# Patient Record
Sex: FEMALE | Race: WHITE | Employment: UNEMPLOYED | URBAN - METROPOLITAN AREA
[De-identification: names, ages, dates, MRNs, and addresses within clinical notes are randomized per-mention and may not be internally consistent; named-entity substitution may affect disease eponyms.]

---

## 2017-06-27 ENCOUNTER — HOSPITAL ENCOUNTER (OUTPATIENT)
Age: 32
Setting detail: SPECIMEN
Discharge: HOME | End: 2017-06-27
Payer: COMMERCIAL

## 2017-06-27 ENCOUNTER — OFFICE VISIT (OUTPATIENT)
Dept: OBGYN CLINIC | Age: 32
End: 2017-06-27
Payer: COMMERCIAL

## 2017-06-27 VITALS
HEART RATE: 85 BPM | RESPIRATION RATE: 18 BRPM | WEIGHT: 206 LBS | HEIGHT: 64 IN | SYSTOLIC BLOOD PRESSURE: 111 MMHG | DIASTOLIC BLOOD PRESSURE: 70 MMHG | BODY MASS INDEX: 35.17 KG/M2

## 2017-06-27 DIAGNOSIS — Z01.419 WELL WOMAN EXAM: Primary | ICD-10-CM

## 2017-06-27 DIAGNOSIS — Z12.4 PAP SMEAR FOR CERVICAL CANCER SCREENING: ICD-10-CM

## 2017-06-27 PROCEDURE — 99395 PREV VISIT EST AGE 18-39: CPT | Performed by: SPECIALIST

## 2017-06-27 ASSESSMENT — ENCOUNTER SYMPTOMS
NAUSEA: 0
EYE PAIN: 0
CONSTIPATION: 0
VOMITING: 0
APNEA: 0
DIARRHEA: 0
COUGH: 0
ABDOMINAL PAIN: 0
ABDOMINAL DISTENTION: 0

## 2017-06-29 LAB — CYTOLOGY REPORT: NORMAL

## 2017-07-25 ENCOUNTER — OFFICE VISIT (OUTPATIENT)
Dept: BEHAVIORAL/MENTAL HEALTH CLINIC | Age: 32
End: 2017-07-25
Payer: COMMERCIAL

## 2017-07-25 ENCOUNTER — OFFICE VISIT (OUTPATIENT)
Dept: FAMILY MEDICINE CLINIC | Age: 32
End: 2017-07-25
Payer: COMMERCIAL

## 2017-07-25 VITALS
BODY MASS INDEX: 35 KG/M2 | HEART RATE: 85 BPM | SYSTOLIC BLOOD PRESSURE: 105 MMHG | HEIGHT: 64 IN | WEIGHT: 205 LBS | DIASTOLIC BLOOD PRESSURE: 72 MMHG

## 2017-07-25 DIAGNOSIS — F40.01 PANIC DISORDER WITH AGORAPHOBIA: Primary | ICD-10-CM

## 2017-07-25 DIAGNOSIS — F41.9 ANXIETY: Primary | ICD-10-CM

## 2017-07-25 PROCEDURE — 99213 OFFICE O/P EST LOW 20 MIN: CPT | Performed by: FAMILY MEDICINE

## 2017-07-25 PROCEDURE — 90791 PSYCH DIAGNOSTIC EVALUATION: CPT | Performed by: SOCIAL WORKER

## 2017-07-25 RX ORDER — ESCITALOPRAM OXALATE 20 MG/1
20 TABLET ORAL DAILY
Qty: 30 TABLET | Refills: 3 | Status: SHIPPED | OUTPATIENT
Start: 2017-07-25 | End: 2017-08-04

## 2017-07-31 ENCOUNTER — INITIAL CONSULT (OUTPATIENT)
Dept: BEHAVIORAL/MENTAL HEALTH CLINIC | Age: 32
End: 2017-07-31
Payer: COMMERCIAL

## 2017-07-31 DIAGNOSIS — F41.0 PANIC DISORDER WITHOUT AGORAPHOBIA: Primary | ICD-10-CM

## 2017-07-31 PROCEDURE — 90837 PSYTX W PT 60 MINUTES: CPT | Performed by: SOCIAL WORKER

## 2017-08-04 ENCOUNTER — OFFICE VISIT (OUTPATIENT)
Dept: FAMILY MEDICINE CLINIC | Age: 32
End: 2017-08-04
Payer: COMMERCIAL

## 2017-08-04 VITALS
HEIGHT: 64 IN | SYSTOLIC BLOOD PRESSURE: 102 MMHG | BODY MASS INDEX: 34.52 KG/M2 | WEIGHT: 202.2 LBS | HEART RATE: 83 BPM | DIASTOLIC BLOOD PRESSURE: 69 MMHG

## 2017-08-04 DIAGNOSIS — L65.9 ALOPECIA: ICD-10-CM

## 2017-08-04 DIAGNOSIS — F41.9 ANXIETY: Primary | ICD-10-CM

## 2017-08-04 DIAGNOSIS — N92.6 IRREGULAR MENSTRUAL CYCLE: ICD-10-CM

## 2017-08-04 PROCEDURE — 99214 OFFICE O/P EST MOD 30 MIN: CPT | Performed by: NURSE PRACTITIONER

## 2017-08-04 ASSESSMENT — ENCOUNTER SYMPTOMS
NAUSEA: 0
ABDOMINAL PAIN: 0
SHORTNESS OF BREATH: 0
WHEEZING: 0
VOMITING: 0

## 2017-08-07 ENCOUNTER — INITIAL CONSULT (OUTPATIENT)
Dept: BEHAVIORAL/MENTAL HEALTH CLINIC | Age: 32
End: 2017-08-07
Payer: COMMERCIAL

## 2017-08-07 DIAGNOSIS — F41.0 PANIC DISORDER WITHOUT AGORAPHOBIA: Primary | ICD-10-CM

## 2017-08-07 PROCEDURE — 90832 PSYTX W PT 30 MINUTES: CPT | Performed by: SOCIAL WORKER

## 2017-08-15 ENCOUNTER — TELEPHONE (OUTPATIENT)
Dept: ADMINISTRATIVE | Age: 32
End: 2017-08-15

## 2017-08-15 NOTE — TELEPHONE ENCOUNTER
Patient states she was in the office 8/15/2017 for a visit with Zoe Owens and physician was called away and told to call back to r/s, please call patient to r/s this appointment [phone]

## 2017-08-16 ENCOUNTER — INITIAL CONSULT (OUTPATIENT)
Dept: BEHAVIORAL/MENTAL HEALTH CLINIC | Age: 32
End: 2017-08-16
Payer: COMMERCIAL

## 2017-08-16 DIAGNOSIS — F41.0 PANIC DISORDER WITHOUT AGORAPHOBIA: Primary | ICD-10-CM

## 2017-08-16 PROCEDURE — 90834 PSYTX W PT 45 MINUTES: CPT | Performed by: SOCIAL WORKER

## 2017-08-21 ENCOUNTER — INITIAL CONSULT (OUTPATIENT)
Dept: BEHAVIORAL/MENTAL HEALTH CLINIC | Age: 32
End: 2017-08-21
Payer: COMMERCIAL

## 2017-08-21 DIAGNOSIS — F41.0 PANIC DISORDER WITHOUT AGORAPHOBIA: Primary | ICD-10-CM

## 2017-08-21 PROCEDURE — 90837 PSYTX W PT 60 MINUTES: CPT | Performed by: SOCIAL WORKER

## 2017-08-23 ENCOUNTER — OFFICE VISIT (OUTPATIENT)
Dept: FAMILY MEDICINE CLINIC | Age: 32
End: 2017-08-23
Payer: COMMERCIAL

## 2017-08-23 VITALS
HEIGHT: 64 IN | SYSTOLIC BLOOD PRESSURE: 111 MMHG | WEIGHT: 205 LBS | DIASTOLIC BLOOD PRESSURE: 69 MMHG | OXYGEN SATURATION: 98 % | TEMPERATURE: 97.4 F | BODY MASS INDEX: 35 KG/M2 | HEART RATE: 89 BPM

## 2017-08-23 DIAGNOSIS — F41.1 GAD (GENERALIZED ANXIETY DISORDER): Primary | ICD-10-CM

## 2017-08-23 DIAGNOSIS — R53.82 CHRONIC FATIGUE: ICD-10-CM

## 2017-08-23 DIAGNOSIS — F33.1 MODERATE EPISODE OF RECURRENT MAJOR DEPRESSIVE DISORDER (HCC): ICD-10-CM

## 2017-08-23 DIAGNOSIS — N94.3 PMS (PREMENSTRUAL SYNDROME): ICD-10-CM

## 2017-08-23 DIAGNOSIS — R00.2 PALPITATIONS: ICD-10-CM

## 2017-08-23 DIAGNOSIS — Z13.31 POSITIVE DEPRESSION SCREENING: ICD-10-CM

## 2017-08-23 PROCEDURE — G8431 POS CLIN DEPRES SCRN F/U DOC: HCPCS | Performed by: FAMILY MEDICINE

## 2017-08-23 PROCEDURE — 99214 OFFICE O/P EST MOD 30 MIN: CPT | Performed by: FAMILY MEDICINE

## 2017-08-23 RX ORDER — FLUOXETINE 10 MG/1
10 TABLET, FILM COATED ORAL DAILY
Qty: 30 TABLET | Refills: 0 | Status: SHIPPED | OUTPATIENT
Start: 2017-08-23 | End: 2017-11-28

## 2017-08-23 RX ORDER — LORAZEPAM 1 MG/1
1 TABLET ORAL DAILY PRN
Qty: 7 TABLET | Refills: 0 | Status: SHIPPED | OUTPATIENT
Start: 2017-08-23 | End: 2017-11-28 | Stop reason: SINTOL

## 2017-08-23 ASSESSMENT — ENCOUNTER SYMPTOMS
SHORTNESS OF BREATH: 0
ABDOMINAL DISTENTION: 0
WHEEZING: 0
NAUSEA: 0
COUGH: 0
ABDOMINAL PAIN: 0
DIARRHEA: 0
CHEST TIGHTNESS: 0
VOMITING: 0
CONSTIPATION: 0

## 2017-08-23 ASSESSMENT — PATIENT HEALTH QUESTIONNAIRE - PHQ9
2. FEELING DOWN, DEPRESSED OR HOPELESS: 2
6. FEELING BAD ABOUT YOURSELF - OR THAT YOU ARE A FAILURE OR HAVE LET YOURSELF OR YOUR FAMILY DOWN: 0
10. IF YOU CHECKED OFF ANY PROBLEMS, HOW DIFFICULT HAVE THESE PROBLEMS MADE IT FOR YOU TO DO YOUR WORK, TAKE CARE OF THINGS AT HOME, OR GET ALONG WITH OTHER PEOPLE: 1
3. TROUBLE FALLING OR STAYING ASLEEP: 3
SUM OF ALL RESPONSES TO PHQ QUESTIONS 1-9: 11
9. THOUGHTS THAT YOU WOULD BE BETTER OFF DEAD, OR OF HURTING YOURSELF: 0
7. TROUBLE CONCENTRATING ON THINGS, SUCH AS READING THE NEWSPAPER OR WATCHING TELEVISION: 2
1. LITTLE INTEREST OR PLEASURE IN DOING THINGS: 1
SUM OF ALL RESPONSES TO PHQ9 QUESTIONS 1 & 2: 3
4. FEELING TIRED OR HAVING LITTLE ENERGY: 3
5. POOR APPETITE OR OVEREATING: 0
8. MOVING OR SPEAKING SO SLOWLY THAT OTHER PEOPLE COULD HAVE NOTICED. OR THE OPPOSITE, BEING SO FIGETY OR RESTLESS THAT YOU HAVE BEEN MOVING AROUND A LOT MORE THAN USUAL: 0

## 2017-08-23 ASSESSMENT — ANXIETY QUESTIONNAIRES
5. BEING SO RESTLESS THAT IT IS HARD TO SIT STILL: 1-SEVERAL DAYS
6. BECOMING EASILY ANNOYED OR IRRITABLE: 2-OVER HALF THE DAYS
4. TROUBLE RELAXING: 2-OVER HALF THE DAYS
1. FEELING NERVOUS, ANXIOUS, OR ON EDGE: 3-NEARLY EVERY DAY
2. NOT BEING ABLE TO STOP OR CONTROL WORRYING: 2-OVER HALF THE DAYS
7. FEELING AFRAID AS IF SOMETHING AWFUL MIGHT HAPPEN: 2-OVER HALF THE DAYS
GAD7 TOTAL SCORE: 14
3. WORRYING TOO MUCH ABOUT DIFFERENT THINGS: 2-OVER HALF THE DAYS

## 2017-08-26 PROBLEM — E66.9 OBESITY (BMI 30-39.9): Status: ACTIVE | Noted: 2017-08-26

## 2017-08-26 PROBLEM — N94.3 PMS (PREMENSTRUAL SYNDROME): Status: ACTIVE | Noted: 2017-08-26

## 2017-08-26 PROBLEM — R53.82 CHRONIC FATIGUE: Status: ACTIVE | Noted: 2017-08-26

## 2017-08-26 PROBLEM — F40.01 PANIC DISORDER WITH AGORAPHOBIA: Status: ACTIVE | Noted: 2017-08-26

## 2017-08-26 PROBLEM — R00.2 PALPITATIONS: Status: ACTIVE | Noted: 2017-08-26

## 2017-08-26 PROBLEM — F41.1 GAD (GENERALIZED ANXIETY DISORDER): Status: ACTIVE | Noted: 2017-08-26

## 2017-08-28 ENCOUNTER — INITIAL CONSULT (OUTPATIENT)
Dept: BEHAVIORAL/MENTAL HEALTH CLINIC | Age: 32
End: 2017-08-28
Payer: COMMERCIAL

## 2017-08-28 DIAGNOSIS — F41.0 PANIC DISORDER WITHOUT AGORAPHOBIA: Primary | ICD-10-CM

## 2017-08-28 PROCEDURE — 90834 PSYTX W PT 45 MINUTES: CPT | Performed by: SOCIAL WORKER

## 2017-09-05 ENCOUNTER — INITIAL CONSULT (OUTPATIENT)
Dept: BEHAVIORAL/MENTAL HEALTH CLINIC | Age: 32
End: 2017-09-05
Payer: COMMERCIAL

## 2017-09-05 DIAGNOSIS — F41.0 PANIC DISORDER WITHOUT AGORAPHOBIA: Primary | ICD-10-CM

## 2017-09-05 PROCEDURE — 90837 PSYTX W PT 60 MINUTES: CPT | Performed by: SOCIAL WORKER

## 2017-09-11 ENCOUNTER — INITIAL CONSULT (OUTPATIENT)
Dept: BEHAVIORAL/MENTAL HEALTH CLINIC | Age: 32
End: 2017-09-11
Payer: COMMERCIAL

## 2017-09-11 DIAGNOSIS — F41.0 PANIC DISORDER WITHOUT AGORAPHOBIA: Primary | ICD-10-CM

## 2017-09-11 PROCEDURE — 90837 PSYTX W PT 60 MINUTES: CPT | Performed by: SOCIAL WORKER

## 2017-09-18 ENCOUNTER — INITIAL CONSULT (OUTPATIENT)
Dept: BEHAVIORAL/MENTAL HEALTH CLINIC | Age: 32
End: 2017-09-18
Payer: COMMERCIAL

## 2017-09-18 DIAGNOSIS — F41.0 PANIC DISORDER WITHOUT AGORAPHOBIA: Primary | ICD-10-CM

## 2017-09-18 PROCEDURE — 90837 PSYTX W PT 60 MINUTES: CPT | Performed by: SOCIAL WORKER

## 2017-09-19 ENCOUNTER — HOSPITAL ENCOUNTER (OUTPATIENT)
Age: 32
Discharge: HOME | End: 2017-09-19
Payer: COMMERCIAL

## 2017-09-19 DIAGNOSIS — R53.82 CHRONIC FATIGUE: ICD-10-CM

## 2017-09-19 DIAGNOSIS — N94.3 PMS (PREMENSTRUAL SYNDROME): ICD-10-CM

## 2017-09-19 DIAGNOSIS — F41.1 GAD (GENERALIZED ANXIETY DISORDER): ICD-10-CM

## 2017-09-19 DIAGNOSIS — N92.6 IRREGULAR MENSTRUAL CYCLE: ICD-10-CM

## 2017-09-19 LAB
-: ABNORMAL
AMORPHOUS: ABNORMAL
ANION GAP SERPL CALCULATED.3IONS-SCNC: 13 MMOL/L (ref 9–17)
BACTERIA: ABNORMAL
BILIRUBIN URINE: ABNORMAL
BUN BLDV-MCNC: 14 MG/DL (ref 6–20)
BUN/CREAT BLD: NORMAL (ref 9–20)
CALCIUM SERPL-MCNC: 9.1 MG/DL (ref 8.6–10.4)
CASTS UA: ABNORMAL /LPF
CHLORIDE BLD-SCNC: 102 MMOL/L (ref 98–107)
CHOLESTEROL/HDL RATIO: 2.8
CHOLESTEROL: 176 MG/DL
CO2: 24 MMOL/L (ref 20–31)
COLOR: YELLOW
COMMENT UA: ABNORMAL
CREAT SERPL-MCNC: 0.64 MG/DL (ref 0.5–0.9)
CRYSTALS, UA: ABNORMAL /HPF
EPITHELIAL CELLS UA: ABNORMAL /HPF
GFR AFRICAN AMERICAN: >60 ML/MIN
GFR NON-AFRICAN AMERICAN: >60 ML/MIN
GFR SERPL CREATININE-BSD FRML MDRD: NORMAL ML/MIN/{1.73_M2}
GFR SERPL CREATININE-BSD FRML MDRD: NORMAL ML/MIN/{1.73_M2}
GLUCOSE BLD-MCNC: 91 MG/DL (ref 70–99)
GLUCOSE URINE: NEGATIVE
HCG(URINE) PREGNANCY TEST: NEGATIVE
HCT VFR BLD CALC: 41 % (ref 36–46)
HDLC SERPL-MCNC: 62 MG/DL
HEMOGLOBIN: 13.7 G/DL (ref 12–16)
KETONES, URINE: NEGATIVE
LDL CHOLESTEROL: 105 MG/DL (ref 0–130)
LEUKOCYTE ESTERASE, URINE: NEGATIVE
MCH RBC QN AUTO: 30.9 PG (ref 26–34)
MCHC RBC AUTO-ENTMCNC: 33.5 G/DL (ref 31–37)
MCV RBC AUTO: 92.1 FL (ref 80–100)
MUCUS: ABNORMAL
NITRITE, URINE: NEGATIVE
OTHER OBSERVATIONS UA: ABNORMAL
PDW BLD-RTO: 12.9 % (ref 11.5–14.9)
PH UA: 5 (ref 5–8)
PLATELET # BLD: 254 K/UL (ref 150–450)
PMV BLD AUTO: 8.4 FL (ref 6–12)
POTASSIUM SERPL-SCNC: 3.9 MMOL/L (ref 3.7–5.3)
PROTEIN UA: NEGATIVE
RBC # BLD: 4.45 M/UL (ref 4–5.2)
RBC UA: ABNORMAL /HPF
RENAL EPITHELIAL, UA: ABNORMAL /HPF
SODIUM BLD-SCNC: 139 MMOL/L (ref 135–144)
SPECIFIC GRAVITY UA: 1.02 (ref 1–1.03)
TRICHOMONAS: ABNORMAL
TRIGL SERPL-MCNC: 45 MG/DL
TSH SERPL DL<=0.05 MIU/L-ACNC: 2.8 MIU/L (ref 0.3–5)
TURBIDITY: ABNORMAL
URINE HGB: ABNORMAL
UROBILINOGEN, URINE: NORMAL
VLDLC SERPL CALC-MCNC: NORMAL MG/DL (ref 1–30)
WBC # BLD: 6.6 K/UL (ref 3.5–11)
WBC UA: ABNORMAL /HPF
YEAST: ABNORMAL

## 2017-09-19 PROCEDURE — 80061 LIPID PANEL: CPT

## 2017-09-19 PROCEDURE — 81001 URINALYSIS AUTO W/SCOPE: CPT

## 2017-09-19 PROCEDURE — 80048 BASIC METABOLIC PNL TOTAL CA: CPT

## 2017-09-19 PROCEDURE — 84703 CHORIONIC GONADOTROPIN ASSAY: CPT

## 2017-09-19 PROCEDURE — 85027 COMPLETE CBC AUTOMATED: CPT

## 2017-09-19 PROCEDURE — 36415 COLL VENOUS BLD VENIPUNCTURE: CPT

## 2017-09-19 PROCEDURE — 84443 ASSAY THYROID STIM HORMONE: CPT

## 2017-09-22 ENCOUNTER — TELEPHONE (OUTPATIENT)
Dept: FAMILY MEDICINE CLINIC | Age: 32
End: 2017-09-22

## 2017-09-22 DIAGNOSIS — R31.9 HEMATURIA: Primary | ICD-10-CM

## 2017-09-25 ENCOUNTER — INITIAL CONSULT (OUTPATIENT)
Dept: BEHAVIORAL/MENTAL HEALTH CLINIC | Age: 32
End: 2017-09-25
Payer: COMMERCIAL

## 2017-09-25 ENCOUNTER — HOSPITAL ENCOUNTER (OUTPATIENT)
Dept: ULTRASOUND IMAGING | Age: 32
Discharge: HOME | End: 2017-09-25
Payer: COMMERCIAL

## 2017-09-25 DIAGNOSIS — F41.0 PANIC DISORDER WITHOUT AGORAPHOBIA: Primary | ICD-10-CM

## 2017-09-25 DIAGNOSIS — R31.9 HEMATURIA: ICD-10-CM

## 2017-09-25 PROCEDURE — 76770 US EXAM ABDO BACK WALL COMP: CPT

## 2017-09-25 PROCEDURE — 90837 PSYTX W PT 60 MINUTES: CPT | Performed by: SOCIAL WORKER

## 2017-09-26 ENCOUNTER — OFFICE VISIT (OUTPATIENT)
Dept: FAMILY MEDICINE CLINIC | Age: 32
End: 2017-09-26
Payer: COMMERCIAL

## 2017-09-26 VITALS
WEIGHT: 204.2 LBS | OXYGEN SATURATION: 97 % | HEART RATE: 83 BPM | DIASTOLIC BLOOD PRESSURE: 73 MMHG | BODY MASS INDEX: 34.86 KG/M2 | HEIGHT: 64 IN | SYSTOLIC BLOOD PRESSURE: 119 MMHG | TEMPERATURE: 97.8 F

## 2017-09-26 DIAGNOSIS — R53.82 CHRONIC FATIGUE: ICD-10-CM

## 2017-09-26 DIAGNOSIS — N94.3 PMS (PREMENSTRUAL SYNDROME): ICD-10-CM

## 2017-09-26 DIAGNOSIS — F41.1 GAD (GENERALIZED ANXIETY DISORDER): ICD-10-CM

## 2017-09-26 DIAGNOSIS — R01.1 MURMUR: ICD-10-CM

## 2017-09-26 DIAGNOSIS — R31.9 HEMATURIA: ICD-10-CM

## 2017-09-26 DIAGNOSIS — R00.2 PALPITATIONS: Primary | ICD-10-CM

## 2017-09-26 PROCEDURE — 96127 BRIEF EMOTIONAL/BEHAV ASSMT: CPT | Performed by: FAMILY MEDICINE

## 2017-09-26 PROCEDURE — 99214 OFFICE O/P EST MOD 30 MIN: CPT | Performed by: FAMILY MEDICINE

## 2017-09-26 ASSESSMENT — ANXIETY QUESTIONNAIRES
5. BEING SO RESTLESS THAT IT IS HARD TO SIT STILL: 1-SEVERAL DAYS
2. NOT BEING ABLE TO STOP OR CONTROL WORRYING: 2-OVER HALF THE DAYS
7. FEELING AFRAID AS IF SOMETHING AWFUL MIGHT HAPPEN: 1-SEVERAL DAYS
6. BECOMING EASILY ANNOYED OR IRRITABLE: 1-SEVERAL DAYS
GAD7 TOTAL SCORE: 10
4. TROUBLE RELAXING: 2-OVER HALF THE DAYS
3. WORRYING TOO MUCH ABOUT DIFFERENT THINGS: 2-OVER HALF THE DAYS
1. FEELING NERVOUS, ANXIOUS, OR ON EDGE: 1-SEVERAL DAYS

## 2017-09-26 ASSESSMENT — ENCOUNTER SYMPTOMS
DIARRHEA: 0
BACK PAIN: 1
ABDOMINAL PAIN: 0
WHEEZING: 0
CONSTIPATION: 0
CHEST TIGHTNESS: 0
COUGH: 0
NAUSEA: 0
VOMITING: 0
SHORTNESS OF BREATH: 0
ABDOMINAL DISTENTION: 0

## 2017-09-26 ASSESSMENT — PATIENT HEALTH QUESTIONNAIRE - PHQ9
4. FEELING TIRED OR HAVING LITTLE ENERGY: 2
8. MOVING OR SPEAKING SO SLOWLY THAT OTHER PEOPLE COULD HAVE NOTICED. OR THE OPPOSITE, BEING SO FIGETY OR RESTLESS THAT YOU HAVE BEEN MOVING AROUND A LOT MORE THAN USUAL: 0
3. TROUBLE FALLING OR STAYING ASLEEP: 3
SUM OF ALL RESPONSES TO PHQ QUESTIONS 1-9: 9
10. IF YOU CHECKED OFF ANY PROBLEMS, HOW DIFFICULT HAVE THESE PROBLEMS MADE IT FOR YOU TO DO YOUR WORK, TAKE CARE OF THINGS AT HOME, OR GET ALONG WITH OTHER PEOPLE: 1
7. TROUBLE CONCENTRATING ON THINGS, SUCH AS READING THE NEWSPAPER OR WATCHING TELEVISION: 1
2. FEELING DOWN, DEPRESSED OR HOPELESS: 1
SUM OF ALL RESPONSES TO PHQ9 QUESTIONS 1 & 2: 2
9. THOUGHTS THAT YOU WOULD BE BETTER OFF DEAD, OR OF HURTING YOURSELF: 0
1. LITTLE INTEREST OR PLEASURE IN DOING THINGS: 1
6. FEELING BAD ABOUT YOURSELF - OR THAT YOU ARE A FAILURE OR HAVE LET YOURSELF OR YOUR FAMILY DOWN: 0
5. POOR APPETITE OR OVEREATING: 1

## 2017-09-28 ENCOUNTER — HOSPITAL ENCOUNTER (OUTPATIENT)
Dept: NON INVASIVE DIAGNOSTICS | Age: 32
Discharge: HOME | End: 2017-09-28
Payer: COMMERCIAL

## 2017-09-28 DIAGNOSIS — R00.2 PALPITATIONS: ICD-10-CM

## 2017-09-28 PROCEDURE — 93226 XTRNL ECG REC<48 HR SCAN A/R: CPT

## 2017-09-28 PROCEDURE — 93225 XTRNL ECG REC<48 HRS REC: CPT

## 2017-10-02 ENCOUNTER — INITIAL CONSULT (OUTPATIENT)
Dept: BEHAVIORAL/MENTAL HEALTH CLINIC | Age: 32
End: 2017-10-02
Payer: COMMERCIAL

## 2017-10-02 DIAGNOSIS — F41.0 PANIC DISORDER WITHOUT AGORAPHOBIA: Primary | ICD-10-CM

## 2017-10-02 PROCEDURE — 90837 PSYTX W PT 60 MINUTES: CPT | Performed by: SOCIAL WORKER

## 2017-10-02 NOTE — PROGRESS NOTES
past panic attacks. She is now confronting her unhappiness in her relationship with son's father. She continues to exercise and is able to think more positive about future. No suicidal ideations. Diagnosis:  The encounter diagnosis was Panic disorder without agoraphobia. - improving with decreasing sx's      Diagnosis Date    Allergic rhinitis     Anxiety     Depression     Headache     Heart murmur     Irritable bowel syndrome with diarrhea 11/17/2016    Midline low back pain without sciatica 11/17/2016    Obesity (BMI 30-39. 9) 8/26/2017    Panic disorder with agoraphobia 8/26/2017       History:    Medications:   Current Outpatient Prescriptions   Medication Sig Dispense Refill    LORazepam (ATIVAN) 1 MG tablet Take 1 tablet by mouth daily as needed for Anxiety 7 tablet 0    FLUoxetine (PROZAC) 10 MG tablet Take 1 tablet by mouth daily X 10 days/mo, premenstrual 30 tablet 0     No current facility-administered medications for this visit. Social History:   Social History     Social History    Marital status: Single     Spouse name: N/A    Number of children: N/A    Years of education: N/A     Occupational History    Not on file. Social History Main Topics    Smoking status: Never Smoker    Smokeless tobacco: Never Used    Alcohol use No    Drug use: No    Sexual activity: Yes     Partners: Male     Other Topics Concern    Not on file     Social History Narrative       TOBACCO:   reports that she has never smoked. She has never used smokeless tobacco.  ETOH:   reports that she does not drink alcohol.     Family History:   Family History   Problem Relation Age of Onset    Bipolar Disorder Mother     Breast Cancer Maternal Grandmother     Heart Attack Maternal Grandfather          Plan:  Pt interventions:  Provided education, Established rapport, Supportive techniques and CBT to target fear, avoidance      Pt Behavioral Change Plan:  Keep up the good work with anxiety reduction of exercise and improved self-care.     Patient Instructions   Continue with work out and exercise for self (anxiety reduction and improved mood, improved self-esteem)  -Continue to pursue marital counseling with sari and Luther Al

## 2017-10-03 ENCOUNTER — OFFICE VISIT (OUTPATIENT)
Dept: UROLOGY | Age: 32
End: 2017-10-03
Payer: COMMERCIAL

## 2017-10-03 VITALS
SYSTOLIC BLOOD PRESSURE: 110 MMHG | BODY MASS INDEX: 34.79 KG/M2 | HEIGHT: 64 IN | HEART RATE: 74 BPM | TEMPERATURE: 98.3 F | WEIGHT: 203.8 LBS | DIASTOLIC BLOOD PRESSURE: 74 MMHG

## 2017-10-03 DIAGNOSIS — R35.0 FREQUENCY OF URINATION: Primary | ICD-10-CM

## 2017-10-03 DIAGNOSIS — R33.9 INCOMPLETE EMPTYING OF BLADDER: ICD-10-CM

## 2017-10-03 DIAGNOSIS — R31.0 GROSS HEMATURIA: ICD-10-CM

## 2017-10-03 LAB
ACQUISITION DURATION: NORMAL S
AVERAGE HEART RATE: 78 BPM
BILIRUBIN, POC: ABNORMAL
BLOOD URINE, POC: ABNORMAL
CLARITY, POC: CLEAR
COLOR, POC: YELLOW
FASTEST SUPRAVENTRICULAR RATE: 170 BPM
GLUCOSE URINE, POC: ABNORMAL
HOOKUP DATE: NORMAL
HOOKUP TIME: NORMAL
KETONES, POC: ABNORMAL
LEUKOCYTE EST, POC: ABNORMAL
LONGEST SUPRAVENTRICULAR RATE: 141 BPM
MAX HEART RATE TIME/DATE: NORMAL
MAX HEART RATE: 148 BPM
MIN HEART RATE TIME/DATE: NORMAL
MIN HEART RATE: 49 BPM
NITRITE, POC: ABNORMAL
NUMBER OF FASTEST SUPRAVENTRICULAR BEATS: 3
NUMBER OF LONGEST SUPRAVENTRICULAR BEATS: 4
NUMBER OF QRS COMPLEXES: NORMAL
NUMBER OF SUPRAVENTRICULAR BEATS IN RUNS: 10
NUMBER OF SUPRAVENTRICULAR COUPLETS: 61
NUMBER OF SUPRAVENTRICULAR ECTOPICS: 309
NUMBER OF SUPRAVENTRICULAR ISOLATED BEATS: 173
NUMBER OF SUPRAVENTRICULAR RUNS: 3
NUMBER OF VENTRICULAR BEATS IN RUNS: 0
NUMBER OF VENTRICULAR BIGEMINAL CYCLES: 0
NUMBER OF VENTRICULAR COUPLETS: 0
NUMBER OF VENTRICULAR ECTOPICS: 62
NUMBER OF VENTRICULAR ISOLATED BEATS: 61
NUMBER OF VENTRICULAR RUNS: 0
PH, POC: ABNORMAL
PROTEIN, POC: ABNORMAL
SPECIFIC GRAVITY, POC: ABNORMAL
UROBILINOGEN, POC: ABNORMAL

## 2017-10-03 PROCEDURE — 81002 URINALYSIS NONAUTO W/O SCOPE: CPT | Performed by: UROLOGY

## 2017-10-03 PROCEDURE — 99204 OFFICE O/P NEW MOD 45 MIN: CPT | Performed by: UROLOGY

## 2017-10-03 PROCEDURE — 51798 US URINE CAPACITY MEASURE: CPT | Performed by: UROLOGY

## 2017-10-03 ASSESSMENT — ENCOUNTER SYMPTOMS
COUGH: 1
ABDOMINAL PAIN: 1
EYE PAIN: 1
NAUSEA: 1
WHEEZING: 0
EYE REDNESS: 0
SHORTNESS OF BREATH: 0
BACK PAIN: 1
COLOR CHANGE: 0
VOMITING: 0

## 2017-10-03 NOTE — PROGRESS NOTES
[facility]  [facility] [place]  [address]  [address]  [address] [address]  Dept: [address] [facility] Urology Office Note - New Patient    Patient:  Kami Wilson  YOB: 1985  Date: 10/3/2017    The patient is a 34 y.o. female who presents today for evaluation of the following problems:   Chief Complaint   Patient presents with    New Patient     hematuria , found out 9/19/2017    referred by Gamaliel Brewer MD.    HPI  Here for hematuria. She noticed it a few weeks ago. She did not see any clots. She had no burning. She states that it looked yellow, with some blood going down the toilet. No flank pain. She has not seen a urologist in the past.   No urologic issues as a child. Renal ultrasound was normal.     (Patient's old records have been requested, reviewed and summarized in today's note.)    Summary of old records: N/A    Additional History: N/A    Procedures Today: pvr    Urinalysis today:  Results for POC orders placed in visit on 10/03/17   POCT Urinalysis no Micro   Result Value Ref Range    Color, UA yellow     Clarity, UA clear     Glucose, UA POC neg     Bilirubin, UA      Ketones, UA      Spec Grav, UA      Blood, UA POC large     pH, UA      Protein, UA POC neg     Urobilinogen, UA      Leukocytes, UA neg     Nitrite, UA neg        AUA Symptom Score (10/3/2017):   INCOMPLETE EMPTYING: How often have you had the sensation of not emptying your bladder?: Less than 1 to 5 times  FREQUENCY: How often do you have to urinate less than every two hours?: Almost always  INTERMITTENCY: How often have you found you stopped and started again several times when you urinated?: Not at all  URGENCY: How often have you found it difficult to postpone urination?: More than Half the time  WEAK STREAM: How often have you had a weak urinary stream?: About Half the time  STRAINING: How often have you had to strain to start  urination?: Not at all  NOCTURIA: How many times did you typically get up at night to uriniate?: 3 Times  TOTAL I-PSS SCORE[de-identified] 16  How would you feel if you were to spend the rest of your life with your urinary condition?: Unhappy    Last BUN and creatinine:  Lab Results   Component Value Date    BUN 14 09/19/2017     Lab Results   Component Value Date    CREATININE 0.64 09/19/2017       Additional Lab/Culture results: none    Imaging Reviewed during this Office Visit: none  (results were independently reviewed by physician and radiology report verified)    PAST MEDICAL, FAMILY AND SOCIAL HISTORY:  Past Medical History:   Diagnosis Date    Allergic rhinitis     Anxiety     Depression     Headache     Heart murmur     Irritable bowel syndrome with diarrhea 11/17/2016    Midline low back pain without sciatica 11/17/2016    Obesity (BMI 30-39. 9) 8/26/2017    Panic disorder with agoraphobia 8/26/2017     Past Surgical History:   Procedure Laterality Date    APPENDECTOMY       Family History   Problem Relation Age of Onset    Bipolar Disorder Mother     Breast Cancer Maternal Grandmother     Heart Attack Maternal Grandfather      No outpatient prescriptions have been marked as taking for the 10/3/17 encounter (Office Visit) with Keron Prieto MD.       Penicillins  History   Smoking Status    Former Smoker    Types: Cigarettes   Smokeless Tobacco    Never Used      (If patient a smoker, smoking cessation counseling offered)   History   Alcohol Use No       REVIEW OF SYSTEMS:  Review of Systems   Constitutional: Positive for appetite change. Negative for chills and fever. Eyes: Positive for pain. Negative for redness and visual disturbance. Respiratory: Positive for cough. Negative for shortness of breath and wheezing. Cardiovascular: Positive for chest pain. Negative for leg swelling. Gastrointestinal: Positive for abdominal pain and nausea. Negative for vomiting.    Genitourinary: Positive for frequency, hematuria, urgency and

## 2017-10-12 ENCOUNTER — HOSPITAL ENCOUNTER (OUTPATIENT)
Dept: CT IMAGING | Age: 32
Discharge: HOME | End: 2017-10-12
Payer: COMMERCIAL

## 2017-10-12 DIAGNOSIS — R31.0 GROSS HEMATURIA: ICD-10-CM

## 2017-10-12 PROCEDURE — 2580000003 HC RX 258: Performed by: UROLOGY

## 2017-10-12 PROCEDURE — 6360000004 HC RX CONTRAST MEDICATION: Performed by: UROLOGY

## 2017-10-12 PROCEDURE — 74178 CT ABD&PLV WO CNTR FLWD CNTR: CPT

## 2017-10-12 RX ORDER — SODIUM CHLORIDE 0.9 % (FLUSH) 0.9 %
10 SYRINGE (ML) INJECTION PRN
Status: DISCONTINUED | OUTPATIENT
Start: 2017-10-12 | End: 2017-10-15 | Stop reason: HOSPADM

## 2017-10-12 RX ORDER — 0.9 % SODIUM CHLORIDE 0.9 %
100 INTRAVENOUS SOLUTION INTRAVENOUS ONCE
Status: COMPLETED | OUTPATIENT
Start: 2017-10-12 | End: 2017-10-12

## 2017-10-12 RX ADMIN — IOPAMIDOL 120 ML: 755 INJECTION, SOLUTION INTRAVENOUS at 16:31

## 2017-10-12 RX ADMIN — SODIUM CHLORIDE 100 ML: 9 INJECTION, SOLUTION INTRAVENOUS at 16:31

## 2017-10-12 RX ADMIN — Medication 10 ML: at 16:31

## 2017-10-17 ENCOUNTER — TELEPHONE (OUTPATIENT)
Dept: UROLOGY | Age: 32
End: 2017-10-17

## 2017-10-17 NOTE — TELEPHONE ENCOUNTER
Pt called was given CT results  By someone else . ..; doesn't want to wait till 11/28/2017  To have  Cysto in office . .. I asked the DR about it  He said that is fine but i9t will have to be in the hospital .. .. She said that is fine she wants it asap .  So please schedule in the hospital

## 2017-10-24 ENCOUNTER — PROCEDURE VISIT (OUTPATIENT)
Dept: UROLOGY | Age: 32
End: 2017-10-24
Payer: COMMERCIAL

## 2017-10-24 ENCOUNTER — HOSPITAL ENCOUNTER (OUTPATIENT)
Age: 32
Setting detail: SPECIMEN
Discharge: HOME | End: 2017-10-24
Payer: COMMERCIAL

## 2017-10-24 VITALS — HEART RATE: 82 BPM | DIASTOLIC BLOOD PRESSURE: 56 MMHG | TEMPERATURE: 98.3 F | SYSTOLIC BLOOD PRESSURE: 97 MMHG

## 2017-10-24 DIAGNOSIS — R31.0 GROSS HEMATURIA: Primary | ICD-10-CM

## 2017-10-24 PROCEDURE — 52000 CYSTOURETHROSCOPY: CPT | Performed by: UROLOGY

## 2017-10-25 ENCOUNTER — TELEPHONE (OUTPATIENT)
Dept: FAMILY MEDICINE CLINIC | Age: 32
End: 2017-10-25

## 2017-10-25 DIAGNOSIS — R31.0 GROSS HEMATURIA: Primary | ICD-10-CM

## 2017-10-25 LAB
CASE NUMBER:: NORMAL
SPECIMEN DESCRIPTION: NORMAL
SURGICAL PATHOLOGY REPORT: NORMAL

## 2017-10-25 NOTE — TELEPHONE ENCOUNTER
I made a referral to nephrology, please give her the contact information    1.  Gross hematuria  [facility] MD

## 2017-10-26 NOTE — TELEPHONE ENCOUNTER
Faxed the referral to nephrology and also spoke to the patient and let her know what the recommendations is

## 2017-10-30 ENCOUNTER — INITIAL CONSULT (OUTPATIENT)
Dept: BEHAVIORAL/MENTAL HEALTH CLINIC | Age: 32
End: 2017-10-30
Payer: COMMERCIAL

## 2017-10-30 DIAGNOSIS — F41.0 PANIC DISORDER WITHOUT AGORAPHOBIA: Primary | ICD-10-CM

## 2017-10-30 PROCEDURE — 90837 PSYTX W PT 60 MINUTES: CPT | Performed by: SOCIAL WORKER

## 2017-10-30 PROCEDURE — 1036F TOBACCO NON-USER: CPT | Performed by: SOCIAL WORKER

## 2017-11-13 ENCOUNTER — INITIAL CONSULT (OUTPATIENT)
Dept: BEHAVIORAL/MENTAL HEALTH CLINIC | Age: 32
End: 2017-11-13
Payer: COMMERCIAL

## 2017-11-13 DIAGNOSIS — F41.0 PANIC DISORDER WITHOUT AGORAPHOBIA: Primary | ICD-10-CM

## 2017-11-13 PROCEDURE — 90837 PSYTX W PT 60 MINUTES: CPT | Performed by: SOCIAL WORKER

## 2017-11-13 NOTE — PROGRESS NOTES
Behavioral Health Consultation  JUAN JOSE Castelan, SUSANS, [facility]  11/13/2017  5:51 PM    Time spent with Patient: 60 minutes  This is patient's 13th [facility] consultation. Reason for Consult:  anxiety and stress  Referring Provider: Bharath Rosa MD    Pt provided informed consent for the behavioral health program. Discussed with patient model of service to include the limits of confidentiality (i.e. abuse reporting, suicide intervention, etc.) and short-term intervention focused approach. Pt indicated understanding. Feedback given to PCP. S:   Lidia Gandara reports that she could not tolerate the Ativan well. It seemed to increase the panic attacks for her. \"I just can't seem to get back to normal.\"  She reports that her panic episodes are so horrible that \"I can't stand being in my head\". She continues to be upset in her relationship with son's father. She is still waiting for him to agree to go to couples counseling with her. So far he is not willing. She reports anger, frustration and not feeling satisfied in this relationship. Her anxiety was so bad the other day she almost took [facility] to [facility] as she felt she needed to be admitted. O:  MSE:     Appearance    alert, cooperative  Appetite normal  Sleep disturbance No  Fatigue No  Loss of pleasure No  Impulsive behavior No  Speech    spontaneous, normal rate, normal volume and well articulated  Mood    Anxious  Affect    anxiety  Thought Content    intact and excessive preoccupations  Thought Process    linear, goal directed and coherent  Associations    logical connections  Insight    Good  Judgment    Intact  Orientation    oriented to person, place, time, and general circumstances  Memory    recent and remote memory intact  Attention/Concentration    intact  Morbid ideation No  Suicide Assessment    no suicidal ideation    A:   Patient presents with anxiety, tearfulness, talkative. She has had recent panic sx's.   She denies suicidal ideations and mood is slightly depressed. No psychosis, no delusional thoughts. Grief over relationship not being what she needs. Patient struggled since our last visit. She almost [facility] as she reports her anxiety and panic was very severe. She is aware that her living situation is fueling much of her anxiety but she is not able/ready to remove herself from the relationship. Her overall sx's seem to be very severe and she gets racing and overwhelming thoughts. Her panic began when she attempted her trial of the prescribed Ativan. She gets overwhelming fearful thoughts that the medication will cause more panic and she thinks herself into more anxiety and panic. Patient is asking about seeing a psychiatrist.  We made referral to Dr. Hakan Montelongo. I will discuss with Dr. Lidia Lester to make sure she is in agreement and ok with this referral.        Diagnosis:  Panic disorder without agoraphobia      Diagnosis Date    Allergic rhinitis     Anxiety     Depression     Headache(784.0)     Heart murmur     Irritable bowel syndrome with diarrhea 11/17/2016    Midline low back pain without sciatica 11/17/2016    Obesity (BMI 30-39. 9) 8/26/2017    Panic disorder with agoraphobia 8/26/2017       History:    Medications:   Current Outpatient Prescriptions   Medication Sig Dispense Refill    LORazepam (ATIVAN) 1 MG tablet Take 1 tablet by mouth daily as needed for Anxiety 7 tablet 0    FLUoxetine (PROZAC) 10 MG tablet Take 1 tablet by mouth daily X 10 days/mo, premenstrual 30 tablet 0     No current facility-administered medications for this visit. Social History:   Social History     Social History    Marital status: Single     Spouse name: N/A    Number of children: N/A    Years of education: N/A     Occupational History    Not on file.      Social History Main Topics    Smoking status: Former Smoker     Types: Cigarettes    Smokeless tobacco: Never Used   

## 2017-11-13 NOTE — PROGRESS NOTES
I'm OK with any referral you feel it's appropriate. FYI  \"Dr. Natali Burns,   I wanted to make sure that you are ok with this.  Bette's anxiety was so increased and intense that I referred her to Dr. Arvin Banda almost took herself to [facility] as her racing thoughts, panic and daily sx's have been increasing. Please let me know if that is ok with you.    Thanks much,  OraLee \"

## 2017-11-20 ENCOUNTER — INITIAL CONSULT (OUTPATIENT)
Dept: BEHAVIORAL/MENTAL HEALTH CLINIC | Age: 32
End: 2017-11-20
Payer: COMMERCIAL

## 2017-11-20 DIAGNOSIS — F41.0 PANIC DISORDER WITHOUT AGORAPHOBIA: Primary | ICD-10-CM

## 2017-11-20 PROCEDURE — 90837 PSYTX W PT 60 MINUTES: CPT | Performed by: SOCIAL WORKER

## 2017-11-20 NOTE — PROGRESS NOTES
Behavioral Health Consultation  JUAN JOSE Jackson, SALLY, [facility]  11/20/2017  3:30 PM    Time spent with Patient: 60 minutes  This is patient's 14th [facility] consultation. Reason for Consult:  anxiety and stress  Referring Provider: Celina Figueroa MD    Pt provided informed consent for the behavioral health program. Discussed with patient model of service to include the limits of confidentiality (i.e. abuse reporting, suicide intervention, etc.) and short-term intervention focused approach. Pt indicated understanding. Feedback given to PCP. S:   Patient is doing a lot better. No panic sx's since our last visit. She is scheduled to see Dr. Nico Renner on the 28th and plans to keep appointment. She shared important information about her pattern of panic attacks. She has panic attacks either 1 week prior to her period or 2 weeks following. She feels that the pattern is the same each month. We discussed the possible hormonal fluctuation/significance. She asked a lot of good parenting questions today. She is frustrated and struggling with her son's challenging behaviors as of late. She is trying to be patient as she waits for [facility] to agree to go to couples counseling with her. O:  MSE:     Appearance    alert, cooperative  Appetite normal  Sleep disturbance No  Fatigue No  Loss of pleasure No  Impulsive behavior No  Speech    spontaneous, normal rate, normal volume and well articulated  Mood    Anxious  Affect    anxiety  Thought Content    intact and excessive preoccupations  Thought Process    linear, goal directed and coherent  Associations    logical connections  Insight    Good  Judgment    Intact  Orientation    oriented to person, place, time, and general circumstances  Memory    recent and remote memory intact  Attention/Concentration    intact  Morbid ideation No  Suicide Assessment    no suicidal ideation    A:   Patient and I made a connection today with her panic sx's.   She of Onset    Bipolar Disorder Mother     Breast Cancer Maternal Grandmother     Heart Attack Maternal Grandfather          Plan:  Pt interventions:  Provided education, Established rapport, Supportive techniques and CBT to target fear, avoidance  Consider referral to outpatient therapy    Pt Behavioral Change Plan:  Continue supportive CBT for panic, fear, anxiety. Today began education and behavioral strategies for parenting challenges. Referral to Dr. Shahbaz Bryan.   There are no Patient Instructions on file for this visit.

## 2017-11-21 NOTE — PATIENT INSTRUCTIONS
- Try to practice the following responses to your son's challenging behaviors:  1)  Don't yell. Practice consistency. Use counting to 3 with reinforcement of a reasonable consequence for him when he refuses to comply.

## 2017-11-28 ENCOUNTER — HOSPITAL ENCOUNTER (OUTPATIENT)
Age: 32
Discharge: HOME | End: 2017-11-28
Payer: COMMERCIAL

## 2017-11-28 ENCOUNTER — OFFICE VISIT (OUTPATIENT)
Dept: BEHAVIORAL/MENTAL HEALTH CLINIC | Age: 32
End: 2017-11-28
Payer: COMMERCIAL

## 2017-11-28 VITALS
RESPIRATION RATE: 18 BRPM | SYSTOLIC BLOOD PRESSURE: 109 MMHG | BODY MASS INDEX: 33.3 KG/M2 | DIASTOLIC BLOOD PRESSURE: 72 MMHG | HEART RATE: 82 BPM | WEIGHT: 194 LBS

## 2017-11-28 DIAGNOSIS — F10.21 ALCOHOL USE DISORDER, MODERATE, IN SUSTAINED REMISSION (HCC): ICD-10-CM

## 2017-11-28 DIAGNOSIS — F40.01 PANIC DISORDER WITH AGORAPHOBIA: ICD-10-CM

## 2017-11-28 DIAGNOSIS — F11.11 OPIOID USE DISORDER, MILD, IN SUSTAINED REMISSION (HCC): ICD-10-CM

## 2017-11-28 DIAGNOSIS — F40.01 PANIC DISORDER WITH AGORAPHOBIA: Primary | ICD-10-CM

## 2017-11-28 DIAGNOSIS — F41.1 GENERALIZED ANXIETY DISORDER: ICD-10-CM

## 2017-11-28 DIAGNOSIS — F12.11 CANNABIS USE DISORDER, MILD, IN SUSTAINED REMISSION: ICD-10-CM

## 2017-11-28 LAB
ALBUMIN SERPL-MCNC: 4.7 G/DL (ref 3.5–5.2)
ALBUMIN/GLOBULIN RATIO: NORMAL (ref 1–2.5)
ALP BLD-CCNC: 76 U/L (ref 35–104)
ALT SERPL-CCNC: 27 U/L (ref 5–33)
AMPHETAMINE SCREEN URINE: NEGATIVE
AST SERPL-CCNC: 25 U/L
BARBITURATE SCREEN URINE: NEGATIVE
BENZODIAZEPINE SCREEN, URINE: NEGATIVE
BILIRUB SERPL-MCNC: 0.45 MG/DL (ref 0.3–1.2)
BILIRUBIN DIRECT: 0.1 MG/DL
BILIRUBIN, INDIRECT: 0.35 MG/DL (ref 0–1)
BUPRENORPHINE URINE: NORMAL
CANNABINOID SCREEN URINE: NEGATIVE
COCAINE METABOLITE, URINE: NEGATIVE
GLOBULIN: NORMAL G/DL (ref 1.5–3.8)
MDMA URINE: NORMAL
METHADONE SCREEN, URINE: NEGATIVE
METHAMPHETAMINE, URINE: NORMAL
OPIATES, URINE: NEGATIVE
OXYCODONE SCREEN URINE: NEGATIVE
PHENCYCLIDINE, URINE: NEGATIVE
PROPOXYPHENE, URINE: NORMAL
TEST INFORMATION: NORMAL
TOTAL PROTEIN: 7.7 G/DL (ref 6.4–8.3)
TRICYCLIC ANTIDEPRESSANTS, UR: NORMAL

## 2017-11-28 PROCEDURE — 36415 COLL VENOUS BLD VENIPUNCTURE: CPT

## 2017-11-28 PROCEDURE — 80076 HEPATIC FUNCTION PANEL: CPT

## 2017-11-28 PROCEDURE — 90792 PSYCH DIAG EVAL W/MED SRVCS: CPT | Performed by: PSYCHIATRY & NEUROLOGY

## 2017-11-28 PROCEDURE — 80307 DRUG TEST PRSMV CHEM ANLYZR: CPT

## 2017-11-28 PROCEDURE — 1036F TOBACCO NON-USER: CPT | Performed by: PSYCHIATRY & NEUROLOGY

## 2017-11-28 ASSESSMENT — ENCOUNTER SYMPTOMS
DIARRHEA: 1
NAUSEA: 1
RESPIRATORY NEGATIVE: 1
VOMITING: 1
EYES NEGATIVE: 1
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
## 2017-11-30 ENCOUNTER — TELEPHONE (OUTPATIENT)
Dept: FAMILY MEDICINE CLINIC | Age: 32
End: 2017-11-30

## 2017-11-30 NOTE — TELEPHONE ENCOUNTER
Juan Francisco Marquez from [facility] office called to let you know that she called patient 10 times since they received referral , patient never called back   ARTIE

## 2017-12-04 ENCOUNTER — INITIAL CONSULT (OUTPATIENT)
Dept: BEHAVIORAL/MENTAL HEALTH CLINIC | Age: 32
End: 2017-12-04
Payer: COMMERCIAL

## 2017-12-04 DIAGNOSIS — F41.0 PANIC DISORDER WITHOUT AGORAPHOBIA: Primary | ICD-10-CM

## 2017-12-04 PROCEDURE — 90834 PSYTX W PT 45 MINUTES: CPT | Performed by: SOCIAL WORKER

## 2017-12-04 NOTE — PROGRESS NOTES
continuing mostly cognitive therapy with behavioral education in parenting techniques along with CBT for panic and anxiety in general.  She denies morbid or suicidal ideations. Diagnosis:  Panic disorder without agoraphobia      Diagnosis Date    Allergic rhinitis     Anxiety     Depression     Headache(784.0)     Heart murmur     Irritable bowel syndrome with diarrhea 11/17/2016    Midline low back pain without sciatica 11/17/2016    Obesity (BMI 30-39. 9) 8/26/2017    Panic disorder with agoraphobia 8/26/2017       History:    Medications:   No current outpatient prescriptions on file. No current facility-administered medications for this visit. Social History:   Social History     Social History    Marital status: Single     Spouse name: N/A    Number of children: N/A    Years of education: N/A     Occupational History    Not on file. Social History Main Topics    Smoking status: Former Smoker     Types: Cigarettes     Quit date: 1/1/2005    Smokeless tobacco: Never Used    Alcohol use No      Comment: Started drinking alcohol when she was 23years old. Between 8519-7188, she drank alcohol everyday-half a bottle of vodka daily; got drunk and had blackouts. She tried to 3 beers once in July 2012. No DUIs and DTs.  Drug use:      Types: Marijuana      Comment:  [age] yrs of age, smoked MJ daily 3 joints / day. Again  used to 3 puffs of MJ once only in 2006. Between [age], abused Coricidin cough syrup and Coricidin tablet off and on. Since her 25s,  abused Vicodin and Percocet once in 2 mo. EVELIO--35    Sexual activity: Yes     Partners: Male     Other Topics Concern    Not on file     Social History Narrative    No narrative on file       TOBACCO:   reports that she quit smoking about 12 years ago. Her smoking use included Cigarettes. She has never used smokeless tobacco.  ETOH:   reports that she does not drink alcohol.     Family History:   Family History   Problem Relation Age of Onset    Bipolar Disorder Mother     Breast Cancer Maternal Grandmother     Heart Attack Maternal Grandfather          Plan:  Pt interventions:  Provided education, Established rapport, Supportive techniques and CBT to target fear, avoidance  Consider referral to outpatient therapy    Pt Behavioral Change Plan:  Continue supportive CBT for panic, fear, anxiety. Today began education and behavioral strategies for parenting challenges. Referral to Dr. Gildardo Doherty.   There are no Patient Instructions on file for this visit.

## 2017-12-11 ENCOUNTER — INITIAL CONSULT (OUTPATIENT)
Dept: BEHAVIORAL/MENTAL HEALTH CLINIC | Age: 32
End: 2017-12-11
Payer: COMMERCIAL

## 2017-12-11 DIAGNOSIS — F41.0 PANIC DISORDER WITHOUT AGORAPHOBIA: Primary | ICD-10-CM

## 2017-12-11 PROCEDURE — 90832 PSYTX W PT 30 MINUTES: CPT | Performed by: SOCIAL WORKER

## 2017-12-11 NOTE — PROGRESS NOTES
continuing mostly cognitive therapy with behavioral education in parenting techniques along with CBT for panic and anxiety in general.  She denies morbid or suicidal ideations. Diagnosis:  Panic disorder without agoraphobia      Diagnosis Date    Allergic rhinitis     Anxiety     Depression     Headache(784.0)     Heart murmur     Irritable bowel syndrome with diarrhea 11/17/2016    Midline low back pain without sciatica 11/17/2016    Obesity (BMI 30-39. 9) 8/26/2017    Panic disorder with agoraphobia 8/26/2017       History:    Medications:   No current outpatient prescriptions on file. No current facility-administered medications for this visit. Social History:   Social History     Social History    Marital status: Single     Spouse name: N/A    Number of children: N/A    Years of education: N/A     Occupational History    Not on file. Social History Main Topics    Smoking status: Former Smoker     Types: Cigarettes     Quit date: 1/1/2005    Smokeless tobacco: Never Used    Alcohol use No      Comment: Started drinking alcohol when she was 23years old. Between 7312-8238, she drank alcohol everyday-half a bottle of vodka daily; got drunk and had blackouts. She tried to 3 beers once in July 2012. No DUIs and DTs.  Drug use: Yes     Types: Marijuana      Comment:  [age] yrs of age, smoked MJ daily 3 joints / day. Again  used to 3 puffs of MJ once only in 2006. Between [age], abused Coricidin cough syrup and Coricidin tablet off and on. Since her 25s,  abused Vicodin and Percocet once in 2 mo. EVELIO--35    Sexual activity: Yes     Partners: Male     Other Topics Concern    Not on file     Social History Narrative    No narrative on file       TOBACCO:   reports that she quit smoking about 12 years ago. Her smoking use included Cigarettes. She has never used smokeless tobacco.  ETOH:   reports that she does not drink alcohol.     Family History:   Family History Problem Relation Age of Onset    Bipolar Disorder Mother     Breast Cancer Maternal Grandmother     Heart Attack Maternal Grandfather          Plan:  Pt interventions:  Provided education, Established rapport, Supportive techniques and CBT to target fear, avoidance  Consider referral to outpatient therapy    Pt Behavioral Change Plan:  Continue supportive CBT for panic, fear, anxiety. Today continued to challenge the thinking errors behind being stuck and powerless in her current relationship. There are no Patient Instructions on file for this visit.

## 2017-12-12 ENCOUNTER — OFFICE VISIT (OUTPATIENT)
Dept: BEHAVIORAL/MENTAL HEALTH CLINIC | Age: 32
End: 2017-12-12
Payer: COMMERCIAL

## 2017-12-12 VITALS
BODY MASS INDEX: 34.16 KG/M2 | WEIGHT: 199 LBS | DIASTOLIC BLOOD PRESSURE: 82 MMHG | HEART RATE: 91 BPM | RESPIRATION RATE: 18 BRPM | SYSTOLIC BLOOD PRESSURE: 111 MMHG

## 2017-12-12 DIAGNOSIS — F41.1 GENERALIZED ANXIETY DISORDER: ICD-10-CM

## 2017-12-12 DIAGNOSIS — F12.11 CANNABIS USE DISORDER, MILD, IN SUSTAINED REMISSION: ICD-10-CM

## 2017-12-12 DIAGNOSIS — F11.11 OPIOID USE DISORDER, MILD, IN SUSTAINED REMISSION (HCC): ICD-10-CM

## 2017-12-12 DIAGNOSIS — F10.21 ALCOHOL USE DISORDER, MODERATE, IN SUSTAINED REMISSION (HCC): ICD-10-CM

## 2017-12-12 DIAGNOSIS — F40.01 PANIC DISORDER WITH AGORAPHOBIA: Primary | ICD-10-CM

## 2017-12-12 PROCEDURE — 99214 OFFICE O/P EST MOD 30 MIN: CPT | Performed by: PSYCHIATRY & NEUROLOGY

## 2017-12-12 PROCEDURE — G8417 CALC BMI ABV UP PARAM F/U: HCPCS | Performed by: PSYCHIATRY & NEUROLOGY

## 2017-12-12 PROCEDURE — G8484 FLU IMMUNIZE NO ADMIN: HCPCS | Performed by: PSYCHIATRY & NEUROLOGY

## 2017-12-12 PROCEDURE — G8427 DOCREV CUR MEDS BY ELIG CLIN: HCPCS | Performed by: PSYCHIATRY & NEUROLOGY

## 2017-12-12 PROCEDURE — 1036F TOBACCO NON-USER: CPT | Performed by: PSYCHIATRY & NEUROLOGY

## 2017-12-12 RX ORDER — PAROXETINE 10 MG/1
10 TABLET, FILM COATED ORAL DAILY
Qty: 30 TABLET | Refills: 0 | Status: SHIPPED | OUTPATIENT
Start: 2017-12-12 | End: 2018-01-19

## 2017-12-12 ASSESSMENT — ENCOUNTER SYMPTOMS
BACK PAIN: 1
RESPIRATORY NEGATIVE: 1
NAUSEA: 1
DIARRHEA: 1
EYES NEGATIVE: 1
SORE THROAT: 1

## 2017-12-12 NOTE — PROGRESS NOTES
[facility] Department of Psychiatry  Outpatient Services    Progress Note      Chief Complaint   Patient presents with    Discuss Medications         History obtained from: patient, medical record, outpatient clinical staff. Subjective:  Patient said she is afraid of taking psychotropics. She rated her anxiety ranging from mild to severe. Patient denied depressive symptoms. Patient also denied mood swings. She lives with her 1year-old child by renting a room but at times she stays with her boyfriend and trying to get involve in the couples counseling as recommended by her therapist.  Pt sleeps 8-10 hours. She has had difficulty in initiation of sleep. She feels Her  sleep is not satisfactory. Her appetite has been low   Her family is supportive. She lives with Her 1year-old child. .  She has no  job. She gets a long with:  [x] others  [x] family members      Review of Systems:  Review of Systems   Constitutional: Positive for malaise/fatigue and weight loss. HENT: Positive for ear pain and sore throat. For 2 days. Eyes: Negative. Respiratory: Negative. Cardiovascular: Positive for palpitations. Gastrointestinal: Positive for diarrhea and nausea. Genitourinary: Positive for hematuria. Tests were negative. LMP--11/23/2017; Regular; uses condom   Musculoskeletal: Positive for back pain. Chronic   Skin: Negative. Neurological: Negative. Endo/Heme/Allergies: Bruises/bleeds easily. Psychiatric/Behavioral:        As above. Allergies:  Penicillins    AoD:  Social History   Substance Use Topics    Smoking status: Former Smoker     Types: Cigarettes     Quit date: 1/1/2005    Smokeless tobacco: Never Used    Alcohol use No      Comment: Started drinking alcohol when she was 23years old. Between 4016-4246, she drank alcohol everyday-half a bottle of vodka daily; got drunk and had blackouts. She tried to 3 beers once in July 2012.   No DUIs and DTs.      History   Drug Use    Types: Marijuana     Comment:  [age] yrs of age, smoked MJ daily 3 joints / day. Again  used to 3 puffs of MJ once only in 2006. Between [age], abused Coricidin cough syrup and Coricidin tablet off and on. Since her 25s,  abused Vicodin and Percocet once in 2 mo. LU--2012         Caffeine:   None    Vitals: Wt Readings from Last 1 Encounters:   12/12/17 199 lb (90.3 kg)     BP Readings from Last 1 Encounters:   12/12/17 111/82     Pulse Readings from Last 1 Encounters:   12/12/17 91     Estimated body mass index is 34.16 kg/m² as calculated from the following:    Height as of 10/3/17: 5' 4\" (1.626 m). Weight as of this encounter: 199 lb (90.3 kg). Mental Status Examination:    Level of consciousness:  Within normal limits  Appearance: Appropriately dressed for the weather, seated in chair, with good grooming, obese  Behavior: No abnormalities noted  Attitude toward examiner:  Cooperative, attentive, good eye contact  Speech:  spontaneous, normal rate, normal volume and well articulated  Mood: Worries  Affect:  Tense and anxious  Thought processes:  linear, goal directed and coherent  Thought content:  Pt denied suicidal and homicidal thoughts during this appt. Auditory hallucination:Pt denied auditory hallucination. Pt does not seem to respond to any internal stimuli such as hallucination during this appt. Delusions:  no evidence of delusions  Perceptual Disturbance:  denies any perceptual disturbance  Cognition:  Intact  Memory: age appropriate  Insight & Judgement: fair  Gait: Normal  Abnormal body movements: Minimal tremor in hands noticed. No other abnormal body movements noticed. Today's Medications List:  Current Outpatient Prescriptions:     PARoxetine (PAXIL) 10 MG tablet, Take 1 tablet by mouth daily ----Start, Disp: 30 tablet, Rfl: 0    Continue other medications as prescribed by her Family physician     Diagnoses DSM V  Visit Diagnoses:  1.  Panic disorder with agoraphobia    2. Generalized anxiety disorder    3. Alcohol use disorder, moderate, in sustained remission ([address]    4. Cannabis use disorder, mild, in sustained remission    5. Opioid use disorder, mild, in sustained remission       Other DX:  Back pain, irritable bowel syndrome, hematuria, heart murmur, irregular menstrual cycle, obesity     Severity of Symptoms:  Severe-needing treatment for mental illness, problem in primary support group, financial limitation    Treatment Plan:  Continue medications as mentioned above. Recommended her to start Paxil 10 mg daily. Discussed Side effects including headache, GI upset symptoms, worsening of mood swings, abrupt discontinuation symptoms, increased liver enzyme etc.    Discussed adverse effects of psychotropics during pregnancy and lactation period. Benefit/risk of no trt were discussed . 50% time spent in coordinating care / counseling. Continue individual therapy with BAILEY Arce [facility]. Return in about 4 weeks (around 1/9/2018). as scheduled, and as needed. In case of emergency advised pt to call local ER/[facility]    F/U with Naila MACKAY as needed. Discussed the adverse effects of alcohol and other street drugs along with the psychotropics. Patient voiced understanding of instructions given. Discussed the SE of nicotine and caffeine in anxiety and sleep disturbance. .     Electronically signed by Greg Macedo MD on 12/12/2017 at 4:02 PM

## 2017-12-14 ENCOUNTER — OFFICE VISIT (OUTPATIENT)
Dept: FAMILY MEDICINE CLINIC | Age: 32
End: 2017-12-14
Payer: COMMERCIAL

## 2017-12-14 VITALS
HEIGHT: 64 IN | BODY MASS INDEX: 32.71 KG/M2 | HEART RATE: 95 BPM | OXYGEN SATURATION: 97 % | TEMPERATURE: 97.7 F | WEIGHT: 191.6 LBS | DIASTOLIC BLOOD PRESSURE: 70 MMHG | SYSTOLIC BLOOD PRESSURE: 112 MMHG

## 2017-12-14 DIAGNOSIS — J01.00 ACUTE NON-RECURRENT MAXILLARY SINUSITIS: ICD-10-CM

## 2017-12-14 DIAGNOSIS — J02.9 SORE THROAT: Primary | ICD-10-CM

## 2017-12-14 DIAGNOSIS — Z20.818 STREPTOCOCCUS EXPOSURE: ICD-10-CM

## 2017-12-14 LAB — S PYO AG THROAT QL: NORMAL

## 2017-12-14 PROCEDURE — 87880 STREP A ASSAY W/OPTIC: CPT | Performed by: FAMILY MEDICINE

## 2017-12-14 PROCEDURE — G8484 FLU IMMUNIZE NO ADMIN: HCPCS | Performed by: FAMILY MEDICINE

## 2017-12-14 PROCEDURE — G8417 CALC BMI ABV UP PARAM F/U: HCPCS | Performed by: FAMILY MEDICINE

## 2017-12-14 PROCEDURE — G8427 DOCREV CUR MEDS BY ELIG CLIN: HCPCS | Performed by: FAMILY MEDICINE

## 2017-12-14 PROCEDURE — 99213 OFFICE O/P EST LOW 20 MIN: CPT | Performed by: FAMILY MEDICINE

## 2017-12-14 PROCEDURE — 1036F TOBACCO NON-USER: CPT | Performed by: FAMILY MEDICINE

## 2017-12-14 RX ORDER — M-VIT,TX,IRON,MINS/CALC/FOLIC 27MG-0.4MG
1 TABLET ORAL DAILY
COMMUNITY
End: 2018-06-06

## 2017-12-14 RX ORDER — AZITHROMYCIN 250 MG/1
TABLET, FILM COATED ORAL
Qty: 6 TABLET | Refills: 0 | Status: SHIPPED | OUTPATIENT
Start: 2017-12-14 | End: 2017-12-19

## 2017-12-14 ASSESSMENT — ENCOUNTER SYMPTOMS
SHORTNESS OF BREATH: 0
SORE THROAT: 1
CHEST TIGHTNESS: 0
FACIAL SWELLING: 0
RHINORRHEA: 1
SINUS PAIN: 1
COUGH: 1
STRIDOR: 0
TROUBLE SWALLOWING: 1
SINUS PRESSURE: 0

## 2017-12-14 NOTE — PATIENT INSTRUCTIONS
Patient Education        Sinusitis: Care Instructions  Your Care Instructions    Sinusitis is an infection of the lining of the sinus cavities in your head. Sinusitis often follows a cold. It causes pain and pressure in your head and face. In most cases, sinusitis gets better on its own in 1 to 2 weeks. But some mild symptoms may last for several weeks. Sometimes antibiotics are needed. Follow-up care is a key part of your treatment and safety. Be sure to make and go to all appointments, and call your doctor if you are having problems. It's also a good idea to know your test results and keep a list of the medicines you take. How can you care for yourself at home? · Take an over-the-counter pain medicine, such as acetaminophen (Tylenol), ibuprofen (Advil, Motrin), or naproxen (Aleve). Read and follow all instructions on the label. · If the doctor prescribed antibiotics, take them as directed. Do not stop taking them just because you feel better. You need to take the full course of antibiotics. · Be careful when taking over-the-counter cold or flu medicines and Tylenol at the same time. Many of these medicines have acetaminophen, which is Tylenol. Read the labels to make sure that you are not taking more than the recommended dose. Too much acetaminophen (Tylenol) can be harmful. · Breathe warm, moist air from a steamy shower, a hot bath, or a sink filled with hot water. Avoid cold, dry air. Using a humidifier in your home may help. Follow the directions for cleaning the machine. · Use saline (saltwater) nasal washes to help keep your nasal passages open and wash out mucus and bacteria. You can buy saline nose drops at a grocery store or drugstore. Or you can make your own at home by adding 1 teaspoon of salt and 1 teaspoon of baking soda to 2 cups of distilled water. If you make your own, fill a bulb syringe with the solution, insert the tip into your nostril, and squeeze gently. [place] Ode your nose.   · Put a hot, wet towel or a warm gel pack on your face 3 or 4 times a day for 5 to 10 minutes each time. · Try a decongestant nasal spray like oxymetazoline (Afrin). Do not use it for more than 3 days in a row. Using it for more than 3 days can make your congestion worse. When should you call for help? Call your doctor now or seek immediate medical care if:  ? · You have new or worse swelling or redness in your face or around your eyes. ? · You have a new or higher fever. ? Watch closely for changes in your health, and be sure to contact your doctor if:  ? · You have new or worse facial pain. ? · The mucus from your nose becomes thicker (like pus) or has new blood in it. ? · You are not getting better as expected. Where can you learn more? Go to https://Enhanced Medical Decisionspepiceweb.[employer]. org and sign in to your [employer] account. Enter I888 in the [employer] box to learn more about \"Sinusitis: Care Instructions. \"     If you do not have an account, please click on the \"Sign Up Now\" link. Current as of: May 12, 2017  Content Version: 11.4  © 3068-0621 [employer]. Care instructions adapted under license by [facility] ([facility]). If you have questions about a medical condition or this instruction, always ask your healthcare professional. [place] [address] any warranty or liability for your use of this information. Patient Education        Sore Throat: Care Instructions  Your Care Instructions    Infection by bacteria or a virus causes most sore throats. Cigarette smoke, dry air, air pollution, allergies, and yelling can also cause a sore throat. Sore throats can be painful and annoying. Fortunately, most sore throats go away on their own. If you have a bacterial infection, your doctor may prescribe antibiotics. Follow-up care is a key part of your treatment and safety. Be sure to make and go to all appointments, and call your doctor if you are having problems.  It's also a good idea to know your test results and keep a list of the medicines you take. How can you care for yourself at home? · If your doctor prescribed antibiotics, take them as directed. Do not stop taking them just because you feel better. You need to take the full course of antibiotics. · Gargle with warm salt water once an hour to help reduce swelling and relieve discomfort. Use 1 teaspoon of salt mixed in 1 cup of warm water. · Take an over-the-counter pain medicine, such as acetaminophen (Tylenol), ibuprofen (Advil, Motrin), or naproxen (Aleve). Read and follow all instructions on the label. · Be careful when taking over-the-counter cold or flu medicines and Tylenol at the same time. Many of these medicines have acetaminophen, which is Tylenol. Read the labels to make sure that you are not taking more than the recommended dose. Too much acetaminophen (Tylenol) can be harmful. · Drink plenty of fluids. Fluids may help soothe an irritated throat. Hot fluids, such as tea or soup, may help decrease throat pain. · Use over-the-counter throat lozenges to soothe pain. Regular cough drops or hard candy may also help. These should not be given to young children because of the risk of choking. · Do not smoke or allow others to smoke around you. If you need help quitting, talk to your doctor about stop-smoking programs and medicines. These can increase your chances of quitting for good. · Use a vaporizer or humidifier to add moisture to your bedroom. Follow the directions for cleaning the machine. When should you call for help? Call your doctor now or seek immediate medical care if:  ? · You have new or worse trouble swallowing. ? · Your sore throat gets much worse on one side. ? Watch closely for changes in your health, and be sure to contact your doctor if you do not get better as expected. Where can you learn more? Go to https://frances.[employer]. org and sign in to your [employer] account.  Enter S307 in

## 2017-12-14 NOTE — PROGRESS NOTES
Addressed during office visit today, negative strep test, within normal limits   Continue current treatment discussed during visit
Visit Information    Have you changed or started any medications since your last visit including any over-the-counter medicines, vitamins, or herbal medicines? no   Have you stopped taking any of your medications? Is so, why? -  no  Are you having any side effects from any of your medications? - no    Have you seen any other physician or provider since your last visit?  no   Have you had any other diagnostic tests since your last visit?  no   Have you been seen in the emergency room and/or had an admission in a hospital since we last saw you?  yes - [facility]   Have you had your routine dental cleaning in the past 6 months?  no     Do you have an active MyChart account? If no, what is the barrier?   Yes    Patient Care Team:  Niyah Herrera MD as PCP - General (Family Medicine)  Jac Gillis MD as Consulting Physician (Obstetrics & Gynecology)  Linda Webster MD as Consulting Physician (Urology)  SALLY Beltran as  (Licensed Clinical )    Medical History Review  Past Medical, Family, and Social History reviewed and does contribute to the patient presenting condition    Health Maintenance   Topic Date Due    DTaP/Tdap/Td vaccine (1 - Tdap) 06/23/2003    Pneumococcal med risk (1 of 1 - PPSV23) 06/23/2003    Flu vaccine (1) 12/26/2017 (Originally 9/1/2017)    Cervical cancer screen  06/27/2020    HIV screen  Completed
 POCT rapid strep A       Orders Placed This Encounter   Medications    azithromycin (ZITHROMAX) 250 MG tablet     Si mg orally on day one followed by 250 mg daily on days two through five     Dispense:  6 tablet     Refill:  0       There are no discontinued medications. Bette received counseling on the following healthy behaviors: nutrition, exercise and medication adherence  Reviewed prior labs and health maintenance. Continue current medications, diet and exercise. Discussed use, benefit, and side effects of prescribed medications. Barriers to medication compliance addressed. Patient given educational materials - see patient instructions. All patient questions answered. Patient voiced understanding. The patient's past medical, surgical, social, and family history as well as her   current medications and allergies were reviewed as documented in today's encounter. Medications, labs, diagnostic studies, consultations and follow-up as documented in this encounter. Return for KEEP APPT. Patient was seen with total face to face time of  15 minutes. More than 50% of this visit was counseling and education. Future Appointments  Date Time Provider Irineo Rueda   2017 3:30 PM LORNA MonahanS [facility]   2017 3:30 PM SALLY Monahan [facility]   1/10/2018 3:30 PM Bri Caldwell MD behav hlth [facility]   2018 2:50 PM Angella Elkins MD [address] [place] [address]     This note was completed by using the assistance of a speech-recognition program. However, inadvertent computerized transcription errors may be present. Although every effort was made to ensure accuracy, no guarantees can be provided that every mistake has been identified and corrected by editing.     Electronically signed by Lynn East MD on 2017 at 10:40 PM

## 2017-12-18 ENCOUNTER — INITIAL CONSULT (OUTPATIENT)
Dept: BEHAVIORAL/MENTAL HEALTH CLINIC | Age: 32
End: 2017-12-18
Payer: COMMERCIAL

## 2017-12-18 DIAGNOSIS — F41.0 PANIC DISORDER WITHOUT AGORAPHOBIA: Primary | ICD-10-CM

## 2017-12-18 PROCEDURE — 90837 PSYTX W PT 60 MINUTES: CPT | Performed by: SOCIAL WORKER

## 2017-12-18 NOTE — PROGRESS NOTES
Behavioral Health Consultation  JUAN JOSE Craven, LORNA-S, [facility]  12/18/2017  3:40 PM    Time spent with Patient: 55 minutes  This is patient's 17th [facility] consultation. Reason for Consult:  anxiety and stress  Referring Provider: Jason Leal MD    Pt provided informed consent for the behavioral health program. Discussed with patient model of service to include the limits of confidentiality (i.e. abuse reporting, suicide intervention, etc.) and short-term intervention focused approach. Pt indicated understanding. Feedback given to PCP. S:   Patient continues to have multiple complaints about her relationship with Kalia Hernandez. She has not scheduled couples counseling and is avoiding dealing with the issues. We began to process through her avoidance. She tearfully admits that she is afraid to confront the truth about her relationship and how unhappy she is. We discussed some parenting issues- effective discipline strategies for when her son refuses to listen/obey. O:  MSE:     Appearance    alert, cooperative  Appetite normal  Sleep disturbance No  Fatigue No  Loss of pleasure No  Impulsive behavior No  Speech    spontaneous, normal rate, normal volume and well articulated  Mood    Anxious  Affect    anxiety  Thought Content    intact and excessive preoccupations  Thought Process    linear, goal directed and coherent  Associations    logical connections  Insight    Good  Judgment    Intact  Orientation    oriented to person, place, time, and general circumstances  Memory    recent and remote memory intact  Attention/Concentration    intact  Morbid ideation No  Suicide Assessment    no suicidal ideation    A:   Patient was pleasant, smiling and engaged well. She became tearful when I confronted her situation with Kalia Hernandez and her avoidance of dealing with it. She is beginning to admit that her anxiety and panic is fueled from her avoidance of her relationship problems.    She agrees to call the

## 2017-12-27 ENCOUNTER — INITIAL CONSULT (OUTPATIENT)
Dept: BEHAVIORAL/MENTAL HEALTH CLINIC | Age: 32
End: 2017-12-27
Payer: COMMERCIAL

## 2017-12-27 DIAGNOSIS — F41.0 PANIC DISORDER WITHOUT AGORAPHOBIA: Primary | ICD-10-CM

## 2017-12-27 PROCEDURE — 90837 PSYTX W PT 60 MINUTES: CPT | Performed by: SOCIAL WORKER

## 2017-12-27 NOTE — PROGRESS NOTES
avoidance of dealing with it. She is beginning to admit that her anxiety and panic is fueled from her avoidance of her relationship problems. She agrees to call the couple's therapist and at least schedule the appointment. I am continuing mostly cognitive therapy with behavioral education in parenting techniques along with CBT for panic and anxiety in general.  She denies morbid or suicidal ideations. Diagnosis:  Panic disorder without agoraphobia      Diagnosis Date    Allergic rhinitis     Anxiety     Depression     Headache(784.0)     Heart murmur     Irritable bowel syndrome with diarrhea 11/17/2016    Midline low back pain without sciatica 11/17/2016    Obesity (BMI 30-39. 9) 8/26/2017    Panic disorder with agoraphobia 8/26/2017       History:    Medications:   Current Outpatient Prescriptions   Medication Sig Dispense Refill    Multiple Vitamins-Minerals (THERAPEUTIC MULTIVITAMIN-MINERALS) tablet Take 1 tablet by mouth daily      PARoxetine (PAXIL) 10 MG tablet Take 1 tablet by mouth daily ----Start 30 tablet 0     No current facility-administered medications for this visit. Social History:   Social History     Social History    Marital status: Single     Spouse name: N/A    Number of children: N/A    Years of education: N/A     Occupational History    Not on file. Social History Main Topics    Smoking status: Former Smoker     Types: Cigarettes     Quit date: 1/1/2005    Smokeless tobacco: Never Used    Alcohol use No      Comment: Started drinking alcohol when she was 23years old. Between 3871-7787, she drank alcohol everyday-half a bottle of vodka daily; got drunk and had blackouts. She tried to 3 beers once in July 2012. No DUIs and DTs.  Drug use: Yes     Types: Marijuana      Comment:  15-17 yrs of age, smoked MJ daily 3 joints / day. Again  used to 3 puffs of MJ once only in 2006.   Between [age], abused Coricidin cough syrup and Coricidin tablet off

## 2018-01-03 ENCOUNTER — INITIAL CONSULT (OUTPATIENT)
Dept: BEHAVIORAL/MENTAL HEALTH CLINIC | Age: 33
End: 2018-01-03
Payer: COMMERCIAL

## 2018-01-03 DIAGNOSIS — F41.0 PANIC DISORDER WITHOUT AGORAPHOBIA: Primary | ICD-10-CM

## 2018-01-03 PROCEDURE — 90837 PSYTX W PT 60 MINUTES: CPT | Performed by: SOCIAL WORKER

## 2018-01-03 NOTE — PROGRESS NOTES
Behavioral Health Consultation  JUAN JOSE Pascal, SALYL, [facility]  1/3/2018  3:43 PM    Time spent with Patient: 55 minutes  This is patient's 19th [facility] consultation. Reason for Consult:  anxiety and stress  Referring Provider: Layo Chandler MD    Pt provided informed consent for the behavioral health program. Discussed with patient model of service to include the limits of confidentiality (i.e. abuse reporting, suicide intervention, etc.) and short-term intervention focused approach. Pt indicated understanding. Feedback given to PCP. S:   Patient is anxious today. She is moving in a direction of acceptance that Lelo Tubbs will not change. She is \"tired of being let down and disappointed. \"  She is focusing on her and her son. She is continuing to work out and has lost 15 pounds. She is using her workouts as a stress reducer as well. She is willing to work with me on referring her on to outpatient therapy with a panel provider on her insurance. O:  MSE:     Appearance    alert, cooperative  Appetite normal  Sleep disturbance No  Fatigue No  Loss of pleasure No  Impulsive behavior No  Speech    spontaneous, normal rate, normal volume and well articulated  Mood    Anxious  Affect    anxiety  Thought Content    intact and excessive preoccupations  Thought Process    linear, goal directed and coherent  Associations    logical connections  Insight    Good  Judgment    Intact  Orientation    oriented to person, place, time, and general circumstances  Memory    recent and remote memory intact  Attention/Concentration    intact  Morbid ideation No  Suicide Assessment    no suicidal ideation    A:   Patient was pleasant, smiling and engaged well. No recent panic attacks and mood is stable with congruent affect. I am continuing mostly cognitive therapy with behavioral education in parenting techniques along with CBT for panic and anxiety in general.  She denies morbid or suicidal ideations.    We

## 2018-01-10 ENCOUNTER — INITIAL CONSULT (OUTPATIENT)
Dept: BEHAVIORAL/MENTAL HEALTH CLINIC | Age: 33
End: 2018-01-10
Payer: COMMERCIAL

## 2018-01-10 DIAGNOSIS — F41.0 PANIC DISORDER WITHOUT AGORAPHOBIA: Primary | ICD-10-CM

## 2018-01-10 PROCEDURE — 90837 PSYTX W PT 60 MINUTES: CPT | Performed by: SOCIAL WORKER

## 2018-01-19 ENCOUNTER — OFFICE VISIT (OUTPATIENT)
Dept: FAMILY MEDICINE CLINIC | Age: 33
End: 2018-01-19
Payer: COMMERCIAL

## 2018-01-19 VITALS
SYSTOLIC BLOOD PRESSURE: 132 MMHG | BODY MASS INDEX: 32.27 KG/M2 | DIASTOLIC BLOOD PRESSURE: 76 MMHG | HEART RATE: 97 BPM | WEIGHT: 188 LBS | TEMPERATURE: 97.9 F

## 2018-01-19 DIAGNOSIS — J01.00 ACUTE NON-RECURRENT MAXILLARY SINUSITIS: Primary | ICD-10-CM

## 2018-01-19 PROCEDURE — 1036F TOBACCO NON-USER: CPT | Performed by: NURSE PRACTITIONER

## 2018-01-19 PROCEDURE — 99213 OFFICE O/P EST LOW 20 MIN: CPT | Performed by: NURSE PRACTITIONER

## 2018-01-19 PROCEDURE — G8484 FLU IMMUNIZE NO ADMIN: HCPCS | Performed by: NURSE PRACTITIONER

## 2018-01-19 PROCEDURE — G8427 DOCREV CUR MEDS BY ELIG CLIN: HCPCS | Performed by: NURSE PRACTITIONER

## 2018-01-19 PROCEDURE — G8417 CALC BMI ABV UP PARAM F/U: HCPCS | Performed by: NURSE PRACTITIONER

## 2018-01-19 RX ORDER — BENZONATATE 100 MG/1
100 CAPSULE ORAL 3 TIMES DAILY PRN
Qty: 30 CAPSULE | Refills: 0 | Status: SHIPPED | OUTPATIENT
Start: 2018-01-19 | End: 2018-01-29

## 2018-01-19 RX ORDER — DOXYCYCLINE HYCLATE 100 MG
100 TABLET ORAL 2 TIMES DAILY
Qty: 20 TABLET | Refills: 0 | Status: SHIPPED | OUTPATIENT
Start: 2018-01-19 | End: 2018-01-29

## 2018-01-19 ASSESSMENT — ENCOUNTER SYMPTOMS
VOMITING: 0
COUGH: 1
RHINORRHEA: 1
SORE THROAT: 1
SINUS PRESSURE: 1
NAUSEA: 0
SHORTNESS OF BREATH: 0
ABDOMINAL PAIN: 0
WHEEZING: 0

## 2018-01-19 NOTE — PROGRESS NOTES
11/28/2017     09/19/2017    K 3.9 09/19/2017     09/19/2017    CREATININE 0.64 09/19/2017    BUN 14 09/19/2017    CO2 24 09/19/2017    TSH 2.80 09/19/2017     Lab Results   Component Value Date    CALCIUM 9.1 09/19/2017     Lab Results   Component Value Date    LDLCHOLESTEROL 105 09/19/2017         1. Acute non-recurrent maxillary sinusitis  - benzonatate (TESSALON PERLES) 100 MG capsule; Take 1 capsule by mouth 3 times daily as needed for Cough  Dispense: 30 capsule; Refill: 0  - doxycycline hyclate (VIBRA-TABS) 100 MG tablet; Take 1 tablet by mouth 2 times daily for 10 days  Dispense: 20 tablet; Refill: 0  - increase fluids and rest.      Requested Prescriptions     Signed Prescriptions Disp Refills    benzonatate (TESSALON PERLES) 100 MG capsule 30 capsule 0     Sig: Take 1 capsule by mouth 3 times daily as needed for Cough    doxycycline hyclate (VIBRA-TABS) 100 MG tablet 20 tablet 0     Sig: Take 1 tablet by mouth 2 times daily for 10 days       Medications Discontinued During This Encounter   Medication Reason    PARoxetine (PAXIL) 10 MG tablet Patient Choice       Bette received counseling on the following healthy behaviors: nutrition and exercise  Reviewed prior labs and health maintenance. Continue current medications, diet and exercise. Discussed use, benefit, and side effects of prescribed medications. Barriers to medication compliance addressed. Patient given educational materials - see patient instructions. All patient questions answered. Patient voiced understanding.

## 2018-01-24 ENCOUNTER — INITIAL CONSULT (OUTPATIENT)
Dept: BEHAVIORAL/MENTAL HEALTH CLINIC | Age: 33
End: 2018-01-24
Payer: COMMERCIAL

## 2018-01-24 DIAGNOSIS — F41.0 PANIC DISORDER WITHOUT AGORAPHOBIA: Primary | ICD-10-CM

## 2018-01-24 PROCEDURE — 90837 PSYTX W PT 60 MINUTES: CPT | Performed by: SOCIAL WORKER

## 2018-01-24 NOTE — PROGRESS NOTES
appropriately applying rational cognitive responses to the fears of the panic which seem to be lessening the severity and duration. She denies morbid or suicidal ideations. We've agreed to decrease frequency of her visits as she is improving. Diagnosis:  Panic disorder without agoraphobia      Diagnosis Date    Allergic rhinitis     Anxiety     Depression     Headache(784.0)     Heart murmur     Irritable bowel syndrome with diarrhea 11/17/2016    Midline low back pain without sciatica 11/17/2016    Obesity (BMI 30-39. 9) 8/26/2017    Panic disorder with agoraphobia 8/26/2017       History:    Medications:   Current Outpatient Prescriptions   Medication Sig Dispense Refill    benzonatate (TESSALON PERLES) 100 MG capsule Take 1 capsule by mouth 3 times daily as needed for Cough 30 capsule 0    doxycycline hyclate (VIBRA-TABS) 100 MG tablet Take 1 tablet by mouth 2 times daily for 10 days 20 tablet 0    Multiple Vitamins-Minerals (THERAPEUTIC MULTIVITAMIN-MINERALS) tablet Take 1 tablet by mouth daily       No current facility-administered medications for this visit. Social History:   Social History     Social History    Marital status: Single     Spouse name: N/A    Number of children: N/A    Years of education: N/A     Occupational History    Not on file. Social History Main Topics    Smoking status: Former Smoker     Types: Cigarettes     Quit date: 1/1/2005    Smokeless tobacco: Never Used    Alcohol use No      Comment: Started drinking alcohol when she was 23years old. Between 0412-7728, she drank alcohol everyday-half a bottle of vodka daily; got drunk and had blackouts. She tried to 3 beers once in July 2012. No DUIs and DTs.  Drug use: Yes     Types: Marijuana      Comment:  15-17 yrs of age, smoked MJ daily 3 joints / day. Again  used to 3 puffs of MJ once only in 2006. Between [age], abused Coricidin cough syrup and Coricidin tablet off and on.   Since her 20s,  abused Vicodin and Percocet once in 2 mo. EVELIO--35    Sexual activity: Yes     Partners: Male     Other Topics Concern    Not on file     Social History Narrative    No narrative on file       TOBACCO:   reports that she quit smoking about 13 years ago. Her smoking use included Cigarettes. She has never used smokeless tobacco.  ETOH:   reports that she does not drink alcohol. Family History:   Family History   Problem Relation Age of Onset    Bipolar Disorder Mother     Breast Cancer Maternal Grandmother     Heart Attack Maternal Grandfather          Plan:  Pt interventions:  Provided education, Established rapport, Supportive techniques and CBT to target fear, avoidance  Consider referral to outpatient therapy    Pt Behavioral Change Plan:  Continue supportive CBT for panic, fear, anxiety. Today continued to challenge the thinking errors behind being stuck and powerless in her current relationship. There are no Patient Instructions on file for this visit.

## 2018-02-05 ENCOUNTER — OFFICE VISIT (OUTPATIENT)
Dept: FAMILY MEDICINE CLINIC | Age: 33
End: 2018-02-05
Payer: COMMERCIAL

## 2018-02-05 VITALS
OXYGEN SATURATION: 97 % | SYSTOLIC BLOOD PRESSURE: 107 MMHG | TEMPERATURE: 98 F | BODY MASS INDEX: 31.89 KG/M2 | HEIGHT: 64 IN | DIASTOLIC BLOOD PRESSURE: 72 MMHG | WEIGHT: 186.8 LBS | HEART RATE: 92 BPM

## 2018-02-05 DIAGNOSIS — J01.00 ACUTE NON-RECURRENT MAXILLARY SINUSITIS: Primary | ICD-10-CM

## 2018-02-05 DIAGNOSIS — F41.1 GAD (GENERALIZED ANXIETY DISORDER): ICD-10-CM

## 2018-02-05 DIAGNOSIS — B37.31 YEAST VAGINITIS: ICD-10-CM

## 2018-02-05 DIAGNOSIS — F51.04 PSYCHOPHYSIOLOGICAL INSOMNIA: ICD-10-CM

## 2018-02-05 DIAGNOSIS — F40.01 PANIC DISORDER WITH AGORAPHOBIA: ICD-10-CM

## 2018-02-05 DIAGNOSIS — R31.29 MICROSCOPIC HEMATURIA: ICD-10-CM

## 2018-02-05 PROBLEM — Z20.818 STREPTOCOCCUS EXPOSURE: Status: RESOLVED | Noted: 2017-12-14 | Resolved: 2018-02-05

## 2018-02-05 PROCEDURE — G8427 DOCREV CUR MEDS BY ELIG CLIN: HCPCS | Performed by: FAMILY MEDICINE

## 2018-02-05 PROCEDURE — G8484 FLU IMMUNIZE NO ADMIN: HCPCS | Performed by: FAMILY MEDICINE

## 2018-02-05 PROCEDURE — 99214 OFFICE O/P EST MOD 30 MIN: CPT | Performed by: FAMILY MEDICINE

## 2018-02-05 PROCEDURE — 1036F TOBACCO NON-USER: CPT | Performed by: FAMILY MEDICINE

## 2018-02-05 PROCEDURE — 96127 BRIEF EMOTIONAL/BEHAV ASSMT: CPT | Performed by: FAMILY MEDICINE

## 2018-02-05 PROCEDURE — G8417 CALC BMI ABV UP PARAM F/U: HCPCS | Performed by: FAMILY MEDICINE

## 2018-02-05 RX ORDER — GUAIFENESIN 600 MG/1
600 TABLET, EXTENDED RELEASE ORAL 2 TIMES DAILY PRN
Qty: 30 TABLET | Refills: 0 | Status: SHIPPED | OUTPATIENT
Start: 2018-02-05 | End: 2018-04-19

## 2018-02-05 RX ORDER — FLUTICASONE PROPIONATE 50 MCG
2 SPRAY, SUSPENSION (ML) NASAL DAILY
Qty: 1 BOTTLE | Refills: 3 | Status: SHIPPED | OUTPATIENT
Start: 2018-02-05 | End: 2018-06-06

## 2018-02-05 RX ORDER — ACETAMINOPHEN 500 MG
1000 TABLET ORAL EVERY 6 HOURS PRN
Qty: 120 TABLET | Refills: 3 | Status: SHIPPED | OUTPATIENT
Start: 2018-02-05 | End: 2018-06-06

## 2018-02-05 RX ORDER — DIPHENHYDRAMINE HCL 25 MG
25 CAPSULE ORAL NIGHTLY PRN
Qty: 30 CAPSULE | Refills: 0 | Status: SHIPPED | OUTPATIENT
Start: 2018-02-05 | End: 2018-05-07 | Stop reason: ALTCHOICE

## 2018-02-05 RX ORDER — FLUCONAZOLE 150 MG/1
150 TABLET ORAL ONCE
Qty: 1 TABLET | Refills: 0 | Status: SHIPPED | OUTPATIENT
Start: 2018-02-05 | End: 2018-02-05

## 2018-02-05 RX ORDER — IBUPROFEN 400 MG/1
400 TABLET ORAL EVERY 8 HOURS PRN
Qty: 90 TABLET | Refills: 0 | Status: SHIPPED | OUTPATIENT
Start: 2018-02-05 | End: 2018-05-29 | Stop reason: SDUPTHER

## 2018-02-05 RX ORDER — DIPHENHYDRAMINE HCL 25 MG
25 CAPSULE ORAL NIGHTLY PRN
Qty: 30 CAPSULE | Refills: 0 | Status: SHIPPED | OUTPATIENT
Start: 2018-02-05 | End: 2018-02-05 | Stop reason: SDUPTHER

## 2018-02-05 ASSESSMENT — ANXIETY QUESTIONNAIRES
4. TROUBLE RELAXING: 2-OVER HALF THE DAYS
6. BECOMING EASILY ANNOYED OR IRRITABLE: 1-SEVERAL DAYS
1. FEELING NERVOUS, ANXIOUS, OR ON EDGE: 2-OVER HALF THE DAYS
2. NOT BEING ABLE TO STOP OR CONTROL WORRYING: 1-SEVERAL DAYS
3. WORRYING TOO MUCH ABOUT DIFFERENT THINGS: 1-SEVERAL DAYS
GAD7 TOTAL SCORE: 10
5. BEING SO RESTLESS THAT IT IS HARD TO SIT STILL: 1-SEVERAL DAYS
7. FEELING AFRAID AS IF SOMETHING AWFUL MIGHT HAPPEN: 2-OVER HALF THE DAYS

## 2018-02-05 ASSESSMENT — PATIENT HEALTH QUESTIONNAIRE - PHQ9
6. FEELING BAD ABOUT YOURSELF - OR THAT YOU ARE A FAILURE OR HAVE LET YOURSELF OR YOUR FAMILY DOWN: 0
SUM OF ALL RESPONSES TO PHQ QUESTIONS 1-9: 9
2. FEELING DOWN, DEPRESSED OR HOPELESS: 1
9. THOUGHTS THAT YOU WOULD BE BETTER OFF DEAD, OR OF HURTING YOURSELF: 0
7. TROUBLE CONCENTRATING ON THINGS, SUCH AS READING THE NEWSPAPER OR WATCHING TELEVISION: 2
5. POOR APPETITE OR OVEREATING: 1
SUM OF ALL RESPONSES TO PHQ9 QUESTIONS 1 & 2: 2
4. FEELING TIRED OR HAVING LITTLE ENERGY: 1
8. MOVING OR SPEAKING SO SLOWLY THAT OTHER PEOPLE COULD HAVE NOTICED. OR THE OPPOSITE, BEING SO FIGETY OR RESTLESS THAT YOU HAVE BEEN MOVING AROUND A LOT MORE THAN USUAL: 0
1. LITTLE INTEREST OR PLEASURE IN DOING THINGS: 1
10. IF YOU CHECKED OFF ANY PROBLEMS, HOW DIFFICULT HAVE THESE PROBLEMS MADE IT FOR YOU TO DO YOUR WORK, TAKE CARE OF THINGS AT HOME, OR GET ALONG WITH OTHER PEOPLE: 1
3. TROUBLE FALLING OR STAYING ASLEEP: 3

## 2018-02-05 ASSESSMENT — ENCOUNTER SYMPTOMS
ABDOMINAL PAIN: 0
BACK PAIN: 1
SHORTNESS OF BREATH: 0
VOMITING: 0
CONSTIPATION: 0
NAUSEA: 0
ABDOMINAL DISTENTION: 0
CHEST TIGHTNESS: 0
WHEEZING: 0
SINUS PRESSURE: 1
SORE THROAT: 1
SINUS PAIN: 1
DIARRHEA: 0
COUGH: 1
RHINORRHEA: 1

## 2018-02-05 NOTE — PROGRESS NOTES
Chief Complaint   Patient presents with    Anxiety    Depression    Premenstrual Syndrome    Palpitations    Hematuria         Patient presents today for an acute visit secondary to Anxiety; Depression; Premenstrual Syndrome; Palpitations; and Hematuria   . HPI    Comes with her toddler. Sinus symptoms     Bette complains of sinus congestion, ears pain, sinus pain over the maxillary and frontal sinuses, headaches, sore throat, postnasal drip and clear rhinorrhea. Ears feel plugged. Had Doxycycline 1/29/18 and since then the rhinorrhea is clear at least.  Reports cough and clearing her throat often. She denies shortness of breath. Bette complains of Low back pain on and off, sometimes seeing orange urine and blood in the urine. She is worried she still has blood in the urine. Had cystoscopy and CT urogram and were normal  Reports regular periods  Always has frequency of urination and Urgency of urination   Reports itching after taking antibiotics, and she stings she has infection. Prior urine showed large amount of blood. Patient quit smoking a while ago. CT urogram  FINDINGS:   Lower Chest: Normal heart size.  Lung bases clear.       KIDNEYS AND URINARY TRACT: No renal calculi are identified.  There is no   evidence for hydronephrosis.  The ureters are of normal course and caliber.       ORGANS: The liver, spleen, pancreas adrenal glands and gallbladder appear   grossly unremarkable.       GI/BOWEL: No bowel obstruction or inflammation.       PELVIS: The bladder and pelvic organs are unremarkable.  Left ovarian   dominant follicle.       PERITONEUM/RETROPERITONEUM: No lymphadenopathy is noted.  Normal caliber   aorta.  No ascites or free air.       BONES/SOFT TISSUES: The osseous structures demonstrate no significant   abnormality.           Impression   Unremarkable CT urogram.     US kidney    The right kidney measures 10.96 x 5.73 x 5.24 cm.       Left kidney measures 11.41 x 0.04 x 6.55 cm.       Kidneys demonstrate normal cortical echogenicity.  No evidence of   hydronephrosis or intrarenal stones.  The corticomedullary junctions are   intact.  Corticomedullary ratios are normal.  No cortical thinning is seen.       Bladder:       Unremarkable appearance of the bladder.  Bladder is suboptimally distended. Prevoid bladder volume is 40 cc.  No intraluminal bladder masses are noted.           Impression   Unremarkable ultrasound of the kidneys and urinary bladder.  Bladder is   suboptimally distended. 10/3/2017  3:11 PM - Sherlon Schlatter     Component Results     Component Collected Lab   Color, UA 10/03/2017  3:11 PM Unknown   yellow    Clarity, UA 10/03/2017  3:11 PM Unknown   clear    Glucose, UA POC 10/03/2017  3:11 PM Unknown   neg    Bilirubin, UA 10/03/2017  3:11 PM Unknown   Ketones, UA 10/03/2017  3:11 PM Unknown   Spec Grav, UA 10/03/2017  3:11 PM Unknown   Blood, UA POC 10/03/2017  3:11 PM Unknown   large    pH, UA 10/03/2017  3:11 PM Unknown   Protein, UA POC 10/03/2017  3:11 PM Unknown   neg    Urobilinogen, UA 10/03/2017  3:11 PM Unknown   Leukocytes, UA 10/03/2017  3:11 PM Unknown   neg    Nitrite, UA 10/03/2017  3:11 PM Unknown   neg    Lab and Collection     POCT Urinalysis no Micro on 10/3/2017         Depression and Anxiety. Patient complains of depression. She complains of anhedonia, depressed mood, difficulty concentrating, fatigue and insomnia. Patient complains of anxiety disorder. She has the following anxiety symptoms: difficulty concentrating, fatigue, insomnia, irritable, palpitations, psychomotor agitation, racing thoughts. Was sent to [address] [place] [address] [place], due to suicidal thoughts    Denies suicidal ideation, plan or intent. Patient says she started to exercise and she was able to lose a bit of weight, it also helps her with anxiety.     She was previously following with our cognitive behavioral therapist, Lorena Mckenna, who referred her to  Refill:  0    acetaminophen (APAP EXTRA STRENGTH) 500 MG tablet     Sig: Take 2 tablets by mouth every 6 hours as needed for Pain     Dispense:  120 tablet     Refill:  3    ibuprofen (ADVIL;MOTRIN) 400 MG tablet     Sig: Take 1 tablet by mouth every 8 hours as needed for Pain Short term. Take with food. Dispense:  90 tablet     Refill:  0    Dextromethorphan-guaiFENesin (VICKS DAYQUIL MUCUS CONTROL DM)  MG/15ML LIQD     Sig: Take 15 mLs by mouth every 6-8 hours as needed (congestion)     Dispense:  296 mL     Refill:  0    Benzocaine-Menthol (CEPACOL) 6-10 MG LOZG lozenge     Sig: Take 1 lozenge by mouth every 2 hours as needed for Sore Throat or Pain     Dispense:  18 lozenge     Refill:  3    diphenhydrAMINE (BENADRYL) 25 MG capsule     Sig: Take 1 capsule by mouth nightly as needed for Itching, Allergies or Sleep     Dispense:  30 capsule     Refill:  0       Medications Discontinued During This Encounter   Medication Reason    diphenhydrAMINE (BENADRYL) 25 MG capsule Reorder       Bette received counseling on the following healthy behaviors: nutrition, exercise, medication adherence and weight loss,   Reviewed prior labs and health maintenance  Continue current medications, diet and exercise. Discussed use, benefit, and side effects of prescribed medications. Barriers to medication compliance addressed. Patient given educational materials - see patient instructions  Was a self-tracking handout given in paper form or via S[employer]?  No:    Requested Prescriptions     Signed Prescriptions Disp Refills    fluticasone (FLONASE) 50 MCG/ACT nasal spray 1 Bottle 3     Si sprays by Nasal route daily    fluconazole (DIFLUCAN) 150 MG tablet 1 tablet 0     Sig: Take 1 tablet by mouth once for 1 dose    guaiFENesin (MUCINEX) 600 MG extended release tablet 30 tablet 0     Sig: Take 1 tablet by mouth 2 times daily as needed for Congestion    acetaminophen (APAP EXTRA STRENGTH) 500 MG tablet 120 Irineo Marybeth   2/7/2018 3:30 PM SALLY Guajardo [facility]   4/24/2018 2:50 PM David Zapata  [facility]   5/7/2018 2:30 PM Mary Beth Tobar MD [facility] [address] [facility]     This note was completed by using the assistance of a speech-recognition program. However, inadvertent computerized transcription errors may be present. Although every effort was made to ensure accuracy, no guarantees can be provided that every mistake has been identified and corrected by editing.     Electronically signed by Mary Beth Tobar MD on 2/5/2018 at 6:01 PM

## 2018-02-05 NOTE — PROGRESS NOTES
Visit Information    Have you changed or started any medications since your last visit including any over-the-counter medicines, vitamins, or herbal medicines? no   Have you stopped taking any of your medications? Is so, why? -  no  Are you having any side effects from any of your medications? - no    Have you seen any other physician or provider since your last visit?  no   Have you had any other diagnostic tests since your last visit?  no   Have you been seen in the emergency room and/or had an admission in a hospital since we last saw you?  no   Have you had your routine dental cleaning in the past 6 months?  no     Do you have an active MyChart account? If no, what is the barrier?   Yes    Patient Care Team:  Andrea Brittle, MD as PCP - General (Family Medicine)  Lissa Levine MD as Consulting Physician (Obstetrics & Gynecology)  Mirella Love MD as Consulting Physician (Urology)  SALLY Hylton as  (Licensed Clinical )  Johanna Mcdowell MD as Consulting Physician (Psychiatry)    Medical History Review  Past Medical, Family, and Social History reviewed and does contribute to the patient presenting condition    Health Maintenance   Topic Date Due    DTaP/Tdap/Td vaccine (1 - Tdap) 06/23/2003    Pneumococcal med risk (1 of 1 - PPSV23) 06/23/2003    Flu vaccine (1) 09/01/2017    Cervical cancer screen  06/27/2020    HIV screen  Completed

## 2018-02-05 NOTE — PATIENT INSTRUCTIONS
Patient Education        Sinusitis: Care Instructions  Your Care Instructions    Sinusitis is an infection of the lining of the sinus cavities in your head. Sinusitis often follows a cold. It causes pain and pressure in your head and face. In most cases, sinusitis gets better on its own in 1 to 2 weeks. But some mild symptoms may last for several weeks. Sometimes antibiotics are needed. Follow-up care is a key part of your treatment and safety. Be sure to make and go to all appointments, and call your doctor if you are having problems. It's also a good idea to know your test results and keep a list of the medicines you take. How can you care for yourself at home? · Take an over-the-counter pain medicine, such as acetaminophen (Tylenol), ibuprofen (Advil, Motrin), or naproxen (Aleve). Read and follow all instructions on the label. · If the doctor prescribed antibiotics, take them as directed. Do not stop taking them just because you feel better. You need to take the full course of antibiotics. · Be careful when taking over-the-counter cold or flu medicines and Tylenol at the same time. Many of these medicines have acetaminophen, which is Tylenol. Read the labels to make sure that you are not taking more than the recommended dose. Too much acetaminophen (Tylenol) can be harmful. · Breathe warm, moist air from a steamy shower, a hot bath, or a sink filled with hot water. Avoid cold, dry air. Using a humidifier in your home may help. Follow the directions for cleaning the machine. · Use saline (saltwater) nasal washes to help keep your nasal passages open and wash out mucus and bacteria. You can buy saline nose drops at a grocery store or drugstore. Or you can make your own at home by adding 1 teaspoon of salt and 1 teaspoon of baking soda to 2 cups of distilled water. If you make your own, fill a bulb syringe with the solution, insert the tip into your nostril, and squeeze gently. [facility] [facility] your nose.   · Put a hot, wet towel or a warm gel pack on your face 3 or 4 times a day for 5 to 10 minutes each time. · Try a decongestant nasal spray like oxymetazoline (Afrin). Do not use it for more than 3 days in a row. Using it for more than 3 days can make your congestion worse. When should you call for help? Call your doctor now or seek immediate medical care if:  ? · You have new or worse swelling or redness in your face or around your eyes. ? · You have a new or higher fever. ? Watch closely for changes in your health, and be sure to contact your doctor if:  ? · You have new or worse facial pain. ? · The mucus from your nose becomes thicker (like pus) or has new blood in it. ? · You are not getting better as expected. Where can you learn more? Go to [email or web address]://TapTrackpepicEnChromaeb.[email or web address]. org and sign in to your Halldis account. Enter N181 in the Surplex box to learn more about \"Sinusitis: Care Instructions. \"     If you do not have an account, please click on the \"Sign Up Now\" link. Current as of: May 12, 2017  Content Version: 11.5  © 2038-0366 [employer]. Care instructions adapted under license by [facility] [employer] [facility] ([facility]). If you have questions about a medical condition or this instruction, always ask your healthcare professional. [place] [address] any warranty or liability for your use of this information. Patient Education        Insomnia: Care Instructions  Your Care Instructions    Insomnia is the inability to sleep well. It is a common problem for most people at some time. Insomnia may make it hard for you to get to sleep, stay asleep, or sleep as long as you need to. This can make you tired and grouchy during the day. It can also make you forgetful, less effective at work, and unhappy. Insomnia can be caused by conditions such as depression or anxiety. Pain can also affect your ability to sleep. When these problems are solved, the insomnia usually clears up.  But

## 2018-02-06 ENCOUNTER — TELEPHONE (OUTPATIENT)
Dept: FAMILY MEDICINE CLINIC | Age: 33
End: 2018-02-06

## 2018-02-06 DIAGNOSIS — R05.9 COUGH: Primary | ICD-10-CM

## 2018-02-06 RX ORDER — GUAIFENESIN/DEXTROMETHORPHAN 100-10MG/5
10 SYRUP ORAL 3 TIMES DAILY PRN
Qty: 120 ML | Refills: 0 | Status: SHIPPED | OUTPATIENT
Start: 2018-02-06 | End: 2018-02-06 | Stop reason: RX

## 2018-02-06 RX ORDER — GUAIFENESIN AND DEXTROMETHORPHAN HYDROBROMIDE 100; 10 MG/5ML; MG/5ML
10 SOLUTION ORAL EVERY 4 HOURS PRN
Qty: 120 ML | Refills: 0 | Status: SHIPPED | OUTPATIENT
Start: 2018-02-06 | End: 2018-05-07 | Stop reason: ALTCHOICE

## 2018-02-07 ENCOUNTER — INITIAL CONSULT (OUTPATIENT)
Dept: BEHAVIORAL/MENTAL HEALTH CLINIC | Age: 33
End: 2018-02-07
Payer: COMMERCIAL

## 2018-02-07 DIAGNOSIS — F41.0 PANIC DISORDER WITHOUT AGORAPHOBIA: Primary | ICD-10-CM

## 2018-02-07 PROCEDURE — 90837 PSYTX W PT 60 MINUTES: CPT | Performed by: SOCIAL WORKER

## 2018-02-19 ENCOUNTER — INITIAL CONSULT (OUTPATIENT)
Dept: BEHAVIORAL/MENTAL HEALTH CLINIC | Age: 33
End: 2018-02-19
Payer: COMMERCIAL

## 2018-02-19 DIAGNOSIS — F41.0 PANIC DISORDER WITHOUT AGORAPHOBIA: Primary | ICD-10-CM

## 2018-02-19 PROCEDURE — 90837 PSYTX W PT 60 MINUTES: CPT | Performed by: SOCIAL WORKER

## 2018-02-19 NOTE — PROGRESS NOTES
stable and needs to keep working on relationship issue. She is focusing on health issues- doing a great job of working out and weight loss. Will continue to offer supportive counseling at this point. Diagnosis:  Panic disorder without agoraphobia      Diagnosis Date    Allergic rhinitis     Anxiety     Depression     Headache(784.0)     Heart murmur     Irritable bowel syndrome with diarrhea 11/17/2016    Midline low back pain without sciatica 11/17/2016    Obesity (BMI 30-39. 9) 8/26/2017    Panic disorder with agoraphobia 8/26/2017       History:    Medications:   Current Outpatient Prescriptions   Medication Sig Dispense Refill    Dextromethorphan-guaiFENesin  MG/5ML SYRP Take 10 mLs by mouth every 4 hours as needed for Cough 120 mL 0    fluticasone (FLONASE) 50 MCG/ACT nasal spray 2 sprays by Nasal route daily 1 Bottle 3    guaiFENesin (MUCINEX) 600 MG extended release tablet Take 1 tablet by mouth 2 times daily as needed for Congestion 30 tablet 0    acetaminophen (APAP EXTRA STRENGTH) 500 MG tablet Take 2 tablets by mouth every 6 hours as needed for Pain 120 tablet 3    ibuprofen (ADVIL;MOTRIN) 400 MG tablet Take 1 tablet by mouth every 8 hours as needed for Pain Short term. Take with food. 90 tablet 0    Benzocaine-Menthol (CEPACOL) 6-10 MG LOZG lozenge Take 1 lozenge by mouth every 2 hours as needed for Sore Throat or Pain 18 lozenge 3    diphenhydrAMINE (BENADRYL) 25 MG capsule Take 1 capsule by mouth nightly as needed for Itching, Allergies or Sleep 30 capsule 0    Multiple Vitamins-Minerals (THERAPEUTIC MULTIVITAMIN-MINERALS) tablet Take 1 tablet by mouth daily       No current facility-administered medications for this visit. Social History:   Social History     Social History    Marital status: Single     Spouse name: N/A    Number of children: N/A    Years of education: N/A     Occupational History    Not on file.      Social History Main Topics    Smoking

## 2018-03-05 ENCOUNTER — INITIAL CONSULT (OUTPATIENT)
Dept: BEHAVIORAL/MENTAL HEALTH CLINIC | Age: 33
End: 2018-03-05
Payer: COMMERCIAL

## 2018-03-05 DIAGNOSIS — F41.0 PANIC DISORDER WITHOUT AGORAPHOBIA: Primary | ICD-10-CM

## 2018-03-05 PROCEDURE — 90837 PSYTX W PT 60 MINUTES: CPT | Performed by: SOCIAL WORKER

## 2018-03-05 NOTE — PROGRESS NOTES
Behavioral Health Consultation  JUAN JOSE Moreno, SALLY, [facility]  3/6/2018  3:28 PM    Time spent with Patient: 60 minutes  This is patient's 24th [facility] consultation. Reason for Consult:  anxiety and stress  Referring Provider: Gallo Aly MD    Pt provided informed consent for the behavioral health program. Discussed with patient model of service to include the limits of confidentiality (i.e. abuse reporting, suicide intervention, etc.) and short-term intervention focused approach. Pt indicated understanding. Feedback given to PCP. S:   Patient tearful, stating that she had difficulty waiting for 2 weeks to see me. She has not yet reached out to the list of therapists that I gave her. Her home situation remains the same. She tearfully talked about the fact that she is moving closer to a decision to leave [employer]. She reports feeling very stuck in her situation as a mother who is not working. If she leaves [employer], she and her son will be living in a situation that is not as nice. She has a place to go but it is a one room situation with a friend. She also reports that her grandmother is close to passing and knows this will be very difficult for her. O:  MSE:     Appearance    alert, cooperative  Appetite normal  Sleep disturbance No  Fatigue No  Loss of pleasure No  Impulsive behavior No  Speech    spontaneous, normal rate, normal volume and well articulated  Mood    Anxious  Affect    anxiety  Thought Content    intact and excessive preoccupations  Thought Process    linear, goal directed and coherent  Associations    logical connections  Insight    Good  Judgment    Intact  Orientation    oriented to person, place, time, and general circumstances  Memory    recent and remote memory intact  Attention/Concentration    intact  Morbid ideation No  Suicide Assessment    no suicidal ideation    A:   Patient engaged well. She was very tearful today and sad.   She is getting closer to

## 2018-03-12 ENCOUNTER — INITIAL CONSULT (OUTPATIENT)
Dept: BEHAVIORAL/MENTAL HEALTH CLINIC | Age: 33
End: 2018-03-12
Payer: COMMERCIAL

## 2018-03-12 DIAGNOSIS — F41.0 PANIC DISORDER WITHOUT AGORAPHOBIA: Primary | ICD-10-CM

## 2018-03-12 PROCEDURE — 90832 PSYTX W PT 30 MINUTES: CPT | Performed by: SOCIAL WORKER

## 2018-03-12 NOTE — PROGRESS NOTES
Behavioral Health Consultation  JUAN JOSE Wong, SALLY, [facility]  3/12/2018  4:12 PM    Time spent with Patient: 30 minutes  This is patient's 25th [facility] consultation. Reason for Consult:  anxiety and stress  Referring Provider: Abena Cottrell MD    Pt provided informed consent for the behavioral health program. Discussed with patient model of service to include the limits of confidentiality (i.e. abuse reporting, suicide intervention, etc.) and short-term intervention focused approach. Pt indicated understanding. Feedback given to PCP. S:   Patient still has not contacted therapist for marital therapy. She admits to procrastinating and states she is fearful that [place] will behave poorly if they try counseling. She reports that he continues to be verbally mean to her at times. She describes a cycle of him being mean and hateful and then very loving to her. She states she is confused but is getting very tired of being treated that way. She says that she is starting to believe some of his mean statements about her. He tells her that her expectations are too high and that no one would be able to make her happy. O:  MSE:     Appearance    alert, cooperative  Appetite normal  Sleep disturbance No  Fatigue No  Loss of pleasure No  Impulsive behavior No  Speech    spontaneous, normal rate, normal volume and well articulated  Mood    Anxious  Affect    anxiety  Thought Content    intact and excessive preoccupations  Thought Process    linear, goal directed and coherent  Associations    logical connections  Insight    Good  Judgment    Intact  Orientation    oriented to person, place, time, and general circumstances  Memory    recent and remote memory intact  Attention/Concentration    intact  Morbid ideation No  Suicide Assessment    no suicidal ideation    A:   Patient engaged well. Her mood is slightly anxious, slightly depressed.   She denies suicidal ideations and reports no recent panic attacks. She has continued to lose weight and is working out. She is struggling with making any new decisions about her life. We agreed that in her next visit, we are going to call and set up marital tx for her and Salina Aly. I am continuing to confront her procrastination while trying to set behavioral strategies and goals for self improvement. She is doing a great of exercising and has lost about 30 pounds. After getting her and Salina Aly set with marital tx, I will be encouraging her to set up outpatient therapy. Diagnosis:  Panic disorder without agoraphobia      Diagnosis Date    Allergic rhinitis     Anxiety     Depression     Headache(784.0)     Heart murmur     Irritable bowel syndrome with diarrhea 11/17/2016    Midline low back pain without sciatica 11/17/2016    Obesity (BMI 30-39. 9) 8/26/2017    Panic disorder with agoraphobia 8/26/2017       History:    Medications:   Current Outpatient Prescriptions   Medication Sig Dispense Refill    Dextromethorphan-guaiFENesin  MG/5ML SYRP Take 10 mLs by mouth every 4 hours as needed for Cough 120 mL 0    fluticasone (FLONASE) 50 MCG/ACT nasal spray 2 sprays by Nasal route daily 1 Bottle 3    guaiFENesin (MUCINEX) 600 MG extended release tablet Take 1 tablet by mouth 2 times daily as needed for Congestion 30 tablet 0    acetaminophen (APAP EXTRA STRENGTH) 500 MG tablet Take 2 tablets by mouth every 6 hours as needed for Pain 120 tablet 3    ibuprofen (ADVIL;MOTRIN) 400 MG tablet Take 1 tablet by mouth every 8 hours as needed for Pain Short term. Take with food.  90 tablet 0    Benzocaine-Menthol (CEPACOL) 6-10 MG LOZG lozenge Take 1 lozenge by mouth every 2 hours as needed for Sore Throat or Pain 18 lozenge 3    diphenhydrAMINE (BENADRYL) 25 MG capsule Take 1 capsule by mouth nightly as needed for Itching, Allergies or Sleep 30 capsule 0    Multiple Vitamins-Minerals (THERAPEUTIC MULTIVITAMIN-MINERALS) tablet Take 1 tablet by mouth daily

## 2018-03-21 ENCOUNTER — INITIAL CONSULT (OUTPATIENT)
Dept: BEHAVIORAL/MENTAL HEALTH CLINIC | Age: 33
End: 2018-03-21
Payer: COMMERCIAL

## 2018-03-21 DIAGNOSIS — F41.0 PANIC DISORDER WITHOUT AGORAPHOBIA: Primary | ICD-10-CM

## 2018-03-21 PROCEDURE — 90832 PSYTX W PT 30 MINUTES: CPT | Performed by: SOCIAL WORKER

## 2018-03-22 NOTE — PROGRESS NOTES
for Pain 120 tablet 3    ibuprofen (ADVIL;MOTRIN) 400 MG tablet Take 1 tablet by mouth every 8 hours as needed for Pain Short term. Take with food. 90 tablet 0    Benzocaine-Menthol (CEPACOL) 6-10 MG LOZG lozenge Take 1 lozenge by mouth every 2 hours as needed for Sore Throat or Pain 18 lozenge 3    diphenhydrAMINE (BENADRYL) 25 MG capsule Take 1 capsule by mouth nightly as needed for Itching, Allergies or Sleep 30 capsule 0    Multiple Vitamins-Minerals (THERAPEUTIC MULTIVITAMIN-MINERALS) tablet Take 1 tablet by mouth daily       No current facility-administered medications for this visit. Social History:   Social History     Social History    Marital status: Single     Spouse name: N/A    Number of children: N/A    Years of education: N/A     Occupational History    Not on file. Social History Main Topics    Smoking status: Former Smoker     Types: Cigarettes     Quit date: 1/1/2005    Smokeless tobacco: Never Used    Alcohol use No      Comment: Started drinking alcohol when she was 23years old. Between 0724-2397, she drank alcohol everyday-half a bottle of vodka daily; got drunk and had blackouts. She tried to 3 beers once in July 2012. No DUIs and DTs.  Drug use: Yes     Types: Marijuana      Comment:  [age] yrs of age, smoked MJ daily 3 joints / day. Again  used to 3 puffs of MJ once only in 2006. Between [age], abused Coricidin cough syrup and Coricidin tablet off and on. Since her 25s,  abused Vicodin and Percocet once in 2 mo. EVELIO--35    Sexual activity: Yes     Partners: Male     Other Topics Concern    Not on file     Social History Narrative    No narrative on file       TOBACCO:   reports that she quit smoking about 13 years ago. Her smoking use included Cigarettes. She has never used smokeless tobacco.  ETOH:   reports that she does not drink alcohol.     Family History:   Family History   Problem Relation Age of Onset    Bipolar Disorder Mother     Breast Cancer

## 2018-03-27 ENCOUNTER — INITIAL CONSULT (OUTPATIENT)
Dept: BEHAVIORAL/MENTAL HEALTH CLINIC | Age: 33
End: 2018-03-27
Payer: COMMERCIAL

## 2018-03-27 DIAGNOSIS — F41.0 PANIC DISORDER WITHOUT AGORAPHOBIA: Primary | ICD-10-CM

## 2018-03-27 PROCEDURE — 90837 PSYTX W PT 60 MINUTES: CPT | Performed by: SOCIAL WORKER

## 2018-03-27 NOTE — PROGRESS NOTES
History Main Topics    Smoking status: Former Smoker     Types: Cigarettes     Quit date: 1/1/2005    Smokeless tobacco: Never Used    Alcohol use No      Comment: Started drinking alcohol when she was 23years old. Between 3306-6555, she drank alcohol everyday-half a bottle of vodka daily; got drunk and had blackouts. She tried to 3 beers once in July 2012. No DUIs and DTs.  Drug use: Yes     Types: Marijuana      Comment:  15-16 yrs of age, smoked MJ daily 3 joints / day. Again  used to 3 puffs of MJ once only in 2006. Between [age], abused Coricidin cough syrup and Coricidin tablet off and on. Since her 25s,  abused Vicodin and Percocet once in 2 mo. EVELIO--35    Sexual activity: Yes     Partners: Male     Other Topics Concern    Not on file     Social History Narrative    No narrative on file       TOBACCO:   reports that she quit smoking about 13 years ago. Her smoking use included Cigarettes. She has never used smokeless tobacco.  ETOH:   reports that she does not drink alcohol. Family History:   Family History   Problem Relation Age of Onset    Bipolar Disorder Mother     Breast Cancer Maternal Grandmother     Heart Attack Maternal Grandfather          Plan:  Pt interventions:  Provided education, Established rapport, Supportive techniques and CBT to target fear, avoidance  Consider referral to outpatient therapy    Pt Behavioral Change Plan:  Continue supportive CBT for panic, fear, anxiety. Today continued to challenge the thinking errors behind being stuck and powerless in her current relationship. There are no Patient Instructions on file for this visit.

## 2018-03-30 ENCOUNTER — PATIENT MESSAGE (OUTPATIENT)
Dept: FAMILY MEDICINE CLINIC | Age: 33
End: 2018-03-30

## 2018-03-30 DIAGNOSIS — J30.2 CHRONIC SEASONAL ALLERGIC RHINITIS DUE TO OTHER ALLERGEN: ICD-10-CM

## 2018-03-30 DIAGNOSIS — J01.81 OTHER ACUTE RECURRENT SINUSITIS: Primary | ICD-10-CM

## 2018-03-30 RX ORDER — AMOXICILLIN AND CLAVULANATE POTASSIUM 875; 125 MG/1; MG/1
1 TABLET, FILM COATED ORAL 2 TIMES DAILY
Qty: 20 TABLET | Refills: 0 | Status: SHIPPED | OUTPATIENT
Start: 2018-03-30 | End: 2018-04-02 | Stop reason: ALTCHOICE

## 2018-03-30 RX ORDER — LORATADINE 10 MG/1
10 TABLET ORAL DAILY
Qty: 30 TABLET | Refills: 3 | Status: SHIPPED | OUTPATIENT
Start: 2018-03-30 | End: 2018-06-06

## 2018-03-30 NOTE — TELEPHONE ENCOUNTER
Patient states she did try doing E-Visit but not able to submit it was giving her a message that she didn't answer all questions( error in subtraction  , or call your doctors office. Patient is c/o : coughing spells with some mucus, itchy ears, lt ear pain, no drainage just more earwax on lt side . Migraine headaches feels like \" brain on fire\", sinus congestion, sore throat.

## 2018-04-01 ENCOUNTER — PATIENT MESSAGE (OUTPATIENT)
Dept: FAMILY MEDICINE CLINIC | Age: 33
End: 2018-04-01

## 2018-04-01 DIAGNOSIS — J01.81 OTHER ACUTE RECURRENT SINUSITIS: ICD-10-CM

## 2018-04-02 ENCOUNTER — INITIAL CONSULT (OUTPATIENT)
Dept: BEHAVIORAL/MENTAL HEALTH CLINIC | Age: 33
End: 2018-04-02
Payer: COMMERCIAL

## 2018-04-02 DIAGNOSIS — F41.1 GENERALIZED ANXIETY DISORDER: Primary | ICD-10-CM

## 2018-04-02 PROCEDURE — 90837 PSYTX W PT 60 MINUTES: CPT | Performed by: SOCIAL WORKER

## 2018-04-02 RX ORDER — AZITHROMYCIN 250 MG/1
TABLET, FILM COATED ORAL
Qty: 6 TABLET | Refills: 0 | Status: SHIPPED | OUTPATIENT
Start: 2018-04-02 | End: 2018-04-07

## 2018-04-10 ENCOUNTER — HOSPITAL ENCOUNTER (OUTPATIENT)
Age: 33
Discharge: HOME | End: 2018-04-10
Payer: COMMERCIAL

## 2018-04-10 DIAGNOSIS — R31.29 MICROSCOPIC HEMATURIA: ICD-10-CM

## 2018-04-10 LAB
-: ABNORMAL
AMORPHOUS: ABNORMAL
BACTERIA: ABNORMAL
BILIRUBIN URINE: NEGATIVE
CASTS UA: ABNORMAL /LPF
COLOR: YELLOW
COMMENT UA: ABNORMAL
CRYSTALS, UA: ABNORMAL /HPF
EKG ATRIAL RATE: 82 BPM
EKG P AXIS: 63 DEGREES
EKG P-R INTERVAL: 166 MS
EKG Q-T INTERVAL: 378 MS
EKG QRS DURATION: 80 MS
EKG QTC CALCULATION (BAZETT): 441 MS
EKG R AXIS: -15 DEGREES
EKG T AXIS: 38 DEGREES
EKG VENTRICULAR RATE: 82 BPM
EPITHELIAL CELLS UA: ABNORMAL /HPF
GLUCOSE URINE: NEGATIVE
KETONES, URINE: NEGATIVE
LEUKOCYTE ESTERASE, URINE: ABNORMAL
MUCUS: ABNORMAL
NITRITE, URINE: NEGATIVE
OTHER OBSERVATIONS UA: ABNORMAL
PH UA: 5.5 (ref 5–8)
PROTEIN UA: NEGATIVE
RBC UA: ABNORMAL /HPF
RENAL EPITHELIAL, UA: ABNORMAL /HPF
SPECIFIC GRAVITY UA: 1.02 (ref 1–1.03)
TRICHOMONAS: ABNORMAL
TURBIDITY: CLEAR
URINE HGB: ABNORMAL
UROBILINOGEN, URINE: NORMAL
WBC UA: ABNORMAL /HPF
YEAST: ABNORMAL

## 2018-04-10 PROCEDURE — 93005 ELECTROCARDIOGRAM TRACING: CPT

## 2018-04-10 PROCEDURE — 87086 URINE CULTURE/COLONY COUNT: CPT

## 2018-04-10 PROCEDURE — 81001 URINALYSIS AUTO W/SCOPE: CPT

## 2018-04-11 ENCOUNTER — INITIAL CONSULT (OUTPATIENT)
Dept: BEHAVIORAL/MENTAL HEALTH CLINIC | Age: 33
End: 2018-04-11
Payer: COMMERCIAL

## 2018-04-11 DIAGNOSIS — F41.1 GENERALIZED ANXIETY DISORDER: Primary | ICD-10-CM

## 2018-04-11 PROBLEM — J02.9 SORE THROAT: Status: RESOLVED | Noted: 2017-12-14 | Resolved: 2018-04-11

## 2018-04-11 LAB
CULTURE: NORMAL
CULTURE: NORMAL
Lab: NORMAL
SPECIMEN DESCRIPTION: NORMAL
SPECIMEN DESCRIPTION: NORMAL
STATUS: NORMAL

## 2018-04-11 PROCEDURE — 90837 PSYTX W PT 60 MINUTES: CPT | Performed by: SOCIAL WORKER

## 2018-04-19 ENCOUNTER — OFFICE VISIT (OUTPATIENT)
Dept: OBGYN CLINIC | Age: 33
End: 2018-04-19
Payer: COMMERCIAL

## 2018-04-19 VITALS
DIASTOLIC BLOOD PRESSURE: 66 MMHG | SYSTOLIC BLOOD PRESSURE: 94 MMHG | HEART RATE: 90 BPM | BODY MASS INDEX: 31.58 KG/M2 | RESPIRATION RATE: 16 BRPM | WEIGHT: 184 LBS

## 2018-04-19 DIAGNOSIS — R10.2 PELVIC PAIN IN FEMALE: Primary | ICD-10-CM

## 2018-04-19 DIAGNOSIS — R31.9 HEMATURIA, UNSPECIFIED TYPE: ICD-10-CM

## 2018-04-19 LAB
BILIRUBIN, POC: ABNORMAL
BLOOD URINE, POC: ABNORMAL
CLARITY, POC: CLEAR
COLOR, POC: YELLOW
GLUCOSE URINE, POC: ABNORMAL
KETONES, POC: ABNORMAL
LEUKOCYTE EST, POC: ABNORMAL
NITRITE, POC: ABNORMAL
PH, POC: ABNORMAL
PROTEIN, POC: ABNORMAL
SPECIFIC GRAVITY, POC: ABNORMAL
UROBILINOGEN, POC: ABNORMAL

## 2018-04-19 PROCEDURE — 81002 URINALYSIS NONAUTO W/O SCOPE: CPT | Performed by: SPECIALIST

## 2018-04-19 PROCEDURE — 99213 OFFICE O/P EST LOW 20 MIN: CPT | Performed by: SPECIALIST

## 2018-04-19 ASSESSMENT — ENCOUNTER SYMPTOMS
APNEA: 0
VOMITING: 0
EYE PAIN: 0
ABDOMINAL DISTENTION: 0
COUGH: 0
DIARRHEA: 0
CONSTIPATION: 0
ABDOMINAL PAIN: 0
NAUSEA: 0

## 2018-04-24 ENCOUNTER — OFFICE VISIT (OUTPATIENT)
Dept: UROLOGY | Age: 33
End: 2018-04-24
Payer: COMMERCIAL

## 2018-04-24 ENCOUNTER — OFFICE VISIT (OUTPATIENT)
Dept: OBGYN CLINIC | Age: 33
End: 2018-04-24
Payer: COMMERCIAL

## 2018-04-24 VITALS
SYSTOLIC BLOOD PRESSURE: 99 MMHG | TEMPERATURE: 98 F | HEART RATE: 83 BPM | WEIGHT: 184.2 LBS | HEIGHT: 64 IN | DIASTOLIC BLOOD PRESSURE: 62 MMHG | BODY MASS INDEX: 31.45 KG/M2

## 2018-04-24 DIAGNOSIS — R31.29 MICROHEMATURIA: ICD-10-CM

## 2018-04-24 DIAGNOSIS — R10.2 PELVIC PAIN IN FEMALE: Primary | ICD-10-CM

## 2018-04-24 DIAGNOSIS — R31.9 HEMATURIA, UNSPECIFIED TYPE: ICD-10-CM

## 2018-04-24 DIAGNOSIS — R10.2 PELVIC PAIN IN FEMALE: ICD-10-CM

## 2018-04-24 PROCEDURE — 99214 OFFICE O/P EST MOD 30 MIN: CPT | Performed by: UROLOGY

## 2018-04-24 PROCEDURE — 76830 TRANSVAGINAL US NON-OB: CPT | Performed by: SPECIALIST

## 2018-04-24 PROCEDURE — 76856 US EXAM PELVIC COMPLETE: CPT | Performed by: SPECIALIST

## 2018-04-24 ASSESSMENT — ENCOUNTER SYMPTOMS
BACK PAIN: 0
COUGH: 0
NAUSEA: 1
SHORTNESS OF BREATH: 0
EYE PAIN: 0
EYE REDNESS: 0
WHEEZING: 0
VOMITING: 0
COLOR CHANGE: 0
ABDOMINAL PAIN: 1

## 2018-05-07 ENCOUNTER — OFFICE VISIT (OUTPATIENT)
Dept: FAMILY MEDICINE CLINIC | Age: 33
End: 2018-05-07
Payer: COMMERCIAL

## 2018-05-07 VITALS
HEART RATE: 96 BPM | WEIGHT: 183.8 LBS | HEIGHT: 64 IN | OXYGEN SATURATION: 98 % | BODY MASS INDEX: 31.38 KG/M2 | DIASTOLIC BLOOD PRESSURE: 72 MMHG | SYSTOLIC BLOOD PRESSURE: 106 MMHG | TEMPERATURE: 97.9 F

## 2018-05-07 DIAGNOSIS — Z13.31 POSITIVE DEPRESSION SCREENING: ICD-10-CM

## 2018-05-07 DIAGNOSIS — F33.0 MILD EPISODE OF RECURRENT MAJOR DEPRESSIVE DISORDER (HCC): ICD-10-CM

## 2018-05-07 DIAGNOSIS — R10.30 LOWER ABDOMINAL PAIN: Primary | ICD-10-CM

## 2018-05-07 DIAGNOSIS — F51.04 PSYCHOPHYSIOLOGICAL INSOMNIA: ICD-10-CM

## 2018-05-07 DIAGNOSIS — R31.29 OTHER MICROSCOPIC HEMATURIA: ICD-10-CM

## 2018-05-07 DIAGNOSIS — R30.0 DYSURIA: ICD-10-CM

## 2018-05-07 DIAGNOSIS — F41.1 GAD (GENERALIZED ANXIETY DISORDER): ICD-10-CM

## 2018-05-07 DIAGNOSIS — R35.1 NOCTURIA: ICD-10-CM

## 2018-05-07 PROCEDURE — G8431 POS CLIN DEPRES SCRN F/U DOC: HCPCS | Performed by: FAMILY MEDICINE

## 2018-05-07 PROCEDURE — 99214 OFFICE O/P EST MOD 30 MIN: CPT | Performed by: FAMILY MEDICINE

## 2018-05-07 PROCEDURE — G8417 CALC BMI ABV UP PARAM F/U: HCPCS | Performed by: FAMILY MEDICINE

## 2018-05-07 PROCEDURE — G8427 DOCREV CUR MEDS BY ELIG CLIN: HCPCS | Performed by: FAMILY MEDICINE

## 2018-05-07 PROCEDURE — 1036F TOBACCO NON-USER: CPT | Performed by: FAMILY MEDICINE

## 2018-05-07 RX ORDER — UREA 10 %
1 LOTION (ML) TOPICAL NIGHTLY PRN
Qty: 30 TABLET | Refills: 0 | Status: SHIPPED | OUTPATIENT
Start: 2018-05-07 | End: 2018-06-06

## 2018-05-07 ASSESSMENT — ENCOUNTER SYMPTOMS
CHEST TIGHTNESS: 0
ABDOMINAL DISTENTION: 0
SHORTNESS OF BREATH: 0
COUGH: 0
ABDOMINAL PAIN: 1
NAUSEA: 0
VOMITING: 0
WHEEZING: 0
DIARRHEA: 0
CONSTIPATION: 0

## 2018-05-08 ENCOUNTER — OFFICE VISIT (OUTPATIENT)
Dept: OBGYN CLINIC | Age: 33
End: 2018-05-08
Payer: COMMERCIAL

## 2018-05-08 VITALS
HEART RATE: 61 BPM | WEIGHT: 183 LBS | BODY MASS INDEX: 31.41 KG/M2 | DIASTOLIC BLOOD PRESSURE: 66 MMHG | SYSTOLIC BLOOD PRESSURE: 96 MMHG | RESPIRATION RATE: 16 BRPM

## 2018-05-08 DIAGNOSIS — R10.2 CHRONIC PELVIC PAIN IN FEMALE: Primary | ICD-10-CM

## 2018-05-08 DIAGNOSIS — G89.29 CHRONIC PELVIC PAIN IN FEMALE: Primary | ICD-10-CM

## 2018-05-08 PROCEDURE — 99213 OFFICE O/P EST LOW 20 MIN: CPT | Performed by: SPECIALIST

## 2018-05-08 PROCEDURE — G8417 CALC BMI ABV UP PARAM F/U: HCPCS | Performed by: SPECIALIST

## 2018-05-08 PROCEDURE — 1036F TOBACCO NON-USER: CPT | Performed by: SPECIALIST

## 2018-05-08 PROCEDURE — G8427 DOCREV CUR MEDS BY ELIG CLIN: HCPCS | Performed by: SPECIALIST

## 2018-05-08 ASSESSMENT — ENCOUNTER SYMPTOMS
DIARRHEA: 0
CONSTIPATION: 0
ABDOMINAL PAIN: 0
VOMITING: 0
COUGH: 0
NAUSEA: 0
EYE PAIN: 0
APNEA: 0
ABDOMINAL DISTENTION: 0

## 2018-05-09 ENCOUNTER — INITIAL CONSULT (OUTPATIENT)
Dept: BEHAVIORAL/MENTAL HEALTH CLINIC | Age: 33
End: 2018-05-09
Payer: COMMERCIAL

## 2018-05-09 DIAGNOSIS — F41.1 GENERALIZED ANXIETY DISORDER: Primary | ICD-10-CM

## 2018-05-09 PROCEDURE — 90837 PSYTX W PT 60 MINUTES: CPT | Performed by: SOCIAL WORKER

## 2018-05-13 PROBLEM — R10.30 LOWER ABDOMINAL PAIN: Status: ACTIVE | Noted: 2018-05-13

## 2018-05-13 PROBLEM — R30.0 DYSURIA: Status: ACTIVE | Noted: 2018-05-13

## 2018-05-13 PROBLEM — R35.1 NOCTURIA: Status: ACTIVE | Noted: 2018-05-13

## 2018-05-13 PROBLEM — F33.0 MILD EPISODE OF RECURRENT MAJOR DEPRESSIVE DISORDER (HCC): Status: ACTIVE | Noted: 2018-05-13

## 2018-05-15 ENCOUNTER — TELEPHONE (OUTPATIENT)
Dept: OBGYN CLINIC | Age: 33
End: 2018-05-15

## 2018-05-23 ENCOUNTER — INITIAL CONSULT (OUTPATIENT)
Dept: BEHAVIORAL/MENTAL HEALTH CLINIC | Age: 33
End: 2018-05-23
Payer: COMMERCIAL

## 2018-05-23 DIAGNOSIS — F41.1 GENERALIZED ANXIETY DISORDER: Primary | ICD-10-CM

## 2018-05-23 PROCEDURE — 90837 PSYTX W PT 60 MINUTES: CPT | Performed by: SOCIAL WORKER

## 2018-05-29 DIAGNOSIS — J01.00 ACUTE NON-RECURRENT MAXILLARY SINUSITIS: ICD-10-CM

## 2018-05-29 RX ORDER — IBUPROFEN 400 MG/1
400 TABLET ORAL EVERY 8 HOURS PRN
Qty: 90 TABLET | Refills: 0 | Status: SHIPPED | OUTPATIENT
Start: 2018-05-29 | End: 2018-06-06

## 2018-06-06 ENCOUNTER — APPOINTMENT (OUTPATIENT)
Dept: GENERAL RADIOLOGY | Age: 33
End: 2018-06-06
Payer: COMMERCIAL

## 2018-06-06 ENCOUNTER — HOSPITAL ENCOUNTER (EMERGENCY)
Age: 33
Discharge: HOME | End: 2018-06-06
Attending: EMERGENCY MEDICINE
Payer: COMMERCIAL

## 2018-06-06 VITALS
WEIGHT: 179 LBS | HEART RATE: 73 BPM | TEMPERATURE: 99.1 F | DIASTOLIC BLOOD PRESSURE: 57 MMHG | SYSTOLIC BLOOD PRESSURE: 99 MMHG | OXYGEN SATURATION: 97 % | RESPIRATION RATE: 14 BRPM | BODY MASS INDEX: 30.73 KG/M2

## 2018-06-06 DIAGNOSIS — Z20.828 EXPOSURE TO INFLUENZA: Primary | ICD-10-CM

## 2018-06-06 DIAGNOSIS — R31.9 HEMATURIA, UNSPECIFIED TYPE: ICD-10-CM

## 2018-06-06 DIAGNOSIS — R82.71 BACTERIA IN URINE: ICD-10-CM

## 2018-06-06 DIAGNOSIS — R05.9 COUGH: ICD-10-CM

## 2018-06-06 DIAGNOSIS — R06.02 SHORTNESS OF BREATH: ICD-10-CM

## 2018-06-06 LAB
-: ABNORMAL
ABSOLUTE EOS #: 0 K/UL (ref 0–0.4)
ABSOLUTE IMMATURE GRANULOCYTE: ABNORMAL K/UL (ref 0–0.3)
ABSOLUTE LYMPH #: 0.6 K/UL (ref 1–4.8)
ABSOLUTE MONO #: 0.4 K/UL (ref 0.1–1.3)
ALBUMIN SERPL-MCNC: 4.3 G/DL (ref 3.5–5.2)
ALBUMIN/GLOBULIN RATIO: ABNORMAL (ref 1–2.5)
ALP BLD-CCNC: 73 U/L (ref 35–104)
ALT SERPL-CCNC: 42 U/L (ref 5–33)
AMORPHOUS: ABNORMAL
ANION GAP SERPL CALCULATED.3IONS-SCNC: 13 MMOL/L (ref 9–17)
AST SERPL-CCNC: 38 U/L
BACTERIA: ABNORMAL
BASOPHILS # BLD: 0 % (ref 0–2)
BASOPHILS ABSOLUTE: 0 K/UL (ref 0–0.2)
BILIRUB SERPL-MCNC: 0.25 MG/DL (ref 0.3–1.2)
BILIRUBIN URINE: NEGATIVE
BUN BLDV-MCNC: 8 MG/DL (ref 6–20)
BUN/CREAT BLD: ABNORMAL (ref 9–20)
CALCIUM SERPL-MCNC: 8.7 MG/DL (ref 8.6–10.4)
CASTS UA: ABNORMAL /LPF
CHLORIDE BLD-SCNC: 99 MMOL/L (ref 98–107)
CO2: 26 MMOL/L (ref 20–31)
COLOR: ABNORMAL
COMMENT UA: ABNORMAL
CREAT SERPL-MCNC: 0.88 MG/DL (ref 0.5–0.9)
CRYSTALS, UA: ABNORMAL /HPF
DIFFERENTIAL TYPE: ABNORMAL
EKG ATRIAL RATE: 94 BPM
EKG P AXIS: 57 DEGREES
EKG P-R INTERVAL: 156 MS
EKG Q-T INTERVAL: 356 MS
EKG QRS DURATION: 76 MS
EKG QTC CALCULATION (BAZETT): 445 MS
EKG R AXIS: -27 DEGREES
EKG T AXIS: 32 DEGREES
EKG VENTRICULAR RATE: 94 BPM
EOSINOPHILS RELATIVE PERCENT: 0 % (ref 0–4)
EPITHELIAL CELLS UA: ABNORMAL /HPF
GFR AFRICAN AMERICAN: >60 ML/MIN
GFR NON-AFRICAN AMERICAN: >60 ML/MIN
GFR SERPL CREATININE-BSD FRML MDRD: ABNORMAL ML/MIN/{1.73_M2}
GFR SERPL CREATININE-BSD FRML MDRD: ABNORMAL ML/MIN/{1.73_M2}
GLUCOSE BLD-MCNC: 96 MG/DL (ref 70–99)
GLUCOSE URINE: NEGATIVE
HCG(URINE) PREGNANCY TEST: NEGATIVE
HCT VFR BLD CALC: 38.7 % (ref 36–46)
HEMOGLOBIN: 13.2 G/DL (ref 12–16)
IMMATURE GRANULOCYTES: ABNORMAL %
KETONES, URINE: ABNORMAL
LEUKOCYTE ESTERASE, URINE: NEGATIVE
LYMPHOCYTES # BLD: 16 % (ref 24–44)
MCH RBC QN AUTO: 31 PG (ref 26–34)
MCHC RBC AUTO-ENTMCNC: 34.2 G/DL (ref 31–37)
MCV RBC AUTO: 90.7 FL (ref 80–100)
MONOCYTES # BLD: 12 % (ref 1–7)
MUCUS: ABNORMAL
NITRITE, URINE: NEGATIVE
NRBC AUTOMATED: ABNORMAL PER 100 WBC
OTHER OBSERVATIONS UA: ABNORMAL
PDW BLD-RTO: 11.9 % (ref 11.5–14.9)
PH UA: 6 (ref 5–8)
PLATELET # BLD: 157 K/UL (ref 150–450)
PLATELET ESTIMATE: ABNORMAL
PMV BLD AUTO: 8.6 FL (ref 6–12)
POTASSIUM SERPL-SCNC: 3.9 MMOL/L (ref 3.7–5.3)
PROTEIN UA: ABNORMAL
RBC # BLD: 4.26 M/UL (ref 4–5.2)
RBC # BLD: ABNORMAL 10*6/UL
RBC UA: ABNORMAL /HPF
RENAL EPITHELIAL, UA: ABNORMAL /HPF
SEG NEUTROPHILS: 72 % (ref 36–66)
SEGMENTED NEUTROPHILS ABSOLUTE COUNT: 2.8 K/UL (ref 1.3–9.1)
SODIUM BLD-SCNC: 138 MMOL/L (ref 135–144)
SPECIFIC GRAVITY UA: 1.02 (ref 1–1.03)
TOTAL PROTEIN: 7.3 G/DL (ref 6.4–8.3)
TRICHOMONAS: ABNORMAL
TURBIDITY: ABNORMAL
URINE HGB: ABNORMAL
UROBILINOGEN, URINE: NORMAL
WBC # BLD: 3.8 K/UL (ref 3.5–11)
WBC # BLD: ABNORMAL 10*3/UL
WBC UA: ABNORMAL /HPF
YEAST: ABNORMAL

## 2018-06-06 PROCEDURE — 93005 ELECTROCARDIOGRAM TRACING: CPT

## 2018-06-06 PROCEDURE — 6370000000 HC RX 637 (ALT 250 FOR IP): Performed by: EMERGENCY MEDICINE

## 2018-06-06 PROCEDURE — 71046 X-RAY EXAM CHEST 2 VIEWS: CPT

## 2018-06-06 PROCEDURE — 2580000003 HC RX 258: Performed by: EMERGENCY MEDICINE

## 2018-06-06 PROCEDURE — 80053 COMPREHEN METABOLIC PANEL: CPT

## 2018-06-06 PROCEDURE — 99285 EMERGENCY DEPT VISIT HI MDM: CPT

## 2018-06-06 PROCEDURE — 96375 TX/PRO/DX INJ NEW DRUG ADDON: CPT

## 2018-06-06 PROCEDURE — 87086 URINE CULTURE/COLONY COUNT: CPT

## 2018-06-06 PROCEDURE — 6360000002 HC RX W HCPCS: Performed by: EMERGENCY MEDICINE

## 2018-06-06 PROCEDURE — 96374 THER/PROPH/DIAG INJ IV PUSH: CPT

## 2018-06-06 PROCEDURE — 85025 COMPLETE CBC W/AUTO DIFF WBC: CPT

## 2018-06-06 PROCEDURE — 36415 COLL VENOUS BLD VENIPUNCTURE: CPT

## 2018-06-06 PROCEDURE — 84703 CHORIONIC GONADOTROPIN ASSAY: CPT

## 2018-06-06 PROCEDURE — 81001 URINALYSIS AUTO W/SCOPE: CPT

## 2018-06-06 RX ORDER — BENZONATATE 100 MG/1
100 CAPSULE ORAL ONCE
Status: COMPLETED | OUTPATIENT
Start: 2018-06-06 | End: 2018-06-06

## 2018-06-06 RX ORDER — KETOROLAC TROMETHAMINE 30 MG/ML
30 INJECTION, SOLUTION INTRAMUSCULAR; INTRAVENOUS ONCE
Status: COMPLETED | OUTPATIENT
Start: 2018-06-06 | End: 2018-06-06

## 2018-06-06 RX ORDER — ONDANSETRON 4 MG/1
4 TABLET, ORALLY DISINTEGRATING ORAL EVERY 8 HOURS PRN
Qty: 8 TABLET | Refills: 0 | Status: SHIPPED | OUTPATIENT
Start: 2018-06-06 | End: 2018-06-28 | Stop reason: ALTCHOICE

## 2018-06-06 RX ORDER — ONDANSETRON 2 MG/ML
4 INJECTION INTRAMUSCULAR; INTRAVENOUS ONCE
Status: COMPLETED | OUTPATIENT
Start: 2018-06-06 | End: 2018-06-06

## 2018-06-06 RX ORDER — 0.9 % SODIUM CHLORIDE 0.9 %
1000 INTRAVENOUS SOLUTION INTRAVENOUS ONCE
Status: COMPLETED | OUTPATIENT
Start: 2018-06-06 | End: 2018-06-06

## 2018-06-06 RX ORDER — BENZONATATE 100 MG/1
100 CAPSULE ORAL 3 TIMES DAILY PRN
Qty: 30 CAPSULE | Refills: 0 | Status: SHIPPED | OUTPATIENT
Start: 2018-06-06 | End: 2018-06-06

## 2018-06-06 RX ORDER — ACETAMINOPHEN 500 MG
500 TABLET ORAL EVERY 4 HOURS PRN
Qty: 60 TABLET | Refills: 0 | Status: SHIPPED | OUTPATIENT
Start: 2018-06-06 | End: 2022-07-08

## 2018-06-06 RX ORDER — ACETAMINOPHEN 500 MG
1000 TABLET ORAL ONCE
Status: COMPLETED | OUTPATIENT
Start: 2018-06-06 | End: 2018-06-06

## 2018-06-06 RX ORDER — BENZONATATE 100 MG/1
100 CAPSULE ORAL 3 TIMES DAILY PRN
Qty: 30 CAPSULE | Refills: 0 | Status: SHIPPED | OUTPATIENT
Start: 2018-06-06 | End: 2018-06-13

## 2018-06-06 RX ORDER — NAPROXEN 500 MG/1
500 TABLET ORAL 2 TIMES DAILY WITH MEALS
Qty: 30 TABLET | Refills: 0 | Status: SHIPPED | OUTPATIENT
Start: 2018-06-06 | End: 2018-06-28 | Stop reason: ALTCHOICE

## 2018-06-06 RX ADMIN — BENZONATATE 100 MG: 100 CAPSULE ORAL at 13:28

## 2018-06-06 RX ADMIN — SODIUM CHLORIDE 1000 ML: 9 INJECTION, SOLUTION INTRAVENOUS at 13:27

## 2018-06-06 RX ADMIN — ONDANSETRON 4 MG: 2 INJECTION INTRAMUSCULAR; INTRAVENOUS at 13:28

## 2018-06-06 RX ADMIN — ACETAMINOPHEN 1000 MG: 500 TABLET ORAL at 13:28

## 2018-06-06 RX ADMIN — KETOROLAC TROMETHAMINE 30 MG: 30 INJECTION, SOLUTION INTRAMUSCULAR at 13:28

## 2018-06-06 ASSESSMENT — ENCOUNTER SYMPTOMS
VOMITING: 0
EYE PAIN: 0
BACK PAIN: 0
DIARRHEA: 0
COUGH: 1
ABDOMINAL PAIN: 0
SORE THROAT: 0
SHORTNESS OF BREATH: 0
NAUSEA: 0

## 2018-06-06 ASSESSMENT — PAIN SCALES - GENERAL
PAINLEVEL_OUTOF10: 8
PAINLEVEL_OUTOF10: 8
PAINLEVEL_OUTOF10: 6

## 2018-06-06 ASSESSMENT — PAIN SCALES - WONG BAKER: WONGBAKER_NUMERICALRESPONSE: 8

## 2018-06-06 ASSESSMENT — PAIN DESCRIPTION - PAIN TYPE: TYPE: ACUTE PAIN

## 2018-06-19 ENCOUNTER — HOSPITAL ENCOUNTER (OUTPATIENT)
Age: 33
Discharge: HOME | End: 2018-06-19
Payer: COMMERCIAL

## 2018-06-19 DIAGNOSIS — R35.1 NOCTURIA: ICD-10-CM

## 2018-06-19 DIAGNOSIS — R31.29 OTHER MICROSCOPIC HEMATURIA: ICD-10-CM

## 2018-06-19 DIAGNOSIS — R10.30 LOWER ABDOMINAL PAIN: ICD-10-CM

## 2018-06-19 DIAGNOSIS — R30.0 DYSURIA: ICD-10-CM

## 2018-06-19 LAB
-: ABNORMAL
AMORPHOUS: ABNORMAL
BACTERIA: ABNORMAL
BILIRUBIN URINE: NEGATIVE
CASTS UA: ABNORMAL /LPF
COLOR: YELLOW
COMMENT UA: ABNORMAL
CRYSTALS, UA: ABNORMAL /HPF
EPITHELIAL CELLS UA: ABNORMAL /HPF
GLUCOSE URINE: NEGATIVE
KETONES, URINE: NEGATIVE
LEUKOCYTE ESTERASE, URINE: NEGATIVE
MUCUS: ABNORMAL
NITRITE, URINE: NEGATIVE
OTHER OBSERVATIONS UA: ABNORMAL
PH UA: 5 (ref 5–8)
PROTEIN UA: NEGATIVE
RBC UA: ABNORMAL /HPF
RENAL EPITHELIAL, UA: ABNORMAL /HPF
SPECIFIC GRAVITY UA: 1.02 (ref 1–1.03)
TRICHOMONAS: ABNORMAL
TURBIDITY: CLEAR
URINE HGB: ABNORMAL
UROBILINOGEN, URINE: NORMAL
WBC UA: ABNORMAL /HPF
YEAST: ABNORMAL

## 2018-06-19 PROCEDURE — 81001 URINALYSIS AUTO W/SCOPE: CPT

## 2018-06-19 PROCEDURE — 87086 URINE CULTURE/COLONY COUNT: CPT

## 2018-06-20 ENCOUNTER — INITIAL CONSULT (OUTPATIENT)
Dept: BEHAVIORAL/MENTAL HEALTH CLINIC | Age: 33
End: 2018-06-20
Payer: COMMERCIAL

## 2018-06-20 DIAGNOSIS — F41.1 GENERALIZED ANXIETY DISORDER: Primary | ICD-10-CM

## 2018-06-20 LAB
CULTURE: NORMAL
Lab: NORMAL
SPECIMEN DESCRIPTION: NORMAL
STATUS: NORMAL

## 2018-06-20 PROCEDURE — 90837 PSYTX W PT 60 MINUTES: CPT | Performed by: SOCIAL WORKER

## 2018-06-23 ENCOUNTER — PATIENT MESSAGE (OUTPATIENT)
Dept: FAMILY MEDICINE CLINIC | Age: 33
End: 2018-06-23

## 2018-06-27 ENCOUNTER — TELEPHONE (OUTPATIENT)
Dept: OBGYN CLINIC | Age: 33
End: 2018-06-27

## 2018-06-27 NOTE — TELEPHONE ENCOUNTER
Pt is scheduled for procedure on 9/12/18 but her PCP suggested she see a Nephrologist first to make sure this is the procedure that she should have done. She needs to speak to us about it. Please address.

## 2018-06-28 ENCOUNTER — HOSPITAL ENCOUNTER (OUTPATIENT)
Dept: PREADMISSION TESTING | Age: 33
Discharge: HOME | End: 2018-07-02
Payer: COMMERCIAL

## 2018-06-28 VITALS
WEIGHT: 176 LBS | HEART RATE: 92 BPM | HEIGHT: 64 IN | SYSTOLIC BLOOD PRESSURE: 108 MMHG | BODY MASS INDEX: 30.05 KG/M2 | OXYGEN SATURATION: 100 % | TEMPERATURE: 98.4 F | RESPIRATION RATE: 16 BRPM | DIASTOLIC BLOOD PRESSURE: 74 MMHG

## 2018-06-28 LAB
ABSOLUTE EOS #: 0 K/UL (ref 0–0.4)
ABSOLUTE IMMATURE GRANULOCYTE: ABNORMAL K/UL (ref 0–0.3)
ABSOLUTE LYMPH #: 1.5 K/UL (ref 1–4.8)
ABSOLUTE MONO #: 0.4 K/UL (ref 0.1–1.3)
ANION GAP SERPL CALCULATED.3IONS-SCNC: 13 MMOL/L (ref 9–17)
BASOPHILS # BLD: 1 % (ref 0–2)
BASOPHILS ABSOLUTE: 0 K/UL (ref 0–0.2)
BUN BLDV-MCNC: 15 MG/DL (ref 6–20)
BUN/CREAT BLD: NORMAL (ref 9–20)
CALCIUM SERPL-MCNC: 8.8 MG/DL (ref 8.6–10.4)
CHLORIDE BLD-SCNC: 103 MMOL/L (ref 98–107)
CO2: 24 MMOL/L (ref 20–31)
CREAT SERPL-MCNC: 0.64 MG/DL (ref 0.5–0.9)
DIFFERENTIAL TYPE: ABNORMAL
EOSINOPHILS RELATIVE PERCENT: 1 % (ref 0–4)
GFR AFRICAN AMERICAN: >60 ML/MIN
GFR NON-AFRICAN AMERICAN: >60 ML/MIN
GFR SERPL CREATININE-BSD FRML MDRD: NORMAL ML/MIN/{1.73_M2}
GFR SERPL CREATININE-BSD FRML MDRD: NORMAL ML/MIN/{1.73_M2}
GLUCOSE BLD-MCNC: 91 MG/DL (ref 70–99)
HCT VFR BLD CALC: 38.3 % (ref 36–46)
HEMOGLOBIN: 12.8 G/DL (ref 12–16)
IMMATURE GRANULOCYTES: ABNORMAL %
LYMPHOCYTES # BLD: 33 % (ref 24–44)
MCH RBC QN AUTO: 30.5 PG (ref 26–34)
MCHC RBC AUTO-ENTMCNC: 33.4 G/DL (ref 31–37)
MCV RBC AUTO: 91.3 FL (ref 80–100)
MONOCYTES # BLD: 8 % (ref 1–7)
NRBC AUTOMATED: ABNORMAL PER 100 WBC
PDW BLD-RTO: 12.4 % (ref 11.5–14.9)
PLATELET # BLD: 223 K/UL (ref 150–450)
PLATELET ESTIMATE: ABNORMAL
PMV BLD AUTO: 8.8 FL (ref 6–12)
POTASSIUM SERPL-SCNC: 3.7 MMOL/L (ref 3.7–5.3)
RBC # BLD: 4.2 M/UL (ref 4–5.2)
RBC # BLD: ABNORMAL 10*6/UL
SEG NEUTROPHILS: 57 % (ref 36–66)
SEGMENTED NEUTROPHILS ABSOLUTE COUNT: 2.5 K/UL (ref 1.3–9.1)
SODIUM BLD-SCNC: 140 MMOL/L (ref 135–144)
WBC # BLD: 4.4 K/UL (ref 3.5–11)
WBC # BLD: ABNORMAL 10*3/UL

## 2018-06-28 PROCEDURE — 85025 COMPLETE CBC W/AUTO DIFF WBC: CPT

## 2018-06-28 PROCEDURE — 36415 COLL VENOUS BLD VENIPUNCTURE: CPT

## 2018-06-28 PROCEDURE — 80048 BASIC METABOLIC PNL TOTAL CA: CPT

## 2018-06-28 RX ORDER — IBUPROFEN 400 MG/1
800 TABLET ORAL EVERY 6 HOURS PRN
Status: ON HOLD | COMMUNITY
End: 2019-05-02 | Stop reason: HOSPADM

## 2018-06-28 NOTE — PROGRESS NOTES
Dr. Abdoul Truong, anesthesia, was contacted and informed of the patient's history, EKG results from 4/10/2018, and abnormal EKG results from 6/6/2018. No clearance required.

## 2018-06-28 NOTE — H&P
Encounter   Medication Sig Dispense Refill    acetaminophen (TYLENOL) 500 MG tablet Take 1 tablet by mouth every 4 hours as needed for Pain 60 tablet 0     No current facility-administered medications on file prior to encounter. Negative except for what is mentioned in the HPI. GENERAL PHYSICAL EXAM     Vitals: /74   Pulse 92   Temp 98.4 °F (36.9 °C) (Oral)   Resp 16   Ht 5' 3.5\" (1.613 m)   Wt 176 lb (79.8 kg)   LMP 06/20/2018   SpO2 100%   BMI 30.69 kg/m²  Body mass index is 30.69 kg/m². GENERAL APPEARANCE:   Kerin Hammans is 28 y.o.,  female, mildly obese, nourished, conscious, alert. Does not appear to be distress or pain at this time. SKIN:  Warm, dry, no cyanosis or jaundice. HEAD:  Normocephalic, atraumatic, no swelling or tenderness. EYES:  Pupils equal, reactive to light. EARS:  No discharge, no marked hearing loss. NOSE:  No rhinorrhea, epistaxis or septal deformity. THROAT:  Not congested. No ulceration bleeding or discharge. NECK:  No stiffness, trachea central.  No palpable masses or L.N.                 CHEST:  Symmetrical and equal on expansion. HEART:  RRR S1 > S2. No audible murmurs or gallops. LUNGS:  Equal on expansion, normal breath sounds. No adventitious sounds. ABDOMEN:  Obese. Soft on palpation. Mild lower abd. tenderness. No guarding or rigidity. No palpable organomegaly. LYMPHATICS:  No palpable cervical lymphadenopathy. LOCOMOTOR, BACK AND SPINE:  No tenderness or deformities. EXTREMITIES:  Symmetrical, no pedal edema. Lexiss sign negative. No discoloration or ulcerations. NEUROLOGIC:  The patient is conscious, alert, oriented, No apparent focal sensory or motor deficits.                       PROVISIONAL DIAGNOSES / SURGERY:      Pelvic Pain, Dx Laparoscopy.     Patient Active Problem List    Diagnosis Date Noted    Panic disorder with agoraphobia 08/26/2017     Priority: High    Obesity (BMI 30-39.9) 08/26/2017     Priority: High    Lower abdominal pain 05/13/2018    Nocturia 05/13/2018    Dysuria 05/13/2018    [facility] of [facility] 05/13/2018    Psychophysiological insomnia 02/05/2018    Microscopic hematuria 02/05/2018    Acute non-recurrent maxillary sinusitis 12/14/2017    Murmur 09/26/2017    Hematuria 09/22/2017    DEREK (generalized anxiety disorder) 08/26/2017    PMS (premenstrual syndrome) 08/26/2017    Chronic fatigue 08/26/2017    Palpitations 08/26/2017    Midline low back pain without sciatica 11/17/2016    Irritable bowel syndrome with diarrhea 11/17/2016           BAILEY SULLIVAN PA-C on 6/28/2018 at 2:22 PM

## 2018-06-29 RX ORDER — SODIUM CHLORIDE 0.9 % (FLUSH) 0.9 %
10 SYRINGE (ML) INJECTION EVERY 12 HOURS SCHEDULED
Status: CANCELLED | OUTPATIENT
Start: 2018-06-29

## 2018-06-29 RX ORDER — SODIUM CHLORIDE 0.9 % (FLUSH) 0.9 %
10 SYRINGE (ML) INJECTION PRN
Status: CANCELLED | OUTPATIENT
Start: 2018-06-29

## 2018-06-29 RX ORDER — SODIUM CHLORIDE, SODIUM LACTATE, POTASSIUM CHLORIDE, CALCIUM CHLORIDE 600; 310; 30; 20 MG/100ML; MG/100ML; MG/100ML; MG/100ML
INJECTION, SOLUTION INTRAVENOUS CONTINUOUS
Status: CANCELLED | OUTPATIENT
Start: 2018-06-29

## 2018-06-29 RX ORDER — LIDOCAINE HYDROCHLORIDE 10 MG/ML
1 INJECTION, SOLUTION EPIDURAL; INFILTRATION; INTRACAUDAL; PERINEURAL
Status: CANCELLED | OUTPATIENT
Start: 2018-06-29 | End: 2018-06-29

## 2018-07-05 ENCOUNTER — TELEPHONE (OUTPATIENT)
Dept: FAMILY MEDICINE CLINIC | Age: 33
End: 2018-07-05

## 2018-07-05 DIAGNOSIS — R31.29 MICROSCOPIC HEMATURIA: Primary | ICD-10-CM

## 2018-07-10 ENCOUNTER — INITIAL CONSULT (OUTPATIENT)
Dept: BEHAVIORAL/MENTAL HEALTH CLINIC | Age: 33
End: 2018-07-10
Payer: COMMERCIAL

## 2018-07-10 DIAGNOSIS — F41.1 GENERALIZED ANXIETY DISORDER: Primary | ICD-10-CM

## 2018-07-10 PROBLEM — N18.1 CKD (CHRONIC KIDNEY DISEASE) STAGE 1, GFR 90 ML/MIN OR GREATER: Status: ACTIVE | Noted: 2018-07-10

## 2018-07-10 PROCEDURE — 90837 PSYTX W PT 60 MINUTES: CPT | Performed by: SOCIAL WORKER

## 2018-07-17 ENCOUNTER — INITIAL CONSULT (OUTPATIENT)
Dept: BEHAVIORAL/MENTAL HEALTH CLINIC | Age: 33
End: 2018-07-17
Payer: COMMERCIAL

## 2018-07-17 DIAGNOSIS — F41.1 GENERALIZED ANXIETY DISORDER: Primary | ICD-10-CM

## 2018-07-17 PROCEDURE — 90837 PSYTX W PT 60 MINUTES: CPT | Performed by: SOCIAL WORKER
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
## 2018-07-19 ENCOUNTER — OFFICE VISIT (OUTPATIENT)
Dept: FAMILY MEDICINE CLINIC | Age: 33
End: 2018-07-19
Payer: COMMERCIAL

## 2018-07-19 VITALS
RESPIRATION RATE: 16 BRPM | BODY MASS INDEX: 31.01 KG/M2 | HEIGHT: 63 IN | WEIGHT: 175 LBS | DIASTOLIC BLOOD PRESSURE: 70 MMHG | SYSTOLIC BLOOD PRESSURE: 109 MMHG | TEMPERATURE: 97.7 F | OXYGEN SATURATION: 98 % | HEART RATE: 80 BPM

## 2018-07-19 DIAGNOSIS — R06.2 WHEEZING: ICD-10-CM

## 2018-07-19 DIAGNOSIS — J02.9 SORE THROAT: Primary | ICD-10-CM

## 2018-07-19 DIAGNOSIS — R11.2 NAUSEA AND VOMITING, INTRACTABILITY OF VOMITING NOT SPECIFIED, UNSPECIFIED VOMITING TYPE: ICD-10-CM

## 2018-07-19 DIAGNOSIS — H66.90 ACUTE OTITIS MEDIA, UNSPECIFIED OTITIS MEDIA TYPE: ICD-10-CM

## 2018-07-19 DIAGNOSIS — J34.89 THICK NASAL MUCUS: ICD-10-CM

## 2018-07-19 DIAGNOSIS — J01.90 ACUTE SINUSITIS, RECURRENCE NOT SPECIFIED, UNSPECIFIED LOCATION: ICD-10-CM

## 2018-07-19 DIAGNOSIS — R05.9 COUGH: ICD-10-CM

## 2018-07-19 DIAGNOSIS — R51.9 NONINTRACTABLE HEADACHE, UNSPECIFIED CHRONICITY PATTERN, UNSPECIFIED HEADACHE TYPE: ICD-10-CM

## 2018-07-19 LAB
INFLUENZA A ANTIBODY: NEGATIVE
INFLUENZA B ANTIBODY: NEGATIVE
S PYO AG THROAT QL: NORMAL

## 2018-07-19 PROCEDURE — 87804 INFLUENZA ASSAY W/OPTIC: CPT | Performed by: FAMILY MEDICINE

## 2018-07-19 PROCEDURE — 1036F TOBACCO NON-USER: CPT | Performed by: FAMILY MEDICINE

## 2018-07-19 PROCEDURE — G8427 DOCREV CUR MEDS BY ELIG CLIN: HCPCS | Performed by: FAMILY MEDICINE

## 2018-07-19 PROCEDURE — G8417 CALC BMI ABV UP PARAM F/U: HCPCS | Performed by: FAMILY MEDICINE

## 2018-07-19 PROCEDURE — 99213 OFFICE O/P EST LOW 20 MIN: CPT | Performed by: FAMILY MEDICINE

## 2018-07-19 PROCEDURE — 87880 STREP A ASSAY W/OPTIC: CPT | Performed by: FAMILY MEDICINE

## 2018-07-19 RX ORDER — AZITHROMYCIN 250 MG/1
TABLET, FILM COATED ORAL
Qty: 1 PACKET | Refills: 0 | Status: SHIPPED | OUTPATIENT
Start: 2018-07-19 | End: 2018-07-29

## 2018-07-19 RX ORDER — ALBUTEROL SULFATE 90 UG/1
2 AEROSOL, METERED RESPIRATORY (INHALATION) EVERY 6 HOURS PRN
Qty: 1 INHALER | Refills: 0 | Status: SHIPPED | OUTPATIENT
Start: 2018-07-19 | End: 2019-04-22

## 2018-07-19 RX ORDER — LORATADINE 10 MG/1
10 CAPSULE, LIQUID FILLED ORAL DAILY
Qty: 10 CAPSULE | Refills: 0 | Status: SHIPPED | OUTPATIENT
Start: 2018-07-19 | End: 2018-07-29

## 2018-07-19 ASSESSMENT — ENCOUNTER SYMPTOMS
CHEST TIGHTNESS: 0
CHANGE IN BOWEL HABIT: 1
SHORTNESS OF BREATH: 1
NAUSEA: 1
EYES NEGATIVE: 1
WHEEZING: 1
ABDOMINAL PAIN: 1
DIARRHEA: 1
VOMITING: 1
VISUAL CHANGE: 0
SORE THROAT: 1
RHINORRHEA: 1
BACK PAIN: 0
SINUS PRESSURE: 1
COUGH: 1
SINUS PAIN: 1

## 2018-07-19 NOTE — PROGRESS NOTES
visit. Allergies   Allergen Reactions    Penicillins      When a child       Health Maintenance   Topic Date Due    DTaP/Tdap/Td vaccine (1 - Tdap) 06/23/2003    Pneumococcal med risk (1 of 1 - PPSV23) 06/23/2003    Flu vaccine (1) 09/01/2018    Cervical cancer screen  06/27/2020    HIV screen  Completed       Subjective:      Review of Systems   Constitutional: Positive for appetite change, chills, diaphoresis, fatigue and fever. HENT: Positive for congestion, ear pain, rhinorrhea, sinus pain, sinus pressure and sore throat. Negative for ear discharge. Eyes: Negative. Respiratory: Positive for cough, shortness of breath and wheezing. Negative for chest tightness. Congestion causing shortness of breath. Cardiovascular: Negative for chest pain. Gastrointestinal: Positive for abdominal pain, change in bowel habit, diarrhea, nausea and vomiting. Genitourinary: Positive for frequency. Negative for menstrual problem. Long standing frequency/ hematuria 2 yrs having tests seen specialists. Musculoskeletal: Negative for back pain and neck pain. Skin: Negative for rash. Neurological: Positive for headaches. Negative for dizziness and light-headedness. Objective:     Physical Exam   Constitutional: She is oriented to person, place, and time. She appears well-developed and well-nourished. No distress. HENT:   Head: Normocephalic. Right Ear: External ear normal. No tenderness. Tympanic membrane is erythematous. A middle ear effusion is present. Left Ear: External ear normal. No tenderness. Tympanic membrane is erythematous. A middle ear effusion is present. Nose: Mucosal edema and sinus tenderness present. Right sinus exhibits maxillary sinus tenderness. Left sinus exhibits maxillary sinus tenderness. Mouth/Throat: Uvula is midline and mucous membranes are normal. No uvula swelling. Posterior oropharyngeal erythema present.  No oropharyngeal exudate or posterior tablet    loratadine (CLARITIN) 10 MG capsule       Plan:    Has Zofran at home for prn use. Results for orders placed or performed in visit on 07/19/18   POCT Influenza A/B   Result Value Ref Range    Influenza A Ab NEGATIVE     Influenza B Ab NEGATIVE    POCT rapid strep A   Result Value Ref Range    Strep A Ag None Detected None Detected       Orders Placed This Encounter   Procedures    POCT Influenza A/B    POCT rapid strep A     Orders Placed This Encounter   Medications    azithromycin (ZITHROMAX) 250 MG tablet     Sig: Use as directed     Dispense:  1 packet     Refill:  0    loratadine (CLARITIN) 10 MG capsule     Sig: Take 1 capsule by mouth daily for 10 days     Dispense:  10 capsule     Refill:  0    albuterol sulfate  (90 Base) MCG/ACT inhaler     Sig: Inhale 2 puffs into the lungs every 6 hours as needed for Wheezing     Dispense:  1 Inhaler     Refill:  0       Patient given educational materials - see patient instructions. Discussed use, benefit, and side effects of prescribed medications. All patient questions answered. Pt voiced understanding. Reviewed health maintenance. Instructed to continue current medications, diet and exercise. Patient agreed with treatment plan. Follow up as directed.      Electronically signed by Brea Frazier MD on 7/19/2018 at 6:22 PM

## 2018-07-19 NOTE — PATIENT INSTRUCTIONS
notice more mucus or a change in the color of your mucus.     · You have new symptoms, such as a sore throat, an earache, or sinus pain.     · You do not get better as expected. Where can you learn more? Go to https://chpeines.[email or web address] org and sign in to your EnerMotion account. Enter D279 in the [facility] box to learn more about \"Cough: Care Instructions. \"     If you do not have an account, please click on the \"Sign Up Now\" link. Current as of: December 6, 2017  Content Version: 11.6  © 5424-9681 [employer]uckus. Care instructions adapted under license by [email or web address] ([facility]). If you have questions about a medical condition or this instruction, always ask your healthcare professional. [place] [address] any warranty or liability for your use of this information. Patient Education        Nausea and Vomiting: Care Instructions  Your Care Instructions    When you are nauseated, you may feel weak and sweaty and notice a lot of saliva in your mouth. Nausea often leads to vomiting. Most of the time you do not need to worry about nausea and vomiting, but they can be signs of other illnesses. Two common causes of nausea and vomiting are stomach flu and food poisoning. Nausea and vomiting from viral stomach flu will usually start to improve within 24 hours. Nausea and vomiting from food poisoning may last from 12 to 48 hours. The doctor has checked you carefully, but problems can develop later. If you notice any problems or new symptoms, get medical treatment right away. Follow-up care is a key part of your treatment and safety. Be sure to make and go to all appointments, and call your doctor if you are having problems. It's also a good idea to know your test results and keep a list of the medicines you take. How can you care for yourself at home? · To prevent dehydration, drink plenty of fluids, enough so that your urine is light yellow or clear like water.  Choose water and other caffeine-free clear liquids until you feel better. If you have kidney, heart, or liver disease and have to limit fluids, talk with your doctor before you increase the amount of fluids you drink. · Rest in bed until you feel better. · When you are able to eat, try clear soups, mild foods, and liquids until all symptoms are gone for 12 to 48 hours. Other good choices include dry toast, crackers, cooked cereal, and gelatin dessert, such as Jell-O. When should you call for help? Call 911 anytime you think you may need emergency care. For example, call if:    · You passed out (lost consciousness).    Call your doctor now or seek immediate medical care if:    · You have symptoms of dehydration, such as:  ¨ Dry eyes and a dry mouth. ¨ Passing only a little dark urine. ¨ Feeling thirstier than usual.     · You have new or worsening belly pain.     · You have a new or higher fever.     · You vomit blood or what looks like coffee grounds.    Watch closely for changes in your health, and be sure to contact your doctor if:    · You have ongoing nausea and vomiting.     · Your vomiting is getting worse.     · Your vomiting lasts longer than 2 days.     · You are not getting better as expected. Where can you learn more? Go to https://TimePoints.mcTEL. org and sign in to your LuxVue Technology account. Enter 07 [id number] in the [employer] box to learn more about \"Nausea and Vomiting: Care Instructions. \"     If you do not have an account, please click on the \"Sign Up Now\" link. Current as of: November 20, 2017  Content Version: 11.6  © 5646-6856 Verimed[employer]orporated. Care instructions adapted under license by [facility] ([facility]). If you have questions about a medical condition or this instruction, always ask your healthcare professional. [place] [address] any warranty or liability for your use of this information.        Patient Education        Ear Infection (Otitis Media): Care doctor if:    · You have new or worse symptoms.     · You are not getting better after taking an antibiotic for 2 days. Where can you learn more? Go to [email or web address]://chpepiceweb.[email or web address] and sign in to your Dragon Insidet account. Enter B103 in the [facility] box to learn more about \"Ear Infection (Otitis Media): Care Instructions. \"     If you do not have an account, please click on the \"Sign Up Now\" link. Current as of: May 12, 2017  Content Version: 11.6  © 2696-4302 DesignFace IT. Care instructions adapted under license by [facility] ([facility]). If you have questions about a medical condition or this instruction, always ask your healthcare professional. [address] any warranty or liability for your use of this information. Patient Education        Sinusitis: Care Instructions  Your Care Instructions    Sinusitis is an infection of the lining of the sinus cavities in your head. Sinusitis often follows a cold. It causes pain and pressure in your head and face. In most cases, sinusitis gets better on its own in 1 to 2 weeks. But some mild symptoms may last for several weeks. Sometimes antibiotics are needed. Follow-up care is a key part of your treatment and safety. Be sure to make and go to all appointments, and call your doctor if you are having problems. It's also a good idea to know your test results and keep a list of the medicines you take. How can you care for yourself at home? · Take an over-the-counter pain medicine, such as acetaminophen (Tylenol), ibuprofen (Advil, Motrin), or naproxen (Aleve). Read and follow all instructions on the label. · If the doctor prescribed antibiotics, take them as directed. Do not stop taking them just because you feel better. You need to take the full course of antibiotics. · Be careful when taking over-the-counter cold or flu medicines and Tylenol at the same time.  Many of these medicines have acetaminophen, which is you have questions about a medical condition or this instruction, always ask your healthcare professional. [address] any warranty or liability for your use of this information. Patient Education        Sore Throat: Care Instructions  Your Care Instructions    Infection by bacteria or a virus causes most sore throats. Cigarette smoke, dry air, air pollution, allergies, and yelling can also cause a sore throat. Sore throats can be painful and annoying. Fortunately, most sore throats go away on their own. If you have a bacterial infection, your doctor may prescribe antibiotics. Follow-up care is a key part of your treatment and safety. Be sure to make and go to all appointments, and call your doctor if you are having problems. It's also a good idea to know your test results and keep a list of the medicines you take. How can you care for yourself at home? · If your doctor prescribed antibiotics, take them as directed. Do not stop taking them just because you feel better. You need to take the full course of antibiotics. · Gargle with warm salt water once an hour to help reduce swelling and relieve discomfort. Use 1 teaspoon of salt mixed in 1 cup of warm water. · Take an over-the-counter pain medicine, such as acetaminophen (Tylenol), ibuprofen (Advil, Motrin), or naproxen (Aleve). Read and follow all instructions on the label. · Be careful when taking over-the-counter cold or flu medicines and Tylenol at the same time. Many of these medicines have acetaminophen, which is Tylenol. Read the labels to make sure that you are not taking more than the recommended dose. Too much acetaminophen (Tylenol) can be harmful. · Drink plenty of fluids. Fluids may help soothe an irritated throat. Hot fluids, such as tea or soup, may help decrease throat pain. · Use over-the-counter throat lozenges to soothe pain. Regular cough drops or hard candy may also help.  These should not be given to young need emergency care. For example, call if:    · You have severe trouble breathing.     · You passed out (lost consciousness).    Call your doctor now or seek immediate medical care if:    · You cough up yellow, dark brown, or bloody mucus (sputum).     · You have new or worse shortness of breath.     · Your wheezing is not getting better or it gets worse after you start taking your medicine.    Watch closely for changes in your health, and be sure to contact your doctor if:    · You do not get better as expected. Where can you learn more? Go to [email or web address] org and sign in to your MyToons account. Enter [id number] [id number] in the [facility] box to learn more about \"Wheezing or Bronchoconstriction: Care Instructions. \"     If you do not have an account, please click on the \"Sign Up Now\" link. Current as of: December 6, 2017  Content Version: 11.6  © 7707-9627 Sparling Studio, Incorporated. Care instructions adapted under license by [facility] ([facility]). If you have questions about a medical condition or this instruction, always ask your healthcare professional. [address] any warranty or liability for your use of this information. Please go to the emergency if you feel worse.

## 2018-07-24 ENCOUNTER — INITIAL CONSULT (OUTPATIENT)
Dept: BEHAVIORAL/MENTAL HEALTH CLINIC | Age: 33
End: 2018-07-24
Payer: COMMERCIAL

## 2018-07-24 DIAGNOSIS — F41.1 GENERALIZED ANXIETY DISORDER: Primary | ICD-10-CM

## 2018-07-24 PROCEDURE — 90837 PSYTX W PT 60 MINUTES: CPT | Performed by: SOCIAL WORKER
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
## 2018-07-24 NOTE — PATIENT INSTRUCTIONS
Try to get out of the \"stuck cycle\":  - consider finding a [employer]ai  - Go to the gym  - [employer] about your needs

## 2018-08-08 ENCOUNTER — OFFICE VISIT (OUTPATIENT)
Dept: FAMILY MEDICINE CLINIC | Age: 33
End: 2018-08-08
Payer: COMMERCIAL

## 2018-08-08 ENCOUNTER — INITIAL CONSULT (OUTPATIENT)
Dept: BEHAVIORAL/MENTAL HEALTH CLINIC | Age: 33
End: 2018-08-08
Payer: COMMERCIAL

## 2018-08-08 VITALS
DIASTOLIC BLOOD PRESSURE: 68 MMHG | WEIGHT: 180.8 LBS | TEMPERATURE: 98.4 F | OXYGEN SATURATION: 100 % | SYSTOLIC BLOOD PRESSURE: 103 MMHG | BODY MASS INDEX: 32.04 KG/M2 | HEIGHT: 63 IN | HEART RATE: 72 BPM

## 2018-08-08 DIAGNOSIS — H69.83 DYSFUNCTION OF BOTH EUSTACHIAN TUBES: ICD-10-CM

## 2018-08-08 DIAGNOSIS — G89.29 CHRONIC BILATERAL LOW BACK PAIN WITHOUT SCIATICA: Primary | ICD-10-CM

## 2018-08-08 DIAGNOSIS — M54.50 CHRONIC BILATERAL LOW BACK PAIN WITHOUT SCIATICA: Primary | ICD-10-CM

## 2018-08-08 DIAGNOSIS — F41.1 GAD (GENERALIZED ANXIETY DISORDER): ICD-10-CM

## 2018-08-08 DIAGNOSIS — K29.00 ACUTE SUPERFICIAL GASTRITIS WITHOUT HEMORRHAGE: ICD-10-CM

## 2018-08-08 DIAGNOSIS — F33.1 MODERATE EPISODE OF RECURRENT MAJOR DEPRESSIVE DISORDER (HCC): ICD-10-CM

## 2018-08-08 DIAGNOSIS — J30.89 SEASONAL ALLERGIC RHINITIS DUE TO OTHER ALLERGIC TRIGGER: ICD-10-CM

## 2018-08-08 DIAGNOSIS — F41.1 GENERALIZED ANXIETY DISORDER: Primary | ICD-10-CM

## 2018-08-08 PROCEDURE — G8427 DOCREV CUR MEDS BY ELIG CLIN: HCPCS | Performed by: FAMILY MEDICINE

## 2018-08-08 PROCEDURE — G8417 CALC BMI ABV UP PARAM F/U: HCPCS | Performed by: FAMILY MEDICINE

## 2018-08-08 PROCEDURE — 1036F TOBACCO NON-USER: CPT | Performed by: FAMILY MEDICINE

## 2018-08-08 PROCEDURE — 90837 PSYTX W PT 60 MINUTES: CPT | Performed by: SOCIAL WORKER

## 2018-08-08 PROCEDURE — 99214 OFFICE O/P EST MOD 30 MIN: CPT | Performed by: FAMILY MEDICINE

## 2018-08-08 PROCEDURE — 96160 PT-FOCUSED HLTH RISK ASSMT: CPT | Performed by: FAMILY MEDICINE

## 2018-08-08 RX ORDER — FLUTICASONE PROPIONATE 50 MCG
2 SPRAY, SUSPENSION (ML) NASAL DAILY
Qty: 1 BOTTLE | Refills: 3 | Status: SHIPPED | OUTPATIENT
Start: 2018-08-08 | End: 2018-10-31 | Stop reason: SDUPTHER

## 2018-08-08 RX ORDER — RANITIDINE 150 MG/1
150 TABLET ORAL 2 TIMES DAILY
Qty: 60 TABLET | Refills: 1 | Status: SHIPPED | OUTPATIENT
Start: 2018-08-08 | End: 2019-01-23

## 2018-08-08 ASSESSMENT — PATIENT HEALTH QUESTIONNAIRE - PHQ9
SUM OF ALL RESPONSES TO PHQ QUESTIONS 1-9: 11
10. IF YOU CHECKED OFF ANY PROBLEMS, HOW DIFFICULT HAVE THESE PROBLEMS MADE IT FOR YOU TO DO YOUR WORK, TAKE CARE OF THINGS AT HOME, OR GET ALONG WITH OTHER PEOPLE: 0
1. LITTLE INTEREST OR PLEASURE IN DOING THINGS: 1
7. TROUBLE CONCENTRATING ON THINGS, SUCH AS READING THE NEWSPAPER OR WATCHING TELEVISION: 2
2. FEELING DOWN, DEPRESSED OR HOPELESS: 1
3. TROUBLE FALLING OR STAYING ASLEEP: 3
6. FEELING BAD ABOUT YOURSELF - OR THAT YOU ARE A FAILURE OR HAVE LET YOURSELF OR YOUR FAMILY DOWN: 1
5. POOR APPETITE OR OVEREATING: 1
SUM OF ALL RESPONSES TO PHQ9 QUESTIONS 1 & 2: 2
9. THOUGHTS THAT YOU WOULD BE BETTER OFF DEAD, OR OF HURTING YOURSELF: 0
SUM OF ALL RESPONSES TO PHQ QUESTIONS 1-9: 11
4. FEELING TIRED OR HAVING LITTLE ENERGY: 2
8. MOVING OR SPEAKING SO SLOWLY THAT OTHER PEOPLE COULD HAVE NOTICED. OR THE OPPOSITE, BEING SO FIGETY OR RESTLESS THAT YOU HAVE BEEN MOVING AROUND A LOT MORE THAN USUAL: 0

## 2018-08-08 ASSESSMENT — ENCOUNTER SYMPTOMS
COUGH: 0
ABDOMINAL PAIN: 1
SHORTNESS OF BREATH: 0
ABDOMINAL DISTENTION: 0
NAUSEA: 1
CHEST TIGHTNESS: 0
WHEEZING: 0
VOMITING: 0
RHINORRHEA: 1
BACK PAIN: 1
SINUS PRESSURE: 0
SINUS PAIN: 0
DIARRHEA: 0
CONSTIPATION: 0

## 2018-08-08 ASSESSMENT — ANXIETY QUESTIONNAIRES
1. FEELING NERVOUS, ANXIOUS, OR ON EDGE: 3-NEARLY EVERY DAY
GAD7 TOTAL SCORE: 17
7. FEELING AFRAID AS IF SOMETHING AWFUL MIGHT HAPPEN: 2-OVER HALF THE DAYS
6. BECOMING EASILY ANNOYED OR IRRITABLE: 3-NEARLY EVERY DAY
4. TROUBLE RELAXING: 3-NEARLY EVERY DAY
3. WORRYING TOO MUCH ABOUT DIFFERENT THINGS: 2-OVER HALF THE DAYS
2. NOT BEING ABLE TO STOP OR CONTROL WORRYING: 2-OVER HALF THE DAYS
5. BEING SO RESTLESS THAT IT IS HARD TO SIT STILL: 2-OVER HALF THE DAYS

## 2018-08-08 NOTE — PROGRESS NOTES
Chief Complaint   Patient presents with    Anxiety    Otitis Media    Back Pain    Abdominal Pain     lower abdomen          Candace Maxwell  here today for follow up on chronic medical problems, go over labs and/or diagnostic studies, and medication refills. Anxiety; Otitis Media; Back Pain; and Abdominal Pain (lower abdomen )      SHEYLA Amos reports lower back pain bilateral, nonradiating and pain in the right lower quadrant and left lower quadrant of the abdomen, where the ovaries are, daily, for 1 yr., intermittent. Sometimes cold makes it better, sometimes heat makes it better. Sometimes worse when she is on her menstrual period, but not always. Intensity of pain is 4-5/10. Goes up to 8/10. Wakes up from sleep, 3-4 times/night, and she has to urinate 2-3 times/night. The bladder pain is worse when the bladder is full. She says the pain in her bladder gets better 30 mins after urinates. There is blood in the urine and nobody can figure out why. Patient says she was found to have blood in the urine since her son was born. She does see nephrology who ordered some blood work, and she will have it in about 1 week. Dr. Anni Mireles ordered labs for her. Had Urologic workup which was negative. Her GYN suspects she might have endometriosis, but she canceled her laparoscopy. Depression and Anxiety. Patient continues to struggle with her anxiety and depression related to her medical problems and unhealthy relationship. Patient complains of depression. She complains of anhedonia, depressed mood, difficulty concentrating, fatigue, feelings of worthlessness/guilt, hopelessness, insomnia and psychomotor agitation. Patient complains of anxiety disorder, panic attacks and sleep disturbance. She has the following anxiety symptoms: difficulty concentrating, fatigue, feelings of losing control, insomnia, irritable, psychomotor agitation, racing thoughts.       Patient reports that continues to worry a lot, even about things that didn't happen. Like her son needing to go to the bathroom without even asking he needed to go to the bathroom. She says her mind doesn't shut down. She absolutely doesn't want any medications because she is afraid about how she is going to feel on them  She is worried about her health. She wants to go to [facility] even if she didn't complete the workup locally  Denies suicidal ideation, plan or intent. She declines Genesight testing, advised to continue locally with workup    She has worsening depression and anxiety. Patient follows with Trae Jarrell, our cognitive behavioral therapist, who referred her to another therapist.  Has one more appointment with Trae Jarrell, our [facility] Consultant. PHQ Scores 8/8/2018 2/5/2018 9/26/2017 8/23/2017   PHQ2 Score 2 2 2 3   PHQ9 Score 11 9 9 11        []1-4 = Minimal depression   []5-9 = Mild depression   [x]10-14 = Moderate depression   []15-19 = Moderately severe depression   []20-27 = Severe depression      Severe anxiety, worsening  DEREK 7 SCORE 8/8/2018 2/5/2018 9/26/2017 8/23/2017   DEREK-7 Total Score 17 10 10 14     Interpretation of DEREK-7 score: 5-9 = mild anxiety, 10-14 = moderate anxiety, 15+ = severe anxiety. Recommend referral to behavioral health for scores 10 or greater. Patient reports ears feeling plugged, congestion, clear rhinorrhea. She denies headache, sore throat, hearing loss, or sinus pain. She admits to allergies, worse in summer. Onset a few days ago, symptoms are progressively getting worse. Patient also admits to abdominal pain, stomach pains, nausea. She also has pain in the right lower quadrant and left lower quadrant . Patient denies vomiting, blood in the stool, constipation and diarrhea. Symptoms are progressively getting worse.     Allergies   Allergen Reactions    Penicillins      When a child        Current Outpatient Prescriptions   Medication Sig Dispense Refill    ibuprofen (ADVIL;MOTRIN) pelvic pain and urgency. Musculoskeletal: Positive for back pain. Allergic/Immunologic: Positive for environmental allergies. Psychiatric/Behavioral: Positive for decreased concentration, dysphoric mood and sleep disturbance. Negative for self-injury. The patient is nervous/anxious.            -vital signs stable and within normal limits except Obesity per BMI. /68   Pulse 72   Temp 98.4 °F (36.9 °C) (Tympanic)   Ht 5' 3\" (1.6 m)   Wt 180 lb 12.8 oz (82 kg)   LMP 07/19/2018 (Exact Date)   SpO2 100%   BMI 32.03 kg/m²      Physical Exam   Constitutional: She is oriented to person, place, and time. She appears well-developed and well-nourished. No distress. HENT:   Head: Normocephalic and atraumatic. Right Ear: External ear normal. No tenderness. Tympanic membrane is bulging. Left Ear: External ear normal. No tenderness. Tympanic membrane is bulging. A middle ear effusion is present. Nose: Mucosal edema present. No rhinorrhea. Right sinus exhibits no maxillary sinus tenderness and no frontal sinus tenderness. Left sinus exhibits no maxillary sinus tenderness and no frontal sinus tenderness. Mouth/Throat: Posterior oropharyngeal erythema present. No oropharyngeal exudate. Eyes: Conjunctivae and EOM are normal. Right eye exhibits no discharge. Left eye exhibits no discharge. No scleral icterus. Neck: Normal range of motion. Neck supple. No thyromegaly present. Cardiovascular: Normal rate, regular rhythm, normal heart sounds and intact distal pulses. Pulmonary/Chest: Effort normal and breath sounds normal. No respiratory distress. She has no wheezes. She has no rales. She exhibits no tenderness. Abdominal: Soft. Bowel sounds are normal. She exhibits no distension. There is tenderness in the right lower quadrant, epigastric area, suprapubic area and left lower quadrant. There is CVA tenderness (b/l ). There is no rigidity, no rebound and no guarding.    Musculoskeletal: She exhibits tenderness. She exhibits no edema. Lumbar back: She exhibits decreased range of motion, tenderness, bony tenderness, pain and spasm. Neurological: She is alert and oriented to person, place, and time. No cranial nerve deficit. She exhibits normal muscle tone. Skin: Skin is warm and dry. No rash noted. She is not diaphoretic. Psychiatric: Her behavior is normal. Judgment and thought content normal.   Nursing note and vitals reviewed. Discussed testing with the patient and all questions fully answered. Mildly increased liver function tests , Otherwise labs within normal limits    Lab Results   Component Value Date    WBC 4.4 06/28/2018    HGB 12.8 06/28/2018    HCT 38.3 06/28/2018    MCV 91.3 06/28/2018     06/28/2018       Lab Results   Component Value Date     06/28/2018    K 3.7 06/28/2018     06/28/2018    CO2 24 06/28/2018    BUN 15 06/28/2018    CREATININE 0.64 06/28/2018    GLUCOSE 91 06/28/2018    CALCIUM 8.8 06/28/2018        Lab Results   Component Value Date    ALT 42 (H) 06/06/2018    AST 38 (H) 06/06/2018    ALKPHOS 73 06/06/2018    BILITOT 0.25 (L) 06/06/2018       Lab Results   Component Value Date    TSH 2.80 09/19/2017       Lab Results   Component Value Date    CHOL 176 09/19/2017     Lab Results   Component Value Date    TRIG 45 09/19/2017     Lab Results   Component Value Date    HDL 62 09/19/2017     Lab Results   Component Value Date    LDLCHOLESTEROL 105 09/19/2017     Lab Results   Component Value Date    VLDL NOT REPORTED 09/19/2017     Lab Results   Component Value Date    CHOLHDLRATIO 2.8 09/19/2017       No results found for: LABA1C    No results found for: SZRNSDKQ78    No results found for: FOLATE    No results found for: IRON, TIBC, FERRITIN    No results found for: VITD25        ASSESSMENT AND PLAN      1. Chronic bilateral low back pain without sciatica  - Camphor-Menthol-Methyl Sal 3.1-16-10 % GEL;  Apply every 8 hours as needed for SALLY Fernández SC [address]   11/8/2018 3:30 PM Tiffanie Villalba MD [facility] Dragan Alicea     This note was completed by using the assistance of a speech-recognition program. However, inadvertent computerized transcription errors may be present. Although every effort was made to ensure accuracy, no guarantees can be provided that every mistake has been identified and corrected by editing.   Electronically signed by Tiffanie Villalba MD on 8/12/2018  6:50 PM

## 2018-08-09 NOTE — PATIENT INSTRUCTIONS
Angie rFancis, [facility]  [address] [facility], [address] [place]  [phone]

## 2018-08-12 PROBLEM — H69.93 DYSFUNCTION OF BOTH EUSTACHIAN TUBES: Status: ACTIVE | Noted: 2018-08-12

## 2018-08-12 PROBLEM — H69.83 DYSFUNCTION OF BOTH EUSTACHIAN TUBES: Status: ACTIVE | Noted: 2018-08-12

## 2018-08-12 PROBLEM — J30.9 ALLERGIC RHINITIS DUE TO ALLERGEN: Status: ACTIVE | Noted: 2018-08-12

## 2018-08-12 PROBLEM — K29.00 ACUTE SUPERFICIAL GASTRITIS WITHOUT HEMORRHAGE: Status: ACTIVE | Noted: 2018-08-12

## 2018-08-12 ASSESSMENT — ENCOUNTER SYMPTOMS: SORE THROAT: 0

## 2018-08-13 ENCOUNTER — HOSPITAL ENCOUNTER (OUTPATIENT)
Age: 33
Discharge: HOME | End: 2018-08-13
Payer: COMMERCIAL

## 2018-08-13 LAB
ABSOLUTE EOS #: 0.1 K/UL (ref 0–0.4)
ABSOLUTE IMMATURE GRANULOCYTE: NORMAL K/UL (ref 0–0.3)
ABSOLUTE LYMPH #: 2.1 K/UL (ref 1–4.8)
ABSOLUTE MONO #: 0.4 K/UL (ref 0.1–1.3)
ANION GAP SERPL CALCULATED.3IONS-SCNC: 11 MMOL/L (ref 9–17)
BASOPHILS # BLD: 1 % (ref 0–2)
BASOPHILS ABSOLUTE: 0 K/UL (ref 0–0.2)
BUN BLDV-MCNC: 16 MG/DL (ref 6–20)
BUN/CREAT BLD: NORMAL (ref 9–20)
C-REACTIVE PROTEIN: 1.2 MG/L (ref 0–5)
CALCIUM SERPL-MCNC: 8.6 MG/DL (ref 8.6–10.4)
CHLORIDE BLD-SCNC: 106 MMOL/L (ref 98–107)
CO2: 24 MMOL/L (ref 20–31)
COMPLEMENT C3: 129 MG/DL (ref 90–180)
COMPLEMENT C4: 25 MG/DL (ref 10–40)
CREAT SERPL-MCNC: 0.71 MG/DL (ref 0.5–0.9)
CREATININE URINE: 118.6 MG/DL (ref 28–217)
DIFFERENTIAL TYPE: NORMAL
EOSINOPHILS RELATIVE PERCENT: 1 % (ref 0–4)
GFR AFRICAN AMERICAN: >60 ML/MIN
GFR NON-AFRICAN AMERICAN: >60 ML/MIN
GFR SERPL CREATININE-BSD FRML MDRD: NORMAL ML/MIN/{1.73_M2}
GFR SERPL CREATININE-BSD FRML MDRD: NORMAL ML/MIN/{1.73_M2}
GLUCOSE BLD-MCNC: 90 MG/DL (ref 70–99)
HCT VFR BLD CALC: 38.5 % (ref 36–46)
HEMOGLOBIN: 13 G/DL (ref 12–16)
IMMATURE GRANULOCYTES: NORMAL %
LYMPHOCYTES # BLD: 35 % (ref 24–44)
MAGNESIUM: 2.2 MG/DL (ref 1.6–2.6)
MCH RBC QN AUTO: 31.4 PG (ref 26–34)
MCHC RBC AUTO-ENTMCNC: 33.9 G/DL (ref 31–37)
MCV RBC AUTO: 92.7 FL (ref 80–100)
MONOCYTES # BLD: 7 % (ref 1–7)
NRBC AUTOMATED: NORMAL PER 100 WBC
PDW BLD-RTO: 12.7 % (ref 11.5–14.9)
PHOSPHORUS: 2.7 MG/DL (ref 2.6–4.5)
PLATELET # BLD: 257 K/UL (ref 150–450)
PLATELET ESTIMATE: NORMAL
PMV BLD AUTO: 8.7 FL (ref 6–12)
POTASSIUM SERPL-SCNC: 4.1 MMOL/L (ref 3.7–5.3)
RBC # BLD: 4.15 M/UL (ref 4–5.2)
RBC # BLD: NORMAL 10*6/UL
SEDIMENTATION RATE, ERYTHROCYTE: 14 MM (ref 0–20)
SEG NEUTROPHILS: 56 % (ref 36–66)
SEGMENTED NEUTROPHILS ABSOLUTE COUNT: 3.4 K/UL (ref 1.3–9.1)
SODIUM BLD-SCNC: 141 MMOL/L (ref 135–144)
TOTAL CK: 139 U/L (ref 26–192)
TOTAL PROTEIN, URINE: 6 MG/DL
URINE TOTAL PROTEIN CREATININE RATIO: 0.05 (ref 0–0.2)
WBC # BLD: 6 K/UL (ref 3.5–11)
WBC # BLD: NORMAL 10*3/UL

## 2018-08-13 PROCEDURE — 82550 ASSAY OF CK (CPK): CPT

## 2018-08-13 PROCEDURE — 84156 ASSAY OF PROTEIN URINE: CPT

## 2018-08-13 PROCEDURE — 83735 ASSAY OF MAGNESIUM: CPT

## 2018-08-13 PROCEDURE — 83516 IMMUNOASSAY NONANTIBODY: CPT

## 2018-08-13 PROCEDURE — 86160 COMPLEMENT ANTIGEN: CPT

## 2018-08-13 PROCEDURE — 80048 BASIC METABOLIC PNL TOTAL CA: CPT

## 2018-08-13 PROCEDURE — 82570 ASSAY OF URINE CREATININE: CPT

## 2018-08-13 PROCEDURE — 36415 COLL VENOUS BLD VENIPUNCTURE: CPT

## 2018-08-13 PROCEDURE — 84100 ASSAY OF PHOSPHORUS: CPT

## 2018-08-13 PROCEDURE — 86140 C-REACTIVE PROTEIN: CPT

## 2018-08-13 PROCEDURE — 85025 COMPLETE CBC W/AUTO DIFF WBC: CPT

## 2018-08-13 PROCEDURE — 86038 ANTINUCLEAR ANTIBODIES: CPT

## 2018-08-13 PROCEDURE — 85651 RBC SED RATE NONAUTOMATED: CPT

## 2018-08-14 LAB
ANTI-NUCLEAR ANTIBODY (ANA): NEGATIVE
GBM ANTIBODY IGG: 5 AU/ML

## 2018-08-20 ENCOUNTER — OFFICE VISIT (OUTPATIENT)
Dept: OBGYN CLINIC | Age: 33
End: 2018-08-20
Payer: COMMERCIAL

## 2018-08-20 ENCOUNTER — HOSPITAL ENCOUNTER (OUTPATIENT)
Age: 33
Setting detail: SPECIMEN
Discharge: HOME | End: 2018-08-20
Payer: COMMERCIAL

## 2018-08-20 VITALS
HEART RATE: 90 BPM | WEIGHT: 181 LBS | RESPIRATION RATE: 18 BRPM | BODY MASS INDEX: 32.06 KG/M2 | SYSTOLIC BLOOD PRESSURE: 120 MMHG | DIASTOLIC BLOOD PRESSURE: 64 MMHG

## 2018-08-20 DIAGNOSIS — L29.9 ITCHING: Primary | ICD-10-CM

## 2018-08-20 DIAGNOSIS — Z11.3 SCREENING FOR STDS (SEXUALLY TRANSMITTED DISEASES): ICD-10-CM

## 2018-08-20 DIAGNOSIS — L29.9 ITCHING: ICD-10-CM

## 2018-08-20 DIAGNOSIS — N76.0 ACUTE VAGINITIS: ICD-10-CM

## 2018-08-20 LAB
DIRECT EXAM: ABNORMAL
Lab: ABNORMAL
SPECIMEN DESCRIPTION: ABNORMAL
STATUS: ABNORMAL

## 2018-08-20 PROCEDURE — 1036F TOBACCO NON-USER: CPT | Performed by: CLINICAL NURSE SPECIALIST

## 2018-08-20 PROCEDURE — G8417 CALC BMI ABV UP PARAM F/U: HCPCS | Performed by: CLINICAL NURSE SPECIALIST

## 2018-08-20 PROCEDURE — G8427 DOCREV CUR MEDS BY ELIG CLIN: HCPCS | Performed by: CLINICAL NURSE SPECIALIST

## 2018-08-20 PROCEDURE — 99213 OFFICE O/P EST LOW 20 MIN: CPT | Performed by: CLINICAL NURSE SPECIALIST

## 2018-08-20 RX ORDER — FLUCONAZOLE 100 MG/1
100 TABLET ORAL DAILY
Qty: 7 TABLET | Refills: 0 | Status: SHIPPED | OUTPATIENT
Start: 2018-08-20 | End: 2018-08-27

## 2018-08-20 RX ORDER — METRONIDAZOLE 500 MG/1
500 TABLET ORAL 2 TIMES DAILY
Qty: 14 TABLET | Refills: 0 | Status: SHIPPED | OUTPATIENT
Start: 2018-08-20 | End: 2018-08-27

## 2018-08-20 ASSESSMENT — ENCOUNTER SYMPTOMS
EYES NEGATIVE: 1
GASTROINTESTINAL NEGATIVE: 1
RESPIRATORY NEGATIVE: 1
ALLERGIC/IMMUNOLOGIC NEGATIVE: 1

## 2018-08-20 NOTE — PROGRESS NOTES
time.   Skin: Skin is warm and dry. Psychiatric: She has a normal mood and affect. Her behavior is normal. Judgment and thought content normal.   Vitals reviewed. Assessment:      Diagnosis Orders   1. Itching  VAGINITIS DNA PROBE    C.trachomatis N.gonorrhoeae DNA    fluconazole (DIFLUCAN) 100 MG tablet   2. Screening for STDs (sexually transmitted diseases)  VAGINITIS DNA PROBE    C.trachomatis N.gonorrhoeae DNA   3. Acute vaginitis  fluconazole (DIFLUCAN) 100 MG tablet    metroNIDAZOLE (FLAGYL) 500 MG tablet         Plan:      Return for as needed. Patient was seen with total face to face time of 15 minutes. More than 50% of this visit was on counseling and education regarding the problems listed below and her options. She was also counseled on her preventative health maintenance recommendations and follow-up.     Electronically signed by: Debi Mccullough CNP

## 2018-08-21 LAB
C TRACH DNA GENITAL QL NAA+PROBE: NEGATIVE
N. GONORRHOEAE DNA: NEGATIVE

## 2018-08-22 ENCOUNTER — OFFICE VISIT (OUTPATIENT)
Dept: BEHAVIORAL/MENTAL HEALTH CLINIC | Age: 33
End: 2018-08-22
Payer: COMMERCIAL

## 2018-08-22 DIAGNOSIS — N76.0 BV (BACTERIAL VAGINOSIS): Primary | ICD-10-CM

## 2018-08-22 DIAGNOSIS — B96.89 BV (BACTERIAL VAGINOSIS): Primary | ICD-10-CM

## 2018-08-22 DIAGNOSIS — F41.1 GENERALIZED ANXIETY DISORDER: Primary | ICD-10-CM

## 2018-08-22 PROCEDURE — 90837 PSYTX W PT 60 MINUTES: CPT | Performed by: SOCIAL WORKER

## 2018-08-22 RX ORDER — CLINDAMYCIN PHOSPHATE 20 MG/G
1 CREAM VAGINAL NIGHTLY
Qty: 1 TUBE | Refills: 0 | Status: SHIPPED | OUTPATIENT
Start: 2018-08-22 | End: 2018-08-29

## 2018-08-22 NOTE — PROGRESS NOTES
Behavioral Health Consultation  JUAN JOSE Kunz, SUSANS, LCDC III  8/22/2018  3:00 PM    Time spent with Patient: 60 minutes      Reason for Consult:  depression  Referring Provider: Ayaz Frankel MD    Pt provided informed consent for the behavioral health program. Discussed with patient model of service to include the limits of confidentiality (i.e. abuse reporting, suicide intervention, etc.) and short-term intervention focused approach. Pt indicated understanding. Feedback given to PCP. S:   She followed through with my recommendation to get linked into outpatient therapy. She attend her first visit, was very nervous but felt positive about the therapist.  She is planning on returning and agrees to follow through with my recommendation. She is realizing more that Lance Dietrich is narcissistic and that her personality is the perfect one that a person like him selects. She is realizing that her codependency has kept her stuck in an unhappy and unhealthy situation. \"I now know that I have these issues but I don't know what to do. \"   She had a bad reaction to an antibiotic yesterday and said that it caused some panic sx's. O:  MSE:     Appearance    alert, cooperative  Appetite normal  Sleep disturbance No  Fatigue No  Loss of pleasure No  Impulsive behavior No  Speech    spontaneous, normal rate, normal volume and well articulated  Mood    Mild depression  Affect    Appropriate to context  Thought Content    intact  Thought Process    linear, goal directed and coherent  Associations    logical connections  Insight    Good  Judgment    Intact  Orientation    oriented to person, place, time, and general circumstances  Memory    recent and remote memory intact  Attention/Concentration    intact  Morbid ideation No  Suicide Assessment    no suicidal ideation    A:   Patient engaged well. She reports panic sx's last night which she attributes to a reaction from an antibiotic.   Her mood is stable, every 6 hours as needed for Pain Takes 2 tablets of 400 mg      acetaminophen (TYLENOL) 500 MG tablet Take 1 tablet by mouth every 4 hours as needed for Pain 60 tablet 0     No current facility-administered medications for this visit. Social History:   Social History     Social History    Marital status: Single     Spouse name: N/A    Number of children: N/A    Years of education: N/A     Occupational History    Not on file. Social History Main Topics    Smoking status: Former Smoker     Types: Cigarettes     Quit date: 1/1/2005    Smokeless tobacco: Never Used    Alcohol use No      Comment: Hx alcohol abuse as teenager    Drug use: No      Comment:  Hx, 15-17 yrs of age, smoked MJ daily 3 joints / day.  Sexual activity: Yes     Partners: Male     Other Topics Concern    Not on file     Social History Narrative    No narrative on file       TOBACCO:   reports that she quit smoking about 13 years ago. Her smoking use included Cigarettes. She has never used smokeless tobacco.  ETOH:   reports that she does not drink alcohol. Family History:   Family History   Problem Relation Age of Onset    Mental Illness Mother     Breast Cancer Maternal Grandmother     Heart Attack Maternal Grandfather          Plan:  Pt interventions:  Provided education, Discussed self-care (sleep, nutrition, rewarding activities, social support, exercise), Discussed potential barriers to change, Supportive techniques, Emphasized self-care as important for managing overall health and Collaborative treatment planning,Clarified role of [facility] in primary care,Recommended that pt establish with a mental health clinician with whom they can meet regularly for psychotherapy services      Pt Behavioral Change Plan:  Patient is in process of transferring to outpatient therapy.    -She may return for a follow up as we transition her to another therapist.     There are no Patient Instructions on file for this visit.

## 2018-09-10 ENCOUNTER — OFFICE VISIT (OUTPATIENT)
Dept: BEHAVIORAL/MENTAL HEALTH CLINIC | Age: 33
End: 2018-09-10
Payer: COMMERCIAL

## 2018-09-10 DIAGNOSIS — F41.1 GENERALIZED ANXIETY DISORDER: Primary | ICD-10-CM

## 2018-09-10 PROCEDURE — 90832 PSYTX W PT 30 MINUTES: CPT | Performed by: SOCIAL WORKER

## 2018-09-10 NOTE — PROGRESS NOTES
attended 1 session and agrees to continue with her. I have agreed to see her next month to work on closure and brief support as she switches over to psychotherapy. I am also trying to link her with a weekly group that offers support for her struggles. Diagnosis:  The encounter diagnosis was Generalized anxiety disorder. Diagnosis Date    Allergic rhinitis     Anxiety     Chronic kidney disease     CKD (chronic kidney disease) stage 1, GFR 90 ml/min or greater 7/10/2018    Depression     Headache(784.0)     Heart murmur     Irritable bowel syndrome with diarrhea 11/17/2016    Midline low back pain without sciatica 11/17/2016    Obesity (BMI 30-39. 9) 8/26/2017    Panic attack as reaction to stress     certain anxiety medications will cause a panic attack    Panic disorder with agoraphobia 08/26/2017    some medications for anxiety will cause a panic attack. History:    Medications:   Current Outpatient Prescriptions   Medication Sig Dispense Refill    ranitidine (ZANTAC) 150 MG tablet Take 1 tablet by mouth 2 times daily 60 tablet 1    fluticasone (FLONASE) 50 MCG/ACT nasal spray 2 sprays by Nasal route daily 1 Bottle 3    albuterol sulfate  (90 Base) MCG/ACT inhaler Inhale 2 puffs into the lungs every 6 hours as needed for Wheezing 1 Inhaler 0    ibuprofen (ADVIL;MOTRIN) 400 MG tablet Take 800 mg by mouth every 6 hours as needed for Pain Takes 2 tablets of 400 mg      acetaminophen (TYLENOL) 500 MG tablet Take 1 tablet by mouth every 4 hours as needed for Pain 60 tablet 0     No current facility-administered medications for this visit. Social History:   Social History     Social History    Marital status: Single     Spouse name: N/A    Number of children: N/A    Years of education: N/A     Occupational History    Not on file.      Social History Main Topics    Smoking status: Former Smoker     Types: Cigarettes     Quit date: 1/1/2005    Smokeless tobacco: Never

## 2018-10-09 ENCOUNTER — TELEPHONE (OUTPATIENT)
Dept: FAMILY MEDICINE CLINIC | Age: 33
End: 2018-10-09

## 2018-10-09 DIAGNOSIS — K59.01 SLOW TRANSIT CONSTIPATION: Primary | ICD-10-CM

## 2018-10-09 RX ORDER — POLYETHYLENE GLYCOL 3350 17 G/17G
17 POWDER, FOR SOLUTION ORAL DAILY PRN
Qty: 1 BOTTLE | Refills: 3 | Status: SHIPPED | OUTPATIENT
Start: 2018-10-09 | End: 2019-04-23

## 2018-10-09 NOTE — TELEPHONE ENCOUNTER
Debora sent   Orders Placed This Encounter   Medications    polyethylene glycol (MIRALAX) powder     Sig: Take 17 g by mouth daily as needed (constipation)     Dispense:  1 Bottle     Refill:  3

## 2018-10-15 ENCOUNTER — INITIAL CONSULT (OUTPATIENT)
Dept: BEHAVIORAL/MENTAL HEALTH CLINIC | Age: 33
End: 2018-10-15
Payer: COMMERCIAL

## 2018-10-15 DIAGNOSIS — F41.1 GENERALIZED ANXIETY DISORDER: Primary | ICD-10-CM

## 2018-10-15 PROCEDURE — 90837 PSYTX W PT 60 MINUTES: CPT | Performed by: SOCIAL WORKER

## 2018-10-31 ENCOUNTER — OFFICE VISIT (OUTPATIENT)
Dept: FAMILY MEDICINE CLINIC | Age: 33
End: 2018-10-31
Payer: COMMERCIAL

## 2018-10-31 VITALS
TEMPERATURE: 99 F | HEIGHT: 63 IN | HEART RATE: 87 BPM | BODY MASS INDEX: 32.28 KG/M2 | OXYGEN SATURATION: 99 % | DIASTOLIC BLOOD PRESSURE: 58 MMHG | SYSTOLIC BLOOD PRESSURE: 94 MMHG | WEIGHT: 182.2 LBS

## 2018-10-31 DIAGNOSIS — J30.89 SEASONAL ALLERGIC RHINITIS DUE TO OTHER ALLERGIC TRIGGER: ICD-10-CM

## 2018-10-31 DIAGNOSIS — R42 VERTIGO: Primary | ICD-10-CM

## 2018-10-31 DIAGNOSIS — H69.83 DYSFUNCTION OF BOTH EUSTACHIAN TUBES: ICD-10-CM

## 2018-10-31 PROBLEM — K29.00 ACUTE SUPERFICIAL GASTRITIS WITHOUT HEMORRHAGE: Status: RESOLVED | Noted: 2018-08-12 | Resolved: 2018-10-31

## 2018-10-31 PROCEDURE — 1036F TOBACCO NON-USER: CPT | Performed by: FAMILY MEDICINE

## 2018-10-31 PROCEDURE — G8417 CALC BMI ABV UP PARAM F/U: HCPCS | Performed by: FAMILY MEDICINE

## 2018-10-31 PROCEDURE — G8484 FLU IMMUNIZE NO ADMIN: HCPCS | Performed by: FAMILY MEDICINE

## 2018-10-31 PROCEDURE — 99213 OFFICE O/P EST LOW 20 MIN: CPT | Performed by: FAMILY MEDICINE

## 2018-10-31 PROCEDURE — G8427 DOCREV CUR MEDS BY ELIG CLIN: HCPCS | Performed by: FAMILY MEDICINE

## 2018-10-31 RX ORDER — MECLIZINE HYDROCHLORIDE 25 MG/1
25 TABLET ORAL 3 TIMES DAILY PRN
Qty: 30 TABLET | Refills: 0 | Status: SHIPPED | OUTPATIENT
Start: 2018-10-31 | End: 2018-11-10

## 2018-10-31 RX ORDER — FLUTICASONE PROPIONATE 50 MCG
2 SPRAY, SUSPENSION (ML) NASAL DAILY
Qty: 1 BOTTLE | Refills: 3 | Status: SHIPPED | OUTPATIENT
Start: 2018-10-31 | End: 2019-04-22

## 2018-10-31 RX ORDER — LORATADINE 10 MG/1
10 TABLET ORAL DAILY
Qty: 90 TABLET | Refills: 3 | Status: SHIPPED | OUTPATIENT
Start: 2018-10-31 | End: 2019-01-16 | Stop reason: ALTCHOICE

## 2018-10-31 ASSESSMENT — ENCOUNTER SYMPTOMS
RHINORRHEA: 0
TROUBLE SWALLOWING: 0
SORE THROAT: 0
NAUSEA: 0
STRIDOR: 0
SINUS PAIN: 0
SHORTNESS OF BREATH: 0
COUGH: 0
SINUS PRESSURE: 0

## 2018-10-31 ASSESSMENT — PATIENT HEALTH QUESTIONNAIRE - PHQ9: DEPRESSION UNABLE TO ASSESS: URGENT/EMERGENT SITUATION

## 2018-10-31 NOTE — PATIENT INSTRUCTIONS
These may include:  ¨ Sudden numbness, tingling, weakness, or loss of movement in your face, arm, or leg, especially on only one side of your body. ¨ Sudden vision changes. ¨ Sudden trouble speaking. ¨ Sudden confusion or trouble understanding simple statements. ¨ Sudden problems with walking or balance. ¨ A sudden, severe headache that is different from past headaches.    Call your doctor now or seek immediate medical care if:    · You have new or worse nausea and vomiting.     · You have new symptoms such as hearing loss or roaring in your ears.    Watch closely for changes in your health, and be sure to contact your doctor if:    · You are not getting better as expected.     · Your vertigo gets worse. Where can you learn more? Go to https://[email or web address] Visioneweb.Cytocentrics. org and sign in to your Dynamics Direct account. Enter  in the lifeaction games box to learn more about \"Benign Paroxysmal Positional Vertigo (BPPV): Care Instructions. \"     If you do not have an account, please click on the \"Sign Up Now\" link. Current as of: May 12, 2017  Content Version: 11.7  © 4683-8377 [employer]. Care instructions adapted under license by [employer] [facility] [facility] ([facility]). If you have questions about a medical condition or this instruction, always ask your healthcare professional. [place] [address] any warranty or liability for your use of this information. Patient Education        Vertigo: Exercises  Your Care Instructions  Here are some examples of typical rehabilitation exercises for your condition. Start each exercise slowly. Ease off the exercise if you start to have pain. Your doctor or physical therapist will tell you when you can start these exercises and which ones will work best for you. How to do the exercises  Exercise 1    1. Stand with a chair in front of you and a wall behind you. If you begin to fall, you may use them for support.   2. Stand with your feet together and your arms at your sides. 3. Move your head up and down 10 times. Exercise 2    1. Move your head side to side 10 times. Exercise 3    1. Move your head diagonally up and down 10 times. Exercise 4    1. Move your head diagonally up and down 10 times on the other side. Follow-up care is a key part of your treatment and safety. Be sure to make and go to all appointments, and call your doctor if you are having problems. It's also a good idea to know your test results and keep a list of the medicines you take. Where can you learn more? Go to https://Coinpepiceweb.Unifysquare. org and sign in to your Haztucesta account. Enter F349 in the Tackle Grab box to learn more about \"Vertigo: Exercises. \"     If you do not have an account, please click on the \"Sign Up Now\" link. Current as of: May 4, 2017  Content Version: 11.7  © 6799-6315 [employer]. Care instructions adapted under license by [facility] ([facility]). If you have questions about a medical condition or this instruction, always ask your healthcare professional. [place] [address] any warranty or liability for your use of this information.

## 2018-10-31 NOTE — PROGRESS NOTES
Chief Complaint   Patient presents with    Dizziness     started last night.  Hematuria    Constipation         Patient presents today for anacute visit secondary to Dizziness (started last night. ); Hematuria; and Constipation   . HPI    She reports dizziness started at 9 pm last night, couldn't get out of the bed/  Had water and increased fluids but didn't help. When laying down and turning her head, felt like drunk and spinning. Denies nausea, sore throat, sinus congestion. Feels that she has itchy ears and has clogged nose, and feels stuffy. The episode of vertigo lasted for 3 hrs. It happened when she turned her head to the left side. She couldn't drive. Getting a lot of sinus headaches lately. She isn't take the Loratadine and Flonase but has them at home.     Orthostatic vital signs are negative today   10/31/18 4:11 PM  10/31/18 4:12 PM  10/31/18 4:13 PM      BP  98/62   98/58   94/58     Position  Supine   Sitting   Standing     Pulse  77   78   87     SpO2  99%   100%   99%          Wt Readings from Last 3 Encounters:   10/31/18 182 lb 3.2 oz (82.6 kg)   08/20/18 181 lb (82.1 kg)   08/13/18 175 lb 3.2 oz (79.5 kg)       Allergies   Allergen Reactions    Penicillins      When a child         Current Outpatient Prescriptions   Medication Sig Dispense Refill    polyethylene glycol (MIRALAX) powder Take 17 g by mouth daily as needed (constipation) 1 Bottle 3    ranitidine (ZANTAC) 150 MG tablet Take 1 tablet by mouth 2 times daily 60 tablet 1    fluticasone (FLONASE) 50 MCG/ACT nasal spray 2 sprays by Nasal route daily 1 Bottle 3    albuterol sulfate  (90 Base) MCG/ACT inhaler Inhale 2 puffs into the lungs every 6 hours as needed for Wheezing 1 Inhaler 0    ibuprofen (ADVIL;MOTRIN) 400 MG tablet Take 800 mg by mouth every 6 hours as needed for Pain Takes 2 tablets of 400 mg      acetaminophen (TYLENOL) 500 MG tablet Take 1 tablet by mouth every 4 hours as needed for Pain 60 tablet 0     No current facility-administered medications for this visit. Social History     Social History    Marital status: Single     Spouse name: N/A    Number of children: N/A    Years of education: N/A     Social History Main Topics    Smoking status: Former Smoker     Types: Cigarettes     Quit date: 1/1/2005    Smokeless tobacco: Never Used    Alcohol use No      Comment: Velma alcohol abuse as teenager    Drug use: No      Comment:  Hx, [age] yrs of age, smoked MJ daily 3 joints / day.  Sexual activity: Yes     Partners: Male     Other Topics Concern    None     Social History Narrative    None     Counseling given: Yes                    The patient's past medical, surgical, social, and family history as well as her current medications and allergies werereviewed as documented in today's encounter. Rest of complaints with corresponding details per ROS. Review of Systems   Constitutional: Positive for fatigue. Negative for activity change, appetite change, chills, diaphoresis and fever. HENT: Positive for congestion and postnasal drip. Negative for ear discharge, ear pain, nosebleeds, rhinorrhea, sinus pain, sinus pressure, sore throat, tinnitus and trouble swallowing. Respiratory: Negative for cough, shortness of breath and stridor. Gastrointestinal: Negative for nausea. Allergic/Immunologic: Positive for environmental allergies. Neurological: Positive for dizziness and headaches. Psychiatric/Behavioral: The patient is nervous/anxious.          -vital signs stable and within normal limits except Obesity per BMI and low BPs  BP (!) 94/58 (Position: Standing)   Pulse 87   Temp 99 °F (37.2 °C)   Ht 5' 3\" (1.6 m)   Wt 182 lb 3.2 oz (82.6 kg)   SpO2 99%   BMI 32.28 kg/m²        Physical Exam   Constitutional: She is oriented to person, place, and time. She appears well-developed and well-nourished. No distress. HENT:   Head: Normocephalic and atraumatic.    Right tablet; Take 1 tablet by mouth daily  Dispense: 90 tablet; Refill: 3      Call or return if symptoms persist or fail to improve. Orders Placed This Encounter   Procedures    [facility]     Referral Priority:   Routine     Referral Type:   Eval and Treat     Referral Reason:   Specialty Services Required     Requested Specialty:   Physical Therapy     Number of Visits Requested:   1       Orders Placed This Encounter   Medications    meclizine (ANTIVERT) 25 MG tablet     Sig: Take 1 tablet by mouth 3 times daily as needed for Dizziness     Dispense:  30 tablet     Refill:  0    fluticasone (FLONASE) 50 MCG/ACT nasal spray     Si sprays by Nasal route daily     Dispense:  1 Bottle     Refill:  3    loratadine (CLARITIN) 10 MG tablet     Sig: Take 1 tablet by mouth daily     Dispense:  90 tablet     Refill:  3       Medications Discontinued During This Encounter   Medication Reason    fluticasone (FLONASE) 50 MCG/ACT nasal spray REORDER       Bette received counseling on the following healthy behaviors: nutrition, exercise and medication adherence  Reviewed prior labs and health maintenance. Continue current medications, diet and exercise. Discussed use, benefit, and side effects of prescribed medications. Barriers to medication compliance addressed. Patient given educational materials - see patient instructions. All patient questions answered. Patient voiced understanding. Thepatient's past medical, surgical, social, and family history as well as her   current medications and allergies were reviewed as documented in today's encounter. Medications, labs, diagnostic studies,consultations and follow-up as documented in this encounter. Return for KEEP APPT. Patient was seen with total face to face time of 15 minutes. More than 50% of this visit was counseling and education.        Future Appointments  Date Time Provider Irineo Rueda   2018 11:00 AM

## 2018-11-19 ENCOUNTER — INITIAL CONSULT (OUTPATIENT)
Dept: BEHAVIORAL/MENTAL HEALTH CLINIC | Age: 33
End: 2018-11-19
Payer: COMMERCIAL

## 2018-11-19 DIAGNOSIS — F41.1 GENERALIZED ANXIETY DISORDER: Primary | ICD-10-CM

## 2018-11-19 PROCEDURE — 90837 PSYTX W PT 60 MINUTES: CPT | Performed by: SOCIAL WORKER

## 2018-12-10 ENCOUNTER — INITIAL CONSULT (OUTPATIENT)
Dept: BEHAVIORAL/MENTAL HEALTH CLINIC | Age: 33
End: 2018-12-10
Payer: MEDICARE

## 2018-12-10 DIAGNOSIS — F41.1 GENERALIZED ANXIETY DISORDER: Primary | ICD-10-CM

## 2018-12-10 PROCEDURE — 90837 PSYTX W PT 60 MINUTES: CPT | Performed by: SOCIAL WORKER

## 2018-12-19 ENCOUNTER — INITIAL CONSULT (OUTPATIENT)
Dept: BEHAVIORAL/MENTAL HEALTH CLINIC | Age: 33
End: 2018-12-19
Payer: MEDICARE

## 2018-12-19 DIAGNOSIS — F41.1 GENERALIZED ANXIETY DISORDER: Primary | ICD-10-CM

## 2018-12-19 PROCEDURE — 90837 PSYTX W PT 60 MINUTES: CPT | Performed by: SOCIAL WORKER

## 2018-12-19 NOTE — PROGRESS NOTES
Behavioral Health Consultation  JUAN JOSE Longoria, KGW-S, LCDC III  8/22/2018  3:00 PM    Time spent with Patient: 60 minutes      Reason for Consult:  depression  Referring Provider: Cali Morin MD    Pt provided informed consent for the behavioral health program. Discussed with patient model of service to include the limits of confidentiality (i.e. abuse reporting, suicide intervention, etc.) and short-term intervention focused approach. Pt indicated understanding. Feedback given to PCP. S:   She continues to be in marital therapy with Андрей Rahman. She is focusing on herself and is trying to make positive changes. She is getting up earlier, putting on makeup and making herself do her hair. She is leaning more in the direction of wanting to work eventually. She has not had any major panic sx's. She is struggling with knowing that I am leaving my position. She wanted to process through this. We reviewed her progress up to this point. O:  MSE:     Appearance    alert, cooperative  Appetite normal  Sleep disturbance No  Fatigue No  Loss of pleasure No  Impulsive behavior No  Speech    spontaneous, normal rate, normal volume and well articulated  Mood    Mild depression  Affect    Appropriate to context  Thought Content    intact  Thought Process    linear, goal directed and coherent  Associations    logical connections  Insight    Good  Judgment    Intact  Orientation    oriented to person, place, time, and general circumstances  Memory    recent and remote memory intact  Attention/Concentration    intact  Morbid ideation No  Suicide Assessment    no suicidal ideation    A:   Patient engaged well. Her mood is stable and improved. Her affect is mood congruent. She denies morbid or suicidal ideations. Her anxiety has been steady but no major panic attacks. She continues to struggle with chronic family problems with Андрей Rahman.     They have started couple's counseling and this has been very

## 2018-12-31 ENCOUNTER — OFFICE VISIT (OUTPATIENT)
Dept: FAMILY MEDICINE CLINIC | Age: 33
End: 2018-12-31
Payer: MEDICARE

## 2018-12-31 VITALS
HEART RATE: 88 BPM | WEIGHT: 181 LBS | HEIGHT: 63 IN | SYSTOLIC BLOOD PRESSURE: 101 MMHG | BODY MASS INDEX: 32.07 KG/M2 | TEMPERATURE: 97.6 F | DIASTOLIC BLOOD PRESSURE: 70 MMHG | OXYGEN SATURATION: 95 %

## 2018-12-31 DIAGNOSIS — J01.10 ACUTE NON-RECURRENT FRONTAL SINUSITIS: ICD-10-CM

## 2018-12-31 DIAGNOSIS — J06.9 VIRAL URI: ICD-10-CM

## 2018-12-31 DIAGNOSIS — Z13.31 POSITIVE DEPRESSION SCREENING: ICD-10-CM

## 2018-12-31 DIAGNOSIS — R05.9 COUGH: Primary | ICD-10-CM

## 2018-12-31 PROCEDURE — 1036F TOBACCO NON-USER: CPT | Performed by: FAMILY MEDICINE

## 2018-12-31 PROCEDURE — G8431 POS CLIN DEPRES SCRN F/U DOC: HCPCS | Performed by: FAMILY MEDICINE

## 2018-12-31 PROCEDURE — G8484 FLU IMMUNIZE NO ADMIN: HCPCS | Performed by: FAMILY MEDICINE

## 2018-12-31 PROCEDURE — 99213 OFFICE O/P EST LOW 20 MIN: CPT | Performed by: FAMILY MEDICINE

## 2018-12-31 PROCEDURE — G8427 DOCREV CUR MEDS BY ELIG CLIN: HCPCS | Performed by: FAMILY MEDICINE

## 2018-12-31 PROCEDURE — G8417 CALC BMI ABV UP PARAM F/U: HCPCS | Performed by: FAMILY MEDICINE

## 2018-12-31 RX ORDER — FLUTICASONE PROPIONATE 50 MCG
1 SPRAY, SUSPENSION (ML) NASAL 2 TIMES DAILY
Qty: 1 BOTTLE | Refills: 2 | Status: SHIPPED | OUTPATIENT
Start: 2018-12-31 | End: 2019-01-16 | Stop reason: ALTCHOICE

## 2018-12-31 RX ORDER — PREDNISONE 50 MG/1
50 TABLET ORAL DAILY
Qty: 7 TABLET | Refills: 0 | Status: SHIPPED | OUTPATIENT
Start: 2018-12-31 | End: 2019-01-07

## 2018-12-31 RX ORDER — ALBUTEROL SULFATE 90 UG/1
2 AEROSOL, METERED RESPIRATORY (INHALATION) EVERY 6 HOURS PRN
Qty: 1 INHALER | Refills: 3 | Status: SHIPPED | OUTPATIENT
Start: 2018-12-31 | End: 2019-01-16 | Stop reason: SDUPTHER

## 2018-12-31 RX ORDER — AZITHROMYCIN 250 MG/1
TABLET, FILM COATED ORAL
Qty: 1 PACKET | Refills: 0 | Status: SHIPPED | OUTPATIENT
Start: 2018-12-31 | End: 2019-01-14 | Stop reason: ALTCHOICE

## 2018-12-31 RX ORDER — BROMPHENIRAMINE MALEATE, PSEUDOEPHEDRINE HYDROCHLORIDE, AND DEXTROMETHORPHAN HYDROBROMIDE 2; 30; 10 MG/5ML; MG/5ML; MG/5ML
5 SYRUP ORAL 4 TIMES DAILY PRN
Qty: 180 ML | Refills: 0 | Status: SHIPPED | OUTPATIENT
Start: 2018-12-31 | End: 2019-01-14 | Stop reason: ALTCHOICE

## 2019-01-07 ENCOUNTER — PATIENT MESSAGE (OUTPATIENT)
Dept: FAMILY MEDICINE CLINIC | Age: 34
End: 2019-01-07

## 2019-01-07 DIAGNOSIS — J20.9 ACUTE BRONCHITIS DUE TO INFECTION: Primary | ICD-10-CM

## 2019-01-07 RX ORDER — DOXYCYCLINE HYCLATE 100 MG
100 TABLET ORAL 2 TIMES DAILY
Qty: 20 TABLET | Refills: 0 | Status: SHIPPED | OUTPATIENT
Start: 2019-01-07 | End: 2019-01-14 | Stop reason: ALTCHOICE

## 2019-01-07 RX ORDER — ALBUTEROL SULFATE 90 UG/1
2 AEROSOL, METERED RESPIRATORY (INHALATION) EVERY 6 HOURS PRN
Qty: 1 INHALER | Refills: 1 | Status: SHIPPED | OUTPATIENT
Start: 2019-01-07 | End: 2019-04-22

## 2019-01-09 ENCOUNTER — INITIAL CONSULT (OUTPATIENT)
Dept: BEHAVIORAL/MENTAL HEALTH CLINIC | Age: 34
End: 2019-01-09
Payer: MEDICARE

## 2019-01-09 DIAGNOSIS — F41.0 PANIC DISORDER WITHOUT AGORAPHOBIA: Primary | ICD-10-CM

## 2019-01-09 PROCEDURE — 90837 PSYTX W PT 60 MINUTES: CPT | Performed by: SOCIAL WORKER

## 2019-01-11 ENCOUNTER — PATIENT MESSAGE (OUTPATIENT)
Dept: FAMILY MEDICINE CLINIC | Age: 34
End: 2019-01-11

## 2019-01-14 ENCOUNTER — OFFICE VISIT (OUTPATIENT)
Dept: FAMILY MEDICINE CLINIC | Age: 34
End: 2019-01-14
Payer: MEDICARE

## 2019-01-14 ENCOUNTER — INITIAL CONSULT (OUTPATIENT)
Dept: BEHAVIORAL/MENTAL HEALTH CLINIC | Age: 34
End: 2019-01-14
Payer: MEDICARE

## 2019-01-14 VITALS
BODY MASS INDEX: 30.49 KG/M2 | SYSTOLIC BLOOD PRESSURE: 100 MMHG | HEART RATE: 79 BPM | DIASTOLIC BLOOD PRESSURE: 64 MMHG | RESPIRATION RATE: 16 BRPM | WEIGHT: 183 LBS | OXYGEN SATURATION: 99 % | HEIGHT: 65 IN | TEMPERATURE: 98.5 F

## 2019-01-14 DIAGNOSIS — H53.9 VISION CHANGES: ICD-10-CM

## 2019-01-14 DIAGNOSIS — F41.1 GENERALIZED ANXIETY DISORDER: Primary | ICD-10-CM

## 2019-01-14 DIAGNOSIS — J06.0 SORE THROAT AND LARYNGITIS: Primary | ICD-10-CM

## 2019-01-14 PROCEDURE — G8484 FLU IMMUNIZE NO ADMIN: HCPCS | Performed by: FAMILY MEDICINE

## 2019-01-14 PROCEDURE — 99213 OFFICE O/P EST LOW 20 MIN: CPT | Performed by: FAMILY MEDICINE

## 2019-01-14 PROCEDURE — G8427 DOCREV CUR MEDS BY ELIG CLIN: HCPCS | Performed by: FAMILY MEDICINE

## 2019-01-14 PROCEDURE — 1036F TOBACCO NON-USER: CPT | Performed by: FAMILY MEDICINE

## 2019-01-14 PROCEDURE — G8417 CALC BMI ABV UP PARAM F/U: HCPCS | Performed by: FAMILY MEDICINE

## 2019-01-14 PROCEDURE — 90837 PSYTX W PT 60 MINUTES: CPT | Performed by: SOCIAL WORKER

## 2019-01-14 RX ORDER — FLUTICASONE PROPIONATE 50 MCG
1 SPRAY, SUSPENSION (ML) NASAL 2 TIMES DAILY
Qty: 1 BOTTLE | Refills: 2 | Status: SHIPPED | OUTPATIENT
Start: 2019-01-14 | End: 2019-01-16 | Stop reason: SDUPTHER

## 2019-01-14 RX ORDER — BROMPHENIRAMINE MALEATE, PSEUDOEPHEDRINE HYDROCHLORIDE, AND DEXTROMETHORPHAN HYDROBROMIDE 2; 30; 10 MG/5ML; MG/5ML; MG/5ML
5 SYRUP ORAL 4 TIMES DAILY PRN
Qty: 180 ML | Refills: 0 | Status: SHIPPED | OUTPATIENT
Start: 2019-01-14 | End: 2019-01-16

## 2019-01-14 RX ORDER — AZITHROMYCIN 250 MG/1
TABLET, FILM COATED ORAL
Qty: 1 PACKET | Refills: 0 | Status: SHIPPED | OUTPATIENT
Start: 2019-01-14 | End: 2019-01-16 | Stop reason: ALTCHOICE

## 2019-01-14 ASSESSMENT — ENCOUNTER SYMPTOMS
EYE ITCHING: 0
ALLERGIC/IMMUNOLOGIC NEGATIVE: 1
EYE PAIN: 0
EYE REDNESS: 0
RHINORRHEA: 1
RESPIRATORY NEGATIVE: 1
PHOTOPHOBIA: 0
GASTROINTESTINAL NEGATIVE: 1
SINUS PAIN: 1
EYE DISCHARGE: 0
SINUS PRESSURE: 1

## 2019-01-15 ENCOUNTER — PATIENT MESSAGE (OUTPATIENT)
Dept: FAMILY MEDICINE CLINIC | Age: 34
End: 2019-01-15

## 2019-01-16 ENCOUNTER — OFFICE VISIT (OUTPATIENT)
Dept: FAMILY MEDICINE CLINIC | Age: 34
End: 2019-01-16
Payer: MEDICARE

## 2019-01-16 ENCOUNTER — HOSPITAL ENCOUNTER (OUTPATIENT)
Age: 34
Discharge: HOME | End: 2019-01-16
Payer: MEDICARE

## 2019-01-16 ENCOUNTER — HOSPITAL ENCOUNTER (OUTPATIENT)
Age: 34
Setting detail: SPECIMEN
Discharge: HOME | End: 2019-01-16
Payer: MEDICARE

## 2019-01-16 VITALS
HEART RATE: 80 BPM | DIASTOLIC BLOOD PRESSURE: 67 MMHG | BODY MASS INDEX: 31.34 KG/M2 | WEIGHT: 183.6 LBS | OXYGEN SATURATION: 97 % | SYSTOLIC BLOOD PRESSURE: 104 MMHG | HEIGHT: 64 IN

## 2019-01-16 DIAGNOSIS — G44.229 CHRONIC TENSION-TYPE HEADACHE, NOT INTRACTABLE: ICD-10-CM

## 2019-01-16 DIAGNOSIS — H53.9 VISION CHANGES: ICD-10-CM

## 2019-01-16 DIAGNOSIS — J01.01 ACUTE RECURRENT MAXILLARY SINUSITIS: ICD-10-CM

## 2019-01-16 DIAGNOSIS — R31.0 GROSS HEMATURIA: ICD-10-CM

## 2019-01-16 DIAGNOSIS — F41.9 ANXIETY: ICD-10-CM

## 2019-01-16 DIAGNOSIS — R42 VERTIGO: Primary | ICD-10-CM

## 2019-01-16 DIAGNOSIS — J06.0 SORE THROAT AND LARYNGITIS: ICD-10-CM

## 2019-01-16 LAB
BILIRUBIN, POC: NEGATIVE
BLOOD URINE, POC: NORMAL
CLARITY, POC: CLEAR
COLOR, POC: NORMAL
GLUCOSE URINE, POC: NEGATIVE
KETONES, POC: NEGATIVE
LEUKOCYTE EST, POC: NORMAL
NITRITE, POC: NEGATIVE
PH, POC: 6.5
PROTEIN, POC: NEGATIVE
SPECIFIC GRAVITY, POC: 1.03
UROBILINOGEN, POC: 0.2

## 2019-01-16 PROCEDURE — 83036 HEMOGLOBIN GLYCOSYLATED A1C: CPT

## 2019-01-16 PROCEDURE — G8427 DOCREV CUR MEDS BY ELIG CLIN: HCPCS | Performed by: FAMILY MEDICINE

## 2019-01-16 PROCEDURE — 99214 OFFICE O/P EST MOD 30 MIN: CPT | Performed by: FAMILY MEDICINE

## 2019-01-16 PROCEDURE — G8417 CALC BMI ABV UP PARAM F/U: HCPCS | Performed by: FAMILY MEDICINE

## 2019-01-16 PROCEDURE — G8484 FLU IMMUNIZE NO ADMIN: HCPCS | Performed by: FAMILY MEDICINE

## 2019-01-16 PROCEDURE — 81003 URINALYSIS AUTO W/O SCOPE: CPT | Performed by: FAMILY MEDICINE

## 2019-01-16 PROCEDURE — 36415 COLL VENOUS BLD VENIPUNCTURE: CPT

## 2019-01-16 PROCEDURE — 1036F TOBACCO NON-USER: CPT | Performed by: FAMILY MEDICINE

## 2019-01-16 RX ORDER — MULTIVIT-MIN/IRON FUM/FOLIC AC 7.5 MG-4
1 TABLET ORAL DAILY
Qty: 30 TABLET | Refills: 3 | Status: SHIPPED | OUTPATIENT
Start: 2019-01-16 | End: 2019-01-23

## 2019-01-16 RX ORDER — CETIRIZINE HYDROCHLORIDE 10 MG/1
10 TABLET ORAL DAILY
Qty: 30 TABLET | Refills: 3 | Status: SHIPPED | OUTPATIENT
Start: 2019-01-16 | End: 2019-04-22

## 2019-01-16 RX ORDER — AZITHROMYCIN 250 MG/1
TABLET, FILM COATED ORAL
Qty: 1 PACKET | Refills: 0
Start: 2019-01-16 | End: 2019-01-23 | Stop reason: ALTCHOICE

## 2019-01-16 ASSESSMENT — ENCOUNTER SYMPTOMS
ABDOMINAL DISTENTION: 0
SINUS PRESSURE: 1
RHINORRHEA: 1
CHEST TIGHTNESS: 1
WHEEZING: 0
SORE THROAT: 1
VOMITING: 0
DIARRHEA: 0
NAUSEA: 0
CONSTIPATION: 0
SINUS PAIN: 1
TROUBLE SWALLOWING: 0
COUGH: 1
SHORTNESS OF BREATH: 1
ABDOMINAL PAIN: 1

## 2019-01-17 LAB
CULTURE: NORMAL
ESTIMATED AVERAGE GLUCOSE: 85 MG/DL
HBA1C MFR BLD: 4.6 % (ref 4–6)
Lab: NORMAL
SPECIMEN DESCRIPTION: NORMAL
STATUS: NORMAL

## 2019-01-21 ENCOUNTER — INITIAL CONSULT (OUTPATIENT)
Dept: BEHAVIORAL/MENTAL HEALTH CLINIC | Age: 34
End: 2019-01-21
Payer: MEDICARE

## 2019-01-21 DIAGNOSIS — F41.1 GENERALIZED ANXIETY DISORDER: Primary | ICD-10-CM

## 2019-01-21 PROCEDURE — 90837 PSYTX W PT 60 MINUTES: CPT | Performed by: SOCIAL WORKER

## 2019-01-23 ENCOUNTER — HOSPITAL ENCOUNTER (OUTPATIENT)
Age: 34
Setting detail: SPECIMEN
Discharge: HOME | End: 2019-01-23
Payer: MEDICARE

## 2019-01-23 ENCOUNTER — OFFICE VISIT (OUTPATIENT)
Dept: OBGYN CLINIC | Age: 34
End: 2019-01-23
Payer: MEDICARE

## 2019-01-23 VITALS
SYSTOLIC BLOOD PRESSURE: 101 MMHG | DIASTOLIC BLOOD PRESSURE: 66 MMHG | WEIGHT: 184.6 LBS | HEART RATE: 80 BPM | TEMPERATURE: 98.4 F | RESPIRATION RATE: 18 BRPM | BODY MASS INDEX: 31.51 KG/M2 | HEIGHT: 64 IN

## 2019-01-23 DIAGNOSIS — R10.2 PELVIC PAIN IN FEMALE: Primary | ICD-10-CM

## 2019-01-23 DIAGNOSIS — N89.8 VAGINAL DISCHARGE: ICD-10-CM

## 2019-01-23 DIAGNOSIS — N89.8 VAGINA ITCHING: ICD-10-CM

## 2019-01-23 DIAGNOSIS — Z11.3 SCREEN FOR STD (SEXUALLY TRANSMITTED DISEASE): ICD-10-CM

## 2019-01-23 PROCEDURE — 1036F TOBACCO NON-USER: CPT | Performed by: CLINICAL NURSE SPECIALIST

## 2019-01-23 PROCEDURE — G8484 FLU IMMUNIZE NO ADMIN: HCPCS | Performed by: CLINICAL NURSE SPECIALIST

## 2019-01-23 PROCEDURE — 99213 OFFICE O/P EST LOW 20 MIN: CPT | Performed by: CLINICAL NURSE SPECIALIST

## 2019-01-23 PROCEDURE — G8427 DOCREV CUR MEDS BY ELIG CLIN: HCPCS | Performed by: CLINICAL NURSE SPECIALIST

## 2019-01-23 PROCEDURE — G8417 CALC BMI ABV UP PARAM F/U: HCPCS | Performed by: CLINICAL NURSE SPECIALIST

## 2019-01-23 ASSESSMENT — ENCOUNTER SYMPTOMS
ALLERGIC/IMMUNOLOGIC NEGATIVE: 1
GASTROINTESTINAL NEGATIVE: 1
EYES NEGATIVE: 1
RESPIRATORY NEGATIVE: 1

## 2019-01-24 ENCOUNTER — HOSPITAL ENCOUNTER (OUTPATIENT)
Dept: MRI IMAGING | Age: 34
Discharge: HOME | End: 2019-01-26
Payer: MEDICARE

## 2019-01-24 DIAGNOSIS — G44.229 CHRONIC TENSION-TYPE HEADACHE, NOT INTRACTABLE: ICD-10-CM

## 2019-01-24 DIAGNOSIS — B96.89 BV (BACTERIAL VAGINOSIS): Primary | ICD-10-CM

## 2019-01-24 DIAGNOSIS — R42 VERTIGO: ICD-10-CM

## 2019-01-24 DIAGNOSIS — N76.0 BV (BACTERIAL VAGINOSIS): Primary | ICD-10-CM

## 2019-01-24 LAB
C TRACH DNA GENITAL QL NAA+PROBE: NEGATIVE
DIRECT EXAM: ABNORMAL
Lab: ABNORMAL
N. GONORRHOEAE DNA: NEGATIVE
SPECIMEN DESCRIPTION: ABNORMAL
STATUS: ABNORMAL

## 2019-01-24 PROCEDURE — 6360000004 HC RX CONTRAST MEDICATION: Performed by: FAMILY MEDICINE

## 2019-01-24 PROCEDURE — A9579 GAD-BASE MR CONTRAST NOS,1ML: HCPCS | Performed by: FAMILY MEDICINE

## 2019-01-24 PROCEDURE — 70553 MRI BRAIN STEM W/O & W/DYE: CPT

## 2019-01-24 RX ORDER — METRONIDAZOLE 500 MG/1
500 TABLET ORAL 2 TIMES DAILY
Qty: 14 TABLET | Refills: 0 | Status: SHIPPED | OUTPATIENT
Start: 2019-01-24 | End: 2019-01-31

## 2019-01-24 RX ORDER — FLUCONAZOLE 100 MG/1
100 TABLET ORAL DAILY
Qty: 7 TABLET | Refills: 0 | Status: SHIPPED | OUTPATIENT
Start: 2019-01-24 | End: 2019-01-31

## 2019-01-24 RX ADMIN — GADOTERIDOL 17 ML: 279.3 INJECTION, SOLUTION INTRAVENOUS at 14:31

## 2019-01-28 ENCOUNTER — PATIENT MESSAGE (OUTPATIENT)
Dept: FAMILY MEDICINE CLINIC | Age: 34
End: 2019-01-28

## 2019-01-28 DIAGNOSIS — R42 VERTIGO: Primary | ICD-10-CM

## 2019-01-28 DIAGNOSIS — G89.29 CHRONIC PELVIC PAIN IN FEMALE: Primary | ICD-10-CM

## 2019-01-28 DIAGNOSIS — R10.2 CHRONIC PELVIC PAIN IN FEMALE: Primary | ICD-10-CM

## 2019-01-30 ENCOUNTER — INITIAL CONSULT (OUTPATIENT)
Dept: BEHAVIORAL/MENTAL HEALTH CLINIC | Age: 34
End: 2019-01-30
Payer: MEDICARE

## 2019-01-30 ENCOUNTER — TELEPHONE (OUTPATIENT)
Dept: OBGYN CLINIC | Age: 34
End: 2019-01-30

## 2019-01-30 DIAGNOSIS — F41.1 GENERALIZED ANXIETY DISORDER: Primary | ICD-10-CM

## 2019-01-30 PROCEDURE — 90837 PSYTX W PT 60 MINUTES: CPT | Performed by: SOCIAL WORKER

## 2019-01-30 RX ORDER — CLINDAMYCIN PHOSPHATE 20 MG/G
CREAM VAGINAL
Qty: 1 TUBE | Refills: 0 | Status: SHIPPED | OUTPATIENT
Start: 2019-01-30 | End: 2019-04-23

## 2019-02-11 ENCOUNTER — OFFICE VISIT (OUTPATIENT)
Dept: FAMILY MEDICINE CLINIC | Age: 34
End: 2019-02-11
Payer: MEDICARE

## 2019-02-11 VITALS
HEART RATE: 83 BPM | OXYGEN SATURATION: 99 % | RESPIRATION RATE: 16 BRPM | DIASTOLIC BLOOD PRESSURE: 71 MMHG | SYSTOLIC BLOOD PRESSURE: 114 MMHG | TEMPERATURE: 98.5 F | HEIGHT: 64 IN | WEIGHT: 184 LBS | BODY MASS INDEX: 31.41 KG/M2

## 2019-02-11 DIAGNOSIS — R09.82 PND (POST-NASAL DRIP): ICD-10-CM

## 2019-02-11 DIAGNOSIS — H69.93 EUSTACHIAN TUBE DISORDER, BILATERAL: Primary | ICD-10-CM

## 2019-02-11 DIAGNOSIS — R42 DIZZINESS: ICD-10-CM

## 2019-02-11 DIAGNOSIS — J01.00 ACUTE NON-RECURRENT MAXILLARY SINUSITIS: ICD-10-CM

## 2019-02-11 PROCEDURE — G8427 DOCREV CUR MEDS BY ELIG CLIN: HCPCS | Performed by: FAMILY MEDICINE

## 2019-02-11 PROCEDURE — 99213 OFFICE O/P EST LOW 20 MIN: CPT | Performed by: FAMILY MEDICINE

## 2019-02-11 PROCEDURE — 1036F TOBACCO NON-USER: CPT | Performed by: FAMILY MEDICINE

## 2019-02-11 PROCEDURE — G8417 CALC BMI ABV UP PARAM F/U: HCPCS | Performed by: FAMILY MEDICINE

## 2019-02-11 PROCEDURE — G8484 FLU IMMUNIZE NO ADMIN: HCPCS | Performed by: FAMILY MEDICINE

## 2019-02-11 RX ORDER — FLUTICASONE PROPIONATE 50 MCG
1 SPRAY, SUSPENSION (ML) NASAL 2 TIMES DAILY
Qty: 1 BOTTLE | Refills: 2 | Status: SHIPPED | OUTPATIENT
Start: 2019-02-11 | End: 2019-04-23

## 2019-02-11 RX ORDER — METHYLPREDNISOLONE 4 MG/1
TABLET ORAL
Qty: 1 KIT | Refills: 0 | Status: SHIPPED | OUTPATIENT
Start: 2019-02-11 | End: 2019-04-22

## 2019-02-11 RX ORDER — MECLIZINE HYDROCHLORIDE 25 MG/1
25 TABLET ORAL 3 TIMES DAILY PRN
Qty: 30 TABLET | Refills: 1 | Status: SHIPPED | OUTPATIENT
Start: 2019-02-11 | End: 2019-02-21

## 2019-02-11 RX ORDER — DOXYCYCLINE HYCLATE 100 MG/1
100 CAPSULE ORAL 2 TIMES DAILY
Qty: 20 CAPSULE | Refills: 0 | Status: SHIPPED | OUTPATIENT
Start: 2019-02-11 | End: 2019-02-21

## 2019-02-11 ASSESSMENT — ENCOUNTER SYMPTOMS
SINUS PRESSURE: 1
SORE THROAT: 1
RHINORRHEA: 1
SWOLLEN GLANDS: 1
ALLERGIC/IMMUNOLOGIC NEGATIVE: 1
COUGH: 1
EYES NEGATIVE: 1
VOMITING: 1
SINUS PAIN: 1

## 2019-02-20 ENCOUNTER — TELEPHONE (OUTPATIENT)
Dept: OBGYN CLINIC | Age: 34
End: 2019-02-20

## 2019-02-20 ENCOUNTER — OFFICE VISIT (OUTPATIENT)
Dept: OBGYN CLINIC | Age: 34
End: 2019-02-20
Payer: MEDICARE

## 2019-02-20 DIAGNOSIS — R10.2 PELVIC PAIN IN FEMALE: ICD-10-CM

## 2019-02-20 DIAGNOSIS — B37.9 YEAST INFECTION: Primary | ICD-10-CM

## 2019-02-20 PROCEDURE — 76830 TRANSVAGINAL US NON-OB: CPT | Performed by: SPECIALIST

## 2019-02-20 PROCEDURE — 76856 US EXAM PELVIC COMPLETE: CPT | Performed by: SPECIALIST

## 2019-03-25 ENCOUNTER — OFFICE VISIT (OUTPATIENT)
Dept: OBGYN CLINIC | Age: 34
End: 2019-03-25
Payer: MEDICARE

## 2019-03-25 ENCOUNTER — HOSPITAL ENCOUNTER (OUTPATIENT)
Age: 34
Setting detail: SPECIMEN
Discharge: HOME | End: 2019-03-25
Payer: MEDICARE

## 2019-03-25 VITALS
WEIGHT: 184 LBS | DIASTOLIC BLOOD PRESSURE: 68 MMHG | SYSTOLIC BLOOD PRESSURE: 107 MMHG | HEART RATE: 81 BPM | BODY MASS INDEX: 32.6 KG/M2 | HEIGHT: 63 IN

## 2019-03-25 DIAGNOSIS — F41.9 ANXIETY: ICD-10-CM

## 2019-03-25 DIAGNOSIS — Z11.3 SCREENING FOR STDS (SEXUALLY TRANSMITTED DISEASES): ICD-10-CM

## 2019-03-25 DIAGNOSIS — N94.10 DYSPAREUNIA, FEMALE: ICD-10-CM

## 2019-03-25 DIAGNOSIS — N64.4 BREAST PAIN, LEFT: ICD-10-CM

## 2019-03-25 DIAGNOSIS — Z71.2 ENCOUNTER TO DISCUSS TEST RESULTS: Primary | ICD-10-CM

## 2019-03-25 LAB
DIRECT EXAM: NORMAL
Lab: NORMAL
SPECIMEN DESCRIPTION: NORMAL

## 2019-03-25 PROCEDURE — 99214 OFFICE O/P EST MOD 30 MIN: CPT | Performed by: CLINICAL NURSE SPECIALIST

## 2019-03-25 PROCEDURE — G8484 FLU IMMUNIZE NO ADMIN: HCPCS | Performed by: CLINICAL NURSE SPECIALIST

## 2019-03-25 PROCEDURE — 1036F TOBACCO NON-USER: CPT | Performed by: CLINICAL NURSE SPECIALIST

## 2019-03-25 PROCEDURE — G8417 CALC BMI ABV UP PARAM F/U: HCPCS | Performed by: CLINICAL NURSE SPECIALIST

## 2019-03-25 PROCEDURE — G8427 DOCREV CUR MEDS BY ELIG CLIN: HCPCS | Performed by: CLINICAL NURSE SPECIALIST

## 2019-03-25 ASSESSMENT — ENCOUNTER SYMPTOMS
RESPIRATORY NEGATIVE: 1
ALLERGIC/IMMUNOLOGIC NEGATIVE: 1
GASTROINTESTINAL NEGATIVE: 1
EYES NEGATIVE: 1

## 2019-03-27 LAB
C TRACH DNA GENITAL QL NAA+PROBE: NEGATIVE
N. GONORRHOEAE DNA: NEGATIVE
SPECIMEN DESCRIPTION: NORMAL

## 2019-04-01 LAB — UREAP-MYCOPLASMA CUL: NORMAL

## 2019-04-08 ENCOUNTER — OFFICE VISIT (OUTPATIENT)
Dept: OBGYN CLINIC | Age: 34
End: 2019-04-08
Payer: MEDICARE

## 2019-04-08 VITALS
BODY MASS INDEX: 32.6 KG/M2 | HEART RATE: 91 BPM | WEIGHT: 184 LBS | DIASTOLIC BLOOD PRESSURE: 73 MMHG | SYSTOLIC BLOOD PRESSURE: 108 MMHG | HEIGHT: 63 IN

## 2019-04-08 DIAGNOSIS — F41.9 ANXIETY: ICD-10-CM

## 2019-04-08 DIAGNOSIS — R10.2 PELVIC PAIN IN FEMALE: Primary | ICD-10-CM

## 2019-04-08 PROCEDURE — G8417 CALC BMI ABV UP PARAM F/U: HCPCS | Performed by: SPECIALIST

## 2019-04-08 PROCEDURE — G8427 DOCREV CUR MEDS BY ELIG CLIN: HCPCS | Performed by: SPECIALIST

## 2019-04-08 PROCEDURE — 99213 OFFICE O/P EST LOW 20 MIN: CPT | Performed by: SPECIALIST

## 2019-04-08 PROCEDURE — 1036F TOBACCO NON-USER: CPT | Performed by: SPECIALIST

## 2019-04-08 ASSESSMENT — ENCOUNTER SYMPTOMS
CONSTIPATION: 0
EYE PAIN: 0
ABDOMINAL PAIN: 0
ABDOMINAL DISTENTION: 0
VOMITING: 0
COUGH: 0
APNEA: 0
DIARRHEA: 0
NAUSEA: 0

## 2019-04-08 NOTE — PROGRESS NOTES
Subjective:      Patient ID: Zoya Sy is a 28 y.o. female. Chief Complaint   Patient presents with    Results     Patient is here today for results her Genes[facility] testing      /73 (Site: Right Upper Arm, Position: Sitting, Cuff Size: Medium Adult)   Pulse 91   Ht 5' 3\" (1.6 m)   Wt 184 lb (83.5 kg)   LMP 2019   BMI 32.59 kg/m²   Patient's last menstrual period was 2019.     Past Medical History:   Diagnosis Date    Allergic rhinitis     Anxiety     Chronic kidney disease     CKD (chronic kidney disease) stage 1, GFR 90 ml/min or greater 7/10/2018    Depression     Headache(784.0)     Heart murmur     Irritable bowel syndrome with diarrhea 2016    Midline low back pain without sciatica 2016    Obesity (BMI 30-39. 9) 2017    Panic attack as reaction to stress     certain anxiety medications will cause a panic attack    Panic disorder with agoraphobia 2017    some medications for anxiety will cause a panic attack. Current Outpatient Medications Ordered in Epic   Medication Sig Dispense Refill    terconazole (TERAZOL 3) 0.8 % vaginal cream Insert vaginally at bedtime for 3 nights. 1 Tube 0    methylPREDNISolone (MEDROL, JUDI,) 4 MG tablet By mouth. 1 kit 0    clindamycin (CLEOCIN) 2 % vaginal cream Insert at bedtime for 5 nights.  1 Tube 0    cetirizine (ZYRTEC ALLERGY) 10 MG tablet Take 1 tablet by mouth daily 30 tablet 3    albuterol sulfate HFA (PROVENTIL HFA) 108 (90 Base) MCG/ACT inhaler Inhale 2 puffs into the lungs every 6 hours as needed for Wheezing or Shortness of Breath (cough) 1 Inhaler 1    fluticasone (FLONASE) 50 MCG/ACT nasal spray 2 sprays by Nasal route daily 1 Bottle 3    polyethylene glycol (MIRALAX) powder Take 17 g by mouth daily as needed (constipation) 1 Bottle 3    albuterol sulfate  (90 Base) MCG/ACT inhaler Inhale 2 puffs into the lungs every 6 hours as needed for Wheezing 1 Inhaler 0    ibuprofen

## 2019-04-12 ENCOUNTER — TELEPHONE (OUTPATIENT)
Dept: OBGYN CLINIC | Age: 34
End: 2019-04-12

## 2019-04-22 ENCOUNTER — HOSPITAL ENCOUNTER (OUTPATIENT)
Dept: PREADMISSION TESTING | Age: 34
Discharge: HOME | End: 2019-04-26
Payer: MEDICARE

## 2019-04-22 VITALS
RESPIRATION RATE: 16 BRPM | DIASTOLIC BLOOD PRESSURE: 62 MMHG | HEART RATE: 87 BPM | TEMPERATURE: 98.2 F | BODY MASS INDEX: 32.25 KG/M2 | WEIGHT: 182 LBS | SYSTOLIC BLOOD PRESSURE: 111 MMHG | OXYGEN SATURATION: 99 % | HEIGHT: 63 IN

## 2019-04-22 LAB
ABSOLUTE EOS #: 0 K/UL (ref 0–0.4)
ABSOLUTE IMMATURE GRANULOCYTE: NORMAL K/UL (ref 0–0.3)
ABSOLUTE LYMPH #: 1.5 K/UL (ref 1–4.8)
ABSOLUTE MONO #: 0.4 K/UL (ref 0.1–1.3)
ANION GAP SERPL CALCULATED.3IONS-SCNC: 10 MMOL/L (ref 9–17)
BASOPHILS # BLD: 1 % (ref 0–2)
BASOPHILS ABSOLUTE: 0 K/UL (ref 0–0.2)
BUN BLDV-MCNC: 14 MG/DL (ref 6–20)
BUN/CREAT BLD: NORMAL (ref 9–20)
CALCIUM SERPL-MCNC: 8.7 MG/DL (ref 8.6–10.4)
CHLORIDE BLD-SCNC: 103 MMOL/L (ref 98–107)
CO2: 27 MMOL/L (ref 20–31)
CREAT SERPL-MCNC: 0.84 MG/DL (ref 0.5–0.9)
DIFFERENTIAL TYPE: NORMAL
EOSINOPHILS RELATIVE PERCENT: 1 % (ref 0–4)
GFR AFRICAN AMERICAN: >60 ML/MIN
GFR NON-AFRICAN AMERICAN: >60 ML/MIN
GFR SERPL CREATININE-BSD FRML MDRD: NORMAL ML/MIN/{1.73_M2}
GFR SERPL CREATININE-BSD FRML MDRD: NORMAL ML/MIN/{1.73_M2}
GLUCOSE BLD-MCNC: 74 MG/DL (ref 70–99)
HCT VFR BLD CALC: 39.8 % (ref 36–46)
HEMOGLOBIN: 13.1 G/DL (ref 12–16)
IMMATURE GRANULOCYTES: NORMAL %
LYMPHOCYTES # BLD: 32 % (ref 24–44)
MCH RBC QN AUTO: 30.9 PG (ref 26–34)
MCHC RBC AUTO-ENTMCNC: 33 G/DL (ref 31–37)
MCV RBC AUTO: 93.7 FL (ref 80–100)
MONOCYTES # BLD: 7 % (ref 1–7)
NRBC AUTOMATED: NORMAL PER 100 WBC
PDW BLD-RTO: 12.4 % (ref 11.5–14.9)
PLATELET # BLD: 233 K/UL (ref 150–450)
PLATELET ESTIMATE: NORMAL
PMV BLD AUTO: 8.2 FL (ref 6–12)
POTASSIUM SERPL-SCNC: 4.6 MMOL/L (ref 3.7–5.3)
RBC # BLD: 4.25 M/UL (ref 4–5.2)
RBC # BLD: NORMAL 10*6/UL
SEG NEUTROPHILS: 59 % (ref 36–66)
SEGMENTED NEUTROPHILS ABSOLUTE COUNT: 2.9 K/UL (ref 1.3–9.1)
SODIUM BLD-SCNC: 140 MMOL/L (ref 135–144)
WBC # BLD: 4.9 K/UL (ref 3.5–11)
WBC # BLD: NORMAL 10*3/UL

## 2019-04-22 PROCEDURE — 93005 ELECTROCARDIOGRAM TRACING: CPT

## 2019-04-22 PROCEDURE — 36415 COLL VENOUS BLD VENIPUNCTURE: CPT

## 2019-04-22 PROCEDURE — 85025 COMPLETE CBC W/AUTO DIFF WBC: CPT

## 2019-04-22 PROCEDURE — 80048 BASIC METABOLIC PNL TOTAL CA: CPT

## 2019-04-22 RX ORDER — LORATADINE 10 MG/1
10 CAPSULE, LIQUID FILLED ORAL DAILY
COMMUNITY
End: 2019-08-01 | Stop reason: SDUPTHER

## 2019-04-22 ASSESSMENT — PAIN SCALES - GENERAL: PAINLEVEL_OUTOF10: 2

## 2019-04-22 ASSESSMENT — PAIN DESCRIPTION - PROGRESSION: CLINICAL_PROGRESSION: NOT CHANGED

## 2019-04-22 ASSESSMENT — PAIN DESCRIPTION - ORIENTATION: ORIENTATION: RIGHT;LOWER;OUTER

## 2019-04-22 ASSESSMENT — PAIN DESCRIPTION - DESCRIPTORS: DESCRIPTORS: SQUEEZING

## 2019-04-22 ASSESSMENT — PAIN DESCRIPTION - LOCATION: LOCATION: ABDOMEN

## 2019-04-22 ASSESSMENT — PAIN DESCRIPTION - PAIN TYPE: TYPE: CHRONIC PAIN

## 2019-04-22 ASSESSMENT — PAIN DESCRIPTION - ONSET: ONSET: ON-GOING

## 2019-04-22 ASSESSMENT — PAIN DESCRIPTION - FREQUENCY: FREQUENCY: INTERMITTENT

## 2019-04-22 NOTE — H&P (VIEW-ONLY)
HISTORY and [id number]       NAME:  Robert Valente  MRN: [id number]   YOB: 1985   Date: 2019   Age: 28 y.o. Gender: female       COMPLAINT AND PRESENT HISTORY:     Robert Valente is 28 y.o. , female, Preadmission Testing for diagnostic laparoscopy due to chronic pelvic pain. Reports she has had microscopic blood in her urine since 2016. Pt states she has lower abdominal pain, sharp shooting at times. At times, she describes the pain as aching and dull, scraping, squeezing. She is unsure what precipitates the pain. Pain radiates to the lower back bilaterally. Other associated symptoms include: insomnia and fatigue. Reports she gets frequent vaginal yeast infections. Pt has history of IBS. No history of abnomral paps. Denies current urinary complaints. No vaginal discharge. She has heavy menstrual cycles, lasting 7 days. At times menstrual cycles are irregular. L1     PAST MEDICAL HISTORY     Past Medical History:   Diagnosis Date    Allergic rhinitis     Anxiety     Chest pain     with anxiety    Chronic kidney disease     CKD (chronic kidney disease) stage 1, GFR 90 ml/min or greater 7/10/2018    Depression     Headache(784.0)     Heart murmur     Hematuria     Irritable bowel syndrome with diarrhea 2016    Midline low back pain without sciatica 2016    Obesity (BMI 30-39. 9) 2017    Orthostatic hypotension     Panic attack as reaction to stress     certain anxiety medications will cause a panic attack    Panic disorder with agoraphobia 2017    some medications for anxiety will cause a panic attack.     Vertigo 10/2018    hx of     SURGICAL HISTORY       Past Surgical History:   Procedure Laterality Date    APPENDECTOMY         FAMILY HISTORY       Family History   Problem Relation Age of Onset    Mental Illness Mother     Breast Cancer Maternal Grandmother     Heart Attack Maternal Grandfather     No Known Problems Father        SOCIAL HISTORY       Social History     Socioeconomic History    Marital status: Single     Spouse name: None    Number of children: None    Years of education: None    Highest education level: None   Occupational History    None   Social Needs    Financial resource strain: None    Food insecurity:     Worry: None     Inability: None    Transportation needs:     Medical: None     Non-medical: None   Tobacco Use    Smoking status: Former Smoker     Packs/day: 0.25     Years: 3.00     Pack years: 0.75     Types: Cigarettes     Last attempt to quit: 2005     Years since quittin.3    Smokeless tobacco: Never Used   Substance and Sexual Activity    Alcohol use: No     Comment: Hx alcohol abuse as teenager    Drug use: No     Types: Marijuana     Comment:  Hx, 15-16 yrs of age, smoked MJ daily 3 joints / day.      Sexual activity: Yes     Partners: Male   Lifestyle    Physical activity:     Days per week: None     Minutes per session: None    Stress: None   Relationships    Social connections:     Talks on phone: None     Gets together: None     Attends Voodoo service: None     Active member of club or organization: None     Attends meetings of clubs or organizations: None     Relationship status: None    Intimate partner violence:     Fear of current or ex partner: None     Emotionally abused: None     Physically abused: None     Forced sexual activity: None   Other Topics Concern    None   Social History Narrative    None           REVIEW OF SYSTEMS      Allergies   Allergen Reactions    Penicillins      When a child    Diflucan [Fluconazole]     Flagyl [Metronidazole] Hives       Current Outpatient Medications on File Prior to Encounter   Medication Sig Dispense Refill    loratadine (CLARITIN) 10 MG capsule Take 10 mg by mouth daily      acetaminophen (TYLENOL) 500 MG tablet Take 1 tablet by mouth every 4 hours as needed for Pain 60 tablet 0    terconazole (TERAZOL 3) 0.8 % vaginal cream Insert vaginally at bedtime for 3 nights. 1 Tube 0    fluticasone (FLONASE) 50 MCG/ACT nasal spray 1 spray by Nasal route 2 times daily for 14 days 1 Bottle 2    clindamycin (CLEOCIN) 2 % vaginal cream Insert at bedtime for 5 nights. 1 Tube 0    polyethylene glycol (MIRALAX) powder Take 17 g by mouth daily as needed (constipation) 1 Bottle 3    ibuprofen (ADVIL;MOTRIN) 400 MG tablet Take 800 mg by mouth every 6 hours as needed for Pain Takes 2 tablets of 400 mg       No current facility-administered medications on file prior to encounter. General health:  Fairly good. No fever or chills. Skin:  No itching, redness or rash. HEENT:  No headache, epistaxis or sore throat. Neck:  No pain, stiffness or masses. Cardiovascular/Respiratory system:  No chest pain, palpitation or shortness of breath. Gastrointestinal tract: See HPI. Genitourinary: See HPI. Locomotor:  No bone or joint pains. No swelling. Neuropsychiatric:  Anxiety. GENERAL PHYSICAL EXAM:     Vitals: /62   Pulse 87   Temp 98.2 °F (36.8 °C) (Oral)   Resp 16   Ht 5' 3\" (1.6 m)   Wt 182 lb (82.6 kg)   LMP 04/03/2019   SpO2 99%   BMI 32.24 kg/m²  Body mass index is 32.24 kg/m². GENERAL APPEARANCE:   Nisa Gerardelor is 28 y.o.,  female, mildly obese, nourished, conscious, alert. Does not appear to be distress or pain at this time. SKIN:  Warm, dry, no cyanosis or jaundice. HEAD:  Normocephalic, atraumatic, no swelling or tenderness. EYES:  Pupils equal, reactive to light. EARS:  No discharge, no marked hearing loss. NOSE:  No rhinorrhea, epistaxis or septal deformity. THROAT:  Not congested. No ulceration bleeding or discharge.

## 2019-04-22 NOTE — H&P
Known Problems Father        SOCIAL HISTORY       Social History     Socioeconomic History    Marital status: Single     Spouse name: None    Number of children: None    Years of education: None    Highest education level: None   Occupational History    None   Social Needs    Financial resource strain: None    Food insecurity:     Worry: None     Inability: None    Transportation needs:     Medical: None     Non-medical: None   Tobacco Use    Smoking status: Former Smoker     Packs/day: 0.25     Years: 3.00     Pack years: 0.75     Types: Cigarettes     Last attempt to quit: 2005     Years since quittin.3    Smokeless tobacco: Never Used   Substance and Sexual Activity    Alcohol use: No     Comment: Hx alcohol abuse as teenager    Drug use: No     Types: Marijuana     Comment:  Hx, [age] yrs of age, smoked MJ daily 3 joints / day.      Sexual activity: Yes     Partners: Male   Lifestyle    Physical activity:     Days per week: None     Minutes per session: None    Stress: None   Relationships    Social connections:     Talks on phone: None     Gets together: None     Attends Sikh service: None     Active member of club or organization: None     Attends meetings of clubs or organizations: None     Relationship status: None    Intimate partner violence:     Fear of current or ex partner: None     Emotionally abused: None     Physically abused: None     Forced sexual activity: None   Other Topics Concern    None   Social History Narrative    None           REVIEW OF SYSTEMS      Allergies   Allergen Reactions    Penicillins      When a child    Diflucan [Fluconazole]     Flagyl [Metronidazole] Hives       Current Outpatient Medications on File Prior to Encounter   Medication Sig Dispense Refill    loratadine (CLARITIN) 10 MG capsule Take 10 mg by mouth daily      acetaminophen (TYLENOL) 500 MG tablet Take 1 tablet by mouth every 4 hours as needed for Pain 60 tablet 0    NECK:  No stiffness, trachea central.                 CHEST:  Symmetrical and equal on expansion. HEART:  RRR S1 > S2. No audible murmurs or gallops. LUNGS:  Equal on expansion, normal breath sounds. ABDOMEN:  Obese. Soft on palpation. RLQ, suprapubic tenderness. Normoactive bowel sounds. No guarding or rigidity. Natasha Never LYMPHATICS:  No palpable cervical lymphadenopathy. LOCOMOTOR, BACK AND SPINE:  No tenderness or deformities. EXTREMITIES:  Symmetrical, no pedal edema. No calf tenderness. No discoloration or ulcerations. NEUROLOGIC:  The patient is conscious, alert, oriented, No apparent focal sensory or motor deficits.                                                                                    PROVISIONAL DIAGNOSES / SURGERY:      Chronic Pelvic Pain   Diagnostic Laparoscopy     EL Cheema CNP on 4/22/2019 at 4:44 PM

## 2019-04-23 ENCOUNTER — HOSPITAL ENCOUNTER (OUTPATIENT)
Age: 34
Setting detail: SPECIMEN
Discharge: HOME | End: 2019-04-23
Payer: MEDICARE

## 2019-04-23 ENCOUNTER — OFFICE VISIT (OUTPATIENT)
Dept: OBGYN CLINIC | Age: 34
End: 2019-04-23
Payer: MEDICARE

## 2019-04-23 VITALS
SYSTOLIC BLOOD PRESSURE: 106 MMHG | HEIGHT: 63 IN | BODY MASS INDEX: 33.27 KG/M2 | DIASTOLIC BLOOD PRESSURE: 74 MMHG | HEART RATE: 91 BPM | RESPIRATION RATE: 18 BRPM | WEIGHT: 187.8 LBS | TEMPERATURE: 98.2 F

## 2019-04-23 DIAGNOSIS — R30.9 PAINFUL URINATION: Primary | ICD-10-CM

## 2019-04-23 DIAGNOSIS — N76.0 ACUTE VAGINITIS: ICD-10-CM

## 2019-04-23 DIAGNOSIS — N89.8 VAGINAL DISCHARGE: ICD-10-CM

## 2019-04-23 LAB
BILIRUBIN, POC: NEGATIVE
BLOOD URINE, POC: NORMAL
CLARITY, POC: NORMAL
COLOR, POC: YELLOW
GLUCOSE URINE, POC: NEGATIVE
KETONES, POC: NEGATIVE
LEUKOCYTE EST, POC: NEGATIVE
NITRITE, POC: NEGATIVE
PH, POC: 5
PROTEIN, POC: NORMAL
SPECIFIC GRAVITY, POC: 1.03
UROBILINOGEN, POC: NORMAL

## 2019-04-23 PROCEDURE — G8417 CALC BMI ABV UP PARAM F/U: HCPCS | Performed by: CLINICAL NURSE SPECIALIST

## 2019-04-23 PROCEDURE — 99213 OFFICE O/P EST LOW 20 MIN: CPT | Performed by: CLINICAL NURSE SPECIALIST

## 2019-04-23 PROCEDURE — 1036F TOBACCO NON-USER: CPT | Performed by: CLINICAL NURSE SPECIALIST

## 2019-04-23 PROCEDURE — G8427 DOCREV CUR MEDS BY ELIG CLIN: HCPCS | Performed by: CLINICAL NURSE SPECIALIST

## 2019-04-23 RX ORDER — CLINDAMYCIN PHOSPHATE 20 MG/G
1 CREAM VAGINAL NIGHTLY
Qty: 1 TUBE | Refills: 0 | Status: SHIPPED | OUTPATIENT
Start: 2019-04-23 | End: 2019-08-01 | Stop reason: ALTCHOICE

## 2019-04-23 ASSESSMENT — ENCOUNTER SYMPTOMS
EYES NEGATIVE: 1
RESPIRATORY NEGATIVE: 1
GASTROINTESTINAL NEGATIVE: 1
ALLERGIC/IMMUNOLOGIC NEGATIVE: 1

## 2019-04-23 NOTE — PROGRESS NOTES
Subjective:      Patient ID:  Nisa Boudreaux is a 28 y.o. female who presents for   Chief Complaint   Patient presents with    Pelvic Pain     Pt c/o bilat pelvic pain, pt states this has been constant pain,     Vaginal Discharge     pt c/o vaginal discharge, yellow-green in color,musty odor, with itching ,buring,       HPI      Patient is a 27 yo female who presents for vaginal discharge described as yellow to yellowish-green and painful urination that has been ongoing for the lst 3-4 days. Patient reports that she routinely gets vaginal discharge and is requesting something that she can take monthly to prevent it. Patient reports that she is having very bad ovarian pain right side described as cramping and it intermittent. Review of Systems   Constitutional: Negative for chills and fever. HENT: Negative. Eyes: Negative. Respiratory: Negative. Cardiovascular: Negative. Gastrointestinal: Negative. Endocrine: Negative. Genitourinary: Positive for vaginal discharge (yellow to yellowish-green ). Negative for dysuria and menstrual problem. Musculoskeletal: Negative. Skin: Negative. Allergic/Immunologic: Negative. Neurological: Negative. Hematological: Negative. Psychiatric/Behavioral: Negative. /74 (Site: Right Wrist, Position: Sitting, Cuff Size: Medium Adult)   Pulse 91   Temp 98.2 °F (36.8 °C) (Temporal)   Resp 18   Ht 5' 3\" (1.6 m)   Wt 187 lb 12.8 oz (85.2 kg)   LMP 04/03/2019   BMI 33.27 kg/m²    Patient's last menstrual period was 04/03/2019. Objective:   Physical Exam   Constitutional: She is oriented to person, place, and time. She appears well-developed and well-nourished. HENT:   Head: Normocephalic and atraumatic. Eyes: Conjunctivae are normal.   Neck: Normal range of motion. Neck supple. Cardiovascular: Normal rate and regular rhythm.    Pulmonary/Chest: Effort normal and breath sounds normal.   Abdominal: Bowel sounds are normal. Genitourinary: Vaginal discharge (moderate amount of yellow discharge) found. Musculoskeletal: Normal range of motion. Neurological: She is oriented to person, place, and time. Skin: Skin is warm and dry. Psychiatric: She has a normal mood and affect. Her behavior is normal. Judgment and thought content normal.   Vitals reviewed. Assessment:      Diagnosis Orders   1. Painful urination  POCT Urinalysis no Micro   2. Vaginal discharge  VAGINITIS DNA PROBE    C.trachomatis N.gonorrhoeae DNA   3. Acute vaginitis  VAGINITIS DNA PROBE    C.trachomatis N.gonorrhoeae DNA    clindamycin (CLEOCIN) 2 % vaginal cream           Plan:   Patient encouraged to increase water intake      Return for as needed. Patient was seen with total face to face time of 15 minutes. More than 50% of this visit was on counseling andeducation regarding the problems listed below and her options. She was also counseled on her preventative health maintenance recommendations and follow-up.     Electronically signed by: Maria Del Rosario Okeefe CNP

## 2019-04-24 LAB
C TRACH DNA GENITAL QL NAA+PROBE: NEGATIVE
DIRECT EXAM: NORMAL
Lab: NORMAL
N. GONORRHOEAE DNA: NEGATIVE
SPECIMEN DESCRIPTION: NORMAL
SPECIMEN DESCRIPTION: NORMAL

## 2019-04-25 ENCOUNTER — OFFICE VISIT (OUTPATIENT)
Dept: FAMILY MEDICINE CLINIC | Age: 34
End: 2019-04-25
Payer: MEDICARE

## 2019-04-25 VITALS
WEIGHT: 188.6 LBS | HEART RATE: 92 BPM | BODY MASS INDEX: 32.2 KG/M2 | DIASTOLIC BLOOD PRESSURE: 74 MMHG | HEIGHT: 64 IN | SYSTOLIC BLOOD PRESSURE: 110 MMHG | OXYGEN SATURATION: 99 %

## 2019-04-25 DIAGNOSIS — F40.01 PANIC DISORDER WITH AGORAPHOBIA: ICD-10-CM

## 2019-04-25 DIAGNOSIS — F41.1 GAD (GENERALIZED ANXIETY DISORDER): ICD-10-CM

## 2019-04-25 DIAGNOSIS — K58.2 IRRITABLE BOWEL SYNDROME WITH BOTH CONSTIPATION AND DIARRHEA: Primary | ICD-10-CM

## 2019-04-25 DIAGNOSIS — F33.1 MODERATE EPISODE OF RECURRENT MAJOR DEPRESSIVE DISORDER (HCC): ICD-10-CM

## 2019-04-25 PROCEDURE — G8427 DOCREV CUR MEDS BY ELIG CLIN: HCPCS | Performed by: FAMILY MEDICINE

## 2019-04-25 PROCEDURE — 1036F TOBACCO NON-USER: CPT | Performed by: FAMILY MEDICINE

## 2019-04-25 PROCEDURE — 99214 OFFICE O/P EST MOD 30 MIN: CPT | Performed by: FAMILY MEDICINE

## 2019-04-25 PROCEDURE — G8417 CALC BMI ABV UP PARAM F/U: HCPCS | Performed by: FAMILY MEDICINE

## 2019-04-25 PROCEDURE — 96160 PT-FOCUSED HLTH RISK ASSMT: CPT | Performed by: FAMILY MEDICINE

## 2019-04-25 RX ORDER — PROPRANOLOL HCL 60 MG
60 CAPSULE, EXTENDED RELEASE 24HR ORAL EVERY EVENING
Qty: 30 CAPSULE | Refills: 0 | Status: SHIPPED | OUTPATIENT
Start: 2019-04-25 | End: 2019-08-01 | Stop reason: ALTCHOICE

## 2019-04-25 RX ORDER — CHOLECALCIFEROL (VITAMIN D3) 125 MCG
1 CAPSULE ORAL 2 TIMES DAILY
Qty: 180 CAPSULE | Refills: 3 | Status: SHIPPED | OUTPATIENT
Start: 2019-04-25 | End: 2019-11-01

## 2019-04-25 RX ORDER — PYRIDOXINE HCL (VITAMIN B6) 100 MG
500 TABLET ORAL 2 TIMES DAILY
Qty: 60 CAPSULE | Refills: 3 | Status: SHIPPED | OUTPATIENT
Start: 2019-04-25 | End: 2021-02-18

## 2019-04-25 ASSESSMENT — ENCOUNTER SYMPTOMS
CONSTIPATION: 1
DIARRHEA: 1
CHEST TIGHTNESS: 0
SHORTNESS OF BREATH: 0
ABDOMINAL DISTENTION: 0
BACK PAIN: 1
VOMITING: 0
ABDOMINAL PAIN: 1
COUGH: 0
BLOOD IN STOOL: 0
NAUSEA: 0
WHEEZING: 0

## 2019-04-25 ASSESSMENT — PATIENT HEALTH QUESTIONNAIRE - PHQ9
4. FEELING TIRED OR HAVING LITTLE ENERGY: 3
10. IF YOU CHECKED OFF ANY PROBLEMS, HOW DIFFICULT HAVE THESE PROBLEMS MADE IT FOR YOU TO DO YOUR WORK, TAKE CARE OF THINGS AT HOME, OR GET ALONG WITH OTHER PEOPLE: 0
SUM OF ALL RESPONSES TO PHQ9 QUESTIONS 1 & 2: 3
9. THOUGHTS THAT YOU WOULD BE BETTER OFF DEAD, OR OF HURTING YOURSELF: 0
5. POOR APPETITE OR OVEREATING: 1
1. LITTLE INTEREST OR PLEASURE IN DOING THINGS: 1
7. TROUBLE CONCENTRATING ON THINGS, SUCH AS READING THE NEWSPAPER OR WATCHING TELEVISION: 2
3. TROUBLE FALLING OR STAYING ASLEEP: 3
8. MOVING OR SPEAKING SO SLOWLY THAT OTHER PEOPLE COULD HAVE NOTICED. OR THE OPPOSITE, BEING SO FIGETY OR RESTLESS THAT YOU HAVE BEEN MOVING AROUND A LOT MORE THAN USUAL: 0
SUM OF ALL RESPONSES TO PHQ QUESTIONS 1-9: 12
2. FEELING DOWN, DEPRESSED OR HOPELESS: 2
SUM OF ALL RESPONSES TO PHQ QUESTIONS 1-9: 12
6. FEELING BAD ABOUT YOURSELF - OR THAT YOU ARE A FAILURE OR HAVE LET YOURSELF OR YOUR FAMILY DOWN: 0

## 2019-04-25 ASSESSMENT — ANXIETY QUESTIONNAIRES
GAD7 TOTAL SCORE: 16
2. NOT BEING ABLE TO STOP OR CONTROL WORRYING: 3-NEARLY EVERY DAY
7. FEELING AFRAID AS IF SOMETHING AWFUL MIGHT HAPPEN: 2-OVER HALF THE DAYS
4. TROUBLE RELAXING: 3-NEARLY EVERY DAY
3. WORRYING TOO MUCH ABOUT DIFFERENT THINGS: 2-OVER HALF THE DAYS
5. BEING SO RESTLESS THAT IT IS HARD TO SIT STILL: 2-OVER HALF THE DAYS
1. FEELING NERVOUS, ANXIOUS, OR ON EDGE: 2-OVER HALF THE DAYS
6. BECOMING EASILY ANNOYED OR IRRITABLE: 2-OVER HALF THE DAYS

## 2019-04-25 NOTE — PROGRESS NOTES
Chief Complaint   Patient presents with    Results    Irritable Bowel Syndrome     pt wants to be tested     Anxiety     pt had gene testing for medication         Eileen Guzman    here today for follow up on chronic medical problems, go over labs and/or diagnostic studies, and medication refills. Results; Irritable Bowel Syndrome (pt wants to be tested ); and Anxiety (pt had gene testing for medication)      HPI    GI symptoms   Patient thinks she has IBS. Patient reports alternating diarrhea with constipation. She reports sometimes the stools look like skiny soft poops, sometimes yellow, some spit sometimes, gets constipation for a few days, then diarrhea for several days afterwards. Patient says she cannot drink a lot of water because then her bladder hurts all the time, and wakes up at nighttime to urinate, and she does have blood in the urine all the time when tested. She doesn't see blood in the urine   She denies blood in the stool. She does have abdominal pain mostly from the umbilicus down. She reports intermittent nausea. She denies vomiting . She is terrified about upcoming laparoscopy, she is very convinced that she has endometriosis, she would like something to regulate her bowel movements, we discussed probiotics. She reports the low back pain, constant pelvic pain, frequency and urgency of urination  Patient says she has been having the lower abdominal pain for years    Depression and Anxiety. Patient complains of depression. She complains of anhedonia, depressed mood, difficulty concentrating, fatigue, feelings of worthlessness/guilt, hopelessness, insomnia and psychomotor retardation. Patient complains of anxiety disorder, panic attacks and sleep disturbance. She has the following anxiety symptoms: difficulty concentrating, fatigue, feelings of losing control, insomnia, irritable, palpitations, psychomotor agitation, racing thoughts.      Denies suicidal ideation, plan or intent. She had gene sight testing and only wants what would work for her  She shows me the list which includes benzodiazepines , propranolol and Buspar. She brought only the first page    She says she did take Buspar  30 mg BID and didn't work for her. Sees Allice the counselor and has been working with her on the anxiety. Has difficulty getting out of the house    She is very afraid of side effects from medications and she doesn't want anything else. Moderate depression worsening    PHQ Scores 4/25/2019 8/8/2018 2/5/2018 9/26/2017 8/23/2017   PHQ2 Score 3 2 2 2 3   PHQ9 Score 12 11 9 9 11     PHQ-2 Over the past 2 weeks, how often have you been bothered by any of the following problems? Little interest or pleasure in doing things: Several days  Feeling down, depressed, or hopeless: More than half the days  PHQ-2 Score: 3  PHQ-9 Over the past 2 weeks, how often have you been bothered by any of the following problems? Trouble falling or staying asleep, or sleeping too much: Nearly every day  Feeling tired or having little energy: Nearly every day  Poor appetite or overeating: Several days  Feeling bad about yourself - or that you are a failure or have let yourself or your family down: Not at all  Trouble concentrating on things, such as reading the newspaper or watching television: More than half the days  Moving or speaking so slowly that other people could have noticed.  Or the opposite - being so fidgety or restless that you have been moving around a lot more than usual: Not at all  Thoughts that you would be better off dead, or of hurting yourself in some way: Not at all  If you checked off any problems, how difficult have these problems made it for you to do your work, take care of things at home, or get along with other people?: Not difficult at all  PHQ-9 Completed?: Complete  PHQ-9 Total Score: 12      Severe anxiety, says she feels in a cycle and cannot get out    DEREK 7 SCORE 4/25/2019 8/8/2018 2/5/2018 9/26/2017 8/23/2017   DEREK-7 Total Score 16 17 10 10 14     Interpretation of DEREK-7 score: 5-9 = mild anxiety, 10-14 = moderate anxiety, 15+ = severe anxiety. Recommend referral to behavioral health for scores 10 or greater. [facility] DEREK-7 4/25/2019 8/8/2018 2/5/2018 9/26/2017 8/23/2017   Feeling nervous, anxious, or on edge 2-Over half the days 3-Nearly every day 2-Over half the days 1-Several days 3-Nearly every day   Not able to stop or control worrying 3-Nearly every day 2-Over half the days 1-Several days 2-Over half the days 2-Over half the days   Worrying too much about different things 2-Over half the days 2-Over half the days 1-Several days 2-Over half the days 2-Over half the days   Trouble relaxing 3-Nearly every day 3-Nearly every day 2-Over half the days 2-Over half the days 2-Over half the days   Being so restless that it's hard to sit still 2-Over half the days 2-Over half the days 1-Several days 1-Several days 1-Several days   Becoming easily annoyed or irritable 2-Over half the days 3-Nearly every day 1-Several days 1-Several days 2-Over half the days   Feeling afraid as if something awful might happen 2-Over half the days 2-Over half the days 2-Over half the days 1-Several days 2-Over half the days   DEREK-7 Total Score 16 17 10 10 14       Didn't lose weight      Wt Readings from Last 8 Encounters:   04/25/19 188 lb 9.6 oz (85.5 kg)   04/23/19 187 lb 12.8 oz (85.2 kg)   04/22/19 182 lb (82.6 kg)   04/08/19 184 lb (83.5 kg)   03/25/19 184 lb (83.5 kg)   02/11/19 184 lb (83.5 kg)   01/23/19 184 lb 9.6 oz (83.7 kg)   01/16/19 183 lb 9.6 oz (83.3 kg)           Allergies   Allergen Reactions    Diflucan [Fluconazole]     Flagyl [Metronidazole] Hives    Penicillins      When a child        Current Outpatient Medications   Medication Sig Dispense Refill    clindamycin (CLEOCIN) 2 % vaginal cream Place 1 applicator vaginally nightly For 5 nights in a row.  1 Tube 0    loratadine (CLARITIN) 10 MG Concern    Not on file   Social History Narrative    Not on file     Counseling given: Yes                  -rest of complaints with corresponding details per ROS    The patient's past medical,surgical, social, and family history as well as her current medications and allergies were reviewed as documented in today's encounter. Review of Systems   Constitutional: Positive for fatigue. Negative for activity change, appetite change, chills, diaphoresis, fever and unexpected weight change. Respiratory: Negative for cough, chest tightness, shortness of breath and wheezing. Cardiovascular: Positive for palpitations. Negative for chest pain and leg swelling. Gastrointestinal: Positive for abdominal pain, constipation and diarrhea. Negative for abdominal distention, blood in stool, nausea and vomiting. Endocrine: Negative for cold intolerance, heat intolerance, polydipsia, polyphagia and polyuria. Genitourinary: Positive for frequency, pelvic pain and urgency. Negative for flank pain and hematuria. Musculoskeletal: Positive for back pain. Psychiatric/Behavioral: Positive for decreased concentration, dysphoric mood and sleep disturbance. Negative for self-injury and suicidal ideas. The patient is nervous/anxious.            -vital signs stable and within normal limits except Obesity per BMI. /74   Pulse 92   Ht 5' 4\" (1.626 m)   Wt 188 lb 9.6 oz (85.5 kg)   LMP 04/03/2019   SpO2 99%   BMI 32.37 kg/m²      Physical Exam   Constitutional: She is oriented to person, place, and time. She appears well-developed and well-nourished. No distress. HENT:   Head: Normocephalic and atraumatic. Right Ear: External ear normal.   Left Ear: External ear normal.   Nose: Nose normal.   Mouth/Throat: Oropharynx is clear and moist. No oropharyngeal exudate. Eyes: Conjunctivae and EOM are normal. Right eye exhibits no discharge. Left eye exhibits no discharge. No scleral icterus.    Neck: Normal range of motion. Neck supple. No thyromegaly present. Cardiovascular: Normal rate, regular rhythm, normal heart sounds and intact distal pulses. Pulmonary/Chest: Effort normal and breath sounds normal. No respiratory distress. She has no wheezes. She has no rales. She exhibits no tenderness. Abdominal: Soft. Bowel sounds are normal. She exhibits no distension. There is tenderness in the right lower quadrant, periumbilical area, suprapubic area and left lower quadrant. There is no rigidity, no guarding and no CVA tenderness. Musculoskeletal: Normal range of motion. She exhibits no edema or tenderness. Neurological: She is alert and oriented to person, place, and time. No cranial nerve deficit. She exhibits normal muscle tone. Skin: Skin is warm and dry. No rash noted. She is not diaphoretic. Psychiatric: Her behavior is normal. Judgment and thought content normal. Her mood appears anxious. Nursing note and vitals reviewed. I personally reviewed testing . Discussed testing with the patient and all questions fully answered. Vaginal cultures normal, the GYN gave her clindamycin ovules  Mild increase of LFTs, will do blood work at her next appointment. CT abdomen and pelvis on 10/12/17 showed liver normal  Otherwise labs within normal limits    Hospital Outpatient Visit on 04/23/2019   Component Date Value Ref Range Status    Specimen Description 04/23/2019 . CERVIX   Final    C. trachomatis DNA 04/23/2019 NEGATIVE  NEGATIVE Final    Comment: CHLAMYDIA TRACHOMATIS DNA not detected by nucleic acid amplification. This test is intended for medical purposes only and is not valid for the evaluation of   suspected sexual abuse or for other forensic purposes. In certain contexts, culture may be required to meet applicable laws and regulations for   diagnosis of C. trachomatis and N. gonorrhoeae infections.   Per 2014  CDC recommendations, this test does not include confirmation of positive results by an alternative nucleic acid target.  N. gonorrhoeae DNA 04/23/2019 NEGATIVE  NEGATIVE Final    Comment: NEISSERIA GONORRHOEAE DNA not detected by nucleic acid amplification. This test is intended for medical purposes only and is not valid for the evaluation of   suspected sexual abuse or for other forensic purposes. In certain contexts, culture may be required to meet applicable laws and regulations for   diagnosis of C. trachomatis and N. gonorrhoeae infections. Per 2014  CDC recommendations, this test does not include confirmation of positive results   by an alternative nucleic acid target.  Specimen Description 04/23/2019 . VAGINA   Final    Special Requests 04/23/2019 NOT REPORTED   Final    Direct Exam 04/23/2019 NEGATIVE for Candida sp. Final    Direct Exam 04/23/2019 NEGATIVE for Gardnerella vaginalis   Final    Direct Exam 04/23/2019 NEGATIVE for Trichomonas vaginalis   Final    Direct Exam 04/23/2019 Method of testing is a DNA probe intended for detection and identification of Candida species, Gardnerella vaginalis, and Trichomonas vaginalis nucleic acid in vaginal fluid specimens from patients with symptoms of vaginitis/vaginosis.    Final       Lab Results   Component Value Date    WBC 4.9 04/22/2019    HGB 13.1 04/22/2019    HCT 39.8 04/22/2019    MCV 93.7 04/22/2019     04/22/2019       Lab Results   Component Value Date     04/22/2019    K 4.6 04/22/2019     04/22/2019    CO2 27 04/22/2019    BUN 14 04/22/2019    CREATININE 0.84 04/22/2019    GLUCOSE 74 04/22/2019    CALCIUM 8.7 04/22/2019        Lab Results   Component Value Date    ALT 42 (H) 06/06/2018    AST 38 (H) 06/06/2018    ALKPHOS 73 06/06/2018    BILITOT 0.25 (L) 06/06/2018     No results found for: HAV, HEPAIGM, HEPBIGM, HEPBCAB, HBEAG, HEPCAB    Lab Results   Component Value Date    TSH 2.80 09/19/2017       Lab Results   Component Value Date    CHOL 176 09/19/2017     Lab Results   Component Severity: 1-4 = Minimal depression, 5-9 = Mild depression, 10-14 = Moderate depression, 15-19 = Moderately severe depression, 20-27 = Severe depression            Data Unavailable    No orders of the defined types were placed in this encounter. There are no discontinued medications. Bette received counseling on the following healthy behaviors: nutrition, exercise, medication adherence and weight loss   Reviewed prior labs and health maintenance  Continue current medications, diet and exercise. Discussed use, benefit, and side effects of prescribed medications. Barriers to medication compliance addressed. Patient given educational materials - see patient instructions  Was a self-tracking handout given in paper form or via [employer]? No:   Requested Prescriptions     Signed Prescriptions Disp Refills    Cranberry 500 MG CAPS 60 capsule 3     Sig: Take 1 capsule by mouth 2 times daily OK to substitute    Lactobacillus (PROBIOTIC ACIDOPHILUS) CAPS 180 capsule 3     Sig: Take 1 tablet by mouth 2 times daily    propranolol (INDERAL LA) 60 MG extended release capsule 30 capsule 0     Sig: Take 1 capsule by mouth every evening Call for refills or dose adjustment. All patient questions answered. Patient voiced understanding. Quality Measures    Body mass index is 32.37 kg/m². Elevated. Weight control planned discussed conventional weight loss and Healthy diet and regular exercise. BP: 110/74 Blood pressure is normal. Treatment plan consists of No treatment change needed.     Lab Results   Component Value Date    LDLCHOLESTEROL 105 09/19/2017    (goal LDL reduction with dx if diabetes is 50% LDL reduction)      PHQ Scores 4/25/2019 8/8/2018 2/5/2018 9/26/2017 8/23/2017   PHQ2 Score 3 2 2 2 3   PHQ9 Score 12 11 9 9 11     Interpretation of Total Score Depression Severity: 1-4 = Minimal depression, 5-9 = Mild depression, 10-14 = Moderate depression, 15-19 = Moderately severe depression, 20-27 = Severe Physician (Obstetrics & Gynecology)  Steven Amezcua MD as Consulting Physician (Urology)  SALLY Dowd as  (Licensed Clinical )  Pastora Ferris MD as Consulting Physician (Nephrology)    Medical History Review  Past Medical, Family, and Social History reviewed and does contribute to the patient presenting condition    Health Maintenance   Topic Date Due    Varicella Vaccine (1 of 2 - 13+ 2-dose series) 06/23/1997    DTaP/Tdap/Td vaccine (1 - Tdap) 04/25/2020 (Originally 6/23/2003)    Flu vaccine (Season Ended) 09/01/2019    Cervical cancer screen  06/27/2020    HIV screen  Completed    Pneumococcal 0-64 years Vaccine  Aged Out

## 2019-04-30 DIAGNOSIS — F41.9 ANXIETY: ICD-10-CM

## 2019-05-01 PROBLEM — G89.29 CHRONIC PELVIC PAIN IN FEMALE: Status: ACTIVE | Noted: 2019-05-01

## 2019-05-01 PROBLEM — R10.2 CHRONIC PELVIC PAIN IN FEMALE: Status: ACTIVE | Noted: 2019-05-01

## 2019-05-02 ENCOUNTER — HOSPITAL ENCOUNTER (OUTPATIENT)
Age: 34
Setting detail: OUTPATIENT SURGERY
Discharge: HOME | End: 2019-05-02
Attending: SPECIALIST | Admitting: SPECIALIST
Payer: MEDICARE

## 2019-05-02 ENCOUNTER — ANESTHESIA EVENT (OUTPATIENT)
Dept: OPERATING ROOM | Age: 34
End: 2019-05-02
Payer: MEDICARE

## 2019-05-02 ENCOUNTER — ANESTHESIA (OUTPATIENT)
Dept: OPERATING ROOM | Age: 34
End: 2019-05-02
Payer: MEDICARE

## 2019-05-02 VITALS
HEIGHT: 63 IN | OXYGEN SATURATION: 96 % | TEMPERATURE: 99.5 F | DIASTOLIC BLOOD PRESSURE: 58 MMHG | RESPIRATION RATE: 16 BRPM | HEART RATE: 78 BPM | SYSTOLIC BLOOD PRESSURE: 96 MMHG | WEIGHT: 182.06 LBS | BODY MASS INDEX: 32.26 KG/M2

## 2019-05-02 VITALS — TEMPERATURE: 97.5 F | OXYGEN SATURATION: 97 % | DIASTOLIC BLOOD PRESSURE: 60 MMHG | SYSTOLIC BLOOD PRESSURE: 102 MMHG

## 2019-05-02 DIAGNOSIS — Z98.890 S/P LAPAROSCOPY: Primary | ICD-10-CM

## 2019-05-02 LAB
-: NORMAL
HCG, PREGNANCY URINE (POC): NEGATIVE

## 2019-05-02 PROCEDURE — 6360000002 HC RX W HCPCS: Performed by: NURSE ANESTHETIST, CERTIFIED REGISTERED

## 2019-05-02 PROCEDURE — 2580000003 HC RX 258: Performed by: ANESTHESIOLOGY

## 2019-05-02 PROCEDURE — 84703 CHORIONIC GONADOTROPIN ASSAY: CPT

## 2019-05-02 PROCEDURE — 2709999900 HC NON-CHARGEABLE SUPPLY: Performed by: SPECIALIST

## 2019-05-02 PROCEDURE — 2500000003 HC RX 250 WO HCPCS: Performed by: NURSE ANESTHETIST, CERTIFIED REGISTERED

## 2019-05-02 PROCEDURE — 3700000001 HC ADD 15 MINUTES (ANESTHESIA): Performed by: SPECIALIST

## 2019-05-02 PROCEDURE — 7100000030 HC ASPR PHASE II RECOVERY - FIRST 15 MIN: Performed by: SPECIALIST

## 2019-05-02 PROCEDURE — 7100000000 HC PACU RECOVERY - FIRST 15 MIN: Performed by: SPECIALIST

## 2019-05-02 PROCEDURE — 3600000014 HC SURGERY LEVEL 4 ADDTL 15MIN: Performed by: SPECIALIST

## 2019-05-02 PROCEDURE — 7100000001 HC PACU RECOVERY - ADDTL 15 MIN: Performed by: SPECIALIST

## 2019-05-02 PROCEDURE — 3700000000 HC ANESTHESIA ATTENDED CARE: Performed by: SPECIALIST

## 2019-05-02 PROCEDURE — 58660 LAPAROSCOPY LYSIS: CPT | Performed by: SPECIALIST

## 2019-05-02 PROCEDURE — 2500000003 HC RX 250 WO HCPCS: Performed by: SPECIALIST

## 2019-05-02 PROCEDURE — 6360000002 HC RX W HCPCS: Performed by: SPECIALIST

## 2019-05-02 PROCEDURE — 3600000004 HC SURGERY LEVEL 4 BASE: Performed by: SPECIALIST

## 2019-05-02 PROCEDURE — 7100000031 HC ASPR PHASE II RECOVERY - ADDTL 15 MIN: Performed by: SPECIALIST

## 2019-05-02 PROCEDURE — 6360000002 HC RX W HCPCS: Performed by: ANESTHESIOLOGY

## 2019-05-02 PROCEDURE — 2720000010 HC SURG SUPPLY STERILE: Performed by: SPECIALIST

## 2019-05-02 RX ORDER — BUPIVACAINE HYDROCHLORIDE 5 MG/ML
INJECTION, SOLUTION EPIDURAL; INTRACAUDAL PRN
Status: DISCONTINUED | OUTPATIENT
Start: 2019-05-02 | End: 2019-05-02 | Stop reason: ALTCHOICE

## 2019-05-02 RX ORDER — KETOROLAC TROMETHAMINE 30 MG/ML
30 INJECTION, SOLUTION INTRAMUSCULAR; INTRAVENOUS ONCE
Status: COMPLETED | OUTPATIENT
Start: 2019-05-02 | End: 2019-05-02

## 2019-05-02 RX ORDER — NEOSTIGMINE METHYLSULFATE 5 MG/5 ML
SYRINGE (ML) INTRAVENOUS PRN
Status: DISCONTINUED | OUTPATIENT
Start: 2019-05-02 | End: 2019-05-02 | Stop reason: SDUPTHER

## 2019-05-02 RX ORDER — ONDANSETRON 2 MG/ML
4 INJECTION INTRAMUSCULAR; INTRAVENOUS
Status: COMPLETED | OUTPATIENT
Start: 2019-05-02 | End: 2019-05-02

## 2019-05-02 RX ORDER — LABETALOL 20 MG/4 ML (5 MG/ML) INTRAVENOUS SYRINGE
5 EVERY 10 MIN PRN
Status: DISCONTINUED | OUTPATIENT
Start: 2019-05-02 | End: 2019-05-02 | Stop reason: HOSPADM

## 2019-05-02 RX ORDER — PROMETHAZINE HYDROCHLORIDE 25 MG/ML
6.25 INJECTION, SOLUTION INTRAMUSCULAR; INTRAVENOUS
Status: DISCONTINUED | OUTPATIENT
Start: 2019-05-02 | End: 2019-05-02 | Stop reason: RX

## 2019-05-02 RX ORDER — MEPERIDINE HYDROCHLORIDE 50 MG/ML
12.5 INJECTION INTRAMUSCULAR; INTRAVENOUS; SUBCUTANEOUS EVERY 5 MIN PRN
Status: DISCONTINUED | OUTPATIENT
Start: 2019-05-02 | End: 2019-05-02 | Stop reason: HOSPADM

## 2019-05-02 RX ORDER — LIDOCAINE HYDROCHLORIDE 10 MG/ML
INJECTION, SOLUTION EPIDURAL; INFILTRATION; INTRACAUDAL; PERINEURAL PRN
Status: DISCONTINUED | OUTPATIENT
Start: 2019-05-02 | End: 2019-05-02 | Stop reason: SDUPTHER

## 2019-05-02 RX ORDER — HYDROCODONE BITARTRATE AND ACETAMINOPHEN 5; 325 MG/1; MG/1
1 TABLET ORAL EVERY 6 HOURS PRN
Qty: 10 TABLET | Refills: 0 | Status: SHIPPED | OUTPATIENT
Start: 2019-05-02 | End: 2019-05-09

## 2019-05-02 RX ORDER — DEXAMETHASONE SODIUM PHOSPHATE 4 MG/ML
INJECTION, SOLUTION INTRA-ARTICULAR; INTRALESIONAL; INTRAMUSCULAR; INTRAVENOUS; SOFT TISSUE PRN
Status: DISCONTINUED | OUTPATIENT
Start: 2019-05-02 | End: 2019-05-02 | Stop reason: SDUPTHER

## 2019-05-02 RX ORDER — DIPHENHYDRAMINE HYDROCHLORIDE 50 MG/ML
12.5 INJECTION INTRAMUSCULAR; INTRAVENOUS
Status: DISCONTINUED | OUTPATIENT
Start: 2019-05-02 | End: 2019-05-02 | Stop reason: HOSPADM

## 2019-05-02 RX ORDER — FENTANYL CITRATE 50 UG/ML
INJECTION, SOLUTION INTRAMUSCULAR; INTRAVENOUS PRN
Status: DISCONTINUED | OUTPATIENT
Start: 2019-05-02 | End: 2019-05-02 | Stop reason: SDUPTHER

## 2019-05-02 RX ORDER — MORPHINE SULFATE 2 MG/ML
2 INJECTION, SOLUTION INTRAMUSCULAR; INTRAVENOUS EVERY 5 MIN PRN
Status: DISCONTINUED | OUTPATIENT
Start: 2019-05-02 | End: 2019-05-02 | Stop reason: HOSPADM

## 2019-05-02 RX ORDER — OXYCODONE HYDROCHLORIDE AND ACETAMINOPHEN 5; 325 MG/1; MG/1
2 TABLET ORAL EVERY 4 HOURS PRN
Status: DISCONTINUED | OUTPATIENT
Start: 2019-05-02 | End: 2019-05-02 | Stop reason: HOSPADM

## 2019-05-02 RX ORDER — ONDANSETRON 4 MG/1
4 TABLET, ORALLY DISINTEGRATING ORAL EVERY 8 HOURS PRN
Qty: 6 TABLET | Refills: 0 | Status: SHIPPED | OUTPATIENT
Start: 2019-05-02 | End: 2019-08-08 | Stop reason: ALTCHOICE

## 2019-05-02 RX ORDER — SODIUM CHLORIDE, SODIUM LACTATE, POTASSIUM CHLORIDE, CALCIUM CHLORIDE 600; 310; 30; 20 MG/100ML; MG/100ML; MG/100ML; MG/100ML
INJECTION, SOLUTION INTRAVENOUS CONTINUOUS
Status: DISCONTINUED | OUTPATIENT
Start: 2019-05-02 | End: 2019-05-02 | Stop reason: HOSPADM

## 2019-05-02 RX ORDER — IBUPROFEN 600 MG/1
600 TABLET ORAL EVERY 6 HOURS PRN
Qty: 30 TABLET | Refills: 0 | Status: SHIPPED | OUTPATIENT
Start: 2019-05-02 | End: 2019-08-01 | Stop reason: HOSPADM

## 2019-05-02 RX ORDER — ROCURONIUM BROMIDE 10 MG/ML
INJECTION, SOLUTION INTRAVENOUS PRN
Status: DISCONTINUED | OUTPATIENT
Start: 2019-05-02 | End: 2019-05-02 | Stop reason: SDUPTHER

## 2019-05-02 RX ORDER — PROPOFOL 10 MG/ML
INJECTION, EMULSION INTRAVENOUS PRN
Status: DISCONTINUED | OUTPATIENT
Start: 2019-05-02 | End: 2019-05-02 | Stop reason: SDUPTHER

## 2019-05-02 RX ORDER — GLYCOPYRROLATE 1 MG/5 ML
SYRINGE (ML) INTRAVENOUS PRN
Status: DISCONTINUED | OUTPATIENT
Start: 2019-05-02 | End: 2019-05-02 | Stop reason: SDUPTHER

## 2019-05-02 RX ADMIN — Medication 2 G: at 11:21

## 2019-05-02 RX ADMIN — Medication 3 MG: at 11:27

## 2019-05-02 RX ADMIN — Medication 0.4 MG: at 11:27

## 2019-05-02 RX ADMIN — DEXAMETHASONE SODIUM PHOSPHATE 8 MG: 4 INJECTION, SOLUTION INTRA-ARTICULAR; INTRALESIONAL; INTRAMUSCULAR; INTRAVENOUS; SOFT TISSUE at 11:16

## 2019-05-02 RX ADMIN — SODIUM CHLORIDE, POTASSIUM CHLORIDE, SODIUM LACTATE AND CALCIUM CHLORIDE: 600; 310; 30; 20 INJECTION, SOLUTION INTRAVENOUS at 10:14

## 2019-05-02 RX ADMIN — PROMETHAZINE HYDROCHLORIDE 6.25 MG: 25 INJECTION INTRAMUSCULAR; INTRAVENOUS at 13:23

## 2019-05-02 RX ADMIN — KETOROLAC TROMETHAMINE 30 MG: 30 INJECTION, SOLUTION INTRAMUSCULAR; INTRAVENOUS at 13:22

## 2019-05-02 RX ADMIN — FENTANYL CITRATE 100 MCG: 50 INJECTION, SOLUTION INTRAMUSCULAR; INTRAVENOUS at 11:16

## 2019-05-02 RX ADMIN — PROPOFOL 100 MG: 10 INJECTION, EMULSION INTRAVENOUS at 11:16

## 2019-05-02 RX ADMIN — SODIUM CHLORIDE, POTASSIUM CHLORIDE, SODIUM LACTATE AND CALCIUM CHLORIDE 500 ML: 600; 310; 30; 20 INJECTION, SOLUTION INTRAVENOUS at 13:26

## 2019-05-02 RX ADMIN — Medication 0.2 MG: at 11:41

## 2019-05-02 RX ADMIN — LIDOCAINE HYDROCHLORIDE 50 MG: 10 INJECTION, SOLUTION EPIDURAL; INFILTRATION; INTRACAUDAL; PERINEURAL at 11:16

## 2019-05-02 RX ADMIN — ROCURONIUM BROMIDE 30 MG: 10 INJECTION INTRAVENOUS at 11:16

## 2019-05-02 RX ADMIN — PROPOFOL 50 MG: 10 INJECTION, EMULSION INTRAVENOUS at 11:17

## 2019-05-02 RX ADMIN — ONDANSETRON 4 MG: 2 INJECTION INTRAMUSCULAR; INTRAVENOUS at 12:48

## 2019-05-02 ASSESSMENT — PULMONARY FUNCTION TESTS
PIF_VALUE: 14
PIF_VALUE: 18
PIF_VALUE: 21
PIF_VALUE: 2
PIF_VALUE: 18
PIF_VALUE: 18
PIF_VALUE: 21
PIF_VALUE: 14
PIF_VALUE: 18
PIF_VALUE: 26
PIF_VALUE: 18
PIF_VALUE: 17
PIF_VALUE: 15
PIF_VALUE: 14
PIF_VALUE: 21
PIF_VALUE: 26
PIF_VALUE: 12
PIF_VALUE: 15
PIF_VALUE: 18
PIF_VALUE: 15
PIF_VALUE: 16
PIF_VALUE: 15
PIF_VALUE: 21
PIF_VALUE: 13
PIF_VALUE: 20
PIF_VALUE: 21
PIF_VALUE: 28
PIF_VALUE: 15
PIF_VALUE: 15
PIF_VALUE: 14
PIF_VALUE: 1
PIF_VALUE: 14
PIF_VALUE: 15
PIF_VALUE: 18
PIF_VALUE: 18
PIF_VALUE: 20
PIF_VALUE: 3
PIF_VALUE: 15
PIF_VALUE: 14
PIF_VALUE: 18
PIF_VALUE: 2
PIF_VALUE: 14
PIF_VALUE: 0
PIF_VALUE: 14
PIF_VALUE: 23
PIF_VALUE: 6
PIF_VALUE: 14
PIF_VALUE: 20
PIF_VALUE: 14
PIF_VALUE: 13
PIF_VALUE: 2
PIF_VALUE: 18
PIF_VALUE: 15
PIF_VALUE: 14
PIF_VALUE: 15
PIF_VALUE: 14
PIF_VALUE: 15
PIF_VALUE: 14
PIF_VALUE: 26
PIF_VALUE: 16
PIF_VALUE: 2
PIF_VALUE: 15
PIF_VALUE: 18
PIF_VALUE: 21
PIF_VALUE: 18
PIF_VALUE: 14
PIF_VALUE: 2
PIF_VALUE: 18
PIF_VALUE: 13
PIF_VALUE: 18
PIF_VALUE: 20
PIF_VALUE: 15
PIF_VALUE: 1
PIF_VALUE: 21
PIF_VALUE: 14
PIF_VALUE: 28
PIF_VALUE: 13

## 2019-05-02 ASSESSMENT — PAIN SCALES - GENERAL
PAINLEVEL_OUTOF10: 6
PAINLEVEL_OUTOF10: 0
PAINLEVEL_OUTOF10: 8
PAINLEVEL_OUTOF10: 8
PAINLEVEL_OUTOF10: 6

## 2019-05-02 ASSESSMENT — ENCOUNTER SYMPTOMS
SHORTNESS OF BREATH: 0
STRIDOR: 0

## 2019-05-02 ASSESSMENT — PAIN DESCRIPTION - DESCRIPTORS
DESCRIPTORS: ACHING
DESCRIPTORS: CRAMPING;ACHING

## 2019-05-02 ASSESSMENT — PAIN DESCRIPTION - LOCATION
LOCATION: ABDOMEN

## 2019-05-02 ASSESSMENT — PAIN DESCRIPTION - PAIN TYPE
TYPE: SURGICAL PAIN

## 2019-05-02 ASSESSMENT — LIFESTYLE VARIABLES: SMOKING_STATUS: 0

## 2019-05-02 NOTE — ANESTHESIA PRE PROCEDURE
Department of Anesthesiology  Preprocedure Note       Name:  Jessica Dillon   Age:  28 y.o.  :  1985                                          MRN:  [id number]         Date:  2019      Surgeon: Maximus Ashraf):  Cynthia Tinoco MD    Procedure: LAPAROSCOPY DIAGNOSTIC (N/A )    Medications prior to admission:   Prior to Admission medications    Medication Sig Start Date End Date Taking? Authorizing Provider   loratadine (CLARITIN) 10 MG capsule Take 10 mg by mouth daily   Yes Historical Provider, MD   ibuprofen (ADVIL;MOTRIN) 400 MG tablet Take 800 mg by mouth every 6 hours as needed for Pain Takes 2 tablets of 400 mg   Yes Historical Provider, MD   acetaminophen (TYLENOL) 500 MG tablet Take 1 tablet by mouth every 4 hours as needed for Pain 18  Yes Lisa Mars MD   Cranberry 500 MG CAPS Take 1 capsule by mouth 2 times daily OK to substitute 19   Flora Pelaez MD   Lactobacillus (PROBIOTIC ACIDOPHILUS) CAPS Take 1 tablet by mouth 2 times daily 19   Flora Pelaez MD   propranolol (INDERAL LA) 60 MG extended release capsule Take 1 capsule by mouth every evening Call for refills or dose adjustment. 19   Naila Thorpe MD   clindamycin (CLEOCIN) 2 % vaginal cream Place 1 applicator vaginally nightly For 5 nights in a row. 19   EL Rizvi CNP       Current medications:    Current Facility-Administered Medications   Medication Dose Route Frequency Provider Last Rate Last Dose    ceFAZolin (ANCEF) 2 g in dextrose 5 % 50 mL IVPB  2 g Intravenous Once Cynthia Tinoco MD        lactated ringers infusion   Intravenous Continuous Mary Lou Lafleur MD           Allergies:     Allergies   Allergen Reactions    Diflucan [Fluconazole]     Flagyl [Metronidazole] Hives    Penicillins      When a child       Problem List:    Patient Active Problem List   Diagnosis Code    Chronic bilateral low back pain without sciatica M54.5, G89.29    Irritable bowel syndrome with both constipation and diarrhea K58.2    DEREK (generalized anxiety disorder) F41.1    PMS (premenstrual syndrome) N94.3    Chronic fatigue R53.82    Palpitations R00.2    Panic disorder with agoraphobia F40.01    Obesity (BMI 30-39. 9) E66.9    Hematuria R31.9    Murmur R01.1    Acute non-recurrent maxillary sinusitis J01.00    Psychophysiological insomnia F51.04    Microscopic hematuria R31.29    Lower abdominal pain R10.30    Nocturia R35.1    Dysuria R30.0    Moderate episode of recurrent major depressive disorder (HCC) F33.1    CKD (chronic kidney disease) stage 1, GFR 90 ml/min or greater N18.1    Dysfunction of both eustachian tubes H69.83    Allergic rhinitis due to allergen J30.9    Vertigo R42    Chronic pelvic pain in female R10.2, G89.29       Past Medical History:        Diagnosis Date    Allergic rhinitis     Anxiety     Chest pain     with anxiety    Chronic kidney disease     CKD (chronic kidney disease) stage 1, GFR 90 ml/min or greater 7/10/2018    Depression     Headache(784.0)     Heart murmur     Hematuria 2016    Irritable bowel syndrome with diarrhea 2016    Midline low back pain without sciatica 2016    Obesity (BMI 30-39. 9) 2017    Orthostatic hypotension     Panic attack as reaction to stress     certain anxiety medications will cause a panic attack    Panic disorder with agoraphobia 2017    some medications for anxiety will cause a panic attack.     Vertigo 10/2018    hx of       Past Surgical History:        Procedure Laterality Date    APPENDECTOMY         Social History:    Social History     Tobacco Use    Smoking status: Former Smoker     Packs/day: 0.25     Years: 3.00     Pack years: 0.75     Types: Cigarettes     Last attempt to quit: 2005     Years since quittin.3    Smokeless tobacco: Never Used   Substance Use Topics    Alcohol use: No     Comment: Hx alcohol abuse as teenager anesthetic complications:   Airway: Mallampati: II  TM distance: >3 FB   Neck ROM: full  Mouth opening: > = 3 FB Dental:          Pulmonary:Negative Pulmonary ROS and normal exam  breath sounds clear to auscultation      (-) pneumonia, COPD, asthma, shortness of breath, recent URI, sleep apnea, rhonchi, wheezes, rales, stridor and not a current smoker          Patient did not smoke on day of surgery. Cardiovascular:Negative CV ROS  Exercise tolerance: good (>4 METS),       (-) pacemaker, hypertension, valvular problems/murmurs, past MI, CAD, CABG/stent, dysrhythmias,  angina,  CHF, orthopnea, PND,  BURRELL, murmur, weak pulses,  friction rub, systolic click, carotid bruit,  JVD and peripheral edema      Rhythm: regular  Rate: normal           Beta Blocker:  Dose within 24 Hrs         Neuro/Psych:   (+) headaches:, psychiatric history: stable with treatment and stable without treatmentdepression/anxiety    (-) seizures, neuromuscular disease, TIA and CVA           GI/Hepatic/Renal: Neg GI/Hepatic/Renal ROS       (-) hiatal hernia, GERD, PUD, hepatitis, liver disease, no renal disease, bowel prep and no morbid obesity       Endo/Other: Negative Endo/Other ROS   (+) no malignancy/cancer. (-) diabetes mellitus, hypothyroidism, hyperthyroidism, blood dyscrasia, arthritis, no electrolyte abnormalities, no malignancy/cancer               Abdominal:           Vascular: negative vascular ROS. - PVD, DVT and PE. Anesthesia Plan      general     ASA 2       Induction: intravenous. MIPS: Postoperative opioids intended and Prophylactic antiemetics administered. Anesthetic plan and risks discussed with patient. Plan discussed with CRNA.                   Lilia Foote MD   5/2/2019

## 2019-05-02 NOTE — BRIEF OP NOTE
Brief Operative Note  Department of Obstetrics and Gynecology  [facility]     Patient: Mulugeta Samuel   : 1985  MRN: [id number]       Acct: [de-identified]   Date of Procedure: 19     Pre-operative Diagnosis: 28 y.o. female    Chronic pelvic pain  Hx of IBS   Heavy menstrual cycles  Hx of open appendectomy    Post-operative Diagnosis: Same as above  Abdominopelvic Adhesive Disease (Right side>left)      Procedure: Operative Laparoscopy  Lysis of adhesions    Surgeon: Dr. Muniz Alter): Chandrika Shrestha DO, PGY3; Jovanny Foley, PGY2    Anesthesia: general     Findings:  Pelvic adhesive disease, with right side worse than the right side secondary to prior to open appendectomy, adhesions involved right aspect of uterus and right fallopian tube tacked to the right pelvic side wall, adhesions containing bowel tacked to the anterior abdominal wall, small amount of blood secondary to trochar entry at umbilical site (hemostatic at end of case)  Total IV fluids/Blood products:  700 ml crystalloid  Urine Output:  200 ml    Estimated blood loss:  15ml  Drains:  ramos catheter  Specimens:  none  Instrument and Sponge Count: Correct  Complications:  none  Condition:  good, transferred to post anesthesia recovery    See full operative report for further details.       Chandrika Shrestha DO  Ob/Gyn Resident  Pager: [phone]  2019, 12:41 PM

## 2019-05-02 NOTE — INTERVAL H&P NOTE
HISTORY and [facility]       NAME:  Karen Kelley  MRN: [id number]   YOB: 1985   Date: 5/2/2019   Age: 28 y.o. Gender: female     H&P Update Note    H&P from 4/22/2019 reviewed and updated. Patient examined. INTERVAL HISTORY:     Patient is feeling well today, denies any fever/chills, chest pain, shortness of breath. No interval changes other than she feels \"a little dizzy. \" She attributes this to not eating or drinking after midnight. She also has hx of vertigo. Denies any other somatic complaints on examination. No interval changes to past medical history, social history, family history. Review of systems as stated above and otherwise negative. PHYSICAL EXAM:     Vitals: /64   Pulse 74   Temp 98.4 °F (36.9 °C) (Oral)   Resp 18   Ht 5' 3\" (1.6 m)   Wt 182 lb 1 oz (82.6 kg)   LMP 04/03/2019   SpO2 100%   BMI 32.25 kg/m²  Body mass index is 32.25 kg/m². Patient is alert and oriented, in no distress. Normotensive. Heart rate and rhythm are regular. Lungs clear to auscultation bilaterally. Abdomen is soft, non tender. No pedal edema. No interval changes. I concur with the findings.      EL Amaro - CNP on 5/2/2019 at 9:46 AM

## 2019-05-03 NOTE — OP NOTE
Operative Note  Department of Obstetrics and Gynecology  [facility]       Patient: Nisa Boudreaux   : 1985  MRN: [id number]       Acct: [de-identified]   PCP: Jenny Mccord MD  Date of Procedure: 19    Pre-operative Diagnosis:   28 y.o. female    Chronic pelvic pain  Hx of IBS   Heavy menstrual cycles  Hx of open appendectomy    Post-operative Diagnosis: Same as above  Abdominopelvic Adhesive Disease (Right side>left)    Procedure: Operative Laparoscopy, Lysis of adhesions    Surgeon: Dr. Mary Goldberg MD    Assistants: Flori Lucas DO, PGY3; Alix Larson DO, PGY2      Anesthesia: general    Indications: This is a 28year old Female  with a long history of chronic pelvic pain and heavy menstrual cycles who opted for a diagnostic laparoscopy, possible operative. Procedure Details   The patient was seen in the pre-op room. The risks, benefits, complications, treatment options, and expected outcomes were discussed with the patient. The patient concurred with the proposed plan, giving informed consent. The patient was taken to the Operating Room and identified as Nisa Boudreaux and the procedure was verified. A Time Out was held and the above information confirmed. After administration of general anesthesia, A ramos catheter was inserted into the bladder. The patient was prepped and draped in the usual sterile fashion. An approximately 10 mm infraumbilical incision was made and using blunt dissection was carried out down to the fascia with kocher clamps. While carefully tenting up the fascia, sharp dissection was carried out to the fascia and peritoneum. A 5 mm trocar was then inserted and the laparoscope was used to confirm intraperitoneal placement. CO2 gas was then used to create a pneumoperitoneum. The patient was then placed in trendelenberg position.  Survey of the pelvis was then performed, revealing significant pelvic adhesive disease, with right worse than the right side secondary to prior open appendectomy, adhesions involved right aspect of uterus and right fallopian tube tacked to the right pelvic side wall, adhesions containing bowel tacked to the anterior abdominal wall, small amount of blood secondary to trochar entry at umbilical site (hemostatic at end of case). A 5 mm port was then placed in the LLQ under direct visualization. The hemoperitoneum was suctioned and the abdomen was irrigated. Hemostasis was noted. Then, using the liga sure lysis of adhesions was performed. A final survey of the pelvis was conducted, hemostasis was noted. All instruments were then removed. Pneumoperitoneum was evacuated. Fascia was closed with 0-PDS. 1% marcaine was injected into each port site. Skin was closed with 4-0 Vicryl interrupted. Incisions were clean, dried and dressed in sterile fashion. Dr. Elena Marr was present for the entire operation.     Findings:  Pelvic adhesive disease, with right side worse than the right side secondary to prior to open appendectomy, adhesions involved right aspect of uterus and right fallopian tube tacked to the right pelvic side wall, adhesions containing bowel tacked to the anterior abdominal wall, small amount of blood secondary to trochar entry at umbilical site (hemostatic at end of case)  Total IV fluids/Blood products:  700 ml crystalloid  Urine Output:  200 ml    Estimated blood loss:  15ml  Drains:  ramos catheter  Specimens:  none  Instrument and Sponge Count: Correct  Complications:  none  Condition:  good, transferred to post anesthesia recovery      Tasha Orellana DO  Ob/Gyn Resident  5/2/2019, 10:05 PM

## 2019-05-10 ENCOUNTER — OFFICE VISIT (OUTPATIENT)
Dept: OBGYN CLINIC | Age: 34
End: 2019-05-10

## 2019-05-10 VITALS
WEIGHT: 186 LBS | DIASTOLIC BLOOD PRESSURE: 68 MMHG | HEIGHT: 64 IN | SYSTOLIC BLOOD PRESSURE: 99 MMHG | BODY MASS INDEX: 31.76 KG/M2 | HEART RATE: 86 BPM | TEMPERATURE: 98.4 F

## 2019-05-10 DIAGNOSIS — Z48.89 POSTOPERATIVE VISIT: Primary | ICD-10-CM

## 2019-05-10 PROCEDURE — 99024 POSTOP FOLLOW-UP VISIT: CPT | Performed by: SPECIALIST

## 2019-05-10 ASSESSMENT — ENCOUNTER SYMPTOMS
NAUSEA: 1
BACK PAIN: 1
ABDOMINAL PAIN: 1
EYE PAIN: 0
COUGH: 0
ABDOMINAL DISTENTION: 0
CONSTIPATION: 0
DIARRHEA: 0
APNEA: 0
VOMITING: 0

## 2019-05-10 NOTE — PROGRESS NOTES
Subjective:      Patient ID: Karen Kelley is a 28 y.o. female. Patient is here following a laparoscopy with lysis of adhesions 1 week ago. Yesterday, patient states she was really sick. She states she had a fever, nausea, back and stomach pain. She started feeling ok about 1:45. She says that she has mucous and blood coming out of her mouth, nose and \"my area\". Review of Systems   Constitutional: Positive for fever. Negative for activity change and appetite change. HENT: Negative for ear discharge and ear pain. Eyes: Negative for pain and visual disturbance. Respiratory: Negative for apnea and cough. Cardiovascular: Negative for chest pain, palpitations and leg swelling. Gastrointestinal: Positive for abdominal pain and nausea. Negative for abdominal distention, constipation, diarrhea and vomiting. Endocrine: Negative. Genitourinary: Positive for vaginal discharge. Negative for difficulty urinating, dysuria, menstrual problem and pelvic pain. Musculoskeletal: Positive for back pain. Negative for neck pain and neck stiffness. Skin: Negative. Neurological: Negative for light-headedness and numbness. Hematological: Negative. Does not bruise/bleed easily. Objective:   Physical Exam   Constitutional: She is oriented to person, place, and time. Vital signs are normal. She appears well-developed and well-nourished. HENT:   Head: Normocephalic and atraumatic. Neck: Normal range of motion. Neck supple. No thyromegaly present. Cardiovascular: Normal rate and regular rhythm. Pulmonary/Chest: Effort normal and breath sounds normal. She has no wheezes. Abdominal: Soft. Bowel sounds are normal. She exhibits no distension and no mass. There is no tenderness. There is no guarding. Incision healing well without signs of infection    Musculoskeletal: Normal range of motion. Neurological: She is alert and oriented to person, place, and time. Skin: Skin is dry. Psychiatric: She has a normal mood and affect. Her behavior is normal. Thought content normal.   Nursing note and vitals reviewed. Assessment:      Patient 1 week post laparoscopy with lysis of adhesions. Reviewed procedure and intraoperative photos with patient. Explained to patient findings of scar tissue and pelvic congestion, but no findings of endometriosis. Patient was advised to see her family physician for further evaluation of her upper respiratory symptoms. Plan:      Appointment in 3 months. Aric Meneses am scribing for, and in the presence of Dr. Nissa Bains. Electronically signed by: Jc Kebede 5/10/19 11:58 AM       I agree to the above documentation placed by my scribe Jc Kebede. I reviewed the scribe's note and agree with the documented findings and plan of care. Any areas of disagreement are noted on the chart. I have personally evaluated this patient. Additional findings are as noted. I agree with the chief complaint, past medical history, past surgical history, allergies, medications, social and family history as documented unless otherwise noted below.      Electronically signed by Nissa Bains MD on 5/12/2019 at 6:52 AM

## 2019-05-12 ENCOUNTER — OFFICE VISIT (OUTPATIENT)
Dept: FAMILY MEDICINE CLINIC | Age: 34
End: 2019-05-12
Payer: MEDICARE

## 2019-05-12 VITALS
OXYGEN SATURATION: 98 % | HEART RATE: 80 BPM | HEIGHT: 63 IN | BODY MASS INDEX: 33.13 KG/M2 | RESPIRATION RATE: 16 BRPM | SYSTOLIC BLOOD PRESSURE: 100 MMHG | TEMPERATURE: 98.3 F | WEIGHT: 187 LBS | DIASTOLIC BLOOD PRESSURE: 68 MMHG

## 2019-05-12 DIAGNOSIS — J01.00 ACUTE MAXILLARY SINUSITIS, RECURRENCE NOT SPECIFIED: Primary | ICD-10-CM

## 2019-05-12 PROCEDURE — 1036F TOBACCO NON-USER: CPT | Performed by: INTERNAL MEDICINE

## 2019-05-12 PROCEDURE — 99213 OFFICE O/P EST LOW 20 MIN: CPT | Performed by: INTERNAL MEDICINE

## 2019-05-12 PROCEDURE — G8427 DOCREV CUR MEDS BY ELIG CLIN: HCPCS | Performed by: INTERNAL MEDICINE

## 2019-05-12 PROCEDURE — G8417 CALC BMI ABV UP PARAM F/U: HCPCS | Performed by: INTERNAL MEDICINE

## 2019-05-12 RX ORDER — DOXYCYCLINE HYCLATE 100 MG
100 TABLET ORAL 2 TIMES DAILY
Qty: 20 TABLET | Refills: 0 | Status: SHIPPED | OUTPATIENT
Start: 2019-05-12 | End: 2019-05-22

## 2019-05-12 ASSESSMENT — ENCOUNTER SYMPTOMS
SINUS PRESSURE: 1
SHORTNESS OF BREATH: 0
RHINORRHEA: 1
SINUS PAIN: 1
COUGH: 0

## 2019-05-12 NOTE — PATIENT INSTRUCTIONS
Patient Education        Sinusitis: Care Instructions  Your Care Instructions    Sinusitis is an infection of the lining of the sinus cavities in your head. Sinusitis often follows a cold. It causes pain and pressure in your head and face. In most cases, sinusitis gets better on its own in 1 to 2 weeks. But some mild symptoms may last for several weeks. Sometimes antibiotics are needed. Follow-up care is a key part of your treatment and safety. Be sure to make and go to all appointments, and call your doctor if you are having problems. It's also a good idea to know your test results and keep a list of the medicines you take. How can you care for yourself at home? · Take an over-the-counter pain medicine, such as acetaminophen (Tylenol), ibuprofen (Advil, Motrin), or naproxen (Aleve). Read and follow all instructions on the label. · If the doctor prescribed antibiotics, take them as directed. Do not stop taking them just because you feel better. You need to take the full course of antibiotics. · Be careful when taking over-the-counter cold or flu medicines and Tylenol at the same time. Many of these medicines have acetaminophen, which is Tylenol. Read the labels to make sure that you are not taking more than the recommended dose. Too much acetaminophen (Tylenol) can be harmful. · Breathe warm, moist air from a steamy shower, a hot bath, or a sink filled with hot water. Avoid cold, dry air. Using a humidifier in your home may help. Follow the directions for cleaning the machine. · Use saline (saltwater) nasal washes to help keep your nasal passages open and wash out mucus and bacteria. You can buy saline nose drops at a grocery store or drugstore. Or you can make your own at home by adding 1 teaspoon of salt and 1 teaspoon of baking soda to 2 cups of distilled water. If you make your own, fill a bulb syringe with the solution, insert the tip into your nostril, and squeeze gently. Jacinta Pulse your nose.   · Put a hot, wet towel or a warm gel pack on your face 3 or 4 times a day for 5 to 10 minutes each time. · Try a decongestant nasal spray like oxymetazoline (Afrin). Do not use it for more than 3 days in a row. Using it for more than 3 days can make your congestion worse. When should you call for help? Call your doctor now or seek immediate medical care if:    · You have new or worse swelling or redness in your face or around your eyes.     · You have a new or higher fever.    Watch closely for changes in your health, and be sure to contact your doctor if:    · You have new or worse facial pain.     · The mucus from your nose becomes thicker (like pus) or has new blood in it.     · You are not getting better as expected. Where can you learn more? Go to https://[employer]. org and sign in to your [employer] account. Enter Z115 in the [employer] box to learn more about \"Sinusitis: Care Instructions. \"     If you do not have an account, please click on the \"Sign Up Now\" link. Current as of: October 21, 2018  Content Version: 12.0  © 7377-9638 Healthwise, Incorporated. Care instructions adapted under license by [facility] ([facility]). If you have questions about a medical condition or this instruction, always ask your healthcare professional. [address] any warranty or liability for your use of this information.

## 2019-05-15 LAB
EKG ATRIAL RATE: 76 BPM
EKG P AXIS: 48 DEGREES
EKG P-R INTERVAL: 152 MS
EKG Q-T INTERVAL: 380 MS
EKG QRS DURATION: 70 MS
EKG QTC CALCULATION (BAZETT): 427 MS
EKG R AXIS: 14 DEGREES
EKG T AXIS: 35 DEGREES
EKG VENTRICULAR RATE: 76 BPM

## 2019-06-13 ENCOUNTER — OFFICE VISIT (OUTPATIENT)
Dept: OBGYN CLINIC | Age: 34
End: 2019-06-13

## 2019-06-13 VITALS
HEART RATE: 80 BPM | SYSTOLIC BLOOD PRESSURE: 113 MMHG | WEIGHT: 190 LBS | HEIGHT: 63 IN | DIASTOLIC BLOOD PRESSURE: 74 MMHG | BODY MASS INDEX: 33.66 KG/M2

## 2019-06-13 DIAGNOSIS — N76.0 ACUTE VAGINITIS: ICD-10-CM

## 2019-06-13 DIAGNOSIS — Z48.89 POSTOPERATIVE VISIT: Primary | ICD-10-CM

## 2019-06-13 PROCEDURE — 1036F TOBACCO NON-USER: CPT | Performed by: SPECIALIST

## 2019-06-13 PROCEDURE — G8417 CALC BMI ABV UP PARAM F/U: HCPCS | Performed by: SPECIALIST

## 2019-06-13 PROCEDURE — 99024 POSTOP FOLLOW-UP VISIT: CPT | Performed by: SPECIALIST

## 2019-06-13 PROCEDURE — G8427 DOCREV CUR MEDS BY ELIG CLIN: HCPCS | Performed by: SPECIALIST

## 2019-06-13 RX ORDER — CLINDAMYCIN HYDROCHLORIDE 300 MG/1
300 CAPSULE ORAL 3 TIMES DAILY
Qty: 30 CAPSULE | Refills: 0 | Status: SHIPPED | OUTPATIENT
Start: 2019-06-13 | End: 2019-06-23

## 2019-06-14 ASSESSMENT — ENCOUNTER SYMPTOMS
DIARRHEA: 0
ABDOMINAL PAIN: 0
CONSTIPATION: 0
ABDOMINAL DISTENTION: 0
NAUSEA: 0
COUGH: 0
APNEA: 0
EYE PAIN: 0
VOMITING: 0

## 2019-06-14 NOTE — PROGRESS NOTES
Subjective:      Patient ID: Carlyn Hernández is a 28 y.o. female. Chief Complaint   Patient presents with    Post-Op Check     Patient is here today c/o pus coming from incision and a white string hanging out of belly button      /74 (Site: Right Upper Arm, Position: Sitting, Cuff Size: Medium Adult)   Pulse 80   Ht 5' 3\" (1.6 m)   Wt 190 lb (86.2 kg)   LMP 05/30/2019   BMI 33.66 kg/m²   Patient's last menstrual period was 05/30/2019. [id number]    Past Medical History:   Diagnosis Date    Allergic rhinitis     Anxiety     Chest pain     with anxiety    Chronic kidney disease     CKD (chronic kidney disease) stage 1, GFR 90 ml/min or greater 7/10/2018    Depression     Headache(784.0)     Heart murmur     Hematuria 2016    Irritable bowel syndrome with diarrhea 11/17/2016    Midline low back pain without sciatica 11/17/2016    Obesity (BMI 30-39. 9) 8/26/2017    Orthostatic hypotension     Panic attack as reaction to stress     certain anxiety medications will cause a panic attack    Panic disorder with agoraphobia 08/26/2017    some medications for anxiety will cause a panic attack.  Vertigo 10/2018    hx of     Current Outpatient Medications Ordered in Epic   Medication Sig Dispense Refill    terconazole (TERAZOL 7) 0.4 % vaginal cream Place vaginally nightly.  45 g 1    clindamycin (CLEOCIN) 300 MG capsule Take 1 capsule by mouth 3 times daily for 10 days 30 capsule 0    ibuprofen (ADVIL;MOTRIN) 600 MG tablet Take 1 tablet by mouth every 6 hours as needed for Pain 30 tablet 0    loratadine (CLARITIN) 10 MG capsule Take 10 mg by mouth daily      ondansetron (ZOFRAN-ODT) 4 MG disintegrating tablet Take 1 tablet by mouth every 8 hours as needed for Nausea or Vomiting 6 tablet 0    Cranberry 500 MG CAPS Take 1 capsule by mouth 2 times daily OK to substitute 60 capsule 3    Lactobacillus (PROBIOTIC ACIDOPHILUS) CAPS Take 1 tablet by mouth 2 times daily 180 capsule 3    propranolol (INDERAL LA) 60 MG extended release capsule Take 1 capsule by mouth every evening Call for refills or dose adjustment. 30 capsule 0    clindamycin (CLEOCIN) 2 % vaginal cream Place 1 applicator vaginally nightly For 5 nights in a row. 1 Tube 0    acetaminophen (TYLENOL) 500 MG tablet Take 1 tablet by mouth every 4 hours as needed for Pain 60 tablet 0     No current Epic-ordered facility-administered medications on file. Problem List Items Addressed This Visit     None      Visit Diagnoses     Postoperative visit    -  Primary        Allergies   Allergen Reactions    Diflucan [Fluconazole]     Flagyl [Metronidazole] Hives    Penicillins      When a child     No orders of the defined types were placed in this encounter. Patient 6 weeks post operative laparoscopy with lysis of adhesions here today because she has pus and a what appears to be a white string in her belly button incision with redness and irritation. She denies nausea, vomiting and fever. Review of Systems   Constitutional: Negative for activity change, appetite change and fever. HENT: Negative for ear discharge and ear pain. Eyes: Negative for pain and visual disturbance. Respiratory: Negative for apnea and cough. Cardiovascular: Negative for chest pain, palpitations and leg swelling. Gastrointestinal: Negative for abdominal distention, abdominal pain, constipation, diarrhea, nausea and vomiting. Endocrine: Negative. Genitourinary: Negative for difficulty urinating, dysuria, menstrual problem and pelvic pain. Possible incisional infection   Musculoskeletal: Negative for neck pain and neck stiffness. Skin: Negative. Neurological: Negative for light-headedness and numbness. Hematological: Negative. Does not bruise/bleed easily. Objective:   Physical Exam   Constitutional: She is oriented to person, place, and time. Vital signs are normal. She appears well-developed and well-nourished. HENT:   Head: Normocephalic and atraumatic. Neck: Normal range of motion. Neck supple. No thyromegaly present. Cardiovascular: Normal rate and regular rhythm. Pulmonary/Chest: Effort normal and breath sounds normal. She has no wheezes. Abdominal: Soft. Bowel sounds are normal. She exhibits no distension and no mass. There is no tenderness. There is no guarding. Incision is well healed and completely normal   Genitourinary: Cervix exhibits discharge. Vaginal discharge found. Musculoskeletal: Normal range of motion. Neurological: She is alert and oriented to person, place, and time. Skin: Skin is dry. Psychiatric: She has a normal mood and affect. Her behavior is normal. Thought content normal.   Nursing note and vitals reviewed. Assessment:      Patient 6 weeks post laparoscopy with lysis of adhesions. Incision today is completely normal without any signs of infection. Reassurance provided. Pelvic exam shows vaginitis. Will treat with Cleocin and Terazol. Plan:      Orders Placed This Encounter   Medications    terconazole (TERAZOL 7) 0.4 % vaginal cream     Sig: Place vaginally nightly. Dispense:  45 g     Refill:  1    clindamycin (CLEOCIN) 300 MG capsule     Sig: Take 1 capsule by mouth 3 times daily for 10 days     Dispense:  30 capsule     Refill:  0      Appointment in 2 weeks. Rosario Negron am scribing for, and in the presence of Dr. Antionette Womack. Electronically signed by: Georgina Hernandez 6/14/19 9:49 AM       I agree to the above documentation placed by my scribe Georgina Hernandez. I reviewed the scribe's note and agree with the documented findings and plan of care. Any areas of disagreement are noted on the chart. I have personally evaluated this patient. Additional findings are as noted.  I agree with the chief complaint, past medical history, past surgical history, allergies, medications, social and family history as documented unless otherwise

## 2019-07-18 ENCOUNTER — TELEPHONE (OUTPATIENT)
Dept: FAMILY MEDICINE CLINIC | Age: 34
End: 2019-07-18

## 2019-07-19 ENCOUNTER — OFFICE VISIT (OUTPATIENT)
Dept: FAMILY MEDICINE CLINIC | Age: 34
End: 2019-07-19
Payer: MEDICARE

## 2019-07-19 VITALS
HEART RATE: 88 BPM | RESPIRATION RATE: 14 BRPM | TEMPERATURE: 98.2 F | OXYGEN SATURATION: 98 % | BODY MASS INDEX: 34.15 KG/M2 | SYSTOLIC BLOOD PRESSURE: 110 MMHG | DIASTOLIC BLOOD PRESSURE: 60 MMHG | WEIGHT: 192.8 LBS

## 2019-07-19 DIAGNOSIS — H60.333 ACUTE SWIMMER'S EAR OF BOTH SIDES: Primary | ICD-10-CM

## 2019-07-19 DIAGNOSIS — H69.93 EUSTACHIAN TUBE DISORDER, BILATERAL: ICD-10-CM

## 2019-07-19 PROBLEM — H69.81 EUSTACHIAN TUBE DYSFUNCTION, RIGHT: Status: ACTIVE | Noted: 2018-08-12

## 2019-07-19 PROBLEM — H69.91 EUSTACHIAN TUBE DYSFUNCTION, RIGHT: Status: ACTIVE | Noted: 2018-08-12

## 2019-07-19 PROCEDURE — 99213 OFFICE O/P EST LOW 20 MIN: CPT | Performed by: FAMILY MEDICINE

## 2019-07-19 RX ORDER — AZITHROMYCIN 250 MG/1
250 TABLET, FILM COATED ORAL SEE ADMIN INSTRUCTIONS
Qty: 6 TABLET | Refills: 0 | Status: SHIPPED | OUTPATIENT
Start: 2019-07-19 | End: 2019-07-24

## 2019-07-19 RX ORDER — PSEUDOEPHEDRINE HCL 60 MG/1
60 TABLET ORAL EVERY 6 HOURS PRN
Qty: 30 TABLET | Refills: 1 | Status: SHIPPED | OUTPATIENT
Start: 2019-07-19 | End: 2019-08-08 | Stop reason: ALTCHOICE

## 2019-07-19 RX ORDER — IBUPROFEN 600 MG/1
600 TABLET ORAL 3 TIMES DAILY PRN
Qty: 90 TABLET | Refills: 0 | Status: SHIPPED | OUTPATIENT
Start: 2019-07-19 | End: 2019-08-01 | Stop reason: HOSPADM

## 2019-07-19 ASSESSMENT — ENCOUNTER SYMPTOMS
ABDOMINAL PAIN: 0
COUGH: 0
VOMITING: 0
SINUS PAIN: 0
EYE PAIN: 0
SORE THROAT: 0
COLOR CHANGE: 0
CONSTIPATION: 0
CHEST TIGHTNESS: 0
NAUSEA: 0
DIARRHEA: 0

## 2019-07-19 NOTE — PATIENT INSTRUCTIONS
Patient Education        Learning About Your Ears  What do your ears do? Your ears make it possible for you to hear. They are also important in helping you keep your balance. The ear is made up of the external ear canal, middle ear, and inner ear. The middle ear is  from the ear canal by the eardrum. The middle ear contains the malleus, incus, and stapes bones, which are also known as the hammer, anvil, and stirrup. The inner ear contains the cochlea, which is the main sensory organ of hearing. The eustachian tube runs from the middle ear to the back part of the nose. How the ear works  · Sound waves enter the ear through the ear canal and strike the eardrum. · The eardrum vibrates, and the vibrations move to the bones of the middle ear. · In response, the bones of the middle ear vibrate, magnifying the sound and sending it to the inner ear. · Sound vibrations cause the fluid in the inner ear to move, which bends tiny hair cells (cilia) in the cochlea. · The movement of the hair cells creates nerve impulses, which travel along the cochlear nerve to the brain and are interpreted as sound. What problems can happen to your ears? Problems with your ears may include:  · Infection of the middle ear (otitis media). · Swelling or infection of the ear canal (swimmer's ear). · Backup of fluid behind the eardrum (otitis media with effusion). · Hearing loss. · Labyrinthitis and BPPV (benign paroxysmal positional vertigo), which are problems in the inner ear. They may cause vertigo. Vertigo makes you feel like you're spinning or whirling. · Tinnitus, which occurs when you have ringing sounds (or roaring, hissing, buzzing, or tinkling) in your ears all the time. · A ruptured eardrum, which is a tear or hole in the membrane of the middle ear. How can you prevent ear problems? · Blow your nose gently.   · Keep soap and shampoo out of the ear canal. These products can cause itching, which can be mistaken for an ear infection because of the need to scratch or pull at the ears. · Do not put cotton swabs, harshad pins, or other objects (especially if they are sharp) in the ear canal.  · Limit or avoid exposure to loud music, power tools, gunshots, and heavy machinery. Wear protective earplugs or earmuffs if you cannot avoid loud noises. ? Avoid wearing earplugs too often or for too long. They can irritate your ears. ? Do not use wadded-up tissue or cotton balls. They don't work well. · Do not smoke or allow others to smoke around you. Smoking may make ear problems more likely. If you need help quitting, talk to your doctor about stop-smoking programs and medicines. These can increase your chances of quitting for good. · If you wear hearing aids, be sure to follow the recommendations carefully for cleaning and storing them. Where can you learn more? Go to https://[employer] Environmental SystemspePARKE [place].[employer]. org and sign in to your [employer] account. Enter J538 in the weartolook box to learn more about \"Learning About Your Ears. \"     If you do not have an account, please click on the \"Sign Up Now\" link. Current as of: October 21, 2018  Content Version: 12.0  © 1768-7604 Healthwise, Incorporated. Care instructions adapted under license by [facility] ([facility]). If you have questions about a medical condition or this instruction, always ask your healthcare professional. [place] [address] any warranty or liability for your use of this information.

## 2019-07-19 NOTE — PROGRESS NOTES
[facility] PHYSICIANS  [facility] FAMILY PHYSICIANS  CLAUDIA Velazquez [facility] [address].  [address] [address]  Dept: [phone]     2019     Toney Drew (:  1985) is a 27 y.o. female, here for evaluation of the following medical concerns:    HPI      EAR PAIN Nidhi Nakayama CONGESTION  Toney Drew is a 27 y.o. female who presents with left ear pain. Patient has a pool at home. She's been in the pool quite a bit. She has a young son that uses a squirt gun a lot which may have been one of the reason for the condition. Symptoms include: bilateral worse on the left ear pain and plugged sensation in the left ear. Patient also c/o worsening nasal congestion associated with the ear pain. Patient has an underlying history of allergic rhinitis. She is on claritin but does not use her flonase. Symptoms began 1 week ago and there has been no improvement since that time. Patient denies,fever, chills, dyspnea and fever. History of previous ear infections: yes - in the past. I recommended that she starts using her flonase again. I gave her a decongestant to help with the rhinitis and the ETD. Patient has some sensitivities to most antibiotics. She does well on Azithromycin. I will start her on a course today. PHQ-9 Total Score: 12 (2019  2:39 PM)     Review of Systems   Constitutional: Negative for chills, fatigue and fever. HENT: Positive for ear pain. Negative for congestion, postnasal drip, sinus pain and sore throat. Nasal congestion. Muffled hearing on left ear. Eyes: Negative for pain. Respiratory: Negative for cough and chest tightness. Cardiovascular: Negative for chest pain and palpitations. Gastrointestinal: Negative for abdominal pain, constipation, diarrhea, nausea and vomiting. Genitourinary: Negative for difficulty urinating. Musculoskeletal: Negative for arthralgias. Skin: Negative for color change. Neurological: Negative for dizziness and headaches. Hematological: Negative for adenopathy. Psychiatric/Behavioral: Negative for dysphoric mood and sleep disturbance. Prior to Visit Medications    Medication Sig Taking? Authorizing Provider   ibuprofen (ADVIL;MOTRIN) 600 MG tablet Take 1 tablet by mouth every 6 hours as needed for Pain  Denzel Wolf, DO   ondansetron (ZOFRAN-ODT) 4 MG disintegrating tablet Take 1 tablet by mouth every 8 hours as needed for Nausea or Vomiting  Araceli Wolf, DO   Cranberry 500 MG CAPS Take 1 capsule by mouth 2 times daily OK to substitute  Hui Dias MD   Lactobacillus (PROBIOTIC ACIDOPHILUS) CAPS Take 1 tablet by mouth 2 times daily  Hui Dias MD   propranolol (INDERAL LA) 60 MG extended release capsule Take 1 capsule by mouth every evening Call for refills or dose adjustment. Hui Dias MD   clindamycin (CLEOCIN) 2 % vaginal cream Place 1 applicator vaginally nightly For 5 nights in a row. Marycruz Rojas APRN - CNP   loratadine (CLARITIN) 10 MG capsule Take 10 mg by mouth daily  Historical Provider, MD   acetaminophen (TYLENOL) 500 MG tablet Take 1 tablet by mouth every 4 hours as needed for Pain  Ban Mike MD        Social History     Tobacco Use    Smoking status: Former Smoker     Packs/day: 0.25     Years: 3.00     Pack years: 0.75     Types: Cigarettes     Last attempt to quit: 2005     Years since quittin.5    Smokeless tobacco: Never Used   Substance Use Topics    Alcohol use: No     Comment: Hx alcohol abuse as teenager        There were no vitals filed for this visit. Estimated body mass index is 33.66 kg/m² as calculated from the following:    Height as of 19: 5' 3\" (1.6 m). Weight as of 19: 190 lb (86.2 kg). Physical Exam   Constitutional: She is oriented to person, place, and time. She appears well-developed. She appears ill. HENT:   Right Ear: External ear normal. There is tenderness (erythematous canal). Tympanic membrane is retracted.    Left Ear: External ear normal. There is tenderness (erythematous canal). Decreased hearing is noted. Nose: No mucosal edema, rhinorrhea or sinus tenderness. Right sinus exhibits no maxillary sinus tenderness and no frontal sinus tenderness. Left sinus exhibits no maxillary sinus tenderness and no frontal sinus tenderness. Mouth/Throat: Uvula is midline and oropharynx is clear and moist. Mucous membranes are pale. No oropharyngeal exudate. Eyes: Pupils are equal, round, and reactive to light. Lids are normal. Right eye exhibits no discharge. No scleral icterus. Neck: No tracheal tenderness present. Cardiovascular: Normal rate and regular rhythm. Pulmonary/Chest: Effort normal and breath sounds normal. No stridor. Abdominal: Soft. Bowel sounds are normal.   Musculoskeletal: Normal range of motion. Lymphadenopathy:     She has no cervical adenopathy. Neurological: She is alert and oriented to person, place, and time. Skin: Skin is dry. Psychiatric: She has a normal mood and affect.  Her behavior is normal.     Lab Results   Component Value Date    WBC 4.9 04/22/2019    HGB 13.1 04/22/2019    HCT 39.8 04/22/2019    MCV 93.7 04/22/2019     04/22/2019       Lab Results   Component Value Date     04/22/2019    K 4.6 04/22/2019     04/22/2019    CO2 27 04/22/2019    BUN 14 04/22/2019    CREATININE 0.84 04/22/2019    GLUCOSE 74 04/22/2019    CALCIUM 8.7 04/22/2019        Lab Results   Component Value Date    ALT 42 (H) 06/06/2018    AST 38 (H) 06/06/2018    ALKPHOS 73 06/06/2018    BILITOT 0.25 (L) 06/06/2018       Lab Results   Component Value Date    TSH 2.80 09/19/2017       Lab Results   Component Value Date    CHOL 176 09/19/2017     Lab Results   Component Value Date    TRIG 45 09/19/2017     Lab Results   Component Value Date    HDL 62 09/19/2017     Lab Results   Component Value Date    LDLCHOLESTEROL 105 09/19/2017     Lab Results   Component Value Date    CHOLHDLRATIO 2.8

## 2019-08-01 ENCOUNTER — HOSPITAL ENCOUNTER (OUTPATIENT)
Age: 34
Discharge: HOME | End: 2019-08-01
Payer: MEDICARE

## 2019-08-01 ENCOUNTER — OFFICE VISIT (OUTPATIENT)
Dept: FAMILY MEDICINE CLINIC | Age: 34
End: 2019-08-01
Payer: MEDICARE

## 2019-08-01 VITALS
DIASTOLIC BLOOD PRESSURE: 71 MMHG | OXYGEN SATURATION: 99 % | SYSTOLIC BLOOD PRESSURE: 104 MMHG | HEART RATE: 76 BPM | TEMPERATURE: 97.6 F | RESPIRATION RATE: 18 BRPM | BODY MASS INDEX: 33.84 KG/M2 | WEIGHT: 191 LBS | HEIGHT: 63 IN

## 2019-08-01 DIAGNOSIS — F33.1 MODERATE EPISODE OF RECURRENT MAJOR DEPRESSIVE DISORDER (HCC): ICD-10-CM

## 2019-08-01 DIAGNOSIS — R53.82 CHRONIC FATIGUE: Primary | ICD-10-CM

## 2019-08-01 DIAGNOSIS — Z11.59 ENCOUNTER FOR SCREENING FOR OTHER VIRAL DISEASES: ICD-10-CM

## 2019-08-01 DIAGNOSIS — G89.29 CHRONIC RLQ PAIN: ICD-10-CM

## 2019-08-01 DIAGNOSIS — R31.29 MICROSCOPIC HEMATURIA: ICD-10-CM

## 2019-08-01 DIAGNOSIS — J30.89 ALLERGIC RHINITIS DUE TO OTHER ALLERGIC TRIGGER, UNSPECIFIED SEASONALITY: ICD-10-CM

## 2019-08-01 DIAGNOSIS — R19.8 ALTERNATING CONSTIPATION AND DIARRHEA: ICD-10-CM

## 2019-08-01 DIAGNOSIS — F40.01 PANIC DISORDER WITH AGORAPHOBIA: ICD-10-CM

## 2019-08-01 DIAGNOSIS — R10.13 EPIGASTRIC PAIN: ICD-10-CM

## 2019-08-01 DIAGNOSIS — R53.82 CHRONIC FATIGUE: ICD-10-CM

## 2019-08-01 DIAGNOSIS — R10.31 CHRONIC RLQ PAIN: ICD-10-CM

## 2019-08-01 DIAGNOSIS — R42 DIZZINESS: ICD-10-CM

## 2019-08-01 DIAGNOSIS — M26.623 BILATERAL TEMPOROMANDIBULAR JOINT PAIN: ICD-10-CM

## 2019-08-01 LAB
-: ABNORMAL
ALBUMIN SERPL-MCNC: 4.7 G/DL (ref 3.5–5.2)
ALBUMIN/GLOBULIN RATIO: NORMAL (ref 1–2.5)
ALP BLD-CCNC: 69 U/L (ref 35–104)
ALT SERPL-CCNC: 17 U/L (ref 5–33)
AMORPHOUS: ABNORMAL
ANION GAP SERPL CALCULATED.3IONS-SCNC: 11 MMOL/L (ref 9–17)
AST SERPL-CCNC: 18 U/L
BACTERIA: ABNORMAL
BILIRUB SERPL-MCNC: 0.39 MG/DL (ref 0.3–1.2)
BILIRUBIN URINE: NEGATIVE
BUN BLDV-MCNC: 11 MG/DL (ref 6–20)
BUN/CREAT BLD: NORMAL (ref 9–20)
CALCIUM SERPL-MCNC: 8.9 MG/DL (ref 8.6–10.4)
CASTS UA: ABNORMAL /LPF
CHLORIDE BLD-SCNC: 105 MMOL/L (ref 98–107)
CO2: 25 MMOL/L (ref 20–31)
COLOR: YELLOW
COMMENT UA: ABNORMAL
CREAT SERPL-MCNC: 0.71 MG/DL (ref 0.5–0.9)
CRYSTALS, UA: ABNORMAL /HPF
EPITHELIAL CELLS UA: ABNORMAL /HPF
GFR AFRICAN AMERICAN: >60 ML/MIN
GFR NON-AFRICAN AMERICAN: >60 ML/MIN
GFR SERPL CREATININE-BSD FRML MDRD: NORMAL ML/MIN/{1.73_M2}
GFR SERPL CREATININE-BSD FRML MDRD: NORMAL ML/MIN/{1.73_M2}
GLUCOSE BLD-MCNC: 80 MG/DL (ref 70–99)
GLUCOSE URINE: NEGATIVE
HCT VFR BLD CALC: 39.5 % (ref 36–46)
HEMOGLOBIN: 13.4 G/DL (ref 12–16)
KETONES, URINE: NEGATIVE
LEUKOCYTE ESTERASE, URINE: NEGATIVE
MCH RBC QN AUTO: 31.2 PG (ref 26–34)
MCHC RBC AUTO-ENTMCNC: 34 G/DL (ref 31–37)
MCV RBC AUTO: 91.8 FL (ref 80–100)
MUCUS: ABNORMAL
NITRITE, URINE: NEGATIVE
NRBC AUTOMATED: NORMAL PER 100 WBC
OTHER OBSERVATIONS UA: ABNORMAL
PDW BLD-RTO: 12.2 % (ref 11.5–14.9)
PH UA: 7 (ref 5–8)
PLATELET # BLD: 232 K/UL (ref 150–450)
PMV BLD AUTO: 8 FL (ref 6–12)
POTASSIUM SERPL-SCNC: 3.8 MMOL/L (ref 3.7–5.3)
PROTEIN UA: NEGATIVE
RBC # BLD: 4.31 M/UL (ref 4–5.2)
RBC UA: ABNORMAL /HPF
RENAL EPITHELIAL, UA: ABNORMAL /HPF
SODIUM BLD-SCNC: 141 MMOL/L (ref 135–144)
SPECIFIC GRAVITY UA: 1.02 (ref 1–1.03)
TOTAL PROTEIN: 7.6 G/DL (ref 6.4–8.3)
TRICHOMONAS: ABNORMAL
TSH SERPL DL<=0.05 MIU/L-ACNC: 2.13 MIU/L (ref 0.3–5)
TURBIDITY: CLEAR
URINE HGB: ABNORMAL
UROBILINOGEN, URINE: NORMAL
WBC # BLD: 5 K/UL (ref 3.5–11)
WBC UA: ABNORMAL /HPF
YEAST: ABNORMAL

## 2019-08-01 PROCEDURE — 85027 COMPLETE CBC AUTOMATED: CPT

## 2019-08-01 PROCEDURE — 96160 PT-FOCUSED HLTH RISK ASSMT: CPT | Performed by: FAMILY MEDICINE

## 2019-08-01 PROCEDURE — G8427 DOCREV CUR MEDS BY ELIG CLIN: HCPCS | Performed by: FAMILY MEDICINE

## 2019-08-01 PROCEDURE — 1036F TOBACCO NON-USER: CPT | Performed by: FAMILY MEDICINE

## 2019-08-01 PROCEDURE — 80053 COMPREHEN METABOLIC PANEL: CPT

## 2019-08-01 PROCEDURE — 84443 ASSAY THYROID STIM HORMONE: CPT

## 2019-08-01 PROCEDURE — 87086 URINE CULTURE/COLONY COUNT: CPT

## 2019-08-01 PROCEDURE — 99214 OFFICE O/P EST MOD 30 MIN: CPT | Performed by: FAMILY MEDICINE

## 2019-08-01 PROCEDURE — 36415 COLL VENOUS BLD VENIPUNCTURE: CPT

## 2019-08-01 PROCEDURE — 86787 VARICELLA-ZOSTER ANTIBODY: CPT

## 2019-08-01 PROCEDURE — G8417 CALC BMI ABV UP PARAM F/U: HCPCS | Performed by: FAMILY MEDICINE

## 2019-08-01 PROCEDURE — 81001 URINALYSIS AUTO W/SCOPE: CPT

## 2019-08-01 RX ORDER — SUCRALFATE 1 G/1
1 TABLET ORAL 4 TIMES DAILY PRN
Qty: 120 TABLET | Refills: 0 | Status: SHIPPED | OUTPATIENT
Start: 2019-08-01 | End: 2019-11-01

## 2019-08-01 RX ORDER — OMEPRAZOLE 40 MG/1
40 CAPSULE, DELAYED RELEASE ORAL
Qty: 30 CAPSULE | Refills: 1 | Status: SHIPPED | OUTPATIENT
Start: 2019-08-01 | End: 2019-11-01

## 2019-08-01 RX ORDER — LORATADINE 10 MG/1
10 CAPSULE, LIQUID FILLED ORAL DAILY
Qty: 30 CAPSULE | Refills: 3 | Status: SHIPPED | OUTPATIENT
Start: 2019-08-01 | End: 2019-12-17 | Stop reason: ALTCHOICE

## 2019-08-01 ASSESSMENT — ENCOUNTER SYMPTOMS
ABDOMINAL DISTENTION: 0
COUGH: 0
ABDOMINAL PAIN: 1
CHEST TIGHTNESS: 0
SHORTNESS OF BREATH: 0
RECTAL PAIN: 0
ANAL BLEEDING: 1
RHINORRHEA: 1
WHEEZING: 0
NAUSEA: 1
VOMITING: 1

## 2019-08-01 ASSESSMENT — PATIENT HEALTH QUESTIONNAIRE - PHQ9
SUM OF ALL RESPONSES TO PHQ QUESTIONS 1-9: 12
3. TROUBLE FALLING OR STAYING ASLEEP: 3
2. FEELING DOWN, DEPRESSED OR HOPELESS: 1
5. POOR APPETITE OR OVEREATING: 1
9. THOUGHTS THAT YOU WOULD BE BETTER OFF DEAD, OR OF HURTING YOURSELF: 0
1. LITTLE INTEREST OR PLEASURE IN DOING THINGS: 1
7. TROUBLE CONCENTRATING ON THINGS, SUCH AS READING THE NEWSPAPER OR WATCHING TELEVISION: 3
8. MOVING OR SPEAKING SO SLOWLY THAT OTHER PEOPLE COULD HAVE NOTICED. OR THE OPPOSITE, BEING SO FIGETY OR RESTLESS THAT YOU HAVE BEEN MOVING AROUND A LOT MORE THAN USUAL: 0
SUM OF ALL RESPONSES TO PHQ QUESTIONS 1-9: 12
4. FEELING TIRED OR HAVING LITTLE ENERGY: 3
SUM OF ALL RESPONSES TO PHQ9 QUESTIONS 1 & 2: 2
10. IF YOU CHECKED OFF ANY PROBLEMS, HOW DIFFICULT HAVE THESE PROBLEMS MADE IT FOR YOU TO DO YOUR WORK, TAKE CARE OF THINGS AT HOME, OR GET ALONG WITH OTHER PEOPLE: 0
6. FEELING BAD ABOUT YOURSELF - OR THAT YOU ARE A FAILURE OR HAVE LET YOURSELF OR YOUR FAMILY DOWN: 0

## 2019-08-01 ASSESSMENT — ANXIETY QUESTIONNAIRES
6. BECOMING EASILY ANNOYED OR IRRITABLE: 2-OVER HALF THE DAYS
2. NOT BEING ABLE TO STOP OR CONTROL WORRYING: 2-OVER HALF THE DAYS
4. TROUBLE RELAXING: 2-OVER HALF THE DAYS
3. WORRYING TOO MUCH ABOUT DIFFERENT THINGS: 2-OVER HALF THE DAYS
GAD7 TOTAL SCORE: 13
7. FEELING AFRAID AS IF SOMETHING AWFUL MIGHT HAPPEN: 1-SEVERAL DAYS
5. BEING SO RESTLESS THAT IT IS HARD TO SIT STILL: 1-SEVERAL DAYS
1. FEELING NERVOUS, ANXIOUS, OR ON EDGE: 3-NEARLY EVERY DAY

## 2019-08-01 NOTE — PROGRESS NOTES
Vicente Jiang  She did have MRI brain and was normal on 1/24/2019      Bette also complains of TMJ pain and cracking in her jaws, and her dentist told her it can cause her headache. Says her dentist told her she has been clenching her jaws a lot, progressively getting worse. Her dentist recommends mouth guard, and she needs a letter of support. Apparently her ENT also recommended her to have guards for her TMJ pains. Bette  Continues to complains of of mid back pain, and upper abdominal pain, and right lower quadrant. She was previously diagnosed with irritable bowel syndrome , and she has either constipation or diarrhea, a few years ago. Eating healthier with grains, but doesn't;t seem to help. She does see occasional blood in the stool from her hemorrhoids she says. She says the stomach pain in the epigastric radiates into the back, mid thoracic, at the same level with the stomach pain. She does take  Ibuprofen for her back pain which does help. Abdominal pain is better after having a bowel movement. She has associated nausea and vomiting. She never had EGD and colonoscopy. She continues to have\" pain in my ovaries\"  Had recent laparoscopy with significant Adhesiolysis. She is still frustrated about the blood in the urine without a cause found by her urologist.        Laparoscopy 5/2/19  \"Findings:  Pelvic adhesive disease, with right side worse than the right side secondary to prior to open appendectomy, adhesions involved right aspect of uterus and right fallopian tube tacked to the right pelvic side wall, adhesions containing bowel tacked to the anterior abdominal wall, small amount of blood secondary to trochar entry at umbilical site (hemostatic at end of case)\"          Depression and Anxiety. Patient complains of depression. She complains of anhedonia, depressed mood, difficulty concentrating, fatigue, feelings of worthlessness/guilt and hopelessness.    Patient complains of anxiety DEREK-7 Total Score 13 16 17 10 10 14            Current Outpatient Medications   Medication Sig Dispense Refill    pseudoephedrine (SUDAFED) 60 MG tablet Take 1 tablet by mouth every 6 hours as needed for Congestion 30 tablet 1    ibuprofen (ADVIL;MOTRIN) 600 MG tablet Take 1 tablet by mouth 3 times daily as needed for Pain 90 tablet 0    ibuprofen (ADVIL;MOTRIN) 600 MG tablet Take 1 tablet by mouth every 6 hours as needed for Pain 30 tablet 0    ondansetron (ZOFRAN-ODT) 4 MG disintegrating tablet Take 1 tablet by mouth every 8 hours as needed for Nausea or Vomiting 6 tablet 0    Cranberry 500 MG CAPS Take 1 capsule by mouth 2 times daily OK to substitute 60 capsule 3    Lactobacillus (PROBIOTIC ACIDOPHILUS) CAPS Take 1 tablet by mouth 2 times daily 180 capsule 3    propranolol (INDERAL LA) 60 MG extended release capsule Take 1 capsule by mouth every evening Call for refills or dose adjustment. 30 capsule 0    clindamycin (CLEOCIN) 2 % vaginal cream Place 1 applicator vaginally nightly For 5 nights in a row. 1 Tube 0    loratadine (CLARITIN) 10 MG capsule Take 10 mg by mouth daily      acetaminophen (TYLENOL) 500 MG tablet Take 1 tablet by mouth every 4 hours as needed for Pain 60 tablet 0     No current facility-administered medications for this visit. Social History     Tobacco Use    Smoking status: Former Smoker     Packs/day: 0.25     Years: 3.00     Pack years: 0.75     Types: Cigarettes     Last attempt to quit: 2005     Years since quittin.5    Smokeless tobacco: Never Used   Substance Use Topics    Alcohol use: No     Comment: Hx alcohol abuse as teenager    Drug use: No     Types: Marijuana     Comment:  Hx, 15-17 yrs of age, smoked MJ daily 3 joints / day. Counseling given:  Yes                  -rest of complaints with corresponding details per ROS    The patient's past medical,surgical, social, and family history as well as her current medications and allergies 600 MG tablet Stop Taking at Discharge    ibuprofen (ADVIL;MOTRIN) 600 MG tablet Stop Taking at Discharge    propranolol (INDERAL LA) 60 MG extended release capsule Therapy completed    clindamycin (CLEOCIN) 2 % vaginal cream Therapy completed    loratadine (CLARITIN) 10 MG capsule REORDER       Bette received counseling on the following healthy behaviors: nutrition, exercise, medication adherence and weight loss    Reviewed prior labs and health maintenance  Continue current medications, diet and exercise. Discussed use, benefit, and side effects of prescribed medications. Barriers to medication compliance addressed. Patient given educational materials - see patient instructions  Was a self-tracking handout given in paper form or via [employer]? No    Requested Prescriptions     Signed Prescriptions Disp Refills    loratadine (CLARITIN) 10 MG capsule 30 capsule 3     Sig: Take 1 capsule by mouth daily    sucralfate (CARAFATE) 1 GM tablet 120 tablet 0     Sig: Take 1 tablet by mouth 4 times daily as needed (stomach pain) dissolve it in 1 tablespoon of water    omeprazole (PRILOSEC) 40 MG delayed release capsule 30 capsule 1     Sig: Take 1 capsule by mouth every morning (before breakfast)       All patient questions answered. Patient voiced understanding. Quality Measures    Body mass index is 33.83 kg/m². Elevated. Weight control planned discussed conventional weight loss and Healthy diet and regular exercise. BP: 104/71 Blood pressure is normal. Treatment plan consists of No treatment change needed.       Mild hyperlipidemia     Lab Results   Component Value Date    LDLCHOLESTEROL 105 09/19/2017    (goal LDL reduction with dx if diabetes is 50% LDL reduction)        Moderate depression, continue cognitive behavioral therapy   PHQ Scores 8/1/2019 4/25/2019 8/8/2018 2/5/2018 9/26/2017 8/23/2017   PHQ2 Score 2 3 2 2 2 3   PHQ9 Score 12 12 11 9 9 11     Interpretation of Total Score Depression Severity:

## 2019-08-01 NOTE — PATIENT INSTRUCTIONS
These can cause stomach upset. Talk to your doctor if you take daily aspirin for another health problem. When should you call for help? Call 911 anytime you think you may need emergency care. For example, call if:    · You passed out (lost consciousness).     · You pass maroon or very bloody stools.     · You vomit blood or what looks like coffee grounds.     · You have new, severe belly pain.    Call your doctor now or seek immediate medical care if:    · Your pain gets worse, especially if it becomes focused in one area of your belly.     · You have a new or higher fever.     · Your stools are black and look like tar, or they have streaks of blood.     · You have unexpected vaginal bleeding.     · You have symptoms of a urinary tract infection. These may include:  ? Pain when you urinate. ? Urinating more often than usual.  ? Blood in your urine.     · You are dizzy or lightheaded, or you feel like you may faint.    Watch closely for changes in your health, and be sure to contact your doctor if:    · You are not getting better after 1 day (24 hours). Where can you learn more? Go to https://[employer]. org and sign in to your [employer] account. Enter A120 in the [employer] box to learn more about \"Abdominal Pain: Care Instructions. \"     If you do not have an account, please click on the \"Sign Up Now\" link. Current as of: September 23, 2018  Content Version: 12.0  © 5842-2735 Healthwise, [employer]. Care instructions adapted under license by [facility] ([facility]). If you have questions about a medical condition or this instruction, always ask your healthcare professional. [place] [address] any warranty or liability for your use of this information.

## 2019-08-01 NOTE — RESULT ENCOUNTER NOTE
Mychart comment sent to patient.   Small amount of blood in the urine, Otherwise labs within normal limits

## 2019-08-02 ENCOUNTER — HOSPITAL ENCOUNTER (OUTPATIENT)
Age: 34
Setting detail: SPECIMEN
Discharge: HOME | End: 2019-08-02
Payer: MEDICARE

## 2019-08-02 DIAGNOSIS — R10.13 EPIGASTRIC PAIN: ICD-10-CM

## 2019-08-02 LAB
CULTURE: NORMAL
Lab: NORMAL
SPECIMEN DESCRIPTION: NORMAL
VZV IGG SER QL IA: 1.27

## 2019-08-02 PROCEDURE — 87338 HPYLORI STOOL AG IA: CPT

## 2019-08-03 LAB
DIRECT EXAM: NEGATIVE
Lab: NORMAL
SPECIMEN DESCRIPTION: NORMAL

## 2019-08-05 PROBLEM — G89.29 CHRONIC RLQ PAIN: Status: ACTIVE | Noted: 2019-08-05

## 2019-08-05 PROBLEM — R10.13 EPIGASTRIC PAIN: Status: ACTIVE | Noted: 2019-08-05

## 2019-08-05 PROBLEM — R10.31 CHRONIC RLQ PAIN: Status: ACTIVE | Noted: 2019-08-05

## 2019-08-05 PROBLEM — R19.8 ALTERNATING CONSTIPATION AND DIARRHEA: Status: ACTIVE | Noted: 2019-08-05

## 2019-08-05 PROBLEM — J01.00 ACUTE NON-RECURRENT MAXILLARY SINUSITIS: Status: RESOLVED | Noted: 2017-12-14 | Resolved: 2019-08-05

## 2019-08-05 PROBLEM — R30.0 DYSURIA: Status: RESOLVED | Noted: 2018-05-13 | Resolved: 2019-08-05

## 2019-08-05 PROBLEM — M26.623 BILATERAL TEMPOROMANDIBULAR JOINT PAIN: Status: ACTIVE | Noted: 2019-08-05

## 2019-08-05 ASSESSMENT — ENCOUNTER SYMPTOMS
SINUS PAIN: 0
CONSTIPATION: 1
DIARRHEA: 1
SINUS PRESSURE: 0
BACK PAIN: 1

## 2019-08-08 ENCOUNTER — HOSPITAL ENCOUNTER (OUTPATIENT)
Age: 34
Setting detail: SPECIMEN
Discharge: HOME | End: 2019-08-08
Payer: MEDICARE

## 2019-08-08 ENCOUNTER — OFFICE VISIT (OUTPATIENT)
Dept: OBGYN CLINIC | Age: 34
End: 2019-08-08
Payer: MEDICARE

## 2019-08-08 VITALS
WEIGHT: 192.6 LBS | BODY MASS INDEX: 32.88 KG/M2 | TEMPERATURE: 99.6 F | HEART RATE: 89 BPM | RESPIRATION RATE: 18 BRPM | SYSTOLIC BLOOD PRESSURE: 114 MMHG | DIASTOLIC BLOOD PRESSURE: 75 MMHG | HEIGHT: 64 IN

## 2019-08-08 DIAGNOSIS — N76.0 ACUTE VAGINITIS: ICD-10-CM

## 2019-08-08 DIAGNOSIS — Z12.4 CERVICAL CANCER SCREENING: ICD-10-CM

## 2019-08-08 DIAGNOSIS — N89.8 VAGINA ITCHING: ICD-10-CM

## 2019-08-08 DIAGNOSIS — Z01.419 WELL WOMAN EXAM: Primary | ICD-10-CM

## 2019-08-08 DIAGNOSIS — Z32.02 NEGATIVE PREGNANCY TEST: ICD-10-CM

## 2019-08-08 DIAGNOSIS — N92.6 LATE MENSES: ICD-10-CM

## 2019-08-08 LAB
CONTROL: NORMAL
PREGNANCY TEST URINE, POC: NEGATIVE

## 2019-08-08 PROCEDURE — 81025 URINE PREGNANCY TEST: CPT | Performed by: SPECIALIST

## 2019-08-08 PROCEDURE — 99395 PREV VISIT EST AGE 18-39: CPT | Performed by: SPECIALIST

## 2019-08-08 RX ORDER — CLINDAMYCIN HYDROCHLORIDE 300 MG/1
300 CAPSULE ORAL 2 TIMES DAILY
Qty: 14 CAPSULE | Refills: 0 | Status: SHIPPED | OUTPATIENT
Start: 2019-08-08 | End: 2019-08-15

## 2019-08-08 ASSESSMENT — ENCOUNTER SYMPTOMS
ABDOMINAL DISTENTION: 0
DIARRHEA: 0
APNEA: 0
VOMITING: 0
NAUSEA: 0
EYE PAIN: 0
COUGH: 0
CONSTIPATION: 0
ABDOMINAL PAIN: 0

## 2019-08-08 NOTE — PROGRESS NOTES
Subjective:      Patient ID: Kev Cervantes is a 27 y.o. female. Chief Complaint   Patient presents with    Gynecologic Exam     Pt is present for annual pap. Last annual pap was 06/27/2017 with normal results. .Pt c/o vaginal itching, with vaginal discharge, white thick, clear like mucus. Pt also c/o vaginal odor, \"fishy\" like smell. Pt states this a reoccuring condition. Pt states the most recent Tx was ineffective. Pt also states she 13 days late for her menes. Pt states she took a home pregnancy test with negative. /75 (Site: Left Lower Arm, Position: Sitting, Cuff Size: Medium Adult)   Pulse 89   Temp 99.6 °F (37.6 °C) (Oral)   Resp 18   Ht 5' 4\" (1.626 m)   Wt 192 lb 9.6 oz (87.4 kg)   LMP 06/28/2019   BMI 33.06 kg/m²   Patient's last menstrual period was 06/28/2019. [id number]    Past Medical History:   Diagnosis Date    Allergic rhinitis     Anxiety     Chest pain     with anxiety    Chronic kidney disease     CKD (chronic kidney disease) stage 1, GFR 90 ml/min or greater 7/10/2018    Depression     Headache(784.0)     Heart murmur     Hematuria 2016    Irritable bowel syndrome with diarrhea 11/17/2016    Midline low back pain without sciatica 11/17/2016    Obesity (BMI 30-39. 9) 8/26/2017    Orthostatic hypotension     Panic attack as reaction to stress     certain anxiety medications will cause a panic attack    Panic disorder with agoraphobia 08/26/2017    some medications for anxiety will cause a panic attack.  Vertigo 10/2018    hx of     Current Outpatient Medications Ordered in Epic   Medication Sig Dispense Refill    terconazole (TERAZOL 7) 0.4 % vaginal cream Place vaginally nightly.  1 Tube 0    clindamycin (CLEOCIN) 300 MG capsule Take 1 capsule by mouth 2 times daily for 7 days 14 capsule 0    loratadine (CLARITIN) 10 MG capsule Take 1 capsule by mouth daily 30 capsule 3    Cranberry 500 MG CAPS Take 1 capsule by mouth 2 times daily OK to substitute 60 Answer:   Screening     Order Specific Question:   HPV Requested? Answer:   Yes -  If ASCUS Reflex HPV     Order Specific Question:   High Risk Patient     Answer:   N/A        Patient is here for her annual exam today. She complains of a thick, white vaginal discharge. She has odor and itching with it also. She says that the infection always comes back a week after it is treated. She also complains that since her laparoscopy 3 months ago, her belly button itches and it is numb. Her last period was 6/28/19 and her home pregnancy test is negative. Patient has seen a urologist and was told that nothing is wrong with her bladder even though she has to get up multiple times to go to the bathroom. She denies nausea, vomiting and fever. Review of Systems   Constitutional: Negative for activity change, appetite change and fever. HENT: Negative for ear discharge and ear pain. Eyes: Negative for pain and visual disturbance. Respiratory: Negative for apnea and cough. Cardiovascular: Negative for chest pain, palpitations and leg swelling. Gastrointestinal: Negative for abdominal distention, abdominal pain, constipation, diarrhea, nausea and vomiting. Endocrine: Negative. Genitourinary: Positive for frequency, menstrual problem and vaginal discharge. Negative for difficulty urinating, dysuria and pelvic pain. Healed incision itching and numbness   Musculoskeletal: Negative for neck pain and neck stiffness. Skin: Negative. Neurological: Negative for light-headedness and numbness. Hematological: Negative. Does not bruise/bleed easily. Objective:   Physical Exam   Constitutional: She is oriented to person, place, and time. Vital signs are normal. She appears well-developed and well-nourished. HENT:   Head: Normocephalic and atraumatic. Neck: Normal range of motion. Neck supple. No thyromegaly present. Cardiovascular: Normal rate and regular rhythm.    Pulmonary/Chest:

## 2019-08-14 LAB — CYTOLOGY REPORT: NORMAL

## 2019-08-15 ENCOUNTER — TELEPHONE (OUTPATIENT)
Dept: FAMILY MEDICINE CLINIC | Age: 34
End: 2019-08-15

## 2019-08-15 NOTE — TELEPHONE ENCOUNTER
pt called back stating she needs an order for the tilt table test faxed to Cardiology at [phone]

## 2019-09-11 ENCOUNTER — HOSPITAL ENCOUNTER (OUTPATIENT)
Age: 34
Discharge: HOME | End: 2019-09-11
Payer: MEDICARE

## 2019-09-11 LAB
ABSOLUTE EOS #: 0.1 K/UL (ref 0–0.4)
ABSOLUTE IMMATURE GRANULOCYTE: ABNORMAL K/UL (ref 0–0.3)
ABSOLUTE LYMPH #: 1.1 K/UL (ref 1–4.8)
ABSOLUTE MONO #: 0.6 K/UL (ref 0.1–1.3)
BASOPHILS # BLD: 1 % (ref 0–2)
BASOPHILS ABSOLUTE: 0 K/UL (ref 0–0.2)
DIFFERENTIAL TYPE: ABNORMAL
EOSINOPHILS RELATIVE PERCENT: 1 % (ref 0–4)
HCT VFR BLD CALC: 38.4 % (ref 36–46)
HEMOGLOBIN: 13 G/DL (ref 12–16)
IGA: 321 MG/DL (ref 70–400)
IGE: 7 IU/ML
IGG: 1053 MG/DL (ref 700–1600)
IGM: 91 MG/DL (ref 40–230)
IMMATURE GRANULOCYTES: ABNORMAL %
LYMPHOCYTES # BLD: 26 % (ref 24–44)
MCH RBC QN AUTO: 31.3 PG (ref 26–34)
MCHC RBC AUTO-ENTMCNC: 33.9 G/DL (ref 31–37)
MCV RBC AUTO: 92.4 FL (ref 80–100)
MONOCYTES # BLD: 13 % (ref 1–7)
NRBC AUTOMATED: ABNORMAL PER 100 WBC
PDW BLD-RTO: 12.3 % (ref 11.5–14.9)
PLATELET # BLD: 234 K/UL (ref 150–450)
PLATELET ESTIMATE: ABNORMAL
PMV BLD AUTO: 7.7 FL (ref 6–12)
RBC # BLD: 4.15 M/UL (ref 4–5.2)
RBC # BLD: ABNORMAL 10*6/UL
SEG NEUTROPHILS: 59 % (ref 36–66)
SEGMENTED NEUTROPHILS ABSOLUTE COUNT: 2.5 K/UL (ref 1.3–9.1)
WBC # BLD: 4.3 K/UL (ref 3.5–11)
WBC # BLD: ABNORMAL 10*3/UL

## 2019-09-11 PROCEDURE — 82784 ASSAY IGA/IGD/IGG/IGM EACH: CPT

## 2019-09-11 PROCEDURE — 85025 COMPLETE CBC W/AUTO DIFF WBC: CPT

## 2019-09-11 PROCEDURE — 36415 COLL VENOUS BLD VENIPUNCTURE: CPT

## 2019-09-11 PROCEDURE — 86317 IMMUNOASSAY INFECTIOUS AGENT: CPT

## 2019-09-11 PROCEDURE — 82785 ASSAY OF IGE: CPT

## 2019-09-13 LAB
PNEUMOCOCCAL ANTIBODY TYPE 12: 0.14 UG/ML
PNEUMOCOCCAL ANTIBODY TYPE 14: 0.08 UG/ML
PNEUMOCOCCAL ANTIBODY TYPE 18: 5.48 UG/ML
PNEUMOCOCCAL ANTIBODY TYPE 19F: 2.37 UG/ML
PNEUMOCOCCAL ANTIBODY TYPE 1: 1.66 UG/ML
PNEUMOCOCCAL ANTIBODY TYPE 23: 0.3 UG/ML
PNEUMOCOCCAL ANTIBODY TYPE 3: 5.74 UG/ML
PNEUMOCOCCAL ANTIBODY TYPE 4: 0.42 UG/ML
PNEUMOCOCCAL ANTIBODY TYPE 5: 6.73 UG/ML
PNEUMOCOCCAL ANTIBODY TYPE 6B: 0.15 UG/ML
PNEUMOCOCCAL ANTIBODY TYPE 7F: 6.69 UG/ML
PNEUMOCOCCAL ANTIBODY TYPE 8: 0.32 UG/ML
PNEUMOCOCCAL ANTIBODY TYPE 9N: 0.32 UG/ML
PNEUMOCOCCAL ANTIBODY TYPE 9V: 1.88 UG/ML
PNEUMOCOCCAL INTERPRETATION: NORMAL
S. PNEUM TYPE 19A,IGG: 5.74 UG/ML
S. PNEUM TYPE 2,IGG: 0.23 UG/ML
S. PNEUM TYPE 20,IGG: 2.64 UG/ML
S. PNEUM TYPE 22F,IGG: 1.03 UG/ML
S. PNEUM TYPE 33F,IGG: 0.26 UG/ML
STREP PNEUMO TYPE 10A: 8.87 UG/ML
STREP PNEUMO TYPE 11A: 1.09 UG/ML
STREP PNEUMO TYPE 15B: 0.6 UG/ML
STREP PNEUMO TYPE 17F: 3.12 UG/ML

## 2019-11-01 ENCOUNTER — OFFICE VISIT (OUTPATIENT)
Dept: FAMILY MEDICINE CLINIC | Age: 34
End: 2019-11-01
Payer: MEDICARE

## 2019-11-01 ENCOUNTER — HOSPITAL ENCOUNTER (OUTPATIENT)
Age: 34
Setting detail: SPECIMEN
Discharge: HOME | End: 2019-11-01
Payer: MEDICARE

## 2019-11-01 VITALS
SYSTOLIC BLOOD PRESSURE: 130 MMHG | OXYGEN SATURATION: 97 % | HEIGHT: 64 IN | DIASTOLIC BLOOD PRESSURE: 80 MMHG | BODY MASS INDEX: 32.27 KG/M2 | HEART RATE: 82 BPM | WEIGHT: 189 LBS

## 2019-11-01 DIAGNOSIS — R82.90 ABNORMAL URINALYSIS: ICD-10-CM

## 2019-11-01 DIAGNOSIS — K58.2 IRRITABLE BOWEL SYNDROME WITH BOTH CONSTIPATION AND DIARRHEA: ICD-10-CM

## 2019-11-01 DIAGNOSIS — N76.1 SUBACUTE VAGINITIS: ICD-10-CM

## 2019-11-01 DIAGNOSIS — F33.1 MODERATE EPISODE OF RECURRENT MAJOR DEPRESSIVE DISORDER (HCC): ICD-10-CM

## 2019-11-01 DIAGNOSIS — Z13.31 POSITIVE DEPRESSION SCREENING: ICD-10-CM

## 2019-11-01 DIAGNOSIS — R30.0 DYSURIA: ICD-10-CM

## 2019-11-01 DIAGNOSIS — F41.1 GAD (GENERALIZED ANXIETY DISORDER): Primary | ICD-10-CM

## 2019-11-01 LAB
BILIRUBIN, POC: NEGATIVE
BLOOD URINE, POC: ABNORMAL
CLARITY, POC: CLEAR
COLOR, POC: YELLOW
GLUCOSE URINE, POC: NEGATIVE
KETONES, POC: NEGATIVE
LEUKOCYTE EST, POC: NEGATIVE
NITRITE, POC: NEGATIVE
PH, POC: 5
PROTEIN, POC: NEGATIVE
SPECIFIC GRAVITY, POC: 1.03
UROBILINOGEN, POC: NEGATIVE

## 2019-11-01 PROCEDURE — 96160 PT-FOCUSED HLTH RISK ASSMT: CPT | Performed by: FAMILY MEDICINE

## 2019-11-01 PROCEDURE — 1036F TOBACCO NON-USER: CPT | Performed by: FAMILY MEDICINE

## 2019-11-01 PROCEDURE — G8417 CALC BMI ABV UP PARAM F/U: HCPCS | Performed by: FAMILY MEDICINE

## 2019-11-01 PROCEDURE — G8427 DOCREV CUR MEDS BY ELIG CLIN: HCPCS | Performed by: FAMILY MEDICINE

## 2019-11-01 PROCEDURE — G8484 FLU IMMUNIZE NO ADMIN: HCPCS | Performed by: FAMILY MEDICINE

## 2019-11-01 PROCEDURE — 99214 OFFICE O/P EST MOD 30 MIN: CPT | Performed by: FAMILY MEDICINE

## 2019-11-01 PROCEDURE — G8431 POS CLIN DEPRES SCRN F/U DOC: HCPCS | Performed by: FAMILY MEDICINE

## 2019-11-01 RX ORDER — MULTIVIT-MIN/IRON FUM/FOLIC AC 7.5 MG-4
1 TABLET ORAL DAILY
Qty: 90 TABLET | Refills: 3 | Status: SHIPPED | OUTPATIENT
Start: 2019-11-01 | End: 2020-10-27 | Stop reason: ALTCHOICE

## 2019-11-01 RX ORDER — L. ACIDOPHILUS/L.BULGARICUS 100MM CELL
1 GRANULES IN PACKET (EA) ORAL DAILY
Qty: 30 PACKET | Refills: 5 | Status: SHIPPED | OUTPATIENT
Start: 2019-11-01 | End: 2021-02-18

## 2019-11-01 RX ORDER — SERTRALINE HYDROCHLORIDE 25 MG/1
25 TABLET, FILM COATED ORAL DAILY
Qty: 30 TABLET | Refills: 3 | Status: SHIPPED | OUTPATIENT
Start: 2019-11-01 | End: 2020-10-27 | Stop reason: ALTCHOICE

## 2019-11-01 ASSESSMENT — PATIENT HEALTH QUESTIONNAIRE - PHQ9
5. POOR APPETITE OR OVEREATING: 1
7. TROUBLE CONCENTRATING ON THINGS, SUCH AS READING THE NEWSPAPER OR WATCHING TELEVISION: 1
4. FEELING TIRED OR HAVING LITTLE ENERGY: 3
SUM OF ALL RESPONSES TO PHQ QUESTIONS 1-9: 13
2. FEELING DOWN, DEPRESSED OR HOPELESS: 2
SUM OF ALL RESPONSES TO PHQ9 QUESTIONS 1 & 2: 3
8. MOVING OR SPEAKING SO SLOWLY THAT OTHER PEOPLE COULD HAVE NOTICED. OR THE OPPOSITE, BEING SO FIGETY OR RESTLESS THAT YOU HAVE BEEN MOVING AROUND A LOT MORE THAN USUAL: 1
10. IF YOU CHECKED OFF ANY PROBLEMS, HOW DIFFICULT HAVE THESE PROBLEMS MADE IT FOR YOU TO DO YOUR WORK, TAKE CARE OF THINGS AT HOME, OR GET ALONG WITH OTHER PEOPLE: 0
3. TROUBLE FALLING OR STAYING ASLEEP: 3
SUM OF ALL RESPONSES TO PHQ QUESTIONS 1-9: 13
6. FEELING BAD ABOUT YOURSELF - OR THAT YOU ARE A FAILURE OR HAVE LET YOURSELF OR YOUR FAMILY DOWN: 1
9. THOUGHTS THAT YOU WOULD BE BETTER OFF DEAD, OR OF HURTING YOURSELF: 0
1. LITTLE INTEREST OR PLEASURE IN DOING THINGS: 1

## 2019-11-01 ASSESSMENT — ENCOUNTER SYMPTOMS
ABDOMINAL DISTENTION: 0
CONSTIPATION: 1
WHEEZING: 0
SHORTNESS OF BREATH: 0
VOMITING: 0
ABDOMINAL PAIN: 1
DIARRHEA: 1
NAUSEA: 1
CHEST TIGHTNESS: 0
COUGH: 0

## 2019-11-01 ASSESSMENT — ANXIETY QUESTIONNAIRES
7. FEELING AFRAID AS IF SOMETHING AWFUL MIGHT HAPPEN: 2-OVER HALF THE DAYS
3. WORRYING TOO MUCH ABOUT DIFFERENT THINGS: 2-OVER HALF THE DAYS
4. TROUBLE RELAXING: 2-OVER HALF THE DAYS
2. NOT BEING ABLE TO STOP OR CONTROL WORRYING: 2-OVER HALF THE DAYS
GAD7 TOTAL SCORE: 15
5. BEING SO RESTLESS THAT IT IS HARD TO SIT STILL: 1-SEVERAL DAYS
1. FEELING NERVOUS, ANXIOUS, OR ON EDGE: 3-NEARLY EVERY DAY
6. BECOMING EASILY ANNOYED OR IRRITABLE: 3-NEARLY EVERY DAY

## 2019-11-02 ENCOUNTER — TELEPHONE (OUTPATIENT)
Dept: FAMILY MEDICINE CLINIC | Age: 34
End: 2019-11-02

## 2019-11-02 DIAGNOSIS — B96.89 BV (BACTERIAL VAGINOSIS): Primary | ICD-10-CM

## 2019-11-02 DIAGNOSIS — N76.0 BV (BACTERIAL VAGINOSIS): Primary | ICD-10-CM

## 2019-11-02 LAB
CULTURE: NORMAL
DIRECT EXAM: ABNORMAL
Lab: ABNORMAL
Lab: NORMAL
SPECIMEN DESCRIPTION: ABNORMAL
SPECIMEN DESCRIPTION: NORMAL

## 2019-11-03 PROBLEM — N18.1 CKD (CHRONIC KIDNEY DISEASE) STAGE 1, GFR 90 ML/MIN OR GREATER: Status: RESOLVED | Noted: 2018-07-10 | Resolved: 2019-11-03

## 2019-11-03 ASSESSMENT — ENCOUNTER SYMPTOMS: BLOOD IN STOOL: 0

## 2019-11-13 ENCOUNTER — HOSPITAL ENCOUNTER (OUTPATIENT)
Age: 34
Discharge: HOME | End: 2019-11-13
Payer: MEDICARE

## 2019-11-13 ENCOUNTER — TELEPHONE (OUTPATIENT)
Dept: FAMILY MEDICINE CLINIC | Age: 34
End: 2019-11-13

## 2019-11-13 DIAGNOSIS — R53.82 CHRONIC FATIGUE: Primary | ICD-10-CM

## 2019-11-14 ENCOUNTER — HOSPITAL ENCOUNTER (OUTPATIENT)
Age: 34
Discharge: HOME | End: 2019-11-14
Payer: MEDICARE

## 2019-11-14 DIAGNOSIS — R53.82 CHRONIC FATIGUE: ICD-10-CM

## 2019-11-14 LAB
CORTISOL COLLECTION INFO: NORMAL
CORTISOL: 17.2 UG/DL (ref 2.7–18.4)

## 2019-11-14 PROCEDURE — 82533 TOTAL CORTISOL: CPT

## 2019-11-14 PROCEDURE — 36415 COLL VENOUS BLD VENIPUNCTURE: CPT

## 2019-11-16 ENCOUNTER — TELEPHONE (OUTPATIENT)
Dept: FAMILY MEDICINE CLINIC | Age: 34
End: 2019-11-16

## 2019-11-16 DIAGNOSIS — B37.31 YEAST VAGINITIS: ICD-10-CM

## 2019-11-16 DIAGNOSIS — E34.9 HORMONE IMBALANCE: Primary | ICD-10-CM

## 2019-11-23 ENCOUNTER — OFFICE VISIT (OUTPATIENT)
Dept: FAMILY MEDICINE CLINIC | Age: 34
End: 2019-11-23
Payer: MEDICARE

## 2019-11-23 VITALS
HEART RATE: 107 BPM | SYSTOLIC BLOOD PRESSURE: 103 MMHG | DIASTOLIC BLOOD PRESSURE: 71 MMHG | TEMPERATURE: 98.9 F | WEIGHT: 187 LBS | BODY MASS INDEX: 32.1 KG/M2 | OXYGEN SATURATION: 96 %

## 2019-11-23 DIAGNOSIS — J20.9 ACUTE BRONCHITIS, UNSPECIFIED ORGANISM: ICD-10-CM

## 2019-11-23 DIAGNOSIS — J02.9 SORE THROAT: Primary | ICD-10-CM

## 2019-11-23 LAB
INFLUENZA A ANTIBODY: NEGATIVE
INFLUENZA B ANTIBODY: NEGATIVE

## 2019-11-23 PROCEDURE — 1036F TOBACCO NON-USER: CPT | Performed by: INTERNAL MEDICINE

## 2019-11-23 PROCEDURE — G8417 CALC BMI ABV UP PARAM F/U: HCPCS | Performed by: INTERNAL MEDICINE

## 2019-11-23 PROCEDURE — G8427 DOCREV CUR MEDS BY ELIG CLIN: HCPCS | Performed by: INTERNAL MEDICINE

## 2019-11-23 PROCEDURE — 99213 OFFICE O/P EST LOW 20 MIN: CPT | Performed by: INTERNAL MEDICINE

## 2019-11-23 PROCEDURE — G8484 FLU IMMUNIZE NO ADMIN: HCPCS | Performed by: INTERNAL MEDICINE

## 2019-11-23 PROCEDURE — 87804 INFLUENZA ASSAY W/OPTIC: CPT | Performed by: INTERNAL MEDICINE

## 2019-11-23 RX ORDER — AZITHROMYCIN 250 MG/1
TABLET, FILM COATED ORAL
Qty: 1 PACKET | Refills: 0 | Status: SHIPPED | OUTPATIENT
Start: 2019-11-23 | End: 2019-11-24 | Stop reason: SDUPTHER

## 2019-11-23 ASSESSMENT — ENCOUNTER SYMPTOMS
DIARRHEA: 0
VOMITING: 0
COUGH: 1

## 2019-11-24 RX ORDER — AZITHROMYCIN 250 MG/1
TABLET, FILM COATED ORAL
Qty: 1 PACKET | Refills: 0 | Status: SHIPPED | OUTPATIENT
Start: 2019-11-24 | End: 2020-08-31 | Stop reason: ALTCHOICE

## 2019-12-06 ENCOUNTER — TELEPHONE (OUTPATIENT)
Dept: FAMILY MEDICINE CLINIC | Age: 34
End: 2019-12-06

## 2019-12-17 ENCOUNTER — HOSPITAL ENCOUNTER (OUTPATIENT)
Age: 34
Setting detail: SPECIMEN
Discharge: HOME | End: 2019-12-17
Payer: MEDICARE

## 2019-12-17 ENCOUNTER — OFFICE VISIT (OUTPATIENT)
Dept: FAMILY MEDICINE CLINIC | Age: 34
End: 2019-12-17
Payer: MEDICARE

## 2019-12-17 VITALS
BODY MASS INDEX: 32.23 KG/M2 | WEIGHT: 188.8 LBS | OXYGEN SATURATION: 100 % | SYSTOLIC BLOOD PRESSURE: 91 MMHG | HEART RATE: 70 BPM | DIASTOLIC BLOOD PRESSURE: 62 MMHG | HEIGHT: 64 IN

## 2019-12-17 DIAGNOSIS — R31.9 HEMATURIA, UNSPECIFIED TYPE: ICD-10-CM

## 2019-12-17 DIAGNOSIS — Z13.31 POSITIVE DEPRESSION SCREENING: ICD-10-CM

## 2019-12-17 DIAGNOSIS — R10.2 PELVIC PAIN IN FEMALE: ICD-10-CM

## 2019-12-17 DIAGNOSIS — N76.1 SUBACUTE VAGINITIS: ICD-10-CM

## 2019-12-17 DIAGNOSIS — R10.30 LOWER ABDOMINAL PAIN: Primary | ICD-10-CM

## 2019-12-17 DIAGNOSIS — F33.1 MODERATE EPISODE OF RECURRENT MAJOR DEPRESSIVE DISORDER (HCC): ICD-10-CM

## 2019-12-17 DIAGNOSIS — F41.1 GAD (GENERALIZED ANXIETY DISORDER): ICD-10-CM

## 2019-12-17 LAB
-: NORMAL
AMORPHOUS: NORMAL
BACTERIA: NORMAL
BILIRUBIN URINE: NEGATIVE
BILIRUBIN, POC: ABNORMAL
BLOOD URINE, POC: ABNORMAL
CASTS UA: NORMAL /LPF (ref 0–8)
CLARITY, POC: CLEAR
COLOR, POC: ABNORMAL
COLOR: YELLOW
COMMENT UA: ABNORMAL
CRYSTALS, UA: NORMAL /HPF
EPITHELIAL CELLS UA: NORMAL /HPF (ref 0–5)
GLUCOSE URINE, POC: ABNORMAL
GLUCOSE URINE: NEGATIVE
KETONES, POC: ABNORMAL
KETONES, URINE: NEGATIVE
LEUKOCYTE EST, POC: ABNORMAL
LEUKOCYTE ESTERASE, URINE: NEGATIVE
MUCUS: NORMAL
NITRITE, POC: ABNORMAL
NITRITE, URINE: NEGATIVE
OTHER OBSERVATIONS UA: NORMAL
PH UA: 7 (ref 5–8)
PH, POC: 6
PROTEIN UA: NEGATIVE
PROTEIN, POC: ABNORMAL
RBC UA: NORMAL /HPF (ref 0–4)
RENAL EPITHELIAL, UA: NORMAL /HPF
SPECIFIC GRAVITY UA: 1.03 (ref 1–1.03)
SPECIFIC GRAVITY, POC: 1.03
TRICHOMONAS: NORMAL
TURBIDITY: CLEAR
URINE HGB: ABNORMAL
UROBILINOGEN, POC: 0.2
UROBILINOGEN, URINE: NORMAL
WBC UA: NORMAL /HPF (ref 0–5)
YEAST: NORMAL

## 2019-12-17 PROCEDURE — G8417 CALC BMI ABV UP PARAM F/U: HCPCS | Performed by: FAMILY MEDICINE

## 2019-12-17 PROCEDURE — G8431 POS CLIN DEPRES SCRN F/U DOC: HCPCS | Performed by: FAMILY MEDICINE

## 2019-12-17 PROCEDURE — 81003 URINALYSIS AUTO W/O SCOPE: CPT | Performed by: FAMILY MEDICINE

## 2019-12-17 PROCEDURE — 96160 PT-FOCUSED HLTH RISK ASSMT: CPT | Performed by: FAMILY MEDICINE

## 2019-12-17 PROCEDURE — G8427 DOCREV CUR MEDS BY ELIG CLIN: HCPCS | Performed by: FAMILY MEDICINE

## 2019-12-17 PROCEDURE — 99214 OFFICE O/P EST MOD 30 MIN: CPT | Performed by: FAMILY MEDICINE

## 2019-12-17 PROCEDURE — G8484 FLU IMMUNIZE NO ADMIN: HCPCS | Performed by: FAMILY MEDICINE

## 2019-12-17 PROCEDURE — 1036F TOBACCO NON-USER: CPT | Performed by: FAMILY MEDICINE

## 2019-12-17 RX ORDER — METRONIDAZOLE 7.5 MG/G
GEL VAGINAL
Qty: 1 TUBE | Refills: 0 | Status: SHIPPED | OUTPATIENT
Start: 2019-12-17 | End: 2019-12-24

## 2019-12-17 ASSESSMENT — ANXIETY QUESTIONNAIRES
4. TROUBLE RELAXING: 3-NEARLY EVERY DAY
2. NOT BEING ABLE TO STOP OR CONTROL WORRYING: 2-OVER HALF THE DAYS
5. BEING SO RESTLESS THAT IT IS HARD TO SIT STILL: 1-SEVERAL DAYS
1. FEELING NERVOUS, ANXIOUS, OR ON EDGE: 3-NEARLY EVERY DAY
GAD7 TOTAL SCORE: 15
3. WORRYING TOO MUCH ABOUT DIFFERENT THINGS: 2-OVER HALF THE DAYS
7. FEELING AFRAID AS IF SOMETHING AWFUL MIGHT HAPPEN: 1-SEVERAL DAYS
6. BECOMING EASILY ANNOYED OR IRRITABLE: 3-NEARLY EVERY DAY

## 2019-12-17 ASSESSMENT — PATIENT HEALTH QUESTIONNAIRE - PHQ9
3. TROUBLE FALLING OR STAYING ASLEEP: 3
4. FEELING TIRED OR HAVING LITTLE ENERGY: 3
SUM OF ALL RESPONSES TO PHQ QUESTIONS 1-9: 11
1. LITTLE INTEREST OR PLEASURE IN DOING THINGS: 1
5. POOR APPETITE OR OVEREATING: 1
SUM OF ALL RESPONSES TO PHQ QUESTIONS 1-9: 11
10. IF YOU CHECKED OFF ANY PROBLEMS, HOW DIFFICULT HAVE THESE PROBLEMS MADE IT FOR YOU TO DO YOUR WORK, TAKE CARE OF THINGS AT HOME, OR GET ALONG WITH OTHER PEOPLE: 0
8. MOVING OR SPEAKING SO SLOWLY THAT OTHER PEOPLE COULD HAVE NOTICED. OR THE OPPOSITE, BEING SO FIGETY OR RESTLESS THAT YOU HAVE BEEN MOVING AROUND A LOT MORE THAN USUAL: 0
9. THOUGHTS THAT YOU WOULD BE BETTER OFF DEAD, OR OF HURTING YOURSELF: 0
SUM OF ALL RESPONSES TO PHQ9 QUESTIONS 1 & 2: 3
7. TROUBLE CONCENTRATING ON THINGS, SUCH AS READING THE NEWSPAPER OR WATCHING TELEVISION: 1
6. FEELING BAD ABOUT YOURSELF - OR THAT YOU ARE A FAILURE OR HAVE LET YOURSELF OR YOUR FAMILY DOWN: 0
2. FEELING DOWN, DEPRESSED OR HOPELESS: 2

## 2019-12-17 ASSESSMENT — ENCOUNTER SYMPTOMS
WHEEZING: 0
SHORTNESS OF BREATH: 0
ABDOMINAL PAIN: 1
BACK PAIN: 1
NAUSEA: 1
COUGH: 0
DIARRHEA: 1
ABDOMINAL DISTENTION: 0
CONSTIPATION: 1
CHEST TIGHTNESS: 0

## 2019-12-18 LAB
DIRECT EXAM: NORMAL
Lab: NORMAL
SPECIMEN DESCRIPTION: NORMAL

## 2019-12-18 ASSESSMENT — ENCOUNTER SYMPTOMS
VOMITING: 1
BLOOD IN STOOL: 0

## 2020-01-08 ENCOUNTER — HOSPITAL ENCOUNTER (OUTPATIENT)
Age: 35
Discharge: HOME | End: 2020-01-08
Payer: MEDICARE

## 2020-01-08 PROCEDURE — 86003 ALLG SPEC IGE CRUDE XTRC EA: CPT

## 2020-01-08 PROCEDURE — 86317 IMMUNOASSAY INFECTIOUS AGENT: CPT

## 2020-01-08 PROCEDURE — 36415 COLL VENOUS BLD VENIPUNCTURE: CPT

## 2020-01-09 LAB
AMOXICILLIN IGE AB: <0.34 KU/L (ref 0–0.34)
PENICILLOYL G IGE CLASS: <0.34 KU/L (ref 0–0.34)
PENICILLOYL V IGE CLASS: <0.34 KU/L (ref 0–0.34)

## 2020-01-12 LAB
PNEUMOCOCCAL ANTIBODY TYPE 12: 0.34 UG/ML
PNEUMOCOCCAL ANTIBODY TYPE 14: 0.2 UG/ML
PNEUMOCOCCAL ANTIBODY TYPE 18: 20.79 UG/ML
PNEUMOCOCCAL ANTIBODY TYPE 19F: 3.99 UG/ML
PNEUMOCOCCAL ANTIBODY TYPE 1: 11.72 UG/ML
PNEUMOCOCCAL ANTIBODY TYPE 23: 3.41 UG/ML
PNEUMOCOCCAL ANTIBODY TYPE 3: 7.57 UG/ML
PNEUMOCOCCAL ANTIBODY TYPE 4: 0.9 UG/ML
PNEUMOCOCCAL ANTIBODY TYPE 5: 11.53 UG/ML
PNEUMOCOCCAL ANTIBODY TYPE 6B: 0.22 UG/ML
PNEUMOCOCCAL ANTIBODY TYPE 7F: 10.32 UG/ML
PNEUMOCOCCAL ANTIBODY TYPE 8: 4.76 UG/ML
PNEUMOCOCCAL ANTIBODY TYPE 9N: 1 UG/ML
PNEUMOCOCCAL ANTIBODY TYPE 9V: 22.21 UG/ML
PNEUMOCOCCAL INTERPRETATION: NORMAL
S. PNEUM TYPE 19A,IGG: 16.8 UG/ML
S. PNEUM TYPE 2,IGG: 2.16 UG/ML
S. PNEUM TYPE 20,IGG: 7.7 UG/ML
S. PNEUM TYPE 22F,IGG: 16.29 UG/ML
S. PNEUM TYPE 33F,IGG: 3.93 UG/ML
STREP PNEUMO TYPE 10A: 10.85 UG/ML
STREP PNEUMO TYPE 11A: 4.1 UG/ML
STREP PNEUMO TYPE 15B: 9.03 UG/ML
STREP PNEUMO TYPE 17F: 5.38 UG/ML

## 2020-04-15 ENCOUNTER — TELEPHONE (OUTPATIENT)
Dept: FAMILY MEDICINE CLINIC | Age: 35
End: 2020-04-15

## 2020-04-27 ENCOUNTER — TELEPHONE (OUTPATIENT)
Dept: FAMILY MEDICINE CLINIC | Age: 35
End: 2020-04-27

## 2020-08-20 ENCOUNTER — TELEPHONE (OUTPATIENT)
Dept: OBGYN CLINIC | Age: 35
End: 2020-08-20

## 2020-08-20 RX ORDER — CLINDAMYCIN HYDROCHLORIDE 300 MG/1
300 CAPSULE ORAL 2 TIMES DAILY
Qty: 14 CAPSULE | Refills: 0 | Status: SHIPPED | OUTPATIENT
Start: 2020-08-20 | End: 2020-08-27

## 2020-08-20 NOTE — TELEPHONE ENCOUNTER
Pt walked in asking if a medication for yeast infection be called into [facility] on [facility]. Appt scheduled for 8/24/20.  Please advise

## 2020-08-31 ENCOUNTER — HOSPITAL ENCOUNTER (OUTPATIENT)
Age: 35
Setting detail: SPECIMEN
Discharge: HOME | End: 2020-08-31
Payer: MEDICARE

## 2020-08-31 ENCOUNTER — OFFICE VISIT (OUTPATIENT)
Dept: OBGYN CLINIC | Age: 35
End: 2020-08-31
Payer: MEDICARE

## 2020-08-31 VITALS
SYSTOLIC BLOOD PRESSURE: 103 MMHG | DIASTOLIC BLOOD PRESSURE: 70 MMHG | TEMPERATURE: 98.6 F | HEIGHT: 63 IN | BODY MASS INDEX: 29.27 KG/M2 | WEIGHT: 165.2 LBS | HEART RATE: 82 BPM

## 2020-08-31 LAB
BILIRUBIN, POC: NEGATIVE
BLOOD URINE, POC: NORMAL
CLARITY, POC: NORMAL
COLOR, POC: YELLOW
CONTROL: NORMAL
GLUCOSE URINE, POC: NEGATIVE
KETONES, POC: NEGATIVE
LEUKOCYTE EST, POC: NEGATIVE
NITRITE, POC: NEGATIVE
PH, POC: 6
PREGNANCY TEST URINE, POC: NEGATIVE
PROTEIN, POC: NEGATIVE
SPECIFIC GRAVITY, POC: 1.03
UROBILINOGEN, POC: NORMAL

## 2020-08-31 PROCEDURE — G8427 DOCREV CUR MEDS BY ELIG CLIN: HCPCS | Performed by: CLINICAL NURSE SPECIALIST

## 2020-08-31 PROCEDURE — G8417 CALC BMI ABV UP PARAM F/U: HCPCS | Performed by: CLINICAL NURSE SPECIALIST

## 2020-08-31 PROCEDURE — 1036F TOBACCO NON-USER: CPT | Performed by: CLINICAL NURSE SPECIALIST

## 2020-08-31 PROCEDURE — 81025 URINE PREGNANCY TEST: CPT | Performed by: CLINICAL NURSE SPECIALIST

## 2020-08-31 PROCEDURE — 99213 OFFICE O/P EST LOW 20 MIN: CPT | Performed by: CLINICAL NURSE SPECIALIST

## 2020-08-31 ASSESSMENT — ENCOUNTER SYMPTOMS
ALLERGIC/IMMUNOLOGIC NEGATIVE: 1
RESPIRATORY NEGATIVE: 1
EYES NEGATIVE: 1
GASTROINTESTINAL NEGATIVE: 1

## 2020-08-31 NOTE — PROGRESS NOTES
Subjective:      Patient ID:  Eli Min is a 38 y.o. female who presents for   Chief Complaint   Patient presents with    Vaginitis     The patient complains of a \"yellowish\" discharge on and off for months, and odor that is not fishy smelling in nature. HPI     Patient is a 37 yo female who presents for painful intercourse which began approx. 2016, patient states certain position are worse than others, if he is behind her. Patient reports that she has had surgery for endometriosis but did not have any, but had adhesions which were removed at that time. Patient reports that she has recurrent BV. Patient reports that she has been having vaginal odor for approx. 1 week, denies any discharge or itching at this time. Review of Systems   Constitutional: Negative for chills and fever. HENT: Negative. Eyes: Negative. Respiratory: Negative. Cardiovascular: Negative. Gastrointestinal: Negative. Endocrine: Negative. Genitourinary: Positive for dyspareunia (with certain positiions). Negative for menstrual problem and vaginal discharge. Vaginal odor   Musculoskeletal: Negative. Skin: Negative. Allergic/Immunologic: Negative. Neurological: Negative. Hematological: Negative. Psychiatric/Behavioral: Negative. /70 (Site: Right Upper Arm, Position: Sitting, Cuff Size: Medium Adult)   Pulse 82   Temp 98.6 °F (37 °C)   Ht 5' 3\" (1.6 m)   Wt 165 lb 3.2 oz (74.9 kg)   LMP 08/23/2020 (Approximate)   Breastfeeding No   BMI 29.26 kg/m²    Patient's last menstrual period was 08/23/2020 (approximate). Objective:   Physical Exam  Vitals signs reviewed. Constitutional:       Appearance: She is well-developed. HENT:      Head: Normocephalic and atraumatic. Eyes:      Conjunctiva/sclera: Conjunctivae normal.   Neck:      Musculoskeletal: Normal range of motion and neck supple. Cardiovascular:      Rate and Rhythm: Normal rate and regular rhythm.

## 2020-09-01 LAB
C TRACH DNA GENITAL QL NAA+PROBE: NEGATIVE
DIRECT EXAM: ABNORMAL
Lab: ABNORMAL
N. GONORRHOEAE DNA: NEGATIVE
SPECIMEN DESCRIPTION: ABNORMAL
SPECIMEN DESCRIPTION: NORMAL

## 2020-09-03 ENCOUNTER — TELEPHONE (OUTPATIENT)
Dept: OBGYN CLINIC | Age: 35
End: 2020-09-03

## 2020-09-16 ENCOUNTER — NURSE TRIAGE (OUTPATIENT)
Dept: OTHER | Facility: CLINIC | Age: 35
End: 2020-09-16

## 2020-09-16 ENCOUNTER — OFFICE VISIT (OUTPATIENT)
Dept: PRIMARY CARE CLINIC | Age: 35
End: 2020-09-16
Payer: MEDICARE

## 2020-09-16 VITALS
BODY MASS INDEX: 28.68 KG/M2 | OXYGEN SATURATION: 98 % | WEIGHT: 168 LBS | HEART RATE: 79 BPM | HEIGHT: 64 IN | TEMPERATURE: 98.3 F

## 2020-09-16 PROBLEM — F10.21 ALCOHOL USE DISORDER, MODERATE, IN SUSTAINED REMISSION (HCC): Status: ACTIVE | Noted: 2020-09-16

## 2020-09-16 PROCEDURE — 1036F TOBACCO NON-USER: CPT | Performed by: NURSE PRACTITIONER

## 2020-09-16 PROCEDURE — G8427 DOCREV CUR MEDS BY ELIG CLIN: HCPCS | Performed by: NURSE PRACTITIONER

## 2020-09-16 PROCEDURE — 99213 OFFICE O/P EST LOW 20 MIN: CPT | Performed by: NURSE PRACTITIONER

## 2020-09-16 PROCEDURE — G8417 CALC BMI ABV UP PARAM F/U: HCPCS | Performed by: NURSE PRACTITIONER

## 2020-09-16 RX ORDER — CEPHALEXIN 500 MG/1
500 CAPSULE ORAL 3 TIMES DAILY
Qty: 30 CAPSULE | Refills: 0 | Status: SHIPPED | OUTPATIENT
Start: 2020-09-16 | End: 2020-09-26

## 2020-09-16 NOTE — PATIENT INSTRUCTIONS
Educated  Loratadine as directed  Tylenol as directed if needed  Start keflex, educated on keflex  Recheck for worsening, change or concern  Follow up with primary care in one week

## 2020-09-18 ASSESSMENT — ENCOUNTER SYMPTOMS
DIARRHEA: 0
SORE THROAT: 0
COUGH: 0
VOMITING: 0
RHINORRHEA: 0

## 2020-09-18 NOTE — PROGRESS NOTES
WITH MINERALS) tablet Take 1 tablet by mouth daily (Patient not taking: Reported on 11/23/2019) 90 tablet 3    Cranberry 500 MG CAPS Take 1 capsule by mouth 2 times daily OK to substitute (Patient not taking: Reported on 8/31/2020) 60 capsule 3     No current facility-administered medications for this visit. Allergies   Allergen Reactions    Diflucan [Fluconazole]     Flagyl [Metronidazole] Hives    Penicillins      When a child       Subjective:     Review of Systems   Constitutional: Negative for fever. HENT: Positive for ear discharge and ear pain. Negative for rhinorrhea and sore throat. Respiratory: Negative for cough. Gastrointestinal: Negative for diarrhea and vomiting. All other systems reviewed and are negative. Objective:      Physical Exam  Vitals signs and nursing note reviewed. Constitutional:       General: She is not in acute distress. Appearance: Normal appearance. She is well-developed. She is not ill-appearing, toxic-appearing or diaphoretic. HENT:      Head: Normocephalic and atraumatic. Right Ear: No drainage or swelling. A middle ear effusion is present. Tympanic membrane is not perforated or erythematous. Left Ear: No drainage or swelling. Tympanic membrane is injected and erythematous. Tympanic membrane is not perforated. Nose: Nose normal.      Mouth/Throat:      Mouth: Mucous membranes are moist.   Eyes:      General: No scleral icterus. Right eye: No discharge. Left eye: No discharge. Neck:      Musculoskeletal: Normal range of motion and neck supple. No neck rigidity. Cardiovascular:      Rate and Rhythm: Normal rate and regular rhythm. Heart sounds: Normal heart sounds. No murmur. Pulmonary:      Effort: Pulmonary effort is normal. No respiratory distress. Breath sounds: Normal breath sounds. No stridor. No wheezing, rhonchi or rales. Musculoskeletal: Normal range of motion.          General: No signs of

## 2020-10-14 ENCOUNTER — OFFICE VISIT (OUTPATIENT)
Dept: OBGYN CLINIC | Age: 35
End: 2020-10-14
Payer: MEDICARE

## 2020-10-14 ENCOUNTER — HOSPITAL ENCOUNTER (OUTPATIENT)
Age: 35
Setting detail: SPECIMEN
Discharge: HOME | End: 2020-10-14
Payer: MEDICARE

## 2020-10-14 VITALS
HEIGHT: 63 IN | RESPIRATION RATE: 16 BRPM | BODY MASS INDEX: 29.8 KG/M2 | HEART RATE: 83 BPM | WEIGHT: 168.2 LBS | DIASTOLIC BLOOD PRESSURE: 73 MMHG | SYSTOLIC BLOOD PRESSURE: 108 MMHG | TEMPERATURE: 99 F

## 2020-10-14 PROBLEM — Z01.419 WELL WOMAN EXAM: Status: ACTIVE | Noted: 2020-10-14

## 2020-10-14 PROCEDURE — G8417 CALC BMI ABV UP PARAM F/U: HCPCS | Performed by: SPECIALIST

## 2020-10-14 PROCEDURE — 99395 PREV VISIT EST AGE 18-39: CPT | Performed by: SPECIALIST

## 2020-10-14 PROCEDURE — G8427 DOCREV CUR MEDS BY ELIG CLIN: HCPCS | Performed by: SPECIALIST

## 2020-10-14 PROCEDURE — 1036F TOBACCO NON-USER: CPT | Performed by: SPECIALIST

## 2020-10-14 PROCEDURE — G8484 FLU IMMUNIZE NO ADMIN: HCPCS | Performed by: SPECIALIST

## 2020-10-14 RX ORDER — CROMOLYN SODIUM 40 MG/ML
1 SOLUTION/ DROPS OPHTHALMIC 2 TIMES DAILY PRN
COMMUNITY
Start: 2020-09-18 | End: 2021-05-19 | Stop reason: ALTCHOICE

## 2020-10-14 ASSESSMENT — ENCOUNTER SYMPTOMS
ABDOMINAL DISTENTION: 0
ABDOMINAL PAIN: 0
NAUSEA: 0
COUGH: 0
DIARRHEA: 0
CONSTIPATION: 0
EYE PAIN: 0
APNEA: 0
VOMITING: 0

## 2020-10-14 NOTE — PROGRESS NOTES
Subjective:      Patient ID: Joselo Yip is a 38 y.o. female. Chief Complaint   Patient presents with   [facility] Gynecologic Exam     Patient is here for annual pap. /73 (Site: Left Upper Arm, Position: Sitting)   Pulse 83   Temp 99 °F (37.2 °C) (Temporal)   Resp 16   Ht 5' 3\" (1.6 m)   Wt 168 lb 3.2 oz (76.3 kg)   LMP 09/27/2020 (Exact Date)   BMI 29.80 kg/m²   Patient's last menstrual period was 09/27/2020 (exact date). [id number]    Past Medical History:   Diagnosis Date    Allergic rhinitis     Anxiety     Chest pain     with anxiety    Chronic kidney disease     CKD (chronic kidney disease) stage 1, GFR 90 ml/min or greater 7/10/2018    Depression     Hashimoto's thyroiditis     Headache(784.0)     Heart murmur     Hematuria 2016    Irritable bowel syndrome with diarrhea 11/17/2016    Midline low back pain without sciatica 11/17/2016    Obesity (BMI 30-39. 9) 8/26/2017    Orthostatic hypotension     Panic attack as reaction to stress     certain anxiety medications will cause a panic attack    Panic disorder with agoraphobia 08/26/2017    some medications for anxiety will cause a panic attack.     Vertigo 10/2018    hx of     Current Outpatient Medications Ordered in Epic   Medication Sig Dispense Refill    cromolyn (OPTICROM) 4 % ophthalmic solution Place 1 drop into both eyes 2 times daily as needed      acidophilus (LACTINEX/FLORANEX) Take 1 packet by mouth daily 30 packet 5    Cranberry 500 MG CAPS Take 1 capsule by mouth 2 times daily OK to substitute 60 capsule 3    acetaminophen (TYLENOL) 500 MG tablet Take 1 tablet by mouth every 4 hours as needed for Pain 60 tablet 0    sertraline (ZOLOFT) 25 MG tablet Take 1 tablet by mouth daily (Patient not taking: Reported on 11/23/2019) 30 tablet 3    Multiple Vitamins-Minerals (MULTIVITAMIN WITH MINERALS) tablet Take 1 tablet by mouth daily (Patient not taking: Reported on 11/23/2019) 90 tablet 3     No current Epic-ordered Negative for ear discharge and ear pain. Eyes: Negative for pain and visual disturbance. Respiratory: Negative for apnea and cough. Cardiovascular: Negative for chest pain, palpitations and leg swelling. Gastrointestinal: Negative for abdominal distention, abdominal pain, constipation, diarrhea, nausea and vomiting. Endocrine: Negative. Genitourinary: Negative for difficulty urinating, dysuria, menstrual problem and pelvic pain. Swollen gland near vulva   Musculoskeletal: Negative for neck pain and neck stiffness. Skin: Negative. Neurological: Negative for light-headedness and numbness. Hematological: Negative. Does not bruise/bleed easily. Objective:   Physical Exam  Vitals signs and nursing note reviewed. Exam conducted with a chaperone present. Constitutional:       Appearance: She is well-developed. HENT:      Head: Normocephalic and atraumatic. Neck:      Thyroid: No thyroid mass or thyromegaly. Cardiovascular:      Rate and Rhythm: Normal rate and regular rhythm. Pulmonary:      Effort: Pulmonary effort is normal.      Breath sounds: Normal breath sounds. Chest:      Breasts:         Right: Normal. No inverted nipple, mass, nipple discharge, skin change or tenderness. Left: Normal. No inverted nipple, mass, nipple discharge, skin change or tenderness. Abdominal:      General: Bowel sounds are normal. There is no distension. Palpations: Abdomen is soft. There is no mass. Tenderness: There is no abdominal tenderness. There is no guarding or rebound. Hernia: There is no hernia in the left inguinal area. Genitourinary:     General: Normal vulva. Exam position: Supine. Labia:         Right: No rash or lesion. Left: No rash or lesion. Vagina: Normal. No signs of injury. No vaginal discharge or tenderness. Cervix: No cervical motion tenderness or discharge.       Uterus: Normal. Not enlarged, not fixed and not

## 2020-10-18 PROBLEM — Z11.3 SCREENING EXAMINATION FOR STD (SEXUALLY TRANSMITTED DISEASE): Status: ACTIVE | Noted: 2020-10-14

## 2020-10-18 LAB
HPV SOURCE: NORMAL
HPV, GENOTYPE 16: NOT DETECTED
HPV, GENOTYPE 18: NOT DETECTED
HPV, HIGH RISK OTHER: NOT DETECTED

## 2020-10-22 LAB — CYTOLOGY REPORT: NORMAL

## 2020-10-27 ENCOUNTER — TELEMEDICINE (OUTPATIENT)
Dept: FAMILY MEDICINE CLINIC | Age: 35
End: 2020-10-27
Payer: MEDICARE

## 2020-10-27 PROBLEM — F10.21 ALCOHOL USE DISORDER, MODERATE, IN SUSTAINED REMISSION (HCC): Status: RESOLVED | Noted: 2020-09-16 | Resolved: 2020-10-27

## 2020-10-27 PROBLEM — L23.9 ALLERGIC DERMATITIS: Status: ACTIVE | Noted: 2020-10-27

## 2020-10-27 PROBLEM — H69.93 EUSTACHIAN TUBE DISORDER, BILATERAL: Status: ACTIVE | Noted: 2020-10-27

## 2020-10-27 PROCEDURE — 99214 OFFICE O/P EST MOD 30 MIN: CPT | Performed by: FAMILY MEDICINE

## 2020-10-27 PROCEDURE — G8427 DOCREV CUR MEDS BY ELIG CLIN: HCPCS | Performed by: FAMILY MEDICINE

## 2020-10-27 RX ORDER — CETIRIZINE HYDROCHLORIDE 10 MG/1
10 TABLET ORAL DAILY
Qty: 30 TABLET | Refills: 0 | Status: SHIPPED | OUTPATIENT
Start: 2020-10-27 | End: 2020-12-29 | Stop reason: SDUPTHER

## 2020-10-27 RX ORDER — AMMONIUM LACTATE 12 G/100G
CREAM TOPICAL
Qty: 1 BOTTLE | Refills: 4 | Status: SHIPPED | OUTPATIENT
Start: 2020-10-27 | End: 2020-11-26

## 2020-10-27 RX ORDER — FLUTICASONE PROPIONATE 50 MCG
1 SPRAY, SUSPENSION (ML) NASAL 2 TIMES DAILY
Qty: 1 BOTTLE | Refills: 0
Start: 2020-10-27 | End: 2021-02-18

## 2020-10-27 RX ORDER — PSEUDOEPHEDRINE HCL 60 MG/1
60 TABLET ORAL EVERY 6 HOURS PRN
Qty: 60 TABLET | Refills: 0 | Status: SHIPPED | OUTPATIENT
Start: 2020-10-27 | End: 2020-11-16

## 2020-10-27 ASSESSMENT — ENCOUNTER SYMPTOMS
DIARRHEA: 0
COUGH: 0
VOMITING: 0
SINUS PRESSURE: 0
EYE PAIN: 0
WHEEZING: 0
CONSTIPATION: 0
COLOR CHANGE: 1
NAUSEA: 0
SORE THROAT: 0
ABDOMINAL PAIN: 0
CHEST TIGHTNESS: 0

## 2020-10-27 NOTE — PROGRESS NOTES
[address] [address]  Phone: [phone], Fax: [phone]    TELEHEALTH EVALUATION -- Audio/Visual ([facility])    Patient ID verified by me prior to start of this visit    Jessica Ariza (:  1985) has requested an audio/video evaluation for the following concern(s):  Chief Complaint   Patient presents with    Other     allergies    Skin Problem    URI      HPI:  Jessica Ariza is an established patient of Dr. Annalisa Nation. Patient has a history of MDD, Chronic allergic rhinits, eustachian tube dysfunction, and worsening allergic dermatitis. Patient with known MDD. She was started on Zoloft in November. She took the medication for a short period of time but stopped. Patient states she does not like taking any medication if she does not have to. She declined to discuss symtpms in the past. No SI, HI. However she does have pressured speech    Patient also c/o recurrent and worsening nasal congestion, right ear pain/pressure and recurrent ear infections in the past several months. She has a known non seasonal allergies and was taking some Claritin. She declined relief with therapy. She was also given some Flonase from an urgent care visit. SHe does not use medication due to the bad taste,    Patient also c/o some worsening dry skin and rash on both arms and some on chest and back. She had not used or taken anything for the rash. She denies any fever, chills, or fatigue. .    Review of Systems   Constitutional: Negative for chills, fatigue and fever. HENT: Positive for congestion, ear pain, hearing loss and postnasal drip. Negative for sinus pressure and sore throat. Eyes: Negative for pain. Respiratory: Negative for cough, chest tightness and wheezing. Cardiovascular: Negative for chest pain and palpitations.    Gastrointestinal: Negative for abdominal pain, constipation, diarrhea, nausea and vomiting. Endocrine: Negative. Genitourinary: Negative. Skin: Positive for color change and rash. Allergic/Immunologic: Positive for environmental allergies. Neurological: Negative for dizziness and headaches. Hematological: Negative for adenopathy. Psychiatric/Behavioral: The patient is nervous/anxious.         Patient Active Problem List    Diagnosis Date Noted    Panic disorder with agoraphobia 08/26/2017     Priority: High    Obesity (BMI 30-39.9) 08/26/2017     Priority: High    Eustachian tube disorder, bilateral 10/27/2020    Allergic dermatitis 10/27/2020    Screening examination for STD (sexually transmitted disease) 10/14/2020    Epigastric pain 08/05/2019    Chronic RLQ pain 08/05/2019    Alternating constipation and diarrhea 08/05/2019    Bilateral temporomandibular joint pain 08/05/2019    S/P Operative laparoscopy, JAMIE 5/2/19 05/02/2019    Pelvic pain in female 05/01/2019    Dizziness 10/31/2018    Eustachian tube dysfunction, right 08/12/2018    Allergic rhinitis due to allergen 08/12/2018    Lower abdominal pain 05/13/2018    Nocturia 05/13/2018    Moderate episode of recurrent major depressive disorder ([facility] [address] 05/13/2018    Psychophysiological insomnia 02/05/2018    Microscopic hematuria 02/05/2018    Murmur 09/26/2017    Hematuria 09/22/2017    DEREK (generalized anxiety disorder) 08/26/2017    PMS (premenstrual syndrome) 08/26/2017    Chronic fatigue 08/26/2017    Palpitations 08/26/2017    Chronic bilateral low back pain without sciatica 11/17/2016    Irritable bowel syndrome with both constipation and diarrhea 11/17/2016        Past Surgical History:   Procedure Laterality Date    APPENDECTOMY      LAPAROSCOPY N/A 5/2/2019    LAPAROSCOPY OPERATIVE, LYSIS OF ADHESIONS performed by Kiki Parsons MD at [address]     Family History   Problem Relation Age of Onset    Mental Illness Mother     Breast Cancer Maternal Grandmother     Heart Attack Maternal Grandfather     No Known Problems Father      Current Outpatient Medications   Medication Sig Dispense Refill    cetirizine (ZYRTEC) 10 MG tablet Take 1 tablet by mouth daily 30 tablet 0    fluticasone (FLONASE) 50 MCG/ACT nasal spray 1 spray by Each Nostril route 2 times daily 1 Bottle 0    ammonium lactate (LAC-HYDRIN) 12 % cream Apply topically as needed. 1 Bottle 4    betamethasone valerate (VALISONE) 0.1 % cream Apply topically 2 times daily. 1 Tube 2    pseudoephedrine (SUDAFED) 60 MG tablet Take 1 tablet by mouth every 6 hours as needed for Congestion 60 tablet 0    cromolyn (OPTICROM) 4 % ophthalmic solution Place 1 drop into both eyes 2 times daily as needed      acidophilus (LACTINEX/FLORANEX) Take 1 packet by mouth daily 30 packet 5    Cranberry 500 MG CAPS Take 1 capsule by mouth 2 times daily OK to substitute 60 capsule 3    acetaminophen (TYLENOL) 500 MG tablet Take 1 tablet by mouth every 4 hours as needed for Pain 60 tablet 0     No current facility-administered medications for this visit. Allergies   Allergen Reactions    Diflucan [Fluconazole]     Flagyl [Metronidazole] Hives    Penicillins      When a child        Social History     Tobacco Use    Smoking status: Former Smoker     Packs/day: 0.25     Years: 3.00     Pack years: 0.75     Types: Cigarettes     Last attempt to quit: 1/1/2005     Years since quitting: 15.8    Smokeless tobacco: Never Used   Substance Use Topics    Alcohol use: No     Comment: Hx alcohol abuse as teenager    Drug use: Not Currently     Types: Marijuana     Comment:  Hx, 15-17 yrs of age, smoked MJ daily 3 joints / day.          PHYSICAL EXAMINATION:  Vital Signs: (As obtained by patient/caregiver or practitioner observation)    Constitutional: [x] Appears well-developed and well-nourished [x] No apparent distress      [] Abnormal-   Mental status  [x] Alert and awake  [x] Oriented to person/place/time [x]Able to follow commands      Eyes:  EOM [x]  Normal  [] Abnormal-  Sclera  [x]  Normal  [] Abnormal -         Discharge [x]  None visible  [] Abnormal -    HENT:   [x] Normocephalic, atraumatic. [] Abnormal   [x] Mouth/Throat: Mucous membranes are moist.     External Ears [x] Normal  [] Abnormal-     Neck: [x] No visualized mass     Pulmonary/Chest: [x] Respiratory effort normal.  [x] No visualized signs of difficulty breathing or respiratory distress        [] Abnormal     Musculoskeletal:   [x] Normal gait with no signs of ataxia         [x] Normal range of motion of neck        [] Abnormal-     Neurological:        [x] No Facial Asymmetry (Cranial nerve 7 motor function) (limited exam to video visit)          [x] No gaze palsy        [] Abnormal-     Skin:        [x] No significant exanthematous lesions or discoloration noted on facial skin         [] Abnormal-     Psychiatric:       [x] Normal Affect [x] No Hallucinations        [x] Abnormal- Anxious, with pressured speech    Other pertinent observable physical exam findings- ++ flight of ideas, not in any distress  Lab Results   Component Value Date    WBC 4.3 09/11/2019    HGB 13.0 09/11/2019    HCT 38.4 09/11/2019    MCV 92.4 09/11/2019     09/11/2019     Lab Results   Component Value Date     08/01/2019    K 3.8 08/01/2019     08/01/2019    CO2 25 08/01/2019    BUN 11 08/01/2019    CREATININE 0.71 08/01/2019    GLUCOSE 80 08/01/2019    CALCIUM 8.9 08/01/2019        Due to this being a TeleHealth encounter, evaluation of the following organ systems is limited: Vitals/Constitutional/EENT/Resp/CV/GI//MS/Neuro/Skin/Heme-Lymph-Imm. ASSESSMENT/PLAN:  1. Chronic allergic rhinitis  Worsening  Continue with the same therapy   ADVISED TO:  Avoid known allergens/irritants. Stop smoking or avoid second hand smoke. Stay hydrated. Report for worsening symptoms    - cetirizine (ZYRTEC) 10 MG tablet; Take 1 tablet by mouth daily  Dispense: 30 tablet;  Refill: 0  - fluticasone (FLONASE) 50 MCG/ACT nasal spray; 1 spray by Each Nostril route 2 times daily  Dispense: 1 Bottle; Refill: 0  - pseudoephedrine (SUDAFED) 60 MG tablet; Take 1 tablet by mouth every 6 hours as needed for Congestion  Dispense: 60 tablet; Refill: 0    2. Eustachian tube disorder, bilateral  Worsening  Continue with the same therapy   ADVISED TO:  Avoid known allergens/irritants. Stop smoking or avoid second hand smoke. Stay hydrated. Report for worsening symptoms    - cetirizine (ZYRTEC) 10 MG tablet; Take 1 tablet by mouth daily  Dispense: 30 tablet; Refill: 0  - fluticasone (FLONASE) 50 MCG/ACT nasal spray; 1 spray by Each Nostril route 2 times daily  Dispense: 1 Bottle; Refill: 0  - pseudoephedrine (SUDAFED) 60 MG tablet; Take 1 tablet by mouth every 6 hours as needed for Congestion  Dispense: 60 tablet; Refill: 0    3. Allergic dermatitis  Worsening  DISCUSSED AND ADVISED TO  Use an emollient instead of regular lotion. If there is no emollient available, use any hypoallergenic lotions. Emollients and or lotions are best used after a warm shower. Do not apply to skin with cuts or bruises  EXAMPLES  Cetaphil  Cerave  Eucerin  Gold Bond    - ammonium lactate (LAC-HYDRIN) 12 % cream; Apply topically as needed. Dispense: 1 Bottle; Refill: 4  - betamethasone valerate (VALISONE) 0.1 % cream; Apply topically 2 times daily. Dispense: 1 Tube; Refill: 2    4. Moderate episode of recurrent major depressive disorder (HCC)  Stable  Improved? Stopped Zoloft. Monitor    Due to complexity of care and multiple co morbidities of this patient, she  was seen with total face to face time of *35 minutes. More than 50% of this visit was counseling. coordination and education. Controlled Substance Monitoring:  Acute and Chronic Pain Monitoring:   RX Monitoring 10/27/2020   Attestation -   Periodic Controlled Substance Monitoring No signs of potential drug abuse or diversion identified.      No orders of the defined types were placed in This is a telehealth visit that was performed with the originating site at Patient Location: home and provider Location of [place], [place]. Verbal consent to participate in video visit was obtained. Patient ID verified by me prior to start of this visit  I discussed with the patient the nature of our telehealth visits via interactive/real-time audio/video that:  - I would evaluate the patient and recommend diagnostics and treatments based on my assessment  - Our sessions are not being recorded and that personal health information is protected  - Our team would provide follow up care in person if/when the patient needs it. Pursuant to the emergency declaration under the [address] [place] [address], [address] [place] [address] authority and the [employer] and [employer] Act, this Virtual Visit was conducted with patient's (and/or legal guardian's) consent, to reduce the patient's risk of exposure to COVID-19 and provide necessary medical care. The patient (and/or legal guardian) has also been advised to contact this office for worsening conditions or problems, and seek emergency medical treatment and/or call 911 if deemed necessary. This note was completed by using the assistance of a speech-recognition program. However, inadvertent computerized transcription errors may be present. Although every effort was made to ensure accuracy, no guarantees can be provided that every mistake has been identified and corrected by editing.   Electronically signed by EL Rivera CNP on 10/26/20 at 9:04 PM EDT

## 2020-10-27 NOTE — PATIENT INSTRUCTIONS
mites.  · Look for signs of cockroaches. Cockroaches cause allergic reactions. Use cockroach baits to get rid of them. Then, clean your home well. Cockroaches like areas where grocery bags, newspapers, empty bottles, or cardboard boxes are stored. Do not keep these inside your home, and keep trash and food containers sealed. Seal off any spots where cockroaches might enter your home. · If you are allergic to mold, get rid of furniture, rugs, and drapes that smell musty. Check for mold in the bathroom. · If you are allergic to outdoor pollen or mold spores, use air-conditioning. Change or clean all filters every month. Keep windows closed. · If you are allergic to pollen, stay inside when pollen counts are high. Use a vacuum  with a HEPA filter or a double-thickness filter at least two times each week. · Stay inside when air pollution is bad. Avoid paint fumes, perfumes, and other strong odors. · Avoid conditions that make your allergies worse. Stay away from smoke. Do not smoke or let anyone else smoke in your house. Do not use fireplaces or wood-burning stoves. · If you are allergic to your pets, change the air filter in your furnace every month. Use high-efficiency filters. · If you are allergic to pet dander, keep pets outside or out of your bedroom. Old carpet and cloth furniture can hold a lot of animal dander. You may need to replace them. When should you call for help? Give an epinephrine shot if:    · You think you are having a severe allergic reaction.     · You have symptoms in more than one body area, such as mild nausea and an itchy mouth. After giving an epinephrine shot call 911, even if you feel better. Call 911 if:    · You have symptoms of a severe allergic reaction. These may include:  ? Sudden raised, red areas (hives) all over your body. ? Swelling of the throat, mouth, lips, or tongue. ? Trouble breathing. ? Passing out (losing consciousness).  Or you may feel very lightheaded or suddenly feel weak, confused, or restless.     · You have been given an epinephrine shot, even if you feel better. Call your doctor now or seek immediate medical care if:    · You have symptoms of an allergic reaction, such as:  ? A rash or hives (raised, red areas on the skin). ? Itching. ? Swelling. ? Belly pain, nausea, or vomiting. Watch closely for changes in your health, and be sure to contact your doctor if:    · You do not get better as expected. Where can you learn more? Go to [email or web address] org and sign in to your [employer] account. Enter O577 in the [employer] box to learn more about \"Allergies: Care Instructions. \"     If you do not have an account, please click on the \"Sign Up Now\" link. Current as of: June 29, 2020               Content Version: 12.6  © 0810-5964 [employer]. Care instructions adapted under license by [facility] ([facility]). If you have questions about a medical condition or this instruction, always ask your healthcare professional. [place] [address] any warranty or liability for your use of this information.

## 2020-11-17 PROBLEM — Z11.3 SCREENING EXAMINATION FOR STD (SEXUALLY TRANSMITTED DISEASE): Status: RESOLVED | Noted: 2020-10-14 | Resolved: 2020-11-17

## 2020-12-17 ENCOUNTER — TELEPHONE (OUTPATIENT)
Dept: FAMILY MEDICINE CLINIC | Age: 35
End: 2020-12-17

## 2020-12-17 NOTE — TELEPHONE ENCOUNTER
LM for patient to return call at the office to give dates when she thought she may have been exposed.

## 2020-12-17 NOTE — TELEPHONE ENCOUNTER
Patient requesting COVID antibody test be ordered so she can see if she ever had COVID. Please place order and route back to office pool.

## 2020-12-17 NOTE — TELEPHONE ENCOUNTER
The new order for the COVID-19 antibodies requires when did she have the illness the date   sorry but I can order it without the information    I pended the order and the questions I have to answer for this test

## 2020-12-29 RX ORDER — CETIRIZINE HYDROCHLORIDE 10 MG/1
10 TABLET ORAL DAILY
Qty: 30 TABLET | Refills: 0 | Status: SHIPPED | OUTPATIENT
Start: 2020-12-29 | End: 2021-01-28

## 2020-12-29 NOTE — TELEPHONE ENCOUNTER
Next Visit Date:  Future Appointments   Date Time Provider Irineo Rueda   1/5/2021  4:00 PM Ki Rubin MD fp [facility] [address] Maintenance   Topic Date Due    Hepatitis C screen  1985    DTaP/Tdap/Td vaccine (1 - Tdap) 06/23/2003    Flu vaccine (1) 09/01/2020    Cervical cancer screen  10/14/2025    HIV screen  Completed    Hepatitis A vaccine  Aged Out    Hepatitis B vaccine  Aged Out    Hib vaccine  Aged Out    Meningococcal (ACWY) vaccine  Aged Out    Pneumococcal 0-64 years Vaccine  Aged Out    Varicella vaccine  Discontinued       Hemoglobin A1C (%)   Date Value   01/16/2019 4.6             ( goal A1C is < 7)   No results found for: LABMICR  LDL Cholesterol (mg/dL)   Date Value   09/19/2017 105       (goal LDL is <100)   AST (U/L)   Date Value   08/01/2019 18     ALT (U/L)   Date Value   08/01/2019 17     BUN (mg/dL)   Date Value   08/01/2019 11     BP Readings from Last 3 Encounters:   10/14/20 108/73   08/31/20 103/70   12/17/19 91/62          (goal 120/80)    All Future Testing planned in [facility]  Lab Frequency Next Occurrence   XR CHEST STANDARD (2 VW) Once 08/24/2021   Urine Culture Once 04/10/2021               Patient Active Problem List:     Chronic bilateral low back pain without sciatica     Irritable bowel syndrome with both constipation and diarrhea     DEREK (generalized anxiety disorder)     PMS (premenstrual syndrome)     Chronic fatigue     Palpitations     Panic disorder with agoraphobia     Obesity (BMI 30-39. 9)     Hematuria     Murmur     Psychophysiological insomnia     Microscopic hematuria     Lower abdominal pain     Nocturia     Moderate episode of recurrent major depressive disorder (HCC)     Eustachian tube dysfunction, right     Allergic rhinitis due to allergen     Dizziness     Pelvic pain in female     S/P Operative laparoscopy, JAMIE 5/2/19     Epigastric pain     Chronic RLQ pain     Alternating constipation and diarrhea     Bilateral temporomandibular joint pain     Eustachian tube disorder, bilateral     Allergic dermatitis

## 2021-01-05 ENCOUNTER — TELEPHONE (OUTPATIENT)
Dept: FAMILY MEDICINE CLINIC | Age: 36
End: 2021-01-05

## 2021-01-05 ENCOUNTER — VIRTUAL VISIT (OUTPATIENT)
Dept: FAMILY MEDICINE CLINIC | Age: 36
End: 2021-01-05
Payer: MEDICARE

## 2021-01-05 DIAGNOSIS — Z13.6 SCREENING FOR CARDIOVASCULAR CONDITION: ICD-10-CM

## 2021-01-05 DIAGNOSIS — E06.3 HASHIMOTO'S THYROIDITIS: ICD-10-CM

## 2021-01-05 DIAGNOSIS — Z11.59 ENCOUNTER FOR SCREENING FOR OTHER VIRAL DISEASES: ICD-10-CM

## 2021-01-05 DIAGNOSIS — Z28.21 INFLUENZA VACCINATION DECLINED: ICD-10-CM

## 2021-01-05 DIAGNOSIS — F41.1 GAD (GENERALIZED ANXIETY DISORDER): ICD-10-CM

## 2021-01-05 DIAGNOSIS — M79.18 MUSCULOSKELETAL PAIN: ICD-10-CM

## 2021-01-05 DIAGNOSIS — F10.21 ALCOHOL USE DISORDER, MODERATE, IN SUSTAINED REMISSION (HCC): ICD-10-CM

## 2021-01-05 DIAGNOSIS — B37.31 YEAST VAGINITIS: ICD-10-CM

## 2021-01-05 DIAGNOSIS — R53.82 CHRONIC FATIGUE: Primary | ICD-10-CM

## 2021-01-05 DIAGNOSIS — F33.0 MILD EPISODE OF RECURRENT MAJOR DEPRESSIVE DISORDER (HCC): ICD-10-CM

## 2021-01-05 DIAGNOSIS — L29.9 PRURITIC DERMATITIS: ICD-10-CM

## 2021-01-05 DIAGNOSIS — R31.9 HEMATURIA, UNSPECIFIED TYPE: ICD-10-CM

## 2021-01-05 PROBLEM — R10.13 EPIGASTRIC PAIN: Status: RESOLVED | Noted: 2019-08-05 | Resolved: 2021-01-05

## 2021-01-05 PROBLEM — H69.81 EUSTACHIAN TUBE DYSFUNCTION, RIGHT: Status: RESOLVED | Noted: 2018-08-12 | Resolved: 2021-01-05

## 2021-01-05 PROBLEM — L30.8 PRURITIC DERMATITIS: Status: ACTIVE | Noted: 2021-01-05

## 2021-01-05 PROBLEM — R42 DIZZINESS: Status: RESOLVED | Noted: 2018-10-31 | Resolved: 2021-01-05

## 2021-01-05 PROBLEM — H69.91 EUSTACHIAN TUBE DYSFUNCTION, RIGHT: Status: RESOLVED | Noted: 2018-08-12 | Resolved: 2021-01-05

## 2021-01-05 PROCEDURE — 99214 OFFICE O/P EST MOD 30 MIN: CPT | Performed by: FAMILY MEDICINE

## 2021-01-05 RX ORDER — LANOLIN ALCOHOL/MO/W.PET/CERES
400 CREAM (GRAM) TOPICAL EVERY EVENING
Qty: 90 TABLET | Refills: 3 | Status: SHIPPED | OUTPATIENT
Start: 2021-01-05 | End: 2021-02-18

## 2021-01-05 RX ORDER — BROMPHENIRAMIN/PSEUDOEPHEDRINE 1-15MG/5ML
1 LIQUID (ML) ORAL EVERY EVENING
Qty: 21 G | Refills: 1 | Status: SHIPPED | OUTPATIENT
Start: 2021-01-05 | End: 2021-02-18

## 2021-01-05 RX ORDER — SERTRALINE HYDROCHLORIDE 25 MG/1
25 TABLET, FILM COATED ORAL
Qty: 30 TABLET | Refills: 0 | Status: SHIPPED | OUTPATIENT
Start: 2021-01-05 | End: 2021-05-13

## 2021-01-05 RX ORDER — CLOTRIMAZOLE AND BETAMETHASONE DIPROPIONATE 10; .64 MG/G; MG/G
CREAM TOPICAL
Qty: 1 TUBE | Refills: 3 | Status: SHIPPED | OUTPATIENT
Start: 2021-01-05 | End: 2021-02-18

## 2021-01-05 ASSESSMENT — PATIENT HEALTH QUESTIONNAIRE - PHQ9
SUM OF ALL RESPONSES TO PHQ9 QUESTIONS 1 & 2: 1
SUM OF ALL RESPONSES TO PHQ QUESTIONS 1-9: 1

## 2021-01-05 NOTE — TELEPHONE ENCOUNTER
Return in about 3 months (around 4/5/2021) for Face-2F-30mins PHYSICAL, VISION screen, PHQ9. Violetta Hassan

## 2021-01-05 NOTE — PROGRESS NOTES
Bette contacted provider via phoning our office. Patient requested office visit, was scheduled for virtual visit phone call , Horacio Rolon was unable to use doxy. me or MyChart, and we changed her to telephone encounter    Horacio Rolon is a 38 y.o. female evaluated via telephone on  21    Horacio Rolon (:  1985) is a 38 y.o. female,Established patient, here for evaluation of the following chief complaint(s): Fatigue, Depression, Rash, and Pain      ASSESSMENT/PLAN:    1. Chronic fatigue   worsening  Will do basic labs to rule out certain common medical conditions: hematologic, renal, hepatic, electrolyte imbalances, thyroid disorders ,inflammation and autoimmune disorder.    -     CBC; Future  -     Comprehensive Metabolic Panel; Future  -     Magnesium; Future  -     SHAYNE Screen with Reflex; Future  -     Sedimentation Rate; Future  -     C-Reactive Protein; Future  Start vitamin D 2000 units from over-the-counter    2. Hashimoto's thyroiditis  Unsure if stable or worsening, not on Synthroid at this time, just monitoring with labs per endocrinologist  We will recheck her blood work  -     TSH without Reflex; Future  -     T4, Free; Future  -     Anti-Thyroglobulin Antibody; Future  -     Thyroid Peroxidase Antibody; Future  3. Pruritic dermatitis  Failing to change as expected. -     clotrimazole-betamethasone (LOTRISONE) 1-0.05 % cream; Apply topically 2 times daily x 10 days, Disp-1 Tube, R-3, Normal  Discussed perfume free lotions or ointments, if persistent will need to see dermatologist, she will let us know    4. DEREK (generalized anxiety disorder)  Worsening  -    Start  sertraline (ZOLOFT) 25 MG tablet; Take 1 tablet by mouth Daily with supper Call for refills or dose adjustment. , Disp-30 tablet, R-0Normal  Deep breathing, using breath shyam, and avoid hyperventilation discussed  Continue cognitive behavioral therapy    5.  Mild episode of recurrent major depressive disorder questions answered. Patient voiced understanding. The patient's past medical,surgical, social, and family history as well as her current medications and allergies were reviewed as documented in today's encounter. Medications, labs, diagnostic studies, consultations and follow-up as documented in this encounter. Return in about 3 months (around 4/5/2021) for Face-2F-30mins PHYSICAL, VISION screen, PHQ9. .    Data Unavailable      Consent:  She is aware that that she may receive a bill for this telephone service, depending on her insurance coverage, and has provided verbal consent to proceed: Yes      Documentation and HPI:  I communicated with the patient about: Fatigue, Depression, Rash, and Pain       Bette reports:    Patient says she feels tired all the time, it hurts to be touched, feeling sore all the time. Feels cold all this time. Having chronic abdominal pain and cramps, diagnosed with IBS by GI. Has allergies to the dog, having a lot of runny nose, congested , clear mucus. Has been taking Zyrtec which helps, but when she does not take it she gets congested. Patient reports getting more \" Migraines headaches\" then before. Patient admits she is not sleeping well, has difficulty falling asleep, due to her anxiety and her mind does not shut up at all, did not follow sleep till 5 AM sometimes. She is afraid to try melatonin from over-the-counter. She denies fever, chills, night sweats. Patient says her fatigue is worsening. Saw Dr. Deep Liu and told her that she has Hashimoto. She denies trouble swallowing  Patient says she was told to observe for now, not on any medications. I am not able to see the antibodies done or an ultrasound of the thyroid either in [facility] or epic. Per note from Dr. Deep Liu, her TPO antibodies were high at 138 per his note from 12/19/2020 scanned in media.     She did have full hormonal work-up available in [facility]  FSH 6.2, prolactin 8.3, estradiol 90.3 on 10/23/2020    Free T3 2.7, free T4 0.94, TSH 2.04 on 8/20/2024 within normal limits  Lab Results   Component Value Date    TSH 2.13 08/01/2019     Vitamin D was 36.1 on 8/20/2020  She did try to take the high dosage vitamin D weekly,but it gives her nightmares so she had to stop it  I advised her to take from over-the-counter vitamin D 1000 or 2000 units and to take it with food, she understands        Lab Results   Component Value Date    SHAYNE NEGATIVE 08/13/2018     Lab Results   Component Value Date    CKTOTAL 139 08/13/2018     Rash not getting better  Patient reports getting dry patches on the body ,arms,legs and chest, reddish during shower, then after shower they go away    patient says there is no scale, and he looks like patches of dry skin  Using Lac-Hydrin lotion and coconut oil which she says he did have the rash on the legs but not on the arms and chest.  Patient reports easy bruising, and having bruise on the thigh \"for no reason\"  She denies having hives during shower. She denies having Ring worm, she says is really dry patches which are very itchy  She says she did not change her lotions or creams and she does not use perfume. Patient has severe anxiety and panic attacks when getting out of the house  Patient says her anxiety seems to be worse at nighttime, canot fall asleep till 5 am many times  Almost every night, she says she gets hot, tingly , anxious, intrusive thoughts  \"I know they are intrusive, but I cannot stop them\"      Every week seeing psychologist which it helps a little  I have prescribed her Zoloft last year but she still did not take it she is afraid of side effects. I discussed with her that she is in the cycle of anxiety, fatigue, insomnia which she needs to try to break it and to try the Zoloft. Depression   Denies suicidal ideation, plan or intent. PHQ-2 Over the past 2 weeks, how often have you been bothered by any of the following problems?   Little interest or pleasure in doing things: Not at all  Feeling down, depressed, or hopeless: Several days  PHQ-2 Score: 1  PHQ-9 Over the past 2 weeks, how often have you been bothered by any of the following problems? PHQ-9 Total Score: 1    Depression is actually improved with psychotherapy  PHQ Scores 1/5/2021 12/17/2019 11/1/2019 8/1/2019 4/25/2019 8/8/2018 2/5/2018   PHQ2 Score 1 3 3 2 3 2 2   PHQ9 Score 1 11 13 12 12 11 9       Patient has been having blood in the urine for a long time, she saw Dr. Beverly Brandon and Dr. Radha Vázquez and she did have for work-up. She also had laparoscopic intervention by GYN and she does not have endometriosis. She does not see the blood in the urine, and had extensive work-up and it was negative. Now she is worried about widespread muscle pain, worsening  Patient says it hurts to move around, she feels like an \"old person\", I explained to her that the blood work will rule out medical conditions which can cause muscle pain and if we do not find anything, then she might have fibromyalgia. She is agreeable to see a rheumatologist at this time. She says she has been getting more headaches lately, almost every other day, which seem to be debilitating. She is afraid to take any medications because it puts her in a panic attack. Due for lipids screening. Due to age, Magali Foster is due for lipids screening. Bette is not eating low fat diet. she is not exercising. she is not taking any over the counter supplements. Patient is due for hepatitis C screening. Magali Foster 's indication is age. Patient also reports is vaginitis and she cannot tolerate oral pills but she would like the cream instead  Patient reports a vaginal discharge is itchy and white. Patient has history of alcohol dependence, she does not drink alcohol anymore. I have recommended that this patient have a flu shot but she declines at this time. I have discussed the risks and benefits of this procedure with her.  The patient verbalizes understanding. Social History     Tobacco Use    Smoking status: Former Smoker     Packs/day: 0.25     Years: 3.00     Pack years: 0.75     Types: Cigarettes     Quit date: 2005     Years since quittin.0    Smokeless tobacco: Never Used   Substance Use Topics    Alcohol use: No     Comment: Velma alcohol abuse as teenager    Drug use: Not Currently     Types: Marijuana     Comment:  Velma, 15-17 yrs of age, smoked MJ daily 3 joints / day. \"Started drinking alcohol when she was 23years old. Between 3039-0740, she drank alcohol everyday-half a bottle of vodka daily; got drunk and had blackouts. She tried to 3 beers once in 2012. No DUIs and DTs. \"    The patient's past medical, surgical, social, and family history as well as her current medications and allergies were reviewed as documented in today's encounter. Prior to Visit Medications    Medication Sig Taking? Authorizing Provider   cetirizine (ZYRTEC) 10 MG tablet Take 1 tablet by mouth daily Yes Beth Taylor MD   fluticasone (FLONASE) 50 MCG/ACT nasal spray 1 spray by Each Nostril route 2 times daily Yes EL Sandra CNP   betamethasone valerate (VALISONE) 0.1 % cream Apply topically 2 times daily.  Yes EL Sandra CNP   cromolyn (OPTICROM) 4 % ophthalmic solution Place 1 drop into both eyes 2 times daily as needed Yes Historical Provider, MD   acidophilus (LACTINEX/FLORANEX) Take 1 packet by mouth daily Yes Beth Taylor MD   Cranberry 500 MG CAPS Take 1 capsule by mouth 2 times daily OK to substitute Yes Beth Taylor MD   acetaminophen (TYLENOL) 500 MG tablet Take 1 tablet by mouth every 4 hours as needed for Pain Yes Milena Saleh MD       -vital signs stable and within normal limits except overweight per BMI, last BMI 29.80 kg/M2  Patient-Reported Vitals 2021   Patient-Reported Weight 162 lbs   Patient-Reported Height 5ft 3 in        Orders Placed This Encounter Medications    sertraline (ZOLOFT) 25 MG tablet     Sig: Take 1 tablet by mouth Daily with supper Call for refills or dose adjustment. Dispense:  30 tablet     Refill:  0    magnesium oxide (MAG-OX) 400 (240 Mg) MG tablet     Sig: Take 1 tablet by mouth every evening Take with food     Dispense:  90 tablet     Refill:  3    clotrimazole-betamethasone (LOTRISONE) 1-0.05 % cream     Sig: Apply topically 2 times daily x 10 days     Dispense:  1 Tube     Refill:  3    clotrimazole (CLOTRIMAZOLE 3) 2 % CREA vaginal cream     Sig: Place 1 applicator vaginally every evening     Dispense:  21 g     Refill:  1       Orders Placed This Encounter   Procedures    CBC     Standing Status:   Future     Standing Expiration Date:   4/5/2021    Comprehensive Metabolic Panel     Standing Status:   Future     Standing Expiration Date:   4/5/2021    Lipid Panel     Standing Status:   Future     Standing Expiration Date:   4/5/2021     Order Specific Question:   Is Patient Fasting?/# of Hours     Answer:   8-10 Hours, water ok to drink    Magnesium     Standing Status:   Future     Standing Expiration Date:   4/5/2021    TSH without Reflex     Standing Status:   Future     Standing Expiration Date:   4/5/2021    T4, Free     Standing Status:   Future     Standing Expiration Date:   4/5/2021    SHAYNE Screen with Reflex     Standing Status:   Future     Standing Expiration Date:   1/5/2022    Hepatitis C Antibody     Standing Status:   Future     Standing Expiration Date:   1/5/2022    Urinalysis with Microscopic     Standing Status:   Future     Standing Expiration Date:   1/5/2022     Order Specific Question:   SPECIFY(EX-CATH,MIDSTREAM,CYSTO,ETC)?      Answer:   midstream    Sedimentation Rate     Standing Status:   Future     Standing Expiration Date:   1/5/2022    C-Reactive Protein     Standing Status:   Future     Standing Expiration Date:   1/6/2022    Anti-Thyroglobulin Antibody     Standing Status:   Future Standing Expiration Date:   1/5/2022    Thyroid Peroxidase Antibody     Standing Status:   Future     Standing Expiration Date:   1/5/2022   Bobby Maxwell MD, Rheumatology, [facility]     Referral Priority:   Routine     Referral Type:   Eval and Treat     Referral Reason:   Specialty Services Required     Referred to Provider:   Nissa Chavez MD     Requested Specialty:   Rheumatology     Number of Visits Requested:   1       There are no discontinued medications. I affirm this is a Patient Initiated Episode with an Established Patient who has not had a related appointment within my department in the past 7 days or scheduled within the next 24 hours. Patient location's was at home, and provider's location was at the primary practice location. No future appointments. Patient identification was verified at the start of the visit: Yes     On this date 01/05/21 I have spent 30 minutes reviewing previous notes, test results and face to face with the patient discussing the diagnosis and importance of compliance with the treatment plan. An electronic signature was used to authenticate this note.   Electronically signed by Mimi Ring MD on 1/5/2021  at 6:36 PM

## 2021-01-05 NOTE — PATIENT INSTRUCTIONS
Patient Education        Anxiety Disorder: Care Instructions  Your Care Instructions     Anxiety is a normal reaction to stress. Difficult situations can cause you to have symptoms such as sweaty palms and a nervous feeling. In an anxiety disorder, the symptoms are far more severe. Constant worry, muscle tension, trouble sleeping, nausea and diarrhea, and other symptoms can make normal daily activities difficult or impossible. These symptoms may occur for no reason, and they can affect your work, school, or social life. Medicines, counseling, and self-care can all help. Follow-up care is a key part of your treatment and safety. Be sure to make and go to all appointments, and call your doctor if you are having problems. It's also a good idea to know your test results and keep a list of the medicines you take. How can you care for yourself at home? · Take medicines exactly as directed. Call your doctor if you think you are having a problem with your medicine. · Go to your counseling sessions and follow-up appointments. · Recognize and accept your anxiety. Then, when you are in a situation that makes you anxious, say to yourself, \"This is not an emergency. I feel uncomfortable, but I am not in danger. I can keep going even if I feel anxious. \"  · Be kind to your body:  ? Relieve tension with exercise or a massage. ? Get enough rest.  ? Avoid alcohol, caffeine, nicotine, and illegal drugs. They can increase your anxiety level and cause sleep problems. ? Learn and do relaxation techniques. See below for more about these techniques. · Engage your mind. Get out and do something you enjoy. Go to a funny movie, or take a walk or hike. Plan your day. Having too much or too little to do can make you anxious. · Keep a record of your symptoms. Discuss your fears with a good friend or family member, or join a support group for people with similar problems. Talking to others sometimes relieves stress. · Get involved in social groups, or volunteer to help others. Being alone sometimes makes things seem worse than they are. · Get at least 30 minutes of exercise on most days of the week to relieve stress. Walking is a good choice. You also may want to do other activities, such as running, swimming, cycling, or playing tennis or team sports. Relaxation techniques  Do relaxation exercises 10 to 20 minutes a day. You can play soothing, relaxing music while you do them, if you wish. · Tell others in your house that you are going to do your relaxation exercises. Ask them not to disturb you. · Find a comfortable place, away from all distractions and noise. · Lie down on your back, or sit with your back straight. · Focus on your breathing. Make it slow and steady. · Breathe in through your nose. Breathe out through either your nose or mouth. · Breathe deeply, filling up the area between your navel and your rib cage. Breathe so that your belly goes up and down. · Do not hold your breath. · Breathe like this for 5 to 10 minutes. Notice the feeling of calmness throughout your whole body. As you continue to breathe slowly and deeply, relax by doing the following for another 5 to 10 minutes:  · Tighten and relax each muscle group in your body. You can begin at your toes and work your way up to your head. · Imagine your muscle groups relaxing and becoming heavy. · Empty your mind of all thoughts. · Let yourself relax more and more deeply. · Become aware of the state of calmness that surrounds you. · When your relaxation time is over, you can bring yourself back to alertness by moving your fingers and toes and then your hands and feet and then stretching and moving your entire body. Sometimes people fall asleep during relaxation, but they usually wake up shortly afterward. · Always give yourself time to return to full alertness before you drive a car or do anything that might cause an accident if you are not fully alert. Never play a relaxation tape while you drive a car. When should you call for help? Call 911 anytime you think you may need emergency care. For example, call if:    · You feel you cannot stop from hurting yourself or someone else. Keep the numbers for these national suicide hotlines: 1-094-957-TALK (5-723.228.5758) and 6-624-KTSAZTJ (0-654.242.8868). If you or someone you know talks about suicide or feeling hopeless, get help right away. Watch closely for changes in your health, and be sure to contact your doctor if:    · You have anxiety or fear that affects your life.     · You have symptoms of anxiety that are new or different from those you had before. Where can you learn more? Go to https://Luxanova.Cellceutix. org and sign in to your Shout TV account. Enter P754 in the [employer] box to learn more about \"Anxiety Disorder: Care Instructions. \"     If you do not have an account, please click on the \"Sign Up Now\" link. Current as of: January 31, 2020               Content Version: 12.6  © 1756-7732 [employer]. Care instructions adapted under license by [facility] ([facility]). If you have questions about a medical condition or this instruction, always ask your healthcare professional. [place] [address] any warranty or liability for your use of this information. Patient Education        Preventing Depression From Coming Back: Care Instructions  Overview     Some people have depression symptoms that come back. Depression often comes and goes during a lifetime. But there are many things you can do to keep it from coming back. Follow-up care is a key part of your treatment and safety. Be sure to make and go to all appointments, and call your doctor if you are having problems. It's also a good idea to know your test results and keep a list of the medicines you take. What do you need to know? Know your risk of depression coming back  Many things can make a person more likely to have depression again. These include having depression symptoms that continue after treatment, a previous episode of depression, and a history of childhood abuse or neglect. It is important to know your risk and to recognize warning signs of depression symptoms returning. Once you know these things, you will be better able to keep it from happening to you. Know the warning signs of depression returning  The two most common signs of depression coming back are:  · Feeling sad or hopeless. · Losing interest in your daily activities. You may have other symptoms, such as:  · You eat more or less than usual.  · You sleep too much or not enough. · You feel restless and unable to sit still. · You feel unable to move. · You feel tired all the time. · You feel unworthy or guilty without an obvious reason. · You have problems concentrating, remembering, or making decisions. · You think often about death or suicide. · You feel angry or have panic attacks. How can you care for yourself at home? · Take your medicine as prescribed. Call your doctor if you have any problems with your medicine. · Continue to take your medicine after your symptoms improve. Taking your medicine for at least 6 months after you feel better can help keep you from getting depressed again. If your depression keeps coming back, your doctor may recommend you take medicine even longer.   · You have any problems with your antidepressant medicines, such as side effects, or you are thinking about stopping your medicine.     · You are having manic behavior, such as having very high energy, needing less sleep than normal, or showing risky behavior such as spending money you don't have or abusing others verbally or physically. Where can you learn more? Go to https://Klone Labpepiceweb.Arstasis. org and sign in to your [employer] account. Enter M152 in the HASH box to learn more about \"Preventing Depression From Coming Back: Care Instructions. \"     If you do not have an account, please click on the \"Sign Up Now\" link. Current as of: January 31, 2020               Content Version: 12.6  © 5493-9752 [employer]. Care instructions adapted under license by [facility] ([facility]). If you have questions about a medical condition or this instruction, always ask your healthcare professional. [address] any warranty or liability for your use of this information.

## 2021-01-06 NOTE — TELEPHONE ENCOUNTER
Future Appointments   Date Time Provider Irineo Rueda   5/19/2021  3:45 PM [facility]

## 2021-01-18 ENCOUNTER — TELEPHONE (OUTPATIENT)
Dept: FAMILY MEDICINE CLINIC | Age: 36
End: 2021-01-18

## 2021-01-18 DIAGNOSIS — L23.9 ALLERGIC DERMATITIS: Primary | ICD-10-CM

## 2021-01-18 DIAGNOSIS — J30.9 CHRONIC ALLERGIC RHINITIS: ICD-10-CM

## 2021-01-18 NOTE — TELEPHONE ENCOUNTER
[facility] received a call from:Dr. Davila     Name of Caller:alfredo    Relationship to patient:na    Organization name: na    Best contact number:[phone]    Reason for call: Dr. Paulino Sanchez called and said the referral for this patient came over and cannot read it.  Refax to  came through not portrait format

## 2021-02-18 ENCOUNTER — VIRTUAL VISIT (OUTPATIENT)
Dept: FAMILY MEDICINE CLINIC | Age: 36
End: 2021-02-18
Payer: MEDICARE

## 2021-02-18 DIAGNOSIS — H10.33 ACUTE BACTERIAL CONJUNCTIVITIS OF BOTH EYES: ICD-10-CM

## 2021-02-18 DIAGNOSIS — B96.89 ACUTE BACTERIAL SINUSITIS: ICD-10-CM

## 2021-02-18 DIAGNOSIS — B37.31 YEAST VAGINITIS: ICD-10-CM

## 2021-02-18 DIAGNOSIS — K58.2 IRRITABLE BOWEL SYNDROME WITH BOTH CONSTIPATION AND DIARRHEA: ICD-10-CM

## 2021-02-18 DIAGNOSIS — R51.9 WORSENING HEADACHES: Primary | ICD-10-CM

## 2021-02-18 DIAGNOSIS — L29.9 PRURITIC DERMATITIS: ICD-10-CM

## 2021-02-18 DIAGNOSIS — J01.90 ACUTE BACTERIAL SINUSITIS: ICD-10-CM

## 2021-02-18 DIAGNOSIS — E06.3 HASHIMOTO'S THYROIDITIS: ICD-10-CM

## 2021-02-18 PROCEDURE — 99214 OFFICE O/P EST MOD 30 MIN: CPT | Performed by: FAMILY MEDICINE

## 2021-02-18 RX ORDER — OFLOXACIN 3 MG/ML
1 SOLUTION/ DROPS OPHTHALMIC 4 TIMES DAILY
Qty: 10 ML | Refills: 0 | Status: SHIPPED | OUTPATIENT
Start: 2021-02-18 | End: 2021-05-19 | Stop reason: ALTCHOICE

## 2021-02-18 RX ORDER — AMMONIUM LACTATE 12 G/100G
LOTION TOPICAL
Qty: 1 BOTTLE | Refills: 0 | Status: SHIPPED | OUTPATIENT
Start: 2021-02-18 | End: 2022-07-08

## 2021-02-18 RX ORDER — ONDANSETRON 4 MG/1
4 TABLET, FILM COATED ORAL EVERY 6 HOURS PRN
Qty: 24 TABLET | Refills: 0 | Status: SHIPPED | OUTPATIENT
Start: 2021-02-18 | End: 2021-02-24

## 2021-02-18 RX ORDER — BROMPHENIRAMIN/PSEUDOEPHEDRINE 1-15MG/5ML
1 LIQUID (ML) ORAL EVERY EVENING
Qty: 21 G | Refills: 1 | Status: SHIPPED | OUTPATIENT
Start: 2021-02-18 | End: 2021-03-03

## 2021-02-18 RX ORDER — CETIRIZINE HYDROCHLORIDE 10 MG/1
TABLET ORAL
COMMUNITY
Start: 2021-02-04 | End: 2021-08-17 | Stop reason: ALTCHOICE

## 2021-02-18 RX ORDER — PERPHENAZINE/AMITRIPTYLINE HCL 2 MG-10 MG
1 TABLET ORAL DAILY
Qty: 90 TABLET | Refills: 3 | Status: SHIPPED | OUTPATIENT
Start: 2021-02-18 | End: 2022-01-20 | Stop reason: SDUPTHER

## 2021-02-18 RX ORDER — AZITHROMYCIN 250 MG/1
TABLET, FILM COATED ORAL
Qty: 6 TABLET | Refills: 0 | Status: SHIPPED | OUTPATIENT
Start: 2021-02-18 | End: 2021-02-23

## 2021-02-18 RX ORDER — EPINEPHRINE 0.3 MG/.3ML
INJECTION SUBCUTANEOUS
COMMUNITY
Start: 2021-01-30

## 2021-02-18 RX ORDER — SOD CHLOR,BICARB/SQUEEZ BOTTLE
PACKET, WITH RINSE DEVICE NASAL
COMMUNITY
Start: 2021-01-29 | End: 2021-05-13

## 2021-02-18 RX ORDER — AZELASTINE HYDROCHLORIDE 137 UG/1
SPRAY, METERED NASAL
COMMUNITY
Start: 2021-02-08 | End: 2021-08-17 | Stop reason: ALTCHOICE

## 2021-02-18 RX ORDER — BUTALBITAL, ACETAMINOPHEN AND CAFFEINE 50; 325; 40 MG/1; MG/1; MG/1
1 TABLET ORAL EVERY 6 HOURS PRN
Qty: 30 TABLET | Refills: 0 | Status: SHIPPED | OUTPATIENT
Start: 2021-02-18 | End: 2022-07-12

## 2021-02-18 NOTE — PROGRESS NOTES
Bette contacted provider via telephone. Patient requested office visit, was scheduled for virtual visit phone call , Kiley Ferguson was unable to use doxy. me or MyChart. Kiley Ferguson is a 38 y.o. female evaluated via telephone on  21    Kiley Ferguson (:  1985) is a 38 y.o. female,Established patient, here for evaluation of the following chief complaint(s): Headache, Rash, Sinusitis, and Thyroid Problem      ASSESSMENT/PLAN:    1. Worsening headaches  worsening    -   start  butalbital-acetaminophen-caffeine (FIORICET, ESGIC) -40 MG per tablet; Take 1 tablet by mouth every 6 hours as needed for Headaches or Migraine MAX 3 DAYS/WEEK, Disp-30 tablet, R-0Normal  -  start   ondansetron (ZOFRAN) 4 MG tablet; Take 1 tablet by mouth every 6 hours as needed for Nausea or Vomiting, Disp-24 tablet, R-0Normal  -  start   Riboflavin 100 MG TABS; Take 100 mg by mouth daily, Disp-90 tablet, R-3Normal  -Continue magnesium 400 mg    -     Hemant Hoffman MD, Neurology, [place]    MRI brain on 19 within normal limits     2. Acute bacterial sinusitis  Failing to change as expected. -     azithromycin (ZITHROMAX) 250 MG tablet; 500 mg orally on day one followed by 250 mg daily on days two through five, Disp-6 tablet, R-0Normal  3. Pruritic dermatitis  Failing to change as expected. -     Sarita Calle MD Dermatology, [place]  - trial of    ammonium lactate (LAC-HYDRIN) 12 % lotion; Apply topically daily. , Disp-1 Bottle, R-0, Normal-I advised her to stop it if any side effects , The patient verbalizes understanding and agrees with the plan. 4. Hashimoto's thyroiditis  Stable  I advised her to have the labs done    5. Acute bacterial conjunctivitis of both eyes  Failing to change as expected. -     ofloxacin (OCUFLOX) 0.3 % solution; Place 1 drop into both eyes 4 times daily For 10 days, Disp-10 mL, R-0Normal  6.  Irritable bowel syndrome with both constipation and diarrhea  stable  -   start  [facility] Natural Products (CRANBERRY/PROBIOTIC) TABS; DAILY Starting Thu 2/18/2021, Disp-90 tablet, R-3, NormalPlease dispense according to patients insurance. 7. Yeast vaginitis  -     clotrimazole (CLOTRIMAZOLE 3) 2 % CREA vaginal cream; Place 1 applicator vaginally every evening, Disp-21 g, R-1Normal      Bette received counseling on the following healthy behaviors: nutrition, exercise and medication adherence  Reviewed prior labs and health maintenance  Discussed use, benefit, and side effects of prescribed medications. Barriers to medication compliance addressed. Patient given educational materials - see patient instructions  Was a self-tracking handout given in paper form or via [employer]? No  All patient questions answered. Patient voiced understanding. The patient's past medical,surgical, social, and family history as well as her current medications and allergies were reviewed as documented in today's encounter. Medications, labs, diagnostic studies, consultations and follow-up as documented in this encounter. Return for KEEP APPT. Data Unavailable      Consent:  She is aware that that she may receive a bill for this telephone service, depending on her insurance coverage, and has provided verbal consent to proceed: Yes      Documentation and HPI:  I communicated with the patient about: Headache, Rash, Sinusitis, and Thyroid Problem       Bette reports:    She has been having More headaches  Lasting more than 1 day at a   Usually on the right temple  Quality: pressure, pounding  Associated symptoms: nausea  Taking magnesium  Prior MRI brain was within normal limits       Bette complains of sinus infection  Seeing ENT  She is allergic to dust mites  Using  2 nasal sprays and loratadine and Zyrtec  \"I'm still getting the headaches\"  Reports sinus congestion, yellow mucus, sinus pain and pressure.    Using sinus rinse  \"Nothing helps, sometimes is hard to sleep, due to congestion\"     Bette complains of rash, \"Red patches and breaking up\" She is requesting a lotion. In the recent past I gave her lotrisone, she says it did not help. I explained to her is very hard for me to imagine what kind of rash it is, being a telephone encounter. She still insists to give her a lotion. I explained to her if any side effects from it to stop right away. The patient verbalizes understanding and agrees with the plan. Patient complains of \"pain in the ovaries and vaginal discharge\"  Advised her that she needs to see Dr. Bettye Smith  Patient continues to state that she might have another yeast infection and she just needs something for the yeast infection which will give it to her. Patient says she did see endocrinologist and she was recently diagnosed with Hashimoto thyroiditis. She is not on any medications. I do not have the additional work-up for Hashimoto thyroiditis and she is agreeable to have it done. She denies difficulty swallowing, tremors. Patient reports she continues to be anxious same as before. Patient reports discharge from both eyes, she says she has pink eyes for a few days, not getting better. Patient has known irritable bowel syndrome, with alternating diarrhea with constipation. She denies nausea and vomiting. She has chronic abdominal pain and frequent UTIs. She says  Cannot afford the probiotics and needs it refilled      The patient's past medical, surgical, social, and family history as well as her current medications and allergies were reviewed as documented in today's encounter. Prior to Visit Medications    Medication Sig Taking?  Authorizing Provider   Azelastine HCl 137 MCG/SPRAY SOLN USE 1 SPRAY IN EACH NOSTRIL TWICE DAILY Yes Historical Provider, MD   cetirizine (ZYRTEC) 10 MG tablet TAKE 1 TABLET BY MOUTH ONCE DAILY Yes Historical Provider, MD   EPINEPHrine (EPIPEN) 0.3 MG/0.3ML SOAJ injection  Yes Historical Provider, MD   Hypertonic Nasal Wash (SINUS RINSE) PACK  Yes Historical Provider, MD   magnesium oxide (MAG-OX) 400 (241.3 Mg) MG TABS tablet TAKE 1 TABLET BY MOUTH IN THE EVENING WITH FOOD Yes Historical Provider, MD   betamethasone valerate (VALISONE) 0.1 % cream Apply topically 2 times daily. Yes EL Sandra CNP   cromolyn (OPTICROM) 4 % ophthalmic solution Place 1 drop into both eyes 2 times daily as needed Yes Historical Provider, MD   acidophilus (LACTINEX/FLORANEX) Take 1 packet by mouth daily Yes Poncho Bridges MD   Cranberry 500 MG CAPS Take 1 capsule by mouth 2 times daily OK to substitute Yes Poncho Bridges MD   acetaminophen (TYLENOL) 500 MG tablet Take 1 tablet by mouth every 4 hours as needed for Pain Yes Oskar Hair MD   sertraline (ZOLOFT) 25 MG tablet Take 1 tablet by mouth Daily with supper Call for refills or dose adjustment. Patient not taking: Reported on 2/18/2021  Poncho Bridges MD   clotrimazole-betamethasone (LOTRISONE) 1-0.05 % cream Apply topically 2 times daily x 10 days  Patient not taking: Reported on 2/18/2021  Poncho Bridges MD   clotrimazole (CLOTRIMAZOLE 3) 2 % CREA vaginal cream Place 1 applicator vaginally every evening  Patient not taking: Reported on 2/18/2021  Poncho Bridges MD   fluticasone (FLONASE) 50 MCG/ACT nasal spray 1 spray by Each Nostril route 2 times daily  Patient not taking: Reported on 2/18/2021  EL Sandra CNP       -vital signs stable and within normal limits except Overweight per BMI 29.80 kg/m2. Patient-Reported Vitals 2/18/2021   Patient-Reported Weight 162 lbs   Patient-Reported Height 5 ft 3 in          Orders Placed This Encounter   Medications    ofloxacin (OCUFLOX) 0.3 % solution     Sig: Place 1 drop into both eyes 4 times daily For 10 days     Dispense:  10 mL     Refill:  0    Misc Natural Products (CRANBERRY/PROBIOTIC) TABS     Sig: Take 1 tablet by mouth daily Please dispense according to patients insurance. Dispense:  90 tablet     Refill:  3    butalbital-acetaminophen-caffeine (FIORICET, ESGIC) -40 MG per tablet     Sig: Take 1 tablet by mouth every 6 hours as needed for Headaches or Migraine MAX 3 DAYS/WEEK     Dispense:  30 tablet     Refill:  0    ondansetron (ZOFRAN) 4 MG tablet     Sig: Take 1 tablet by mouth every 6 hours as needed for Nausea or Vomiting     Dispense:  24 tablet     Refill:  0    Riboflavin 100 MG TABS     Sig: Take 100 mg by mouth daily     Dispense:  90 tablet     Refill:  3    azithromycin (ZITHROMAX) 250 MG tablet     Si mg orally on day one followed by 250 mg daily on days two through five     Dispense:  6 tablet     Refill:  0    clotrimazole (CLOTRIMAZOLE 3) 2 % CREA vaginal cream     Sig: Place 1 applicator vaginally every evening     Dispense:  21 g     Refill:  1    ammonium lactate (LAC-HYDRIN) 12 % lotion     Sig: Apply topically daily.      Dispense:  1 Bottle     Refill:  0       Orders Placed This Encounter   Procedures   Jeanette Martinez MD, Neurology, [place]     Referral Priority:   Routine     Referral Type:   Eval and Treat     Referral Reason:   Specialty Services Required     Referred to Provider:   Carlos Ayoub MD     Requested Specialty:   Neurology     Number of Visits Requested:   Norma Bennett - Yanci Torres MD Dermatology, [place]     Referral Priority:   Routine     Referral Type:   Eval and Treat     Referral Reason:   Specialty Services Required     Referred to Provider:   Sherly Patel MD     Requested Specialty:   Dermatology     Number of Visits Requested:   1       Medications Discontinued During This Encounter   Medication Reason    magnesium oxide (MAG-OX) 400 (240 Mg) MG tablet Patient Choice    clotrimazole (CLOTRIMAZOLE 3) 2 % CREA vaginal cream Patient Choice    magnesium oxide (MAG-OX) 400 (240 Mg) MG tablet Patient Choice    fluticasone (FLONASE) 50 MCG/ACT nasal spray Patient Choice    clotrimazole-betamethasone (LOTRISONE) 1-0.05 % cream Patient Choice    acidophilus (LACTINEX/FLORANEX) Cost of medication    Cranberry 500 MG CAPS Cost of medication       I affirm this is a Patient Initiated Episode with an Established Patient who has not had a related appointment within my department in the past 7 days or scheduled within the next 24 hours. Patient location's was at home, and provider's location was at the primary practice location. Future Appointments   Date Time Provider Irineo Rueda   3/3/2021  3:40 PM Tony Sage MD [facility] [place]   5/19/2021  3:45 PM [facility]        Patient identification was verified at the start of the visit: Yes    On this date 02/18/21 I have spent 30 minutes reviewing previous notes, test results and face to face with the patient discussing the diagnosis and importance of compliance with the treatment plan as well as documenting on the day of the visit. An electronic signature was used to authenticate this note.   Electronically signed by Sigifredo Mejia MD on 2/21/2021  at 9:25 PM

## 2021-02-21 PROBLEM — R51.9 WORSENING HEADACHES: Status: ACTIVE | Noted: 2021-02-21

## 2021-03-03 ENCOUNTER — OFFICE VISIT (OUTPATIENT)
Dept: NEUROLOGY | Age: 36
End: 2021-03-03
Payer: MEDICARE

## 2021-03-03 VITALS
TEMPERATURE: 97.9 F | SYSTOLIC BLOOD PRESSURE: 104 MMHG | BODY MASS INDEX: 29.53 KG/M2 | HEART RATE: 78 BPM | DIASTOLIC BLOOD PRESSURE: 64 MMHG | HEIGHT: 64 IN | WEIGHT: 173 LBS

## 2021-03-03 DIAGNOSIS — G43.011 INTRACTABLE MIGRAINE WITHOUT AURA AND WITH STATUS MIGRAINOSUS: Primary | ICD-10-CM

## 2021-03-03 PROCEDURE — G8484 FLU IMMUNIZE NO ADMIN: HCPCS | Performed by: PSYCHIATRY & NEUROLOGY

## 2021-03-03 PROCEDURE — G8417 CALC BMI ABV UP PARAM F/U: HCPCS | Performed by: PSYCHIATRY & NEUROLOGY

## 2021-03-03 PROCEDURE — 1036F TOBACCO NON-USER: CPT | Performed by: PSYCHIATRY & NEUROLOGY

## 2021-03-03 PROCEDURE — 99204 OFFICE O/P NEW MOD 45 MIN: CPT | Performed by: PSYCHIATRY & NEUROLOGY

## 2021-03-03 PROCEDURE — G8427 DOCREV CUR MEDS BY ELIG CLIN: HCPCS | Performed by: PSYCHIATRY & NEUROLOGY

## 2021-03-03 RX ORDER — FREMANEZUMAB-VFRM 225 MG/1.5ML
225 INJECTION SUBCUTANEOUS
Qty: 1 SYRINGE | Refills: 5 | Status: SHIPPED | OUTPATIENT
Start: 2021-03-03 | End: 2021-08-17

## 2021-03-03 NOTE — PROGRESS NOTES
[facility]  Offices: [address], [address] [facility]  [address], [address], [place], [address] [place] [address]  [address] [place], [place] [address]fr[place], [facility], [facility] [address]  Phone: [phone]  Fax: [phone]       MD Piedad Schuler MD Ahmed B. Rosalyn Knack, MD Olympia Rolls, MD Enis Scriver, MD  Alisa Holstein, CNP      New Patient Consultation    3/3/2021    PATIENT NAME: Kylee Sloan  PATIENT MRN: [id number]  PRIMARY CARE PHYSICIAN: Joanna Garber MD  REFERRED BY: Maurilio       HISTORY OF PRESENT ILLNESS     Patient is a 38 y.o. female who comes in for evaluation for headaches. The headaches started almost 6 years ago and is predominantly located in the R temporal  region. They radiate to the rest of the head . Patient describes the quality of pain as combination of dull, sharp and throbbing. The patient has been having these headaches at a frequency of 2 days a week, severity is 3-10/10. The headaches typically last for 6-36 hrs. These headaches are preceded by a prodrome of grogginess/fogginess. Patient denies any typical headache triggers. The headaches are associated with nausea, vomiting, photophobia, phonophobia and osmophobia. The headaches are aggravated by bright light, physical activity and loud noise. Headaches are relieved by rest. Patient denies any associated blurring of vision, double vision, focal weakness, numbness or tingling. Other significant medical conditions include CKD, Hashimoto's. .  There is a family history of migraine in patient's sister and mother. Patient denies serious head trauma, concussion, CNS infection or chemical-toxic exposure.     Prior Neuroimaging:  MRI brain w and w/o 1/2019: unremarkable    Current abortive meds: fioricet (partial relief)  Current prophylactic meds: magnesium ox 400 mg, riboflavin 400 mg  Previous abortive medications tried: tylenol XR (partial relief)  Previous prophylactic medications tried: sertraline    Caffeine intake: <1 cup coffee daily  Smoking: none      PAST MEDICAL HISTORY:         Diagnosis Date    Allergic rhinitis     Anxiety     Chest pain     with anxiety    Chronic kidney disease     CKD (chronic kidney disease) stage 1, GFR 90 ml/min or greater 7/10/2018    Depression     Hashimoto's thyroiditis     Headache(784.0)     Heart murmur     Hematuria     Irritable bowel syndrome with diarrhea 2016    Midline low back pain without sciatica 2016    Obesity (BMI 30-39. 9) 2017    Orthostatic hypotension     Panic attack as reaction to stress     certain anxiety medications will cause a panic attack    Panic disorder with agoraphobia 2017    some medications for anxiety will cause a panic attack.  Vertigo 10/2018    hx of        PAST SURGICAL HISTORY:         Procedure Laterality Date    APPENDECTOMY      LAPAROSCOPY N/A 2019    LAPAROSCOPY OPERATIVE, LYSIS OF ADHESIONS performed by Iver Barthel, MD at [address] [place] [address]:     Social History     Socioeconomic History    Marital status: Single     Spouse name: Not on file    Number of children: Not on file    Years of education: Not on file    Highest education level: Not on file   Occupational History    Not on file   Social Needs    Financial resource strain: Not on file    Food insecurity     Worry: Not on file     Inability: Not on file    Transportation needs     Medical: Not on file     Non-medical: Not on file   Tobacco Use    Smoking status: Former Smoker     Packs/day: 0.25     Years: 3.00     Pack years: 0.75     Types: Cigarettes     Quit date: 2005     Years since quittin.1    Smokeless tobacco: Never Used   Substance and Sexual Activity    Alcohol use: No     Comment: Hx alcohol abuse as teenager    Drug use: Not Currently     Types: Marijuana     Comment:  Hx, 15-17 yrs of age, smoked MJ daily 3 joints / day.      Sexual activity: Yes Partners: Male     Birth control/protection: Condom   Lifestyle    Physical activity     Days per week: Not on file     Minutes per session: Not on file    Stress: Not on file   Relationships    Social connections     Talks on phone: Not on file     Gets together: Not on file     Attends Orthodox service: Not on file     Active member of club or organization: Not on file     Attends meetings of clubs or organizations: Not on file     Relationship status: Not on file    Intimate partner violence     Fear of current or ex partner: Not on file     Emotionally abused: Not on file     Physically abused: Not on file     Forced sexual activity: Not on file   Other Topics Concern    Not on file   Social History Narrative    Not on file       CURRENT MEDICATIONS:     Current Outpatient Medications   Medication Sig Dispense Refill    Fremanezumab-vfrm (AJOVY) 225 MG/1.5ML SOSY Inject 225 mg into the skin every 30 days 1 Syringe 5    Azelastine HCl 137 MCG/SPRAY SOLN USE 1 SPRAY IN EACH NOSTRIL TWICE DAILY      cetirizine (ZYRTEC) 10 MG tablet TAKE 1 TABLET BY MOUTH ONCE DAILY      EPINEPHrine (EPIPEN) 0.3 MG/0.3ML SOAJ injection       Hypertonic Nasal Wash (SINUS RINSE) PACK       magnesium oxide (MAG-OX) 400 (241.3 Mg) MG TABS tablet TAKE 1 TABLET BY MOUTH IN THE EVENING WITH FOOD      ofloxacin (OCUFLOX) 0.3 % solution Place 1 drop into both eyes 4 times daily For 10 days 10 mL 0    Misc Natural Products (CRANBERRY/PROBIOTIC) TABS Take 1 tablet by mouth daily Please dispense according to patients insurance. 90 tablet 3    butalbital-acetaminophen-caffeine (FIORICET, ESGIC) -40 MG per tablet Take 1 tablet by mouth every 6 hours as needed for Headaches or Migraine MAX 3 DAYS/WEEK 30 tablet 0    Riboflavin 100 MG TABS Take 100 mg by mouth daily 90 tablet 3    ammonium lactate (LAC-HYDRIN) 12 % lotion Apply topically daily.  1 Bottle 0    betamethasone valerate (VALISONE) 0.1 % cream Apply topically 2 times daily. 1 Tube 2    cromolyn (OPTICROM) 4 % ophthalmic solution Place 1 drop into both eyes 2 times daily as needed      acetaminophen (TYLENOL) 500 MG tablet Take 1 tablet by mouth every 4 hours as needed for Pain 60 tablet 0    sertraline (ZOLOFT) 25 MG tablet Take 1 tablet by mouth Daily with supper Call for refills or dose adjustment. (Patient not taking: Reported on 2/18/2021) 30 tablet 0     No current facility-administered medications for this visit. ALLERGIES:     Allergies   Allergen Reactions    Diflucan [Fluconazole]     Flagyl [Metronidazole] Hives    Penicillins      When a child         FAMILY MEDICAL HISTORY:     Family History   Problem Relation Age of Onset    Mental Illness Mother     Breast Cancer Maternal Grandmother     Heart Attack Maternal Grandfather     No Known Problems Father           REVIEW OF SYSTEMS:     12 point review of systems unremarkable other than for those mentioned above    PHYSICAL EXAMINATION:     /64 (Site: Right Upper Arm, Position: Sitting, Cuff Size: Medium Adult)   Pulse 78   Temp 97.9 °F (36.6 °C)   Ht 5' 3.5\" (1.613 m)   Wt 173 lb (78.5 kg)   BMI 30.16 kg/m²                                          .                                                                                                     General Appearance:  Alert, cooperative, no signs of distress, appears stated age   Head:  Normocephalic, no signs of trauma   Eyes:  Conjunctiva/corneas clear;  eyelids intact   Ears:  Normal external ear and canals   Nose: Nares normal, mucosa normal, no drainage    Throat: Lips and tongue normal; teeth normal;  gums normal   Neck: Supple, intact flexion, extension and rotation;   trachea midline;  no adenopathy;   thyroid: not enlarged;   no carotid pulse abnormality   Back:   Symmetric, no curvature, ROM adequate   Lungs:   Respirations unlabored   Chest Wall:  No deformity   Heart:  Regular rate and rhythm                 Extremities: concern of side effects, has recently tried magnesium oxide, high-dose riboflavin and sertraline with no success. 1.  Discussed treatment options with the patient. Will start Ajovy 225 mg SQ monthly for prophylaxis. Side effects, risks and benefits discussed in detail with the patient. 2.  Continue Fioricet as needed for rescue. Advised patient to take it no more than twice a week to avoid rebound headaches. She expressed understanding    Follow-up in 10 to 12 weeks         Signed: Rohini Elliott MD  Neurology and Sleep Medicine  [facility]    Please note that this chart was generated using voice recognition Dragon dictation software. Although every effort was made to ensure the accuracy of this automated transcription, some errors in transcription may have occurred.

## 2021-03-08 ENCOUNTER — TELEPHONE (OUTPATIENT)
Dept: NEUROLOGY | Age: 36
End: 2021-03-08

## 2021-03-09 ENCOUNTER — TELEPHONE (OUTPATIENT)
Dept: NEUROLOGY | Age: 36
End: 2021-03-09

## 2021-03-19 ENCOUNTER — TELEPHONE (OUTPATIENT)
Dept: NEUROLOGY | Age: 36
End: 2021-03-19

## 2021-05-13 ENCOUNTER — VIRTUAL VISIT (OUTPATIENT)
Dept: NEUROLOGY | Age: 36
End: 2021-05-13
Payer: MEDICARE

## 2021-05-13 ENCOUNTER — HOSPITAL ENCOUNTER (OUTPATIENT)
Age: 36
Setting detail: SPECIMEN
Discharge: HOME | End: 2021-05-13
Payer: MEDICARE

## 2021-05-13 ENCOUNTER — OFFICE VISIT (OUTPATIENT)
Dept: OBGYN CLINIC | Age: 36
End: 2021-05-13
Payer: MEDICARE

## 2021-05-13 VITALS
HEART RATE: 81 BPM | DIASTOLIC BLOOD PRESSURE: 70 MMHG | SYSTOLIC BLOOD PRESSURE: 104 MMHG | BODY MASS INDEX: 31.21 KG/M2 | WEIGHT: 179 LBS

## 2021-05-13 DIAGNOSIS — G43.119 INTRACTABLE MIGRAINE WITH AURA WITHOUT STATUS MIGRAINOSUS: ICD-10-CM

## 2021-05-13 DIAGNOSIS — N89.8 VAGINAL DISCHARGE: ICD-10-CM

## 2021-05-13 DIAGNOSIS — N89.8 VAGINA ITCHING: ICD-10-CM

## 2021-05-13 DIAGNOSIS — N76.0 ACUTE VAGINITIS: ICD-10-CM

## 2021-05-13 DIAGNOSIS — N89.8 VAGINAL DISCHARGE: Primary | ICD-10-CM

## 2021-05-13 LAB
DIRECT EXAM: NORMAL
Lab: NORMAL
SPECIMEN DESCRIPTION: NORMAL

## 2021-05-13 PROCEDURE — G8417 CALC BMI ABV UP PARAM F/U: HCPCS | Performed by: NURSE PRACTITIONER

## 2021-05-13 PROCEDURE — 99213 OFFICE O/P EST LOW 20 MIN: CPT | Performed by: CLINICAL NURSE SPECIALIST

## 2021-05-13 PROCEDURE — G8427 DOCREV CUR MEDS BY ELIG CLIN: HCPCS | Performed by: CLINICAL NURSE SPECIALIST

## 2021-05-13 PROCEDURE — 99214 OFFICE O/P EST MOD 30 MIN: CPT | Performed by: NURSE PRACTITIONER

## 2021-05-13 PROCEDURE — G8417 CALC BMI ABV UP PARAM F/U: HCPCS | Performed by: CLINICAL NURSE SPECIALIST

## 2021-05-13 PROCEDURE — G8427 DOCREV CUR MEDS BY ELIG CLIN: HCPCS | Performed by: NURSE PRACTITIONER

## 2021-05-13 PROCEDURE — 1036F TOBACCO NON-USER: CPT | Performed by: CLINICAL NURSE SPECIALIST

## 2021-05-13 PROCEDURE — 1036F TOBACCO NON-USER: CPT | Performed by: NURSE PRACTITIONER

## 2021-05-13 RX ORDER — RIZATRIPTAN BENZOATE 10 MG/1
10 TABLET ORAL
Qty: 12 TABLET | Refills: 5 | Status: SHIPPED | OUTPATIENT
Start: 2021-05-13 | End: 2021-08-17 | Stop reason: ALTCHOICE

## 2021-05-13 RX ORDER — CLINDAMYCIN HYDROCHLORIDE 300 MG/1
300 CAPSULE ORAL 2 TIMES DAILY
Qty: 14 CAPSULE | Refills: 0 | Status: SHIPPED | OUTPATIENT
Start: 2021-05-13 | End: 2021-05-20 | Stop reason: SDUPTHER

## 2021-05-13 ASSESSMENT — ENCOUNTER SYMPTOMS
GASTROINTESTINAL NEGATIVE: 1
RESPIRATORY NEGATIVE: 1
EYES NEGATIVE: 1
ALLERGIC/IMMUNOLOGIC NEGATIVE: 1

## 2021-05-13 NOTE — PROGRESS NOTES
Subjective:      Patient ID:  Laura Dee is a 38 y.o. female who presents for   Chief Complaint   Patient presents with    Vaginal Discharge       HPI     Patient is a 39 yo female who presents for vaginal itching and yellow discharge for the past 4 days. Patient denies any odor or irritation. Review of Systems   Constitutional: Negative for chills and fever. HENT: Negative. Eyes: Negative. Respiratory: Negative. Cardiovascular: Negative. Gastrointestinal: Negative. Endocrine: Negative. Genitourinary: Positive for vaginal discharge (yellow discharge with itching for 4 days). Negative for dysuria and menstrual problem. Musculoskeletal: Negative. Skin: Negative. Allergic/Immunologic: Negative. Neurological: Negative. Hematological: Negative. Psychiatric/Behavioral: Negative. /70 (Site: Right Upper Arm, Position: Sitting)   Pulse 81   Wt 179 lb (81.2 kg)   LMP 04/21/2021 (Approximate)   BMI 31.21 kg/m²    Patient's last menstrual period was 04/21/2021 (approximate). Family History   Problem Relation Age of Onset    Mental Illness Mother     Breast Cancer Maternal Grandmother     Heart Attack Maternal Grandfather     No Known Problems Father       Past Medical History:   Diagnosis Date    Allergic rhinitis     Anxiety     Chest pain     with anxiety    Chronic kidney disease     CKD (chronic kidney disease) stage 1, GFR 90 ml/min or greater 7/10/2018    Depression     Hashimoto's thyroiditis     Headache(784.0)     Heart murmur     Hematuria 2016    Irritable bowel syndrome with diarrhea 11/17/2016    Midline low back pain without sciatica 11/17/2016    Obesity (BMI 30-39. 9) 8/26/2017    Orthostatic hypotension     Panic attack as reaction to stress     certain anxiety medications will cause a panic attack    Panic disorder with agoraphobia 08/26/2017    some medications for anxiety will cause a panic attack.     Vertigo 10/2018 mouth 2 times daily for 7 days 14 capsule 0    terconazole (TERAZOL 7) 0.4 % vaginal cream One applicator intravaginally at bedtime for 7 days. 1 Tube 0    Azelastine HCl 137 MCG/SPRAY SOLN USE 1 SPRAY IN EACH NOSTRIL TWICE DAILY      cetirizine (ZYRTEC) 10 MG tablet TAKE 1 TABLET BY MOUTH ONCE DAILY      EPINEPHrine (EPIPEN) 0.3 MG/0.3ML SOAJ injection       magnesium oxide (MAG-OX) 400 (241.3 Mg) MG TABS tablet TAKE 1 TABLET BY MOUTH IN THE EVENING WITH FOOD      ofloxacin (OCUFLOX) 0.3 % solution Place 1 drop into both eyes 4 times daily For 10 days 10 mL 0    Misc Natural Products (CRANBERRY/PROBIOTIC) TABS Take 1 tablet by mouth daily Please dispense according to patients insurance. 90 tablet 3    butalbital-acetaminophen-caffeine (FIORICET, ESGIC) -40 MG per tablet Take 1 tablet by mouth every 6 hours as needed for Headaches or Migraine MAX 3 DAYS/WEEK 30 tablet 0    Riboflavin 100 MG TABS Take 100 mg by mouth daily 90 tablet 3    ammonium lactate (LAC-HYDRIN) 12 % lotion Apply topically daily. 1 Bottle 0    cromolyn (OPTICROM) 4 % ophthalmic solution Place 1 drop into both eyes 2 times daily as needed      acetaminophen (TYLENOL) 500 MG tablet Take 1 tablet by mouth every 4 hours as needed for Pain 60 tablet 0    Fremanezumab-vfrm (AJOVY) 225 MG/1.5ML SOSY Inject 225 mg into the skin every 30 days (Patient not taking: Reported on 5/13/2021) 1 Syringe 5    betamethasone valerate (VALISONE) 0.1 % cream Apply topically 2 times daily. (Patient not taking: Reported on 5/13/2021) 1 Tube 2     No current facility-administered medications for this visit. Objective:   Physical Exam  Vitals signs reviewed. Constitutional:       Appearance: She is well-developed. HENT:      Head: Normocephalic and atraumatic. Eyes:      Conjunctiva/sclera: Conjunctivae normal.   Neck:      Musculoskeletal: Normal range of motion and neck supple.    Cardiovascular:      Rate and Rhythm: Normal rate and regular rhythm. Pulmonary:      Effort: Pulmonary effort is normal.      Breath sounds: Normal breath sounds. Abdominal:      General: Bowel sounds are normal.   Genitourinary:     Vagina: Vaginal discharge (thin yellow discharge) present. Musculoskeletal: Normal range of motion. Skin:     General: Skin is warm and dry. Neurological:      Mental Status: She is oriented to person, place, and time. Psychiatric:         Behavior: Behavior normal.         Thought Content: Thought content normal.         Judgment: Judgment normal.         Assessment:      Diagnosis Orders   1. Vaginal discharge  C.trachomatis N.gonorrhoeae DNA    VAGINITIS DNA PROBE   2. Vagina itching     3. Acute vaginitis  clindamycin (CLEOCIN) 300 MG capsule    terconazole (TERAZOL 7) 0.4 % vaginal cream           Plan:      Return for as needed. Patient was seen with total face to face time of 20 minutes. More than 50% of this visit was on counseling andeducation regarding the problems listed below and her options. She was also counseled on her preventative health maintenance recommendations and follow-up.     Electronically signed by: Frida Trevino CNP

## 2021-05-13 NOTE — PROGRESS NOTES
[facility]  [address], [place], [address] [facility]  Phone: [phone]  Fax: [phone]    MD Shannon Crawford MD Ahmed B. Darral Friedlander, MD Beatris Birkenhead, LUCINDA    TELEHEALTH VISIT        5/13/2021      HISTORY OF PRESENT ILLNESS:       I had the pleasure of seeing Edgar Negron, who returns via Telehealth for continued neurologic care. The patient was seen last on March 3, 2021 for treatment of migraine headaches. Patient was previously seen by Dr. Santana Sung and all of the records and diagnostic studies were carefully reviewed. Today, the patient reports she is not getting migraines as frequently because she stopped taking Flonase. When she does get a migraine, it will typically last all day. She has not gotten Ajovy but it was approved with her insurance. She also takes Fioricet which provides some relief. Her migraines are located in the right temporal region. She describes her migraines as dull, sharp, and throbbing. The migraines are associated with nausea, vomiting, photophobia, phonophobia, and osmophobia. She puts them at a 3-10/10 in intensity. She is currently getting 4 a month after stopping Flonase. She was previously getting 3-4 times a week. She uses Fioricet to provide some relief from her migraines.     Headache location: right temporal region  Headache quality: dull, sharp, throbbing  Associated factors:  nausea, vomiting, Photophobia, Phonophobia, osmophobia  Intensity: 3-10/10  Headache chronicity: 6 years  Headache frequency: 4/month, previously 3-4 times a week  Aggravating factors : bright lights, physical activity, loud sounds  Relieving factors: rest  Prophylactic medications: Magnesium, Riboflavin  Abortive medications: Fioicet  Previously used medications: none      Testing reviewed:  MRI Brain (1/24/2019)  Impression   Normal brain MRI.               PAST MEDICAL HISTORY:         Diagnosis Date    Allergic rhinitis     Anxiety     Chest pain     with anxiety    Chronic kidney disease     CKD (chronic kidney disease) stage 1, GFR 90 ml/min or greater 7/10/2018    Depression     Hashimoto's thyroiditis     Headache(784.0)     Heart murmur     Hematuria     Irritable bowel syndrome with diarrhea 2016    Midline low back pain without sciatica 2016    Obesity (BMI 30-39. 9) 2017    Orthostatic hypotension     Panic attack as reaction to stress     certain anxiety medications will cause a panic attack    Panic disorder with agoraphobia 2017    some medications for anxiety will cause a panic attack.  Vertigo 10/2018    hx of        PAST SURGICAL HISTORY:         Procedure Laterality Date    APPENDECTOMY      LAPAROSCOPY N/A 2019    LAPAROSCOPY OPERATIVE, LYSIS OF ADHESIONS performed by Win Benson MD at [address] [place] [address]:     Social History     Socioeconomic History    Marital status: Single     Spouse name: Not on file    Number of children: Not on file    Years of education: Not on file    Highest education level: Not on file   Occupational History    Not on file   Social Needs    Financial resource strain: Not on file    Food insecurity     Worry: Not on file     Inability: Not on file    Transportation needs     Medical: Not on file     Non-medical: Not on file   Tobacco Use    Smoking status: Former Smoker     Packs/day: 0.25     Years: 3.00     Pack years: 0.75     Types: Cigarettes     Quit date: 2005     Years since quittin.3    Smokeless tobacco: Never Used   Substance and Sexual Activity    Alcohol use: No     Comment: Hx alcohol abuse as teenager    Drug use: Not Currently     Types: Marijuana     Comment:  Hx, 15-17 yrs of age, smoked MJ daily 3 joints / day.      Sexual activity: Yes     Partners: Male     Birth control/protection: Condom   Lifestyle    Physical activity     Days per week: Not on file     Minutes per session: Not on Conjunctiva/corneas clear;  eyelids intact   Ears:  Normal external ear and canals   Nose: Nares normal, no drainage    Throat: Lips and tongue normal; teeth normal;  gums normal   Extremities: Extremities normal, no cyanosis, no edema   Skin: Skin color, texture normal, no rashes, no lesions                                     NEUROLOGIC EXAMINATION      Mental status    Alert and oriented x 3; able to follow commands, speech and language intact; no hallucinations or delusions  Fund of information appropriate for level of education    Cranial nerves    II - grossly intact  III, IV, VI - extra-ocular muscles full: no nystagmus, no ptosis   V - normal facial sensation                                                               VII - normal facial symmetry                                                             VIII - intact hearing                                                                             IX, X - symmetrical palate                                                                  XI - symmetrical shoulder shrug                                                       XII - tongue midline without atrophy      Motor function  Normal muscle bulk. Strength at least 5/5 on all 4 extremities, no pronator drift      Sensory function Unable to test      Cerebellar Intact fine motor movement. No involuntary movements or tremors. No ataxia or dysmetria on finger to nose or heel to shin testing      Reflex function Unable to test      Gait                   normal base and arm swing              ASSESSMENT AND PLAN:     This is a telehealth visit that was performed with the originating site at Patient Location: home and Provider Location of [place], [place]. Verbal consent to participate in video visit was obtained.  Pursuant to the emergency declaration under the [facility] [address] and the [employer] and [employer] Act, this

## 2021-05-19 ENCOUNTER — HOSPITAL ENCOUNTER (OUTPATIENT)
Age: 36
Setting detail: SPECIMEN
Discharge: HOME | End: 2021-05-19
Payer: MEDICARE

## 2021-05-19 ENCOUNTER — OFFICE VISIT (OUTPATIENT)
Dept: FAMILY MEDICINE CLINIC | Age: 36
End: 2021-05-19
Payer: MEDICARE

## 2021-05-19 VITALS
DIASTOLIC BLOOD PRESSURE: 62 MMHG | SYSTOLIC BLOOD PRESSURE: 90 MMHG | WEIGHT: 179.2 LBS | HEART RATE: 86 BPM | HEIGHT: 63 IN | OXYGEN SATURATION: 98 % | BODY MASS INDEX: 31.75 KG/M2 | TEMPERATURE: 98.2 F

## 2021-05-19 DIAGNOSIS — R10.84 GENERALIZED ABDOMINAL PAIN: ICD-10-CM

## 2021-05-19 DIAGNOSIS — R53.82 CHRONIC FATIGUE: ICD-10-CM

## 2021-05-19 DIAGNOSIS — R19.8 ALTERNATING CONSTIPATION AND DIARRHEA: ICD-10-CM

## 2021-05-19 DIAGNOSIS — Z11.59 ENCOUNTER FOR SCREENING FOR OTHER VIRAL DISEASES: ICD-10-CM

## 2021-05-19 DIAGNOSIS — Z00.00 ANNUAL PHYSICAL EXAM: Primary | ICD-10-CM

## 2021-05-19 DIAGNOSIS — Z28.21 COVID-19 VIRUS VACCINATION DECLINED: ICD-10-CM

## 2021-05-19 DIAGNOSIS — Z13.6 SCREENING FOR CARDIOVASCULAR CONDITION: ICD-10-CM

## 2021-05-19 DIAGNOSIS — Z28.21 PNEUMOCOCCAL VACCINATION DECLINED: ICD-10-CM

## 2021-05-19 LAB
ALBUMIN SERPL-MCNC: 4.6 G/DL (ref 3.5–5.2)
ALBUMIN/GLOBULIN RATIO: ABNORMAL (ref 1–2.5)
ALP BLD-CCNC: 65 U/L (ref 35–104)
ALT SERPL-CCNC: 15 U/L (ref 5–33)
ANION GAP SERPL CALCULATED.3IONS-SCNC: 8 MMOL/L (ref 9–17)
AST SERPL-CCNC: 16 U/L
BILIRUB SERPL-MCNC: 0.44 MG/DL (ref 0.3–1.2)
BUN BLDV-MCNC: 10 MG/DL (ref 6–20)
BUN/CREAT BLD: ABNORMAL (ref 9–20)
CALCIUM SERPL-MCNC: 8.7 MG/DL (ref 8.6–10.4)
CHLORIDE BLD-SCNC: 104 MMOL/L (ref 98–107)
CHOLESTEROL/HDL RATIO: 3.3
CHOLESTEROL: 182 MG/DL
CO2: 27 MMOL/L (ref 20–31)
CREAT SERPL-MCNC: 0.64 MG/DL (ref 0.5–0.9)
GFR AFRICAN AMERICAN: >60 ML/MIN
GFR NON-AFRICAN AMERICAN: >60 ML/MIN
GFR SERPL CREATININE-BSD FRML MDRD: ABNORMAL ML/MIN/{1.73_M2}
GFR SERPL CREATININE-BSD FRML MDRD: ABNORMAL ML/MIN/{1.73_M2}
GLUCOSE BLD-MCNC: 88 MG/DL (ref 70–99)
HCT VFR BLD CALC: 36.9 % (ref 36–46)
HDLC SERPL-MCNC: 55 MG/DL
HEMOGLOBIN: 12.3 G/DL (ref 12–16)
HEPATITIS C ANTIBODY: NONREACTIVE
LDL CHOLESTEROL: 118 MG/DL (ref 0–130)
MCH RBC QN AUTO: 31.2 PG (ref 26–34)
MCHC RBC AUTO-ENTMCNC: 33.4 G/DL (ref 31–37)
MCV RBC AUTO: 93.5 FL (ref 80–100)
NRBC AUTOMATED: ABNORMAL PER 100 WBC
PDW BLD-RTO: 12.5 % (ref 11.5–14.9)
PLATELET # BLD: 245 K/UL (ref 150–450)
PMV BLD AUTO: 8.1 FL (ref 6–12)
POTASSIUM SERPL-SCNC: 3.9 MMOL/L (ref 3.7–5.3)
RBC # BLD: 3.95 M/UL (ref 4–5.2)
SODIUM BLD-SCNC: 139 MMOL/L (ref 135–144)
TOTAL PROTEIN: 7.4 G/DL (ref 6.4–8.3)
TRIGL SERPL-MCNC: 44 MG/DL
TSH SERPL DL<=0.05 MIU/L-ACNC: 1.86 MIU/L (ref 0.3–5)
VITAMIN D 25-HYDROXY: 26.9 NG/ML (ref 30–100)
VLDLC SERPL CALC-MCNC: NORMAL MG/DL (ref 1–30)
WBC # BLD: 4.4 K/UL (ref 3.5–11)

## 2021-05-19 PROCEDURE — 36415 COLL VENOUS BLD VENIPUNCTURE: CPT

## 2021-05-19 PROCEDURE — 82306 VITAMIN D 25 HYDROXY: CPT

## 2021-05-19 PROCEDURE — 80061 LIPID PANEL: CPT

## 2021-05-19 PROCEDURE — 80053 COMPREHEN METABOLIC PANEL: CPT

## 2021-05-19 PROCEDURE — 85027 COMPLETE CBC AUTOMATED: CPT

## 2021-05-19 PROCEDURE — 99395 PREV VISIT EST AGE 18-39: CPT | Performed by: FAMILY MEDICINE

## 2021-05-19 PROCEDURE — 84443 ASSAY THYROID STIM HORMONE: CPT

## 2021-05-19 PROCEDURE — 86803 HEPATITIS C AB TEST: CPT

## 2021-05-19 SDOH — ECONOMIC STABILITY: FOOD INSECURITY: WITHIN THE PAST 12 MONTHS, YOU WORRIED THAT YOUR FOOD WOULD RUN OUT BEFORE YOU GOT MONEY TO BUY MORE.: SOMETIMES TRUE

## 2021-05-19 SDOH — ECONOMIC STABILITY: FOOD INSECURITY: WITHIN THE PAST 12 MONTHS, THE FOOD YOU BOUGHT JUST DIDN'T LAST AND YOU DIDN'T HAVE MONEY TO GET MORE.: SOMETIMES TRUE

## 2021-05-19 ASSESSMENT — ENCOUNTER SYMPTOMS
DIARRHEA: 1
VOMITING: 0
ABDOMINAL PAIN: 1
CONSTIPATION: 1
WHEEZING: 0
CHEST TIGHTNESS: 0
ABDOMINAL DISTENTION: 0
SHORTNESS OF BREATH: 0
COUGH: 0
NAUSEA: 1
BACK PAIN: 1
BLOOD IN STOOL: 0

## 2021-05-19 ASSESSMENT — PATIENT HEALTH QUESTIONNAIRE - PHQ9
2. FEELING DOWN, DEPRESSED OR HOPELESS: 2
SUM OF ALL RESPONSES TO PHQ QUESTIONS 1-9: 2
1. LITTLE INTEREST OR PLEASURE IN DOING THINGS: 0
SUM OF ALL RESPONSES TO PHQ QUESTIONS 1-9: 2

## 2021-05-19 ASSESSMENT — SOCIAL DETERMINANTS OF HEALTH (SDOH): HOW HARD IS IT FOR YOU TO PAY FOR THE VERY BASICS LIKE FOOD, HOUSING, MEDICAL CARE, AND HEATING?: SOMEWHAT HARD

## 2021-05-19 NOTE — PATIENT INSTRUCTIONS
or hopeless, talk with your doctor. Treatment can help. · Talk to your doctor about whether you have any risk factors for sexually transmitted infections (STIs). You can help prevent STIs if you wait to have sex with a new partner (or partners) until you've each been tested for STIs. It also helps if you use condoms (male or female condoms) and if you limit your sex partners to one person who only has sex with you. Vaccines are available for some STIs, such as HPV. · Use birth control if it's important to you to prevent pregnancy. Talk with your doctor about the choices available and what might be best for you. · If you think you may have a problem with alcohol or drug use, talk to your doctor. This includes prescription medicines (such as amphetamines and opioids) and illegal drugs (such as cocaine and methamphetamine). Your doctor can help you figure out what type of treatment is best for you. · Protect your skin from too much sun. When you're outdoors from 10 a.m. to 4 p.m., stay in the shade or cover up with clothing and a hat with a wide brim. Wear sunglasses that block UV rays. Even when it's cloudy, put broad-spectrum sunscreen (SPF 30 or higher) on any exposed skin. · See a dentist one or two times a year for checkups and to have your teeth cleaned. · Wear a seat belt in the car. When should you call for help? Watch closely for changes in your health, and be sure to contact your doctor if you have any problems or symptoms that concern you. Where can you learn more? Go to [email or web address] and sign in to your [employer] account. Enter P072 in the [employer] box to learn more about \"Well Visit, Ages 25 to 48: Care Instructions. \"     If you do not have an account, please click on the \"Sign Up Now\" link. Current as of: May 27, 2020               Content Version: 12.8  © 9820-4768 Healthwise, Incorporated. Care instructions adapted under license by [facility] ([facility]).  If

## 2021-05-19 NOTE — PROGRESS NOTES
Visit Information    Have you changed or started any medications since your last visit including any over-the-counter medicines, vitamins, or herbal medicines? no   Are you having any side effects from any of your medications? -  no  Have you stopped taking any of your medications? Is so, why? -  no    Have you seen any other physician or provider since your last visit? Yes - Records Obtained  Have you had any other diagnostic tests since your last visit? Yes - Records Obtained  Have you been seen in the emergency room and/or had an admission to a hospital since we last saw you? No  Have you had your routine dental cleaning in the past 6 months? no    Have you activated your [employer] account? If not, what are your barriers?  Yes     Patient Care Team:  Rajat Tran MD as PCP - General (Family Medicine)  Rajat Tran MD as PCP - [facility] Provider  Michel Park MD as Consulting Physician (Obstetrics & Gynecology)  Kristen Delgado MD as Consulting Physician (Urology)  Daniel Cali MD as Consulting Physician (Nephrology)  Vicenta Kumar (Certified Nurse Practitioner)  Richard Churchill (Audiology)  Eleazar Burden MD as Consulting Physician (Endocrinology)  Jean Claude Reed MD as Consulting Physician (Allergy & Immunology)    Medical History Review  Past Medical, Family, and Social History reviewed and does contribute to the patient presenting condition    Health Maintenance   Topic Date Due    Hepatitis C screen  Never done    Pneumococcal 0-64 years Vaccine (1 of 2 - PPSV23) Never done    COVID-19 Vaccine (1) Never done    DTaP/Tdap/Td vaccine (1 - Tdap) Never done    Flu vaccine (Season Ended) 09/01/2021    Cervical cancer screen  10/14/2025    HIV screen  Completed    Hepatitis A vaccine  Aged Out    Hepatitis B vaccine  Aged Out    Hib vaccine  Aged Out    Meningococcal (ACWY) vaccine  Aged Out    Varicella vaccine  Discontinued

## 2021-05-19 NOTE — PROGRESS NOTES
without Reflex; Future  -     Vitamin D 25 Hydroxy; Future  Prior inflammatory markers negative  Lab Results   Component Value Date    SEDRATE 14 08/13/2018     Lab Results   Component Value Date    CRP 1.2 08/13/2018     Lab Results   Component Value Date    SHAYNE NEGATIVE 08/13/2018         5. Encounter for screening for other viral diseases  -     Hepatitis C Antibody; Future  6. Screening for cardiovascular condition  -     Lipid Panel; Future  7. COVID-19 virus vaccination declined  8. Pneumococcal vaccination declined  Patient declines any immunizations despite counseling      Patient says dermatology and rheumatology didn't call her, Reprinted referrals to dermatologist and rheumatologist* and advised to have done. Bette received counseling on the following healthy behaviors: nutrition, exercise, medication adherence and weight loss  Reviewed prior labs and health maintenance  Discussed use, benefit, and side effects of prescribed medications. Barriers to medication compliance addressed. Patient given educational materials - see patient instructions  All patient questions answered. Patient voiced understanding. The patient's past medical, surgical, social, and family history as well as her  current medications and allergies were reviewed as documented in today's encounter. Medications, labs, diagnostic studies, consultations and follow-up as documented in thisencounter. Return in about 3 months (around 8/19/2021) for LABS F/U, F/U imaging, FATIGUE.     Data Unavailable    Future Appointments   Date Time Provider Irineo Rueda   5/27/2021  2:00 PM [facility] 1 [facility]   8/17/2021  3:00 PM [facility]           Patient Active Problem List   Diagnosis    Chronic bilateral low back pain without sciatica    Irritable bowel syndrome with both constipation and diarrhea    DEREK (generalized anxiety disorder)    PMS (premenstrual syndrome)    Chronic fatigue    Palpitations    Panic disorder with agoraphobia    Obesity (BMI 30-39. 9)    Hematuria    Murmur    Psychophysiological insomnia    Microscopic hematuria    Lower abdominal pain    Nocturia    Mild episode of recurrent major depressive disorder (HCC)    Allergic rhinitis due to allergen    Pelvic pain in female    S/P Operative laparoscopy, JAMIE 5/2/19    Chronic RLQ pain    Alternating constipation and diarrhea    Bilateral temporomandibular joint pain    Eustachian tube disorder, bilateral    Allergic dermatitis    Hashimoto's thyroiditis    Musculoskeletal pain    Pruritic dermatitis    Influenza vaccination declined    Worsening headaches    Intractable migraine with aura without status migrainosus    Generalized abdominal pain    COVID-19 virus vaccination declined    Pneumococcal vaccination declined       Prior to Visit Medications    Medication Sig Taking? Authorizing Provider   clindamycin (CLEOCIN) 300 MG capsule Take 1 capsule by mouth 2 times daily for 7 days Yes EL Flowers CNP   terconazole (TERAZOL 7) 0.4 % vaginal cream One applicator intravaginally at bedtime for 7 days. Yes EL Flowers CNP   Fremanezumab-vfrm (AJOVY) 225 MG/1.5ML SOSY Inject 225 mg into the skin every 30 days Yes Sharda Coleman MD   EPINEPHrine (EPIPEN) 0.3 MG/0.3ML SOAJ injection  Yes Historical Provider, MD   magnesium oxide (MAG-OX) 400 (241.3 Mg) MG TABS tablet TAKE 1 TABLET BY MOUTH IN THE EVENING WITH FOOD Yes Historical Provider, MD   Misc Natural Products (CRANBERRY/PROBIOTIC) TABS Take 1 tablet by mouth daily Please dispense according to patients insurance.  Yes Clara Beckford MD   butalbital-acetaminophen-caffeine (FIORICET, ESGIC) -40 MG per tablet Take 1 tablet by mouth every 6 hours as needed for Headaches or Migraine MAX 3 DAYS/WEEK Yes Clara Beckford MD   Riboflavin 100 MG TABS Take 100 mg by mouth daily Yes Clara Beckford MD Types: Cigarettes     Quit date: 2005     Years since quittin.3    Smokeless tobacco: Never Used   Vaping Use    Vaping Use: Never used   Substance Use Topics    Alcohol use: No     Comment: Hx alcohol abuse as teenager    Drug use: Not Currently     Types: Marijuana     Comment:  Hx, 15-17 yrs of age, smoked MJ daily 3 joints / day. Family History   Problem Relation Age of Onset    Mental Illness Mother     No Known Problems Father     Breast Cancer Maternal Grandmother     Heart Attack Maternal Grandfather     Colon Cancer Neg Hx        ADVANCE DIRECTIVE: N, <no information>    SUBJECTIVE / Lear John Paul is here for Annual Exam, Other (did see dr. Samuel Lunsford did see neuro), and Discuss Labs     Patient reports that since the last appointment she was able to see Dr. Tony Snow, she is allergic to dust mites, cockroach, grass, rabbit. Patient reports getting headaches after mowing the grass. Advised to avoid exposure or to use a mask when doing that. I need to give her a copy of her allergy list    Patient says dermatology and rheumatology didn't call her, Reprinted referrals to dermatologist and rheumatologist* and advised to have done. Patient reports her headaches are better, did see neurology  Pressure paranasal on and off, using a special flonase which she has been tolerating  Nasal wash gave her headache     She continues to have abdominal pain radiates to the back  Pain is located mostly in the lower abdomen, in the ovaries, belly button, and epigastrium, feels like gas pain.   Dr. Hilda Toledo cannot find anything, had laparoscopy, no endometriosis, adhesions  Saw urology cannot see anything which can cause her to have blood in the urine, but everytime urine is checked there is blood in the urine    Taking cranberry and drinking cranberry juice  She has alternating constipation with diarrhea  Sometimes has 4 loose bowel movements a day, other times not having a bowel movement for 4 days  Taking stool softer only as needed. Denies blood in the stool  Denies vomiting but has nausea    Urinary symptoms: no, sometimes pinckish on the underwear      Sexually active: Yes , painful       last pap: was normal  The patient has NO  history of abnormal PAP in 2020    Last mammogram:N/A  The patient has  family history of breast cancer    Wears seatbelts: yes     Regular exercise: yes  Ever been transfused or tattooed?: no  The patient reports that domestic violence in her life is absent. Last eye exam: up to date: Yes    Abnormal visual screening. Rossy Fernandez  reports she is up-to-date with her eye exam.      Hearing Screening    125Hz 250Hz 500Hz [address]   Right ear:            Left ear:               Visual Acuity Screening    Right eye Left eye Both eyes   Without correction: 20/15 20/13 20/13   With correction:          Hearing:normal :Yes    Last dental exam and preventative dental cleaning: up to date : Yes    Nutritional assessment: Body mass index is 31.74 kg/m². Elevated obesity per BMI,    Weight is improving, lost 9 lbs in 2 years    Wt Readings from Last 3 Encounters:   05/19/21 179 lb 3.2 oz (81.3 kg)   05/13/21 179 lb (81.2 kg)   03/03/21 173 lb (78.5 kg)   Wt 188 lb 12.8 oz (85.6 on 12/17/19      Healthful diet and Physical activity counseling to prevent CVD- low carb, low fat diet, increase fruits and vegetables, and exercise 4-5 times a day 30-40minutes a day discussed    Nicotine dependence. no    Counseling given: Yes      Alcohol use: no      There is no immunization history on file for this patient. Tdap one time, then Td every 10 years-up to date:  No: Declines    Influenza annually-up to date:  No: Out of season    PPSV 21 in all adults 19-63 yo with chronic conditions,smokers, alcoholism,  nursing home residents; then PCV 13 at 70 yo-up to date: Declines  Menopause: No   Patient's last menstrual period was 05/16/2021.      Colon cancer screening recommended at 49 yo  The patient has NO family history of colon cancer    Blood pressure is normal.  BP Readings from Last 3 Encounters:   05/19/21 90/62   05/13/21 104/70   03/03/21 104/64       Pulse is normal.  Pulse Readings from Last 3 Encounters:   05/19/21 86   05/13/21 81   03/03/21 78       A1c is normal   Lab Results   Component Value Date    LABA1C 4.6 01/16/2019       Lipid screening -mild hyperlipidemia      Lab Results   Component Value Date    CHOL 182 05/19/2021    CHOL 176 09/19/2017     Lab Results   Component Value Date    TRIG 44 05/19/2021    TRIG 45 09/19/2017     Lab Results   Component Value Date    HDL 55 05/19/2021    HDL 62 09/19/2017     Lab Results   Component Value Date    LDLCHOLESTEROL 118 05/19/2021    LDLCHOLESTEROL 105 09/19/2017     Lab Results   Component Value Date    CHOLHDLRATIO 3.3 05/19/2021    CHOLHDLRATIO 2.8 09/19/2017       Cardiovascular risk is:  low  The ASCVD Risk score (Kirby Castillo, et al., 2013) failed to calculate for the following reasons: The 2013 ASCVD risk score is only valid for ages 36 to 78    Hepatitis C screening- No results found for: HAV, HEPAIGM, HEPBIGM, HEPBCAB, HBEAG, HEPCAB      Mild depression    PHQ-2 Over the past 2 weeks, how often have you been bothered by any of the following problems? Little interest or pleasure in doing things: Not at all  Feeling down, depressed, or hopeless: More than half the days  PHQ-2 Score: 2  PHQ-9 Over the past 2 weeks, how often have you been bothered by any of the following problems?   PHQ-9 Total Score: 2        PHQ Scores 5/19/2021 1/5/2021 12/17/2019 11/1/2019 8/1/2019 4/25/2019 8/8/2018   PHQ2 Score 2 1 3 3 2 3 2   PHQ9 Score 2 1 11 13 12 12 11       Health Maintenance   Topic Date Due    Hepatitis C screen  Never done    Pneumococcal 0-64 years Vaccine (1 of 2 - PPSV23) Never done    COVID-19 Vaccine (1) Never done    DTaP/Tdap/Td vaccine (1 - Tdap) Never done    Flu vaccine (Season Ended) 09/01/2021    Cervical cancer screen  10/14/2025    HIV screen  Completed    Hepatitis A vaccine  Aged Out    Hepatitis B vaccine  Aged Out    Hib vaccine  Aged Out    Meningococcal (ACWY) vaccine  Aged Out    Varicella vaccine  Discontinued       -rest of complaints with corresponding details per ROS    Review of Systems   Constitutional: Positive for fatigue. Negative for activity change, appetite change, chills, diaphoresis, fever and unexpected weight change. HENT: Negative for congestion, dental problem and hearing loss. Eyes: Negative for visual disturbance. Respiratory: Negative for cough, chest tightness, shortness of breath and wheezing. Cardiovascular: Negative for chest pain, palpitations and leg swelling. Gastrointestinal: Positive for abdominal pain, constipation, diarrhea and nausea. Negative for abdominal distention, blood in stool and vomiting. Endocrine: Negative for cold intolerance, heat intolerance, polydipsia, polyphagia and polyuria. Genitourinary: Positive for dyspareunia, menstrual problem (curently during menstrual period) and pelvic pain. Negative for difficulty urinating, dysuria, frequency, urgency and vaginal pain. Musculoskeletal: Positive for arthralgias, back pain and myalgias. Skin: Negative for rash. Allergic/Immunologic: Positive for environmental allergies. Had evaluation by allergy specialist:  dust mites, cockroach, grass, rabbit   Neurological: Positive for headaches (better ). Negative for dizziness, weakness and numbness. Hematological: Does not bruise/bleed easily. Psychiatric/Behavioral: Positive for dysphoric mood and sleep disturbance. Negative for self-injury and suicidal ideas.  The patient is nervous/anxious.          -vital signs stable and within normal limits except obesity per BMI  BP 90/62   Pulse 86   Temp 98.2 °F (36.8 °C) (Temporal)   Ht 5' 3\" (1.6 m)   Wt 179 lb 3.2 oz (81.3 kg)   LMP 05/16/2021   SpO2 98% BMI 31.74 kg/m²   Vitals:    05/19/21 1538   BP: 90/62   Pulse: 86   Temp: 98.2 °F (36.8 °C)   TempSrc: Temporal   SpO2: 98%   Weight: 179 lb 3.2 oz (81.3 kg)   Height: 5' 3\" (1.6 m)     Estimated body mass index is 31.74 kg/m² as calculated from the following:    Height as of this encounter: 5' 3\" (1.6 m). Weight as of this encounter: 179 lb 3.2 oz (81.3 kg). Physical Exam  Vitals and nursing note reviewed. Constitutional:       General: She is not in acute distress. Appearance: Normal appearance. She is well-developed. She is obese. She is not diaphoretic. HENT:      Head: Normocephalic and atraumatic. Right Ear: Hearing, tympanic membrane, ear canal and external ear normal.      Left Ear: Hearing, tympanic membrane, ear canal and external ear normal.      Nose: No mucosal edema. Mouth/Throat:      Comments: I did not examine the mouth due to coronavirus pandemic and wearing masks. Eyes:      General: Lids are normal. No scleral icterus. Right eye: No discharge. Left eye: No discharge. Extraocular Movements: Extraocular movements intact. Conjunctiva/sclera: Conjunctivae normal.   Neck:      Thyroid: No thyromegaly. Cardiovascular:      Rate and Rhythm: Normal rate and regular rhythm. Heart sounds: Normal heart sounds. No murmur heard. Pulmonary:      Effort: Pulmonary effort is normal. No respiratory distress. Breath sounds: Normal breath sounds. No wheezing or rales. Chest:      Chest wall: No tenderness. Abdominal:      General: Bowel sounds are normal. There is no distension. Palpations: Abdomen is soft. There is no hepatomegaly, splenomegaly or mass. Tenderness: There is abdominal tenderness in the right lower quadrant, epigastric area, periumbilical area, suprapubic area and left lower quadrant. There is no right CVA tenderness, left CVA tenderness, guarding or rebound. Hernia: No hernia is present.    Musculoskeletal: General: No tenderness. Normal range of motion. Cervical back: Normal range of motion and neck supple. Right lower leg: No edema. Left lower leg: No edema. Skin:     General: Skin is warm and dry. Capillary Refill: Capillary refill takes less than 2 seconds. Findings: No rash. Neurological:      Mental Status: She is alert and oriented to person, place, and time. Cranial Nerves: No cranial nerve deficit. Motor: No abnormal muscle tone. Gait: Gait normal.      Deep Tendon Reflexes: Reflexes normal.      Reflex Scores:       Patellar reflexes are 2+ on the right side and 2+ on the left side. Psychiatric:         Mood and Affect: Mood is anxious. Affect is not labile. Behavior: Behavior normal.         Thought Content:  Thought content normal.         Judgment: Judgment normal.             Medications Discontinued During This Encounter   Medication Reason    cromolyn (OPTICROM) 4 % ophthalmic solution Therapy completed    ofloxacin (OCUFLOX) 0.3 % solution Therapy completed         Orders Placed This Encounter   Procedures    CT ABDOMEN PELVIS W IV CONTRAST Additional Contrast? Radiologist Recommendation     Standing Status:   Future     Standing Expiration Date:   5/19/2022     Order Specific Question:   Additional Contrast?     Answer:   Radiologist Recommendation    Hepatitis C Antibody     Standing Status:   Future     Number of Occurrences:   1     Standing Expiration Date:   7/10/2021    CBC     Standing Status:   Future     Number of Occurrences:   1     Standing Expiration Date:   7/10/2021    Comprehensive Metabolic Panel     Standing Status:   Future     Number of Occurrences:   1     Standing Expiration Date:   7/10/2021    Lipid Panel     Standing Status:   Future     Number of Occurrences:   1     Standing Expiration Date:   7/10/2021     Order Specific Question:   Is Patient Fasting?/# of Hours     Answer:   8-10 Hours, water ok to drink    TSH without Reflex     Standing Status:   Future     Number of Occurrences:   1     Standing Expiration Date:   7/10/2021    Vitamin D 25 Hydroxy     Standing Status:   Future     Number of Occurrences:   1     Standing Expiration Date:   7/10/2021    Urinalysis Reflex to Culture     Standing Status:   Future     Standing Expiration Date:   5/19/2022     Order Specific Question:   SPECIFY(EX-CATH,MIDSTREAM,CYSTO,ETC)? Answer:   midstream   Rebecca Shrestha MD, Gastroenterology, [place]     Referral Priority:   Routine     Referral Type:   Eval and Treat     Referral Reason:   Specialty Services Required     Referred to Provider:   Chela Mac MD     Requested Specialty:   Gastroenterology     Number of Visits Requested:   1         This note was completed by using the assistance of a speech-recognition program. However, inadvertent computerized transcription errors may be present. Although every effort was made to ensure accuracy, no guarantees can be provided that every mistake has been identified and corrected by editing. An electronic signature was used to authenticate this note.     Electronically signed by Carolann Villafana MD on 5/19/2021 at 7:20 PM

## 2021-05-20 ENCOUNTER — TELEPHONE (OUTPATIENT)
Dept: OBGYN CLINIC | Age: 36
End: 2021-05-20

## 2021-05-20 DIAGNOSIS — N76.0 ACUTE VAGINITIS: ICD-10-CM

## 2021-05-20 DIAGNOSIS — E55.9 VITAMIN D DEFICIENCY: Primary | ICD-10-CM

## 2021-05-20 RX ORDER — CLINDAMYCIN HYDROCHLORIDE 300 MG/1
300 CAPSULE ORAL 2 TIMES DAILY
Qty: 4 CAPSULE | Refills: 0 | Status: SHIPPED | OUTPATIENT
Start: 2021-05-20 | End: 2021-05-22

## 2021-05-20 RX ORDER — ERGOCALCIFEROL 1.25 MG/1
50000 CAPSULE ORAL WEEKLY
Qty: 12 CAPSULE | Refills: 0 | Status: SHIPPED | OUTPATIENT
Start: 2021-05-20 | End: 2021-11-12

## 2021-05-20 NOTE — TELEPHONE ENCOUNTER
Pt called in stated that she was taking antibiotic Clindamycin 300mg and was on her last two pills and believes that she may have thrown them away.  Pt Is requesting that a partial rx be sent to her pharmacy

## 2021-05-20 NOTE — RESULT ENCOUNTER NOTE
Which antibiotic?   Clindamycin was given by her GYN    Future Appointments  5/27/2021  2:00 PM    [facility] CT RM 1     [facility]  8/17/2021  3:00 PM    Clara Beckford MD     [facility]               [address] [facility]

## 2021-05-20 NOTE — RESULT ENCOUNTER NOTE
ABNORMAL. Please notify patient. Vitamin D very low, new prescription for high-dose vitamin D weekly for 3 months sent at the pharmacy, needs to take it with food. Slightly worsening lipids, low-carb low-fat diet, avoid fried food and butter  There is slight anemia, again I support the need for colonoscopy, referral already placed. Pending CT abdomen I do see it was scheduled  Also to do the urine test order is in the computer I did place it after the visit, but I have spoken with her at the appointment to do it in 1 or 2 weeks. \"You can go to any [facility] lab to have it done without the printed order, as the order is in the computer and they can see it. \"  Other labs normal      Future Appointments  5/27/2021  2:00 PM    [facility] RM 1     [facility]  8/17/2021  3:00 PM    Tiffanie Zaragoza MD     [facility]               Marleen Kumar

## 2021-08-02 ENCOUNTER — TELEPHONE (OUTPATIENT)
Dept: OBGYN CLINIC | Age: 36
End: 2021-08-02

## 2021-08-02 DIAGNOSIS — N76.0 ACUTE VAGINITIS: ICD-10-CM

## 2021-08-02 RX ORDER — CLINDAMYCIN HYDROCHLORIDE 300 MG/1
300 CAPSULE ORAL 2 TIMES DAILY
Qty: 14 CAPSULE | Refills: 0 | Status: SHIPPED | OUTPATIENT
Start: 2021-08-02 | End: 2021-08-09

## 2021-08-17 ENCOUNTER — VIRTUAL VISIT (OUTPATIENT)
Dept: FAMILY MEDICINE CLINIC | Age: 36
End: 2021-08-17
Payer: MEDICARE

## 2021-08-17 ENCOUNTER — TELEPHONE (OUTPATIENT)
Dept: FAMILY MEDICINE CLINIC | Age: 36
End: 2021-08-17

## 2021-08-17 DIAGNOSIS — J30.89 SEASONAL ALLERGIC RHINITIS DUE TO OTHER ALLERGIC TRIGGER: ICD-10-CM

## 2021-08-17 DIAGNOSIS — H69.83 DYSFUNCTION OF BOTH EUSTACHIAN TUBES: ICD-10-CM

## 2021-08-17 DIAGNOSIS — F33.42 RECURRENT MAJOR DEPRESSIVE DISORDER, IN FULL REMISSION (HCC): ICD-10-CM

## 2021-08-17 DIAGNOSIS — R42 VERTIGO: Primary | ICD-10-CM

## 2021-08-17 DIAGNOSIS — G43.109 MIGRAINE WITH AURA AND WITHOUT STATUS MIGRAINOSUS, NOT INTRACTABLE: ICD-10-CM

## 2021-08-17 DIAGNOSIS — R10.84 GENERALIZED ABDOMINAL PAIN: ICD-10-CM

## 2021-08-17 DIAGNOSIS — R31.9 HEMATURIA, UNSPECIFIED TYPE: ICD-10-CM

## 2021-08-17 PROCEDURE — 99214 OFFICE O/P EST MOD 30 MIN: CPT | Performed by: FAMILY MEDICINE

## 2021-08-17 PROCEDURE — G8427 DOCREV CUR MEDS BY ELIG CLIN: HCPCS | Performed by: FAMILY MEDICINE

## 2021-08-17 RX ORDER — CRANBERRY CONC/C/BACILL COAG 250-30-15
TABLET ORAL
COMMUNITY
Start: 2021-06-06 | End: 2021-09-17

## 2021-08-17 RX ORDER — MECLIZINE HYDROCHLORIDE 25 MG/1
25 TABLET ORAL 3 TIMES DAILY PRN
Qty: 30 TABLET | Refills: 0 | Status: SHIPPED | OUTPATIENT
Start: 2021-08-17 | End: 2021-08-27

## 2021-08-17 ASSESSMENT — ENCOUNTER SYMPTOMS
NAUSEA: 0
COUGH: 0
DIARRHEA: 0
CONSTIPATION: 0
VOMITING: 0
SHORTNESS OF BREATH: 0
WHEEZING: 0
SINUS PAIN: 0
CHEST TIGHTNESS: 0
ABDOMINAL DISTENTION: 0
ABDOMINAL PAIN: 0
RHINORRHEA: 0

## 2021-08-17 ASSESSMENT — PATIENT HEALTH QUESTIONNAIRE - PHQ9
SUM OF ALL RESPONSES TO PHQ QUESTIONS 1-9: 0
2. FEELING DOWN, DEPRESSED OR HOPELESS: 0
SUM OF ALL RESPONSES TO PHQ QUESTIONS 1-9: 0
1. LITTLE INTEREST OR PLEASURE IN DOING THINGS: 0
SUM OF ALL RESPONSES TO PHQ QUESTIONS 1-9: 0
SUM OF ALL RESPONSES TO PHQ9 QUESTIONS 1 & 2: 0

## 2021-08-17 NOTE — PROGRESS NOTES
2021    TELEHEALTH EVALUATION -- Audio/Visual [facility]      Sabrina Arana (:  1985) is a 39 y.o. female,Established patient, here for evaluation of the following chief complaint(s): Depression (follow up), Anxiety (follow up), Dizziness, and Abdominal Pain        ASSESSMENT/PLAN:    1. Vertigo  Failing to change as expected. - start    meclizine (ANTIVERT) 25 MG tablet; Take 1 tablet by mouth 3 times daily as needed for Dizziness, Disp-30 tablet, R-0Normal  -     External Referral To ENT to Dr. Jalyn Ramos  2. Dysfunction of both eustachian tubes  Failing to change as expected. To restart Flonase  -     External Referral To ENT  To follow-up with Dr. Sabrina Michaud and to see ENT  3. Migraine with aura and without status migrainosus, not intractable  Improved with medications  Continue magnesium, riboflavin, and Anjovy per neurology  -     Riboflavin 100 MG TABS; Take 100 mg by mouth daily, Disp-90 tablet, R-3Normal  4. Seasonal allergic rhinitis due to other allergic trigger  worsening  Patient says she will see Dr. Sabrina Michaud  -restart     fluticasone (VERAMYST) 27.5 MCG/SPRAY nasal spray; 2 sprays by Each Nostril route daily, Disp-1 Bottle, R-3Normal  5. Hematuria, unspecified type  -     Urinalysis with Microscopic; Future   If again blood in the urine , will refer to a new urologist    6. Generalized abdominal pain  Failing to change as expected. New order for CT abdomen and pelvis placed  Given phone number to schedule the CT abdomen, and she wrote the instructyions  I also referred her to the GI specialist 2021, but did not go  We will advise her to make an appointment    7.  Recurrent major depressive disorder, in full remission ([facility] [address]  Improved with lifestyle changes  We will continue to monitor    Bette received counseling on the following healthy behaviors: nutrition, exercise, medication adherence and weight loss    Reviewed prior labs and health maintenance  Discussed use, benefit, and side effects of prescribed medications. Barriers to medication compliance addressed. Patient given educational materials - see patient instructions  All patient questions answered. Patient voiced understanding. The patient's past medical,surgical, social, and family history as well as her current medications and allergies were reviewed as documented in today's encounter. Medications, labs, diagnostic studies, consultations and follow-up as documented in this encounter. Return in about 3 months (around 2021). Data Unavailable    Future Appointments   Date Time Provider Irineo Rueda   2022  2:30 PM [facility]         SUBJECTIVE/OBJECTIVE:  Terra Tesfaye (:  1985) has requested an audio/video evaluation for the following concern(s):Depression (follow up), Anxiety (follow up), and Dizziness    Patient reports she still gets vertigo and feels dizzy a lot  She says she went to [facility] about 1 week ago, in a ride and since then her vertigo flared up, it feels like spinning. It can happen a few times a day, not sure what is triggering it. I suggested vestibular therapy, she says \"I'm not interested in that, didn't help\", as she had it before. She reports sinus pressure, frontal and sinus congestion, and she thinks her allergies are flaring up. She denies purulent nasal discharge, fever or chills. She denies ear pain or ear discharge, but she feels like she has fluid in her ears    Tried loratadine, Flonase, but ran out. She didn't feel Claritin helped  Patient reports in the past the allergy testing showed that she is  Allergic to grass, and other environmental things, she is to see Dr. Mateo Aldridge, but did not see him in a while. She did not have immunotherapy. .      Reports migraines are better, getting about 1-2 per months severe ones, but every week getting milder headaches. On Anjovy.   She feels it is helping. Chocking on food sometimes    Depression is better  She says her anxiety bothers her the most  PHQ-2 Over the past 2 weeks, how often have you been bothered by any of the following problems? Little interest or pleasure in doing things: Not at all  Feeling down, depressed, or hopeless: Not at all  PHQ-2 Score: 0  PHQ-9 Over the past 2 weeks, how often have you been bothered by any of the following problems? PHQ-9 Total Score: 0      PHQ Scores 8/17/2021 5/19/2021 1/5/2021 12/17/2019 11/1/2019 8/1/2019 4/25/2019   PHQ2 Score 0 2 1 3 3 2 3   PHQ9 Score 0 2 1 11 13 12 12     Bette complains of persistent generalized Abdomen pain  If eats anything, gets pain. If not eating, gets pain, still having \"irritable bowel syndrome\" and not sure why. She never completed the CT abdomen ordered in May. Patient reports pain is mostly in the lower abdomen, in her ovaries, epigastrium, bellybutton. She did have laparoscopy, no endometriosis was found, just adhesions. She reports about 19 diarrhea and constipation. Denies blood in the stool. Has nausea but denies vomiting    Patient is also very concerned about persistent blood in the urine, found multiple times and nobody knows why  She did have work-up by urologist  She denies seeing blood in the urine  Denies frequency, urgency or dysuria. Review of Systems   Constitutional: Positive for fatigue. Negative for activity change, appetite change, chills, diaphoresis and fever. HENT: Positive for congestion, postnasal drip and sinus pressure. Negative for ear discharge, ear pain, rhinorrhea and sinus pain. Respiratory: Positive for choking (sometimes, was diagnosed with Hashimoto thyroiditis per her endocrinologist). Negative for cough, chest tightness, shortness of breath and wheezing. Cardiovascular: Negative for chest pain, palpitations and leg swelling.    Gastrointestinal: Negative for abdominal distention, abdominal pain, constipation, diarrhea, nausea and vomiting. Genitourinary: Positive for hematuria (when doing urine tests). Negative for dysuria, frequency and urgency. Allergic/Immunologic: Positive for environmental allergies. Neurological: Positive for dizziness and headaches. Psychiatric/Behavioral: Negative for dysphoric mood, self-injury, sleep disturbance and suicidal ideas. The patient is nervous/anxious. Prior to Visit Medications    Medication Sig Taking? Authorizing Provider   vitamin D (ERGOCALCIFEROL) 1.25 MG (15916 UT) CAPS capsule Take 1 capsule by mouth once a week Yes Shawn Lopez MD   EPINEPHrine (EPIPEN) 0.3 MG/0.3ML SOAJ injection  Yes Historical Provider, MD   magnesium oxide (MAG-OX) 400 (241.3 Mg) MG TABS tablet TAKE 1 TABLET BY MOUTH IN THE EVENING WITH FOOD Yes Historical Provider, MD [place] [employer] (CRANBERRY/PROBIOTIC) TABS Take 1 tablet by mouth daily Please dispense according to patients insurance. Yes Shawn Lopez MD   butalbital-acetaminophen-caffeine (FIORICET, ESGIC) -40 MG per tablet Take 1 tablet by mouth every 6 hours as needed for Headaches or Migraine MAX 3 DAYS/WEEK Yes Shawn Lopez MD   Riboflavin 100 MG TABS Take 100 mg by mouth daily Yes Shawn Lopez MD   ammonium lactate (LAC-HYDRIN) 12 % lotion Apply topically daily. Yes Shawn Lopez MD   acetaminophen (TYLENOL) 500 MG tablet Take 1 tablet by mouth every 4 hours as needed for Pain Yes Barbara Lucero MD       Social History     Tobacco Use    Smoking status: Former Smoker     Packs/day: 0.25     Years: 3.00     Pack years: 0.75     Types: Cigarettes     Quit date: 2005     Years since quittin.6    Smokeless tobacco: Never Used   Vaping Use    Vaping Use: Never used   Substance Use Topics    Alcohol use: No     Comment: Hx alcohol abuse as teenager    Drug use: Not Currently     Types: Marijuana     Comment:  Hx, 15-17 yrs of age, smoked MJ daily 3 joints / day.            PHYSICAL EXAMINATION:    Vital Signs: (As obtained by patient/caregiver or practitioner observation)  -vital signs stable and within normal limits except obesity per BMI, last BMI 31.74 kg/M2  Patient-Reported Vitals 8/17/2021   Patient-Reported Weight 175   Patient-Reported Height 5'3   Patient-Reported Temperature 97.8           Intensity of pain is 4/10 in the abdomen  Constitutional: [x] Appears well-developed and well-nourished [x] No apparent distress      [x] Abnormal-obese      Mental status  [x] Alert and awake  [x] Oriented to person/place/time [x]Able to follow commands      Eyes:  EOM    [x]  Normal  [] Abnormal-  Sclera  [x]  Normal  [] Abnormal -         Discharge [x]  None visible  [] Abnormal -    HENT:   [x] Normocephalic, atraumatic. [] Abnormal   [x] Mouth/Throat: Mucous membranes are moist.     External Ears [x] Normal  [] Abnormal-     Neck: [x] No visualized mass     Pulmonary/Chest: [x] Respiratory effort normal.  [x] No visualized signs of difficulty breathing or respiratory distress        [] Abnormal        Musculoskeletal:   [x] Normal gait with no signs of ataxia         [x] Normal range of motion of neck        [] Abnormal-       Neurological:        [x] No Facial Asymmetry (Cranial nerve 7 motor function) (limited exam to video visit)          [x] No gaze palsy        [] Abnormal-            Skin:        [x] No significant exanthematous lesions or discoloration noted on facial skin         [] Abnormal-            Psychiatric:      [x] No Hallucinations       []Mood is normal      [x]Behavior is normal      [x]Judgment is normal      [x]Thought content is normal       [x] Abnormal-anxious, rapid talking    Other pertinent observable physical exam findings- none    Due to this being a TeleHealth encounter, evaluation of the following organ systems is limited: Vitals/Constitutional/EENT/Resp/CV/GI//MS/Neuro/Skin/Heme-Lymph-Imm.     I personally reviewed most recent labs reviewed with the patient and all questions fully answered.    Mild hyperlipidemia  Vitamin D deficiency  Otherwise labs within normal limits        Lab Results   Component Value Date    WBC 4.4 2021    HGB 12.3 2021    HCT 36.9 2021    MCV 93.5 2021     2021       Lab Results   Component Value Date     2021    K 3.9 2021     2021    CO2 27 2021    BUN 10 2021    CREATININE 0.64 2021    GLUCOSE 88 2021    CALCIUM 8.7 2021        Lab Results   Component Value Date    ALT 15 2021    AST 16 2021    ALKPHOS 65 2021    BILITOT 0.44 2021       Lab Results   Component Value Date    TSH 1.86 2021       Lab Results   Component Value Date    CHOL 182 2021    CHOL 176 2017     Lab Results   Component Value Date    TRIG 44 2021    TRIG 45 2017     Lab Results   Component Value Date    HDL 55 2021    HDL 62 2017     Lab Results   Component Value Date    LDLCHOLESTEROL 118 2021    LDLCHOLESTEROL 105 2017       Lab Results   Component Value Date    CHOLHDLRATIO 3.3 2021    CHOLHDLRATIO 2.8 2017       Lab Results   Component Value Date    LABA1C 4.6 2019         No results found for: [id number]    Lab Results   Component Value Date    VITD25 26.9 (L) 2021       Orders Placed This Encounter   Medications    Riboflavin 100 MG TABS     Sig: Take 100 mg by mouth daily     Dispense:  90 tablet     Refill:  3    meclizine (ANTIVERT) 25 MG tablet     Sig: Take 1 tablet by mouth 3 times daily as needed for Dizziness     Dispense:  30 tablet     Refill:  0    fluticasone (VERAMYST) 27.5 MCG/SPRAY nasal spray     Si sprays by Each Nostril route daily     Dispense:  1 Bottle     Refill:  3       Orders Placed This Encounter   Procedures    CT ABDOMEN PELVIS W IV CONTRAST     BUN/Creatinine Per Radiologist Protocol     Standing Status:   Future     Standing

## 2021-08-23 PROBLEM — H69.83 DYSFUNCTION OF BOTH EUSTACHIAN TUBES: Status: ACTIVE | Noted: 2021-08-23

## 2021-08-23 PROBLEM — H69.93 DYSFUNCTION OF BOTH EUSTACHIAN TUBES: Status: ACTIVE | Noted: 2021-08-23

## 2021-08-23 ASSESSMENT — ENCOUNTER SYMPTOMS
SINUS PRESSURE: 1
CHOKING: 1

## 2021-09-02 ENCOUNTER — HOSPITAL ENCOUNTER (OUTPATIENT)
Dept: CT IMAGING | Facility: CLINIC | Age: 36
Discharge: HOME | End: 2021-09-04
Payer: MEDICARE

## 2021-09-02 ENCOUNTER — TELEPHONE (OUTPATIENT)
Dept: FAMILY MEDICINE CLINIC | Age: 36
End: 2021-09-02

## 2021-09-02 DIAGNOSIS — R10.9 ABDOMINAL PAIN, UNSPECIFIED ABDOMINAL LOCATION: ICD-10-CM

## 2021-09-02 DIAGNOSIS — R93.5 ABNORMAL CT OF THE ABDOMEN: Primary | ICD-10-CM

## 2021-09-02 DIAGNOSIS — R10.84 GENERALIZED ABDOMINAL PAIN: ICD-10-CM

## 2021-09-02 DIAGNOSIS — N92.6 LATE PERIOD: Primary | ICD-10-CM

## 2021-09-02 PROCEDURE — 6360000004 HC RX CONTRAST MEDICATION: Performed by: FAMILY MEDICINE

## 2021-09-02 PROCEDURE — 74177 CT ABD & PELVIS W/CONTRAST: CPT

## 2021-09-02 PROCEDURE — 2580000003 HC RX 258: Performed by: FAMILY MEDICINE

## 2021-09-02 RX ORDER — 0.9 % SODIUM CHLORIDE 0.9 %
80 INTRAVENOUS SOLUTION INTRAVENOUS ONCE
Status: COMPLETED | OUTPATIENT
Start: 2021-09-02 | End: 2021-09-02

## 2021-09-02 RX ORDER — SODIUM CHLORIDE 0.9 % (FLUSH) 0.9 %
10 SYRINGE (ML) INJECTION PRN
Status: COMPLETED | OUTPATIENT
Start: 2021-09-02 | End: 2021-09-02

## 2021-09-02 RX ADMIN — Medication 10 ML: at 12:33

## 2021-09-02 RX ADMIN — IOHEXOL 50 ML: 240 INJECTION, SOLUTION INTRATHECAL; INTRAVASCULAR; INTRAVENOUS; ORAL at 12:33

## 2021-09-02 RX ADMIN — IOVERSOL 75 ML: 741 INJECTION INTRA-ARTERIAL; INTRAVENOUS at 12:33

## 2021-09-02 RX ADMIN — SODIUM CHLORIDE 80 ML: 9 INJECTION, SOLUTION INTRAVENOUS at 12:32

## 2021-09-02 NOTE — TELEPHONE ENCOUNTER
Spoke with the Radiologist, who states that the patient stated there is no way she is pregnant she had unprotected sex but they were careful. .they're going to proceed with CT with out doing pregnancy test

## 2021-09-02 NOTE — RESULT ENCOUNTER NOTE
Please notify patient.     The gallbladder seems to have multiple stones which are not calcified and an ultrasound of the gallbladder it is recommended I will order it    Very small 5 mm simple right kidney cyst.  Small left ovarian cyst 1.5 cm, nothing to worry about and no follow-up recommended    To schedule the wound ultrasound of the gallbladder    Lab Results       Component                Value               Date                       ALT                      15                  05/19/2021                 AST                      16                  05/19/2021                 ALKPHOS                  65                  05/19/2021                 BILITOT                  0.44                05/19/2021                Future Appointments  1/5/2022   2:30 PM    Poli Larson MD     fp sc               [facility]

## 2021-09-02 NOTE — TELEPHONE ENCOUNTER
Pt went to have her CT done and she is late for her period and is having unprotected sex, in order to complete the CT she will need a pregnancy test order placed and completed.

## 2021-09-02 NOTE — TELEPHONE ENCOUNTER
Please let the patient know to have the pregnancy test done ASAP    Orders Placed This Encounter   Procedures    HCG Qualitative, Serum     For CT abdomen     Standing Status:   Future     Standing Expiration Date:   9/3/2022       Future Appointments   Date Time Provider Irineo Rueda   9/2/2021 11:30 AM [facility] MOB CT RM [address] [place] [address] [facility]   1/5/2022  2:30 PM [facility] Cabot, [facility]       Thank you!

## 2021-09-07 ENCOUNTER — HOSPITAL ENCOUNTER (OUTPATIENT)
Dept: ULTRASOUND IMAGING | Age: 36
Discharge: HOME | End: 2021-09-09
Payer: MEDICARE

## 2021-09-07 ENCOUNTER — HOSPITAL ENCOUNTER (OUTPATIENT)
Age: 36
Discharge: HOME | End: 2021-09-07
Payer: MEDICARE

## 2021-09-07 DIAGNOSIS — R93.5 ABNORMAL CT OF THE ABDOMEN: ICD-10-CM

## 2021-09-07 DIAGNOSIS — N92.6 LATE PERIOD: ICD-10-CM

## 2021-09-07 DIAGNOSIS — R10.9 ABDOMINAL PAIN, UNSPECIFIED ABDOMINAL LOCATION: ICD-10-CM

## 2021-09-07 DIAGNOSIS — R31.9 HEMATURIA, UNSPECIFIED TYPE: ICD-10-CM

## 2021-09-07 LAB
-: ABNORMAL
AMORPHOUS: ABNORMAL
BACTERIA: ABNORMAL
BILIRUBIN URINE: NEGATIVE
CASTS UA: ABNORMAL /LPF
COLOR: YELLOW
COMMENT UA: ABNORMAL
CRYSTALS, UA: ABNORMAL /HPF
EPITHELIAL CELLS UA: ABNORMAL /HPF
GLUCOSE URINE: NEGATIVE
HCG QUALITATIVE: NEGATIVE
KETONES, URINE: NEGATIVE
LEUKOCYTE ESTERASE, URINE: ABNORMAL
MUCUS: ABNORMAL
NITRITE, URINE: NEGATIVE
OTHER OBSERVATIONS UA: ABNORMAL
PH UA: 5.5 (ref 5–8)
PROTEIN UA: NEGATIVE
RBC UA: ABNORMAL /HPF
RENAL EPITHELIAL, UA: ABNORMAL /HPF
SPECIFIC GRAVITY UA: 1.02 (ref 1–1.03)
T3 FREE: 2.48 PG/ML (ref 2.02–4.43)
THYROXINE, FREE: 0.99 NG/DL (ref 0.93–1.7)
TRICHOMONAS: ABNORMAL
TSH SERPL DL<=0.05 MIU/L-ACNC: 6.54 MIU/L (ref 0.3–5)
TURBIDITY: ABNORMAL
URINE HGB: NEGATIVE
UROBILINOGEN, URINE: NORMAL
WBC UA: ABNORMAL /HPF
YEAST: ABNORMAL

## 2021-09-07 PROCEDURE — 81001 URINALYSIS AUTO W/SCOPE: CPT

## 2021-09-07 PROCEDURE — 84481 FREE ASSAY (FT-3): CPT

## 2021-09-07 PROCEDURE — 36415 COLL VENOUS BLD VENIPUNCTURE: CPT

## 2021-09-07 PROCEDURE — 76705 ECHO EXAM OF ABDOMEN: CPT

## 2021-09-07 PROCEDURE — 84443 ASSAY THYROID STIM HORMONE: CPT

## 2021-09-07 PROCEDURE — 84703 CHORIONIC GONADOTROPIN ASSAY: CPT

## 2021-09-07 PROCEDURE — 84439 ASSAY OF FREE THYROXINE: CPT

## 2021-09-07 NOTE — RESULT ENCOUNTER NOTE
Please notify patient, normal labs, the urine was contaminated, to drink more water, there was no blood in the urine at this time ,continue current treatment    Future Appointments  9/13/2021  10:15 AM   MD [facility]  1/5/2022   2:30 PM    Thais Perry MD     [facility]

## 2021-09-07 NOTE — RESULT ENCOUNTER NOTE
Please notify patient. Ultrasound did show gallstones, I did suggest referral to surgeon to have the gallbladder removed if she agrees, please let me know and if she agrees, then I can refer her to Dr. Lori Larson    Otherwise labs within normal limits  Continue current treatment.     Future Appointments  9/13/2021  10:15 AM   Jakob Evans MD      [facility]  1/5/2022   2:30 PM    Ada Coleman MD     fp sc               [facility]

## 2021-09-17 ENCOUNTER — TELEPHONE (OUTPATIENT)
Dept: FAMILY MEDICINE CLINIC | Age: 36
End: 2021-09-17

## 2021-09-17 ENCOUNTER — OFFICE VISIT (OUTPATIENT)
Dept: OBGYN CLINIC | Age: 36
End: 2021-09-17
Payer: MEDICARE

## 2021-09-17 VITALS
WEIGHT: 191 LBS | HEART RATE: 92 BPM | DIASTOLIC BLOOD PRESSURE: 79 MMHG | SYSTOLIC BLOOD PRESSURE: 112 MMHG | BODY MASS INDEX: 33.83 KG/M2

## 2021-09-17 DIAGNOSIS — R10.9 ABDOMINAL PAIN, UNSPECIFIED ABDOMINAL LOCATION: ICD-10-CM

## 2021-09-17 DIAGNOSIS — K80.20 GALLSTONES: Primary | ICD-10-CM

## 2021-09-17 DIAGNOSIS — R10.2 PELVIC PAIN IN FEMALE: Primary | ICD-10-CM

## 2021-09-17 DIAGNOSIS — N76.0 ACUTE VAGINITIS: ICD-10-CM

## 2021-09-17 DIAGNOSIS — N83.10 CORPUS LUTEUM CYST: ICD-10-CM

## 2021-09-17 PROCEDURE — G8427 DOCREV CUR MEDS BY ELIG CLIN: HCPCS | Performed by: SPECIALIST

## 2021-09-17 PROCEDURE — 1036F TOBACCO NON-USER: CPT | Performed by: SPECIALIST

## 2021-09-17 PROCEDURE — 99213 OFFICE O/P EST LOW 20 MIN: CPT | Performed by: SPECIALIST

## 2021-09-17 PROCEDURE — G8417 CALC BMI ABV UP PARAM F/U: HCPCS | Performed by: SPECIALIST

## 2021-09-17 RX ORDER — FREMANEZUMAB-VFRM 225 MG/1.5ML
INJECTION SUBCUTANEOUS
COMMUNITY
Start: 2021-09-01 | End: 2022-01-20 | Stop reason: ALTCHOICE

## 2021-09-17 RX ORDER — CLINDAMYCIN HYDROCHLORIDE 300 MG/1
300 CAPSULE ORAL 2 TIMES DAILY
Qty: 14 CAPSULE | Refills: 0 | Status: SHIPPED | OUTPATIENT
Start: 2021-09-17 | End: 2021-09-24

## 2021-09-17 ASSESSMENT — ENCOUNTER SYMPTOMS
NAUSEA: 0
ABDOMINAL PAIN: 0
CONSTIPATION: 0
DIARRHEA: 0
EYE PAIN: 0
BACK PAIN: 1
ABDOMINAL DISTENTION: 0
COUGH: 0
VOMITING: 0
APNEA: 0

## 2021-09-17 NOTE — TELEPHONE ENCOUNTER
Referral placed, please give her contact info to schedule, or MyChart her the contact information         Diagnosis Orders   1. [address] [place] [address] Jessica Harris DO, General Surgery, [place]   2.  Abdominal pain, unspecified abdominal location  [facility] DO Ibra[place]ima, General Surgery, [place]        Future Appointments   Date Time Provider Irineo Rueda   10/18/2021  9:30 AM Ainsley Crane MD [facility]   1/5/2022  2:30 PM Humera Gomez MD [facility]

## 2021-09-17 NOTE — PROGRESS NOTES
Subjective:      Patient ID: Noel Rodríguez is a 39 y.o. female. Chief Complaint   Patient presents with    Follow-Up from Hospital     /79 (Site: Right Upper Arm, Position: Sitting)   Pulse 92   Wt 191 lb (86.6 kg)   LMP 09/09/2021   BMI 33.83 kg/m²   Patient's last menstrual period was 09/09/2021. [id number]    Past Medical History:   Diagnosis Date    Allergic rhinitis     Anxiety     Chest pain     with anxiety    Chronic kidney disease     CKD (chronic kidney disease) stage 1, GFR 90 ml/min or greater 7/10/2018    Depression     Hashimoto's thyroiditis     Headache(784.0)     Heart murmur     Hematuria 2016    Irritable bowel syndrome with diarrhea 11/17/2016    Midline low back pain without sciatica 11/17/2016    Obesity (BMI 30-39. 9) 8/26/2017    Orthostatic hypotension     Panic attack as reaction to stress     certain anxiety medications will cause a panic attack    Panic disorder with agoraphobia 08/26/2017    some medications for anxiety will cause a panic attack.  Vertigo 10/2018    hx of     Current Outpatient Medications Ordered in Epic   Medication Sig Dispense Refill    AJOVY 225 MG/1.5ML SOSY INJECT 225MG INTO THE SKIN EVERY 30 DAYS      clindamycin (CLEOCIN) 300 MG capsule Take 1 capsule by mouth 2 times daily for 7 days 14 capsule 0    miconazole (MONISTAT 1 COMBINATION PACK) 1200 & 2 MG & % kit Place vaginally once. 1 kit 0    Riboflavin 100 MG TABS Take 100 mg by mouth daily 90 tablet 3    vitamin D (ERGOCALCIFEROL) 1.25 MG (78675 UT) CAPS capsule Take 1 capsule by mouth once a week 12 capsule 0    EPINEPHrine (EPIPEN) 0.3 MG/0.3ML SOAJ injection       magnesium oxide (MAG-OX) 400 (241.3 Mg) MG TABS tablet TAKE 1 TABLET BY MOUTH IN THE EVENING WITH FOOD      Misc Natural Products (CRANBERRY/PROBIOTIC) TABS Take 1 tablet by mouth daily Please dispense according to patients insurance.  90 tablet 3    butalbital-acetaminophen-caffeine (FIORICET, ESGIC) -40 MG per tablet Take 1 tablet by mouth every 6 hours as needed for Headaches or Migraine MAX 3 DAYS/WEEK 30 tablet 0    ammonium lactate (LAC-HYDRIN) 12 % lotion Apply topically daily. 1 Bottle 0    acetaminophen (TYLENOL) 500 MG tablet Take 1 tablet by mouth every 4 hours as needed for Pain 60 tablet 0     No current Epic-ordered facility-administered medications on file. Problem List Items Addressed This Visit     Pelvic pain in female - Primary    Corpus luteum cyst    Acute vaginitis        Allergies   Allergen Reactions    Diflucan [Fluconazole]     Flagyl [Metronidazole] Hives    Penicillins      When a child     No orders of the defined types were placed in this encounter. Patient is here today to follow up after having an CT scan showing an ovarian cyst.  Patient states that she is having ovary pain and back pain constantly. She says that she feels like someone is stabbing her ovary all the time. She states that she needs her gallbladder removed but she does not think that is the reason for the underlying pain. She is \"on the fence\" about having her gallbladder removed for concerns of possible weight gain and Hashimoto's disease. She states that she has blood in her urine, but states that she has also had her bladder examined and told that this is normal.  She is having pain after she eats too and she feels hungry when she is full. She says she is tired of getting constant bacterial and yeast infections. She has changed her soap and stop taking baths. She is very frustrated. She has one going on right now and it is affecting her self confidence. She is allergic to Flagyl and Diflucan. Review of Systems   Constitutional: Negative for activity change, appetite change and fever. HENT: Negative for ear discharge and ear pain. Eyes: Negative for pain and visual disturbance. Respiratory: Negative for apnea and cough.     Cardiovascular: Negative for chest pain, palpitations and leg swelling. Gastrointestinal: Negative for abdominal distention, abdominal pain, constipation, diarrhea, nausea and vomiting. Endocrine: Negative. Genitourinary: Positive for pelvic pain and vaginal discharge. Negative for difficulty urinating, dysuria and menstrual problem. Musculoskeletal: Positive for back pain. Negative for neck pain and neck stiffness. Skin: Negative. Neurological: Negative for light-headedness and numbness. Hematological: Negative. Does not bruise/bleed easily. Objective:   Physical Exam  Vitals and nursing note reviewed. Constitutional:       Appearance: She is well-developed. HENT:      Head: Normocephalic and atraumatic. Neck:      Thyroid: No thyromegaly. Cardiovascular:      Rate and Rhythm: Normal rate and regular rhythm. Pulmonary:      Effort: Pulmonary effort is normal.      Breath sounds: Normal breath sounds. No wheezing. Abdominal:      General: Bowel sounds are normal. There is no distension. Palpations: Abdomen is soft. There is no mass. Tenderness: There is no abdominal tenderness. There is no guarding. Musculoskeletal:         General: Normal range of motion. Cervical back: Normal range of motion and neck supple. Skin:     General: Skin is dry. Neurological:      Mental Status: She is alert and oriented to person, place, and time. Psychiatric:         Behavior: Behavior normal.         Thought Content: Thought content normal.         Assessment:      Patient with pelvic pain. CT scan showing cholelithiasis and 1.5 cm corpus luteum cyst was reviewed with patient. Explained to patient that no follow up is needed for this and informed her that this is most likely a follicle, which gets released monthly. Patient was advised to proceed with cholecystectomy as recommended by another provider.   If her symptoms persist following cholecystectomy, she will return for further evaluation of possible

## 2021-09-21 ENCOUNTER — OFFICE VISIT (OUTPATIENT)
Dept: GASTROENTEROLOGY | Age: 36
End: 2021-09-21
Payer: MEDICARE

## 2021-09-21 ENCOUNTER — TELEPHONE (OUTPATIENT)
Dept: GASTROENTEROLOGY | Age: 36
End: 2021-09-21

## 2021-09-21 ENCOUNTER — OFFICE VISIT (OUTPATIENT)
Dept: SURGERY | Age: 36
End: 2021-09-21
Payer: MEDICARE

## 2021-09-21 ENCOUNTER — HOSPITAL ENCOUNTER (OUTPATIENT)
Age: 36
Setting detail: SPECIMEN
Discharge: HOME | End: 2021-09-21
Payer: MEDICARE

## 2021-09-21 VITALS
BODY MASS INDEX: 33.84 KG/M2 | HEIGHT: 63 IN | WEIGHT: 191 LBS | HEART RATE: 89 BPM | OXYGEN SATURATION: 100 % | RESPIRATION RATE: 12 BRPM

## 2021-09-21 VITALS — SYSTOLIC BLOOD PRESSURE: 126 MMHG | DIASTOLIC BLOOD PRESSURE: 80 MMHG | BODY MASS INDEX: 33.83 KG/M2 | WEIGHT: 191 LBS

## 2021-09-21 DIAGNOSIS — K58.2 IRRITABLE BOWEL SYNDROME WITH BOTH CONSTIPATION AND DIARRHEA: ICD-10-CM

## 2021-09-21 DIAGNOSIS — F40.01 PANIC DISORDER WITH AGORAPHOBIA: ICD-10-CM

## 2021-09-21 DIAGNOSIS — R10.30 LOWER ABDOMINAL PAIN OF UNKNOWN ETIOLOGY: Primary | ICD-10-CM

## 2021-09-21 DIAGNOSIS — K58.2 IRRITABLE BOWEL SYNDROME WITH BOTH CONSTIPATION AND DIARRHEA: Primary | ICD-10-CM

## 2021-09-21 LAB
ABSOLUTE EOS #: 0.04 K/UL (ref 0–0.44)
ABSOLUTE IMMATURE GRANULOCYTE: 0.05 K/UL (ref 0–0.3)
ABSOLUTE LYMPH #: 1.76 K/UL (ref 1.1–3.7)
ABSOLUTE MONO #: 0.36 K/UL (ref 0.1–1.2)
ALBUMIN SERPL-MCNC: 4.6 G/DL (ref 3.5–5.2)
ALBUMIN/GLOBULIN RATIO: 1.5 (ref 1–2.5)
ALP BLD-CCNC: 76 U/L (ref 35–104)
ALT SERPL-CCNC: 32 U/L (ref 5–33)
ANION GAP SERPL CALCULATED.3IONS-SCNC: 11 MMOL/L (ref 9–17)
AST SERPL-CCNC: 24 U/L
BASOPHILS # BLD: 1 % (ref 0–2)
BASOPHILS ABSOLUTE: 0.04 K/UL (ref 0–0.2)
BILIRUB SERPL-MCNC: 0.54 MG/DL (ref 0.3–1.2)
BILIRUBIN DIRECT: 0.09 MG/DL
BILIRUBIN, INDIRECT: 0.45 MG/DL (ref 0–1)
BUN BLDV-MCNC: 10 MG/DL (ref 6–20)
BUN/CREAT BLD: NORMAL (ref 9–20)
C-REACTIVE PROTEIN: <3 MG/L (ref 0–5)
CALCIUM SERPL-MCNC: 9 MG/DL (ref 8.6–10.4)
CHLORIDE BLD-SCNC: 104 MMOL/L (ref 98–107)
CO2: 25 MMOL/L (ref 20–31)
CREAT SERPL-MCNC: 0.64 MG/DL (ref 0.5–0.9)
DIFFERENTIAL TYPE: ABNORMAL
EOSINOPHILS RELATIVE PERCENT: 1 % (ref 1–4)
GFR AFRICAN AMERICAN: >60 ML/MIN
GFR NON-AFRICAN AMERICAN: >60 ML/MIN
GFR SERPL CREATININE-BSD FRML MDRD: NORMAL ML/MIN/{1.73_M2}
GFR SERPL CREATININE-BSD FRML MDRD: NORMAL ML/MIN/{1.73_M2}
GLOBULIN: NORMAL G/DL (ref 1.5–3.8)
GLUCOSE BLD-MCNC: 79 MG/DL (ref 70–99)
HCT VFR BLD CALC: 40.4 % (ref 36.3–47.1)
HEMOGLOBIN: 13.2 G/DL (ref 11.9–15.1)
IMMATURE GRANULOCYTES: 1 %
LYMPHOCYTES # BLD: 28 % (ref 24–43)
MCH RBC QN AUTO: 30.6 PG (ref 25.2–33.5)
MCHC RBC AUTO-ENTMCNC: 32.7 G/DL (ref 28.4–34.8)
MCV RBC AUTO: 93.5 FL (ref 82.6–102.9)
MONOCYTES # BLD: 6 % (ref 3–12)
NRBC AUTOMATED: 0 PER 100 WBC
PDW BLD-RTO: 11.6 % (ref 11.8–14.4)
PLATELET # BLD: 264 K/UL (ref 138–453)
PLATELET ESTIMATE: ABNORMAL
PMV BLD AUTO: 10.5 FL (ref 8.1–13.5)
POTASSIUM SERPL-SCNC: 3.9 MMOL/L (ref 3.7–5.3)
RBC # BLD: 4.32 M/UL (ref 3.95–5.11)
RBC # BLD: ABNORMAL 10*6/UL
SEDIMENTATION RATE, ERYTHROCYTE: 9 MM (ref 0–20)
SEG NEUTROPHILS: 63 % (ref 36–65)
SEGMENTED NEUTROPHILS ABSOLUTE COUNT: 3.98 K/UL (ref 1.5–8.1)
SODIUM BLD-SCNC: 140 MMOL/L (ref 135–144)
TOTAL PROTEIN: 7.6 G/DL (ref 6.4–8.3)
WBC # BLD: 6.2 K/UL (ref 3.5–11.3)
WBC # BLD: ABNORMAL 10*3/UL

## 2021-09-21 PROCEDURE — 1036F TOBACCO NON-USER: CPT | Performed by: INTERNAL MEDICINE

## 2021-09-21 PROCEDURE — 99203 OFFICE O/P NEW LOW 30 MIN: CPT | Performed by: INTERNAL MEDICINE

## 2021-09-21 PROCEDURE — G8417 CALC BMI ABV UP PARAM F/U: HCPCS | Performed by: INTERNAL MEDICINE

## 2021-09-21 PROCEDURE — G8427 DOCREV CUR MEDS BY ELIG CLIN: HCPCS | Performed by: INTERNAL MEDICINE

## 2021-09-21 PROCEDURE — G8417 CALC BMI ABV UP PARAM F/U: HCPCS | Performed by: SURGERY

## 2021-09-21 PROCEDURE — 1036F TOBACCO NON-USER: CPT | Performed by: SURGERY

## 2021-09-21 PROCEDURE — 99204 OFFICE O/P NEW MOD 45 MIN: CPT | Performed by: SURGERY

## 2021-09-21 PROCEDURE — G8427 DOCREV CUR MEDS BY ELIG CLIN: HCPCS | Performed by: SURGERY

## 2021-09-21 RX ORDER — POLYETHYLENE GLYCOL 3350, SODIUM SULFATE ANHYDROUS, SODIUM BICARBONATE, SODIUM CHLORIDE, POTASSIUM CHLORIDE 236; 22.74; 6.74; 5.86; 2.97 G/4L; G/4L; G/4L; G/4L; G/4L
POWDER, FOR SOLUTION ORAL
Qty: 4000 ML | Refills: 0 | Status: SHIPPED | OUTPATIENT
Start: 2021-09-21 | End: 2021-10-18

## 2021-09-21 RX ORDER — PSYLLIUM SEED (WITH DEXTROSE)
1 POWDER (GRAM) ORAL DAILY PRN
Qty: 30 PACKET | Refills: 1 | Status: SHIPPED | OUTPATIENT
Start: 2021-09-21 | End: 2021-10-21

## 2021-09-21 ASSESSMENT — ENCOUNTER SYMPTOMS
RESPIRATORY NEGATIVE: 1
ABDOMINAL DISTENTION: 1
DIARRHEA: 1
ABDOMINAL PAIN: 1
NAUSEA: 1
EYES NEGATIVE: 1
CONSTIPATION: 1

## 2021-09-21 NOTE — PROGRESS NOTES
[id number] [address] [facility]   History & Physical  Courtney Rubioil,   Pt Name: Thang Forrest  MRN: [id number]  YOB: 1985  Date of evaluation: 9/21/2021  Primary Care Physician: Nish Tijerina MD    Chief Complaint: Lower abdominal pain, alternating constipation diarrhea, cholelithiasis      SUBJECTIVE:    History of Present Illness: This is a 39 y.o.  female who presents for evaluation for multiple issues. Patient reports occasional generalized abdominal pain although most consistently patient has bilateral lower abdominal pain, occasionally right lateral or right upper quadrant pain. Patient also reports having bilateral flank pain when she becomes hungry. Patient's had extensive work-up regarding her abdominal pain including diagnostic laparoscopy, review of the operative note reveals adhesions in the pelvis including right side adhesions likely secondary to prior appendectomy, lysis of adhesions was undertaken to free up the right aspect of the uterus as well as the right fallopian tube and adhesions to the anterior abdominal wall. Patient reports that she has had alternating constipation and diarrhea for some time, she is not currently on any medical treatments specifically for this. Patient has known history of anxiety for which she sees a therapist.  Patient denies heartburn or indigestion however she does report bilateral flank pain when she becomes hungry. No known dietary triggers for her abdominal complaints, no history of EGD or colonoscopy. CT of the abdomen was obtained 9/2/2021 significant for cholelithiasis but otherwise normal abdomen pelvis, gallbladder ultrasound was obtained 9/7/2021 which again showed cholelithiasis without evidence of cholecystitis or biliary duct obstruction, liver function panel from 5/19/2021 was normal.  Patient denies any postprandial discomfort nor does she have any comfort with bowel movements.     Chart review performed to add information to the HPI: Yes    Past Medical History   has a past medical history of Allergic rhinitis, Anxiety, Chest pain, Chronic kidney disease, CKD (chronic kidney disease) stage 1, GFR 90 ml/min or greater, Depression, Hashimoto's thyroiditis, Headache(784.0), Heart murmur, Hematuria, Irritable bowel syndrome with diarrhea, Midline low back pain without sciatica, Obesity (BMI 30-39.9), Orthostatic hypotension, Panic attack as reaction to stress, Panic disorder with agoraphobia, and Vertigo. Past Surgical History   has a past surgical history that includes Appendectomy and laparoscopy (N/A, 5/2/2019). Family History  family history includes Breast Cancer in her maternal grandmother; Heart Attack in her maternal grandfather; Mental Illness in her mother; No Known Problems in her father. Social History  Tobacco use:  reports that she quit smoking about 16 years ago. Her smoking use included cigarettes. She has a 0.75 pack-year smoking history. She has never used smokeless tobacco.  Alcohol use:  reports no history of alcohol use. Drug use:  reports previous drug use. Drug: Marijuana. Medications  Current Medications:   Current Outpatient Medications   Medication Sig Dispense Refill    AJOVY 225 MG/1.5ML SOSY INJECT 225MG INTO THE SKIN EVERY 30 DAYS      clindamycin (CLEOCIN) 300 MG capsule Take 1 capsule by mouth 2 times daily for 7 days 14 capsule 0    miconazole (MONISTAT 1 COMBINATION PACK) 1200 & 2 MG & % kit Place vaginally once. 1 kit 0    Riboflavin 100 MG TABS Take 100 mg by mouth daily 90 tablet 3    vitamin D (ERGOCALCIFEROL) 1.25 MG (62969 UT) CAPS capsule Take 1 capsule by mouth once a week 12 capsule 0    EPINEPHrine (EPIPEN) 0.3 MG/0.3ML SOAJ injection       magnesium oxide (MAG-OX) 400 (241.3 Mg) MG TABS tablet TAKE 1 TABLET BY MOUTH IN THE EVENING WITH FOOD      Misc Natural Products (CRANBERRY/PROBIOTIC) TABS Take 1 tablet by mouth daily Please dispense according to patients insurance. 90 tablet 3    butalbital-acetaminophen-caffeine (FIORICET, ESGIC) -40 MG per tablet Take 1 tablet by mouth every 6 hours as needed for Headaches or Migraine MAX 3 DAYS/WEEK 30 tablet 0    ammonium lactate (LAC-HYDRIN) 12 % lotion Apply topically daily. 1 Bottle 0    acetaminophen (TYLENOL) 500 MG tablet Take 1 tablet by mouth every 4 hours as needed for Pain 60 tablet 0     No current facility-administered medications for this visit. Home Medications:   Prior to Admission medications    Medication Sig Start Date End Date Taking? Authorizing Provider   AJOVY 225 MG/1.5ML SOSY INJECT 225MG INTO THE SKIN EVERY 30 DAYS 9/1/21   Historical Provider, MD   clindamycin (CLEOCIN) 300 MG capsule Take 1 capsule by mouth 2 times daily for 7 days 9/17/21 9/24/21  Gil Gama MD   miconazole (MONISTAT 1 COMBINATION PACK) 1200 & 2 MG & % kit Place vaginally once. 9/17/21   Gil Gama MD   Riboflavin 100 MG TABS Take 100 mg by mouth daily 8/17/21   Michelle Reed MD   vitamin D (ERGOCALCIFEROL) 1.25 MG (67337 UT) CAPS capsule Take 1 capsule by mouth once a week 5/20/21   Michelle Reed MD   EPINEPHrine (EPIPEN) 0.3 MG/0.3ML SOAJ injection  1/30/21   Historical Provider, MD   magnesium oxide (MAG-OX) 400 (241.3 Mg) MG TABS tablet TAKE 1 TABLET BY MOUTH IN THE EVENING WITH FOOD 2/6/21   Historical Provider, MD   [facility] Natural Products (CRANBERRY/PROBIOTIC) TABS Take 1 tablet by mouth daily Please dispense according to patients insurance. 2/18/21   Michelle Reed MD   butalbital-acetaminophen-caffeine (FIORICET, ESGIC) -40 MG per tablet Take 1 tablet by mouth every 6 hours as needed for Headaches or Migraine MAX 3 DAYS/WEEK 2/18/21   Michelle Reed MD   ammonium lactate (LAC-HYDRIN) 12 % lotion Apply topically daily.  2/18/21   Michelle Reed MD   acetaminophen (TYLENOL) 500 MG tablet Take 1 tablet by mouth every 4 hours as needed for Pain 6/6/18   Bradley Armendariz MD Allergies  Diflucan [fluconazole], Flagyl [metronidazole], and Penicillins      Review of Systems:  General: Denies any fever, chills. Eyes: Denies any changes in vision, diplopia or eye pain  Ears, Nose, Mouth: Denies changes in hearing/tinnitus or drainage from ears, no rhinorrhea or bloody nose, no difficulty chewing  Throat: no difficulty swallowing, no throat pain  Respiratory: Denies any shortness of breath or cough. Cardiac: Denies any chest pain, palpitations, claudication or edema. Gastrointestinal: Abdominal pain as described above, alternating constipation and diarrhea  Genitourinary: Denies any frequency, urgency, hesitancy or incontinence. Musculoskeletal: Denies worsening muscle weakness or recent trauma  Skin: Denies rashes or lesions  Psychiatric: Denies any recent changes in mood or affect  Hematologic: Denies bruising or bleeding easily. PHYSICAL EXAMINATION  Vitals:   Vitals:    09/21/21 0942   Pulse: 89   Resp: 12   SpO2: 100%       General Appearance:  awake, alert, no acute distress, well developed, well nourished   Skin:  Skin color, texture, turgor normal. No rashes or lesions. Head/face:  NCAT, face symmetrical  Eyes:  PERRL, no evidence of conjunctivitis or ptosis bilaterally  Ears:  External ears and canals grossly normal, no evidence of otorrhea. Nose/Sinuses:  Nares normal. Septum midline. Mucosa normal. No external drainage noted. Mouth/Neck:  Mucosa moist.  No external oral lesions. Trachea midline. No visible masses. Lungs:  Normal chest expansion, unlabored breathing without accessory muscle use. No audible rales, rhonchi, or wheezing. Cardiovascular: S1S2. No evidence of JVD. No evidence of pulsatile masses in abdomen  Abdomen:  Soft, non-tender, no organomegaly, no masses. Musculoskeletal: No evidence of bony/muscular deformities, trauma, atrophy of either left/right upper/lower extremity. No evidence of digital clubbing or cyanosis.   Neurologic:  CN 2-12 grossly intact without obvious deficits. Grossly normal sensation in all extremities. Psychiatric: appropriate judgement and insight, appropriate recall of recent and remote memory, no evidence of depression/anxiety/agitation    RADIOLOGY:  The following images and reports were personally reviewed with the following significant findings pertinent to the Chief Complaint and/or HPI:    CT ABDOMEN PELVIS W IV CONTRAST    Result Date: 9/2/2021  EXAMINATION: CT OF THE ABDOMEN AND PELVIS WITH CONTRAST 9/2/2021 10:53 am TECHNIQUE: CT of the abdomen and pelvis was performed with the administration of intravenous contrast. Multiplanar reformatted images are provided for review. Dose modulation, iterative reconstruction, and/or weight based adjustment of the mA/kV was utilized to reduce the radiation dose to as low as reasonably achievable. COMPARISON: 10/12/2017 HISTORY: ORDERING SYSTEM PROVIDED HISTORY: Generalized abdominal pain Reason for Exam: pt stated abdominal pain Acuity: Acute Type of Exam: Initial FINDINGS: Lower Chest: Normal heart size. Lung bases clear. Organs: The liver, spleen, pancreas, adrenal glands and left kidney appear unremarkable. 5 mm likely simple cyst lateral midpole right kidney which otherwise appears unremarkable gallbladder demonstrates multiple folds with apparent noncalcified stones. GI/Bowel: No evidence of bowel obstruction or inflammation. Pelvis: Bladder and uterus appear grossly unremarkable. Normal size right ovary. Left ovarian 1.5 cm early corpus luteum for which no follow-up imaging is recommended per guidelines below. Peritoneum/Retroperitoneum: Normal caliber abdominal aorta. No suspicious lymphadenopathy. No ascites or free air. Bones/Soft Tissues: No significant osseous abnormality. 1. Multiple gallbladder fold with apparent cholelithiasis. Consider gallbladder ultrasound for more optimal assessment as clinically warranted.  2. Otherwise negative CT examination of the obstruction adequately explains lower abdominal and pelvic pain nor bilateral flank pain secondary to hunger, patient has no right upper quadrant or right lateral abdominal postprandial pain nor any personal history of jaundice or dark urine that would suggest biliary obstruction secondary to a cholelith. At this time it is difficult to assure the patient that cholecystectomy would relieve any of her other symptoms, benefits of cholecystectomy may be outweighed by its risks at this time  · In terms of her other symptoms, certainly anxiety can be a significant contributing factor however the etiology of these complaints are uncertain. I would recommend referral to GI for further work-up which may include EGD and colonoscopy to rule out upper GI and colon as sources of her complaints  · If further work-up is negative or nondiagnostic then we have discussed proceeding with cholecystectomy given the radiographic findings. Risks include bleeding, infection, scarring, bile leak, damage to surrounding tissues, chance of damage to the common bile duct, risk of subtotal cholecystectomy, conversion to open procedure, need for further surgery or procedures. Benefits, alternatives, complications and procedure details were explained to the patient, all questions were answered. Patient will contact us if she desires to have further consultation or decides to proceed with cholecystectomy  ·     Greater than 45min were spent on preparing to see this patient as well as counseling this patient.     Electronically signed by Benny Oakes DO on 9/21/2021 at 10:51 AM

## 2021-09-21 NOTE — PROGRESS NOTES
Reason for Referral: Alternating bowel habits which include diarrhea and constipation, concern for GI sources of the patient's chronic dyspepsia      Chief Complaint   Patient presents with    New Patient     referred for IBS-M    Constipation     Patient states having bowel movement every 3-4 days. Denies bleeding. States straining to have bowel movements. States she has hemorrhoids but they currently are not bothering her.  Diarrhea     Patient states when she does have loose bowel movements she will get very sweaty and abdominal pain associated with her bowel movements. Patient states she goes back and forth between constipation and diarrhea- hasn't had loose bm in about 2 weeks.  Bloated     Patient states feeling bloated daily- whether she eats or not    Nausea     Patient states feeling nauseous about 2 times a week. HISTORY OF PRESENT ILLNESS: Junaid Tinoco is a 39 y.o. female with a past history remarkable for prior history of hematuria, depression, Hashimoto, chronic dyspepsia and increase satiety, Migraines, abnormal hearing,  fasting related back pain, referred for evaluation of dyspepsia and irregular bowel movements. Patient reports also a habits of constipation diarrhea, predominantly diarrhea with loose bowel movements and 1 to 2 days of constipation occurring once or twice a month. Patient reports symptoms ongoing for several months now. Denies any dietary triggers. Denies any factious symptoms. Denies any weight loss or weight changes. Denies any appetite changes but does report some postprandial fullness and satiety. No recent endoscopic evaluation. The patient underwent CT imaging which revealed evidence of gallstones and pending evaluation for cholecystectomy. Patient's pain is slightly atypical for biliary colic however given the postprandial nature cannot completely exclude hepatobiliary causes.       Smoker: None   Drinking history: None   Illicit drugs: None   Abdominal surgeries: Appendectomy during child, Laparoscopy   Prior Colonoscopy: none   Prior EGD: None   FH of GI issues: None       Past Medical,Family, and Social History reviewed and does contribute to the patient presentingcondition. Patient's PMH/PSH,SH,PSYCH Hx, MEDs, ALLERGIES, and ROS were all reviewed and updated in the appropriate sections. PAST MEDICAL HISTORY:  Past Medical History:   Diagnosis Date    Allergic rhinitis     Anxiety     Chest pain     with anxiety    Chronic kidney disease     CKD (chronic kidney disease) stage 1, GFR 90 ml/min or greater 7/10/2018    Depression     Hashimoto's thyroiditis     Headache(784.0)     Heart murmur     Hematuria 2016    Irritable bowel syndrome with diarrhea 11/17/2016    Midline low back pain without sciatica 11/17/2016    Obesity (BMI 30-39. 9) 8/26/2017    Orthostatic hypotension     Panic attack as reaction to stress     certain anxiety medications will cause a panic attack    Panic disorder with agoraphobia 08/26/2017    some medications for anxiety will cause a panic attack.     Vertigo 10/2018    hx of       Past Surgical History:   Procedure Laterality Date    APPENDECTOMY      LAPAROSCOPY N/A 5/2/2019    LAPAROSCOPY OPERATIVE, LYSIS OF ADHESIONS performed by Rehana Proctor MD at [address] [place], [place] [address]d:    Current Outpatient Medications:     AJOVY 225 MG/1.5ML SOSY, INJECT 225MG INTO THE SKIN EVERY 30 DAYS, Disp: , Rfl:     clindamycin (CLEOCIN) 300 MG capsule, Take 1 capsule by mouth 2 times daily for 7 days, Disp: 14 capsule, Rfl: 0    miconazole (MONISTAT 1 COMBINATION PACK) 1200 & 2 MG & % kit, Place vaginally once., Disp: 1 kit, Rfl: 0    Riboflavin 100 MG TABS, Take 100 mg by mouth daily, Disp: 90 tablet, Rfl: 3    vitamin D (ERGOCALCIFEROL) 1.25 MG (90232 UT) CAPS capsule, Take 1 capsule by mouth once a week, Disp: 12 capsule, Rfl: 0    EPINEPHrine (EPIPEN) 0.3 MG/0.3ML SOAJ injection, , Disp: , Rfl:     magnesium oxide (MAG-OX) 400 (241.3 Mg) MG TABS tablet, TAKE 1 TABLET BY MOUTH IN THE EVENING WITH FOOD, Disp: , Rfl:     Misc Natural Products (CRANBERRY/PROBIOTIC) TABS, Take 1 tablet by mouth daily Please dispense according to patients insurance., Disp: 90 tablet, Rfl: 3    butalbital-acetaminophen-caffeine (FIORICET, ESGIC) -40 MG per tablet, Take 1 tablet by mouth every 6 hours as needed for Headaches or Migraine MAX 3 DAYS/WEEK, Disp: 30 tablet, Rfl: 0    ammonium lactate (LAC-HYDRIN) 12 % lotion, Apply topically daily. , Disp: 1 Bottle, Rfl: 0    acetaminophen (TYLENOL) 500 MG tablet, Take 1 tablet by mouth every 4 hours as needed for Pain, Disp: 60 tablet, Rfl: 0    ALLERGIES:   Allergies   Allergen Reactions    Diflucan [Fluconazole]     Flagyl [Metronidazole] Hives    Penicillins      When a child       FAMILY HISTORY:       Problem Relation Age of Onset    Mental Illness Mother     No Known Problems Father     Breast Cancer Maternal Grandmother     Heart Attack Maternal Grandfather     Colon Cancer Neg Hx          SOCIAL HISTORY:   Social History     Socioeconomic History    Marital status: Single     Spouse name: Not on file    Number of children: Not on file    Years of education: Not on file    Highest education level: Not on file   Occupational History    Not on file   Tobacco Use    Smoking status: Former Smoker     Packs/day: 0.25     Years: 3.00     Pack years: 0.75     Types: Cigarettes     Quit date: 2005     Years since quittin.7    Smokeless tobacco: Never Used   Vaping Use    Vaping Use: Never used   Substance and Sexual Activity    Alcohol use: No     Comment: Hx alcohol abuse as teenager    Drug use: Not Currently     Types: Marijuana     Comment:  Hx, 15-17 yrs of age, smoked MJ daily 3 joints / day.      Sexual activity: Yes     Partners: Male     Birth control/protection: Condom   Other Topics Concern    Not on file   Social History Narrative    Not on file     Social Determinants of Health     Financial Resource Strain: Medium Risk    Difficulty of Paying Living Expenses: Somewhat hard   Food Insecurity: Food Insecurity Present    Worried About Running Out of Food in the Last Year: Sometimes true    Jessica of Food in the Last Year: Sometimes true   Transportation Needs:     Lack of Transportation (Medical):  Lack of Transportation (Non-Medical):    Physical Activity:     Days of Exercise per Week:     Minutes of Exercise per Session:    Stress:     Feeling of Stress :    Social Connections:     Frequency of Communication with Friends and Family:     Frequency of Social Gatherings with Friends and Family:     Attends Mandaeism Services:     Active Member of Clubs or Organizations:     Attends Club or Organization Meetings:     Marital Status:    Intimate Partner Violence:     Fear of Current or Ex-Partner:     Emotionally Abused:     Physically Abused:     Sexually Abused:          REVIEW OF SYSTEMS: A 12-point review of systems was obtained and pertinent positives and negatives were listed below. REVIEW OF SYSTEMS:     Constitutional: No fever, no chills, no lethargy, no weakness. HEENT:  No headache, otalgia, itchy eyes, nasal discharge or sore throat. Cardiac:  No chest pain, dyspnea, orthopnea or PND. Chest:   No cough, phlegm or wheezing. Abdomen:      Detailed by MA   Neuro:  No focal weakness, abnormal movements or seizure like activity. Skin:   No rashes, no itching. :   No hematuria, no pyuria, no dysuria, no flank pain. Extremities:  No swelling or joint pains. ROS was otherwise negative    Review of Systems   Constitutional: Positive for appetite change, fatigue and unexpected weight change (increase). HENT: Negative. Eyes: Negative. Respiratory: Negative. Cardiovascular: Negative. Gastrointestinal: Positive for abdominal distention, abdominal pain, constipation, diarrhea and nausea. Endocrine: Negative. Genitourinary: Negative. Musculoskeletal: Negative. Skin: Negative. Allergic/Immunologic: Positive for environmental allergies. Neurological: Positive for dizziness, light-headedness and headaches. Hematological: Negative. Psychiatric/Behavioral: Negative. PHYSICAL EXAMINATION: Vital signs reviewed per the nursing documentation. /80   Wt 191 lb (86.6 kg)   LMP 09/09/2021   BMI 33.83 kg/m²   Body mass index is 33.83 kg/m². Physical Exam    Physical Exam   Constitutional: Patient is oriented to person, place, and time. Patient appears well-developed and well-nourished. HENT:   Head: Normocephalic and atraumatic. Eyes: Pupils are equal, round, and reactive to light. EOM are normal.   Neck: Normal range of motion. Neck supple. No JVD present. No tracheal deviation present. No thyromegaly present. Cardiovascular: Normal rate, regular rhythm, normal heart sounds and intact distal pulses. Pulmonary/Chest: Effort normal and breath sounds normal. No stridor. No respiratory distress. He has no wheezes. He has no rales. He exhibits no tenderness. Abdominal: Soft. Bowel sounds are normal. He exhibits no distension and no mass. There is no tenderness. There is no rebound and no guarding. No hernia. Musculoskeletal: Normal range of motion. Lymphadenopathy:    Patient has no cervical adenopathy. Neurological: Patient is alert and oriented to person, place, and time. Psychiatric: Patient has a normal mood and affect.  Patient behavior is normal.       LABORATORY DATA: Reviewed  Lab Results   Component Value Date    WBC 4.4 05/19/2021    HGB 12.3 05/19/2021    HCT 36.9 05/19/2021    MCV 93.5 05/19/2021     05/19/2021     05/19/2021    K 3.9 05/19/2021     05/19/2021    CO2 27 05/19/2021    BUN 10 05/19/2021    CREATININE 0.64 05/19/2021    LABALBU 4.6 05/19/2021    BILITOT 0.44 05/19/2021    ALKPHOS 65 05/19/2021    AST 16 05/19/2021 ALT 15 05/19/2021         Lab Results   Component Value Date    RBC 3.95 (L) 05/19/2021    HGB 12.3 05/19/2021    MCV 93.5 05/19/2021    MCH 31.2 05/19/2021    MCHC 33.4 05/19/2021    RDW 12.5 05/19/2021    MPV 8.1 05/19/2021    BASOPCT 1 09/11/2019    LYMPHSABS 1.10 09/11/2019    MONOSABS 0.60 09/11/2019    NEUTROABS 2.50 09/11/2019    EOSABS 0.10 09/11/2019    BASOSABS 0.00 09/11/2019         DIAGNOSTIC TESTING:     CT ABDOMEN PELVIS W IV CONTRAST    Result Date: 9/2/2021  EXAMINATION: CT OF THE ABDOMEN AND PELVIS WITH CONTRAST 9/2/2021 10:53 am TECHNIQUE: CT of the abdomen and pelvis was performed with the administration of intravenous contrast. Multiplanar reformatted images are provided for review. Dose modulation, iterative reconstruction, and/or weight based adjustment of the mA/kV was utilized to reduce the radiation dose to as low as reasonably achievable. COMPARISON: 10/12/2017 HISTORY: ORDERING SYSTEM PROVIDED HISTORY: Generalized abdominal pain Reason for Exam: pt stated abdominal pain Acuity: Acute Type of Exam: Initial FINDINGS: Lower Chest: Normal heart size. Lung bases clear. Organs: The liver, spleen, pancreas, adrenal glands and left kidney appear unremarkable. 5 mm likely simple cyst lateral midpole right kidney which otherwise appears unremarkable gallbladder demonstrates multiple folds with apparent noncalcified stones. GI/Bowel: No evidence of bowel obstruction or inflammation. Pelvis: Bladder and uterus appear grossly unremarkable. Normal size right ovary. Left ovarian 1.5 cm early corpus luteum for which no follow-up imaging is recommended per guidelines below. Peritoneum/Retroperitoneum: Normal caliber abdominal aorta. No suspicious lymphadenopathy. No ascites or free air. Bones/Soft Tissues: No significant osseous abnormality. 1. Multiple gallbladder fold with apparent cholelithiasis. Consider gallbladder ultrasound for more optimal assessment as clinically warranted.  2. twice a month. Patient reports symptoms ongoing for several months now. Denies any dietary triggers. Denies any factious symptoms. Denies any weight loss or weight changes. Denies any appetite changes but does report some postprandial fullness and satiety. No recent endoscopic evaluation. The patient underwent CT imaging which revealed evidence of gallstones and pending evaluation for cholecystectomy. Patient's pain is slightly atypical for biliary colic however given the postprandial nature cannot completely exclude hepatobiliary causes. Assessment  1. Irritable bowel syndrome with both constipation and diarrhea        PLAN:    1) Irregular bowel movements-- send for ESR, CRP, Celiac, IBD, Calpro, Elastase. Will avoid placing diagnosis of IBS-M until organic causes have been excluded. We will plan for diagnostic upper endoscopy and colonoscopy. Risk, benefits, alternative discussed with the patient. She agreed to proceed with the procedure    2) we will provide Metamucil as a bulk forming agent and to normalize patient's bowel frequency. 3) further recommendations and work-up to be considered until after the procedure. Thank you for allowing me to participate in the care of Ms. Marylu Cuevas. For any further questions please do not hesitate to contact me. I have reviewed and agree with the MA/LPN ROS please refer to their documentation from today's encounter on a separate note. Emiliana Vargas MD, MPH   [facility]  Office #: [phone]          this note is created with the assistance of a speech recognition program.  While intending to generate a document that actually reflects the content of the visit, the document can still have some errors including those of syntax and sound a like substitutions which may escape proof reading. It such instances, actual meaning can be extrapolated by contextual diversion.

## 2021-09-22 ENCOUNTER — HOSPITAL ENCOUNTER (OUTPATIENT)
Age: 36
Setting detail: SPECIMEN
Discharge: HOME | End: 2021-09-22
Payer: MEDICARE

## 2021-09-22 ENCOUNTER — TELEPHONE (OUTPATIENT)
Dept: OBGYN CLINIC | Age: 36
End: 2021-09-22

## 2021-09-22 LAB
GLIADIN DEAMINIDATED PEPTIDE AB IGA: 2 U/ML
GLIADIN DEAMINIDATED PEPTIDE AB IGG: 0.6 U/ML
IGA: 316 MG/DL (ref 70–400)
TISSUE TRANSGLUTAMINASE IGA: 0.5 U/ML

## 2021-09-22 PROCEDURE — 83993 ASSAY FOR CALPROTECTIN FECAL: CPT

## 2021-09-22 NOTE — TELEPHONE ENCOUNTER
Received vm from pharmacy regarding the medication that was ordered. Caller spoke very fast and could not make out a lot of what she said.

## 2021-09-25 LAB — CALPROTECTIN, FECAL: 14 UG/G

## 2021-09-27 ENCOUNTER — TELEPHONE (OUTPATIENT)
Dept: GASTROENTEROLOGY | Age: 36
End: 2021-09-27

## 2021-09-27 NOTE — TELEPHONE ENCOUNTER
Writer called patient to confirm she went to her Covid test at [facility] on Saturday 9/25. Patient stated [facility] cancel her shyam and she didn't know where else to get tested. Writer called [facility] scheduler to see what our options were since she didn't get Covid tested Saturday, Per [facility] scheduler she will ask if patient can get covid tested.

## 2021-09-28 ENCOUNTER — ANESTHESIA EVENT (OUTPATIENT)
Dept: OPERATING ROOM | Age: 36
End: 2021-09-28
Payer: MEDICARE

## 2021-09-28 LAB — IBD PANEL: NORMAL

## 2021-09-28 RX ORDER — SODIUM CHLORIDE 9 MG/ML
25 INJECTION, SOLUTION INTRAVENOUS PRN
Status: CANCELLED | OUTPATIENT
Start: 2021-09-28

## 2021-09-28 RX ORDER — LIDOCAINE HYDROCHLORIDE 10 MG/ML
1 INJECTION, SOLUTION EPIDURAL; INFILTRATION; INTRACAUDAL; PERINEURAL
Status: CANCELLED | OUTPATIENT
Start: 2021-09-28 | End: 2021-09-28

## 2021-09-28 RX ORDER — SODIUM CHLORIDE 0.9 % (FLUSH) 0.9 %
10 SYRINGE (ML) INJECTION EVERY 12 HOURS SCHEDULED
Status: CANCELLED | OUTPATIENT
Start: 2021-09-28

## 2021-09-28 RX ORDER — SODIUM CHLORIDE 0.9 % (FLUSH) 0.9 %
10 SYRINGE (ML) INJECTION PRN
Status: CANCELLED | OUTPATIENT
Start: 2021-09-28

## 2021-09-29 ENCOUNTER — ANESTHESIA (OUTPATIENT)
Dept: OPERATING ROOM | Age: 36
End: 2021-09-29
Payer: MEDICARE

## 2021-09-29 ENCOUNTER — HOSPITAL ENCOUNTER (OUTPATIENT)
Age: 36
Setting detail: OUTPATIENT SURGERY
Discharge: HOME | End: 2021-09-29
Attending: INTERNAL MEDICINE | Admitting: INTERNAL MEDICINE
Payer: MEDICARE

## 2021-09-29 VITALS
SYSTOLIC BLOOD PRESSURE: 108 MMHG | BODY MASS INDEX: 32.33 KG/M2 | DIASTOLIC BLOOD PRESSURE: 76 MMHG | TEMPERATURE: 97.9 F | OXYGEN SATURATION: 100 % | RESPIRATION RATE: 13 BRPM | HEIGHT: 64 IN | HEART RATE: 102 BPM | WEIGHT: 189.4 LBS

## 2021-09-29 VITALS
TEMPERATURE: 96.8 F | SYSTOLIC BLOOD PRESSURE: 105 MMHG | OXYGEN SATURATION: 100 % | RESPIRATION RATE: 15 BRPM | DIASTOLIC BLOOD PRESSURE: 72 MMHG

## 2021-09-29 LAB
SARS-COV-2, RAPID: NOT DETECTED
SPECIMEN DESCRIPTION: NORMAL

## 2021-09-29 PROCEDURE — 7100000010 HC PHASE II RECOVERY - FIRST 15 MIN: Performed by: INTERNAL MEDICINE

## 2021-09-29 PROCEDURE — 43239 EGD BIOPSY SINGLE/MULTIPLE: CPT | Performed by: INTERNAL MEDICINE

## 2021-09-29 PROCEDURE — 7100000011 HC PHASE II RECOVERY - ADDTL 15 MIN: Performed by: INTERNAL MEDICINE

## 2021-09-29 PROCEDURE — 3609012400 HC EGD TRANSORAL BIOPSY SINGLE/MULTIPLE: Performed by: INTERNAL MEDICINE

## 2021-09-29 PROCEDURE — 3700000000 HC ANESTHESIA ATTENDED CARE: Performed by: INTERNAL MEDICINE

## 2021-09-29 PROCEDURE — 2580000003 HC RX 258: Performed by: ANESTHESIOLOGY

## 2021-09-29 PROCEDURE — 3609010300 HC COLONOSCOPY W/BIOPSY SINGLE/MULTIPLE: Performed by: INTERNAL MEDICINE

## 2021-09-29 PROCEDURE — 87635 SARS-COV-2 COVID-19 AMP PRB: CPT

## 2021-09-29 PROCEDURE — 45380 COLONOSCOPY AND BIOPSY: CPT | Performed by: INTERNAL MEDICINE

## 2021-09-29 PROCEDURE — 6360000002 HC RX W HCPCS: Performed by: NURSE ANESTHETIST, CERTIFIED REGISTERED

## 2021-09-29 PROCEDURE — 2500000003 HC RX 250 WO HCPCS: Performed by: NURSE ANESTHETIST, CERTIFIED REGISTERED

## 2021-09-29 PROCEDURE — 88305 TISSUE EXAM BY PATHOLOGIST: CPT

## 2021-09-29 PROCEDURE — 3700000001 HC ADD 15 MINUTES (ANESTHESIA): Performed by: INTERNAL MEDICINE

## 2021-09-29 PROCEDURE — 2709999900 HC NON-CHARGEABLE SUPPLY: Performed by: INTERNAL MEDICINE

## 2021-09-29 RX ORDER — 0.9 % SODIUM CHLORIDE 0.9 %
500 INTRAVENOUS SOLUTION INTRAVENOUS
Status: DISCONTINUED | OUTPATIENT
Start: 2021-09-29 | End: 2021-09-29 | Stop reason: HOSPADM

## 2021-09-29 RX ORDER — LIDOCAINE HYDROCHLORIDE 10 MG/ML
INJECTION, SOLUTION EPIDURAL; INFILTRATION; INTRACAUDAL; PERINEURAL
Status: DISCONTINUED
Start: 2021-09-29 | End: 2021-09-29 | Stop reason: HOSPADM

## 2021-09-29 RX ORDER — OXYCODONE HYDROCHLORIDE AND ACETAMINOPHEN 5; 325 MG/1; MG/1
1 TABLET ORAL PRN
Status: DISCONTINUED | OUTPATIENT
Start: 2021-09-29 | End: 2021-09-29 | Stop reason: HOSPADM

## 2021-09-29 RX ORDER — LIDOCAINE HYDROCHLORIDE 20 MG/ML
INJECTION, SOLUTION EPIDURAL; INFILTRATION; INTRACAUDAL; PERINEURAL PRN
Status: DISCONTINUED | OUTPATIENT
Start: 2021-09-29 | End: 2021-09-29 | Stop reason: SDUPTHER

## 2021-09-29 RX ORDER — LABETALOL 20 MG/4 ML (5 MG/ML) INTRAVENOUS SYRINGE
10 EVERY 10 MIN PRN
Status: DISCONTINUED | OUTPATIENT
Start: 2021-09-29 | End: 2021-09-29 | Stop reason: HOSPADM

## 2021-09-29 RX ORDER — SODIUM CHLORIDE, SODIUM LACTATE, POTASSIUM CHLORIDE, CALCIUM CHLORIDE 600; 310; 30; 20 MG/100ML; MG/100ML; MG/100ML; MG/100ML
INJECTION, SOLUTION INTRAVENOUS CONTINUOUS
Status: DISCONTINUED | OUTPATIENT
Start: 2021-09-29 | End: 2021-09-29 | Stop reason: HOSPADM

## 2021-09-29 RX ORDER — MEPERIDINE HYDROCHLORIDE 50 MG/ML
12.5 INJECTION INTRAMUSCULAR; INTRAVENOUS; SUBCUTANEOUS EVERY 5 MIN PRN
Status: DISCONTINUED | OUTPATIENT
Start: 2021-09-29 | End: 2021-09-29 | Stop reason: HOSPADM

## 2021-09-29 RX ORDER — HYDRALAZINE HYDROCHLORIDE 20 MG/ML
10 INJECTION INTRAMUSCULAR; INTRAVENOUS EVERY 10 MIN PRN
Status: DISCONTINUED | OUTPATIENT
Start: 2021-09-29 | End: 2021-09-29 | Stop reason: HOSPADM

## 2021-09-29 RX ORDER — KETAMINE HYDROCHLORIDE 100 MG/ML
INJECTION, SOLUTION INTRAMUSCULAR; INTRAVENOUS PRN
Status: DISCONTINUED | OUTPATIENT
Start: 2021-09-29 | End: 2021-09-29 | Stop reason: SDUPTHER

## 2021-09-29 RX ORDER — MIDAZOLAM HYDROCHLORIDE 1 MG/ML
INJECTION INTRAMUSCULAR; INTRAVENOUS PRN
Status: DISCONTINUED | OUTPATIENT
Start: 2021-09-29 | End: 2021-09-29 | Stop reason: SDUPTHER

## 2021-09-29 RX ORDER — MIDAZOLAM HYDROCHLORIDE 2 MG/2ML
1 INJECTION, SOLUTION INTRAMUSCULAR; INTRAVENOUS ONCE
Status: DISCONTINUED | OUTPATIENT
Start: 2021-09-29 | End: 2021-09-29 | Stop reason: HOSPADM

## 2021-09-29 RX ORDER — DIPHENHYDRAMINE HYDROCHLORIDE 50 MG/ML
12.5 INJECTION INTRAMUSCULAR; INTRAVENOUS
Status: DISCONTINUED | OUTPATIENT
Start: 2021-09-29 | End: 2021-09-29 | Stop reason: HOSPADM

## 2021-09-29 RX ORDER — ONDANSETRON 2 MG/ML
4 INJECTION INTRAMUSCULAR; INTRAVENOUS
Status: DISCONTINUED | OUTPATIENT
Start: 2021-09-29 | End: 2021-09-29 | Stop reason: HOSPADM

## 2021-09-29 RX ORDER — PROPOFOL 10 MG/ML
INJECTION, EMULSION INTRAVENOUS PRN
Status: DISCONTINUED | OUTPATIENT
Start: 2021-09-29 | End: 2021-09-29 | Stop reason: SDUPTHER

## 2021-09-29 RX ORDER — GLYCOPYRROLATE 1 MG/5 ML
SYRINGE (ML) INTRAVENOUS PRN
Status: DISCONTINUED | OUTPATIENT
Start: 2021-09-29 | End: 2021-09-29 | Stop reason: SDUPTHER

## 2021-09-29 RX ORDER — OXYCODONE HYDROCHLORIDE AND ACETAMINOPHEN 5; 325 MG/1; MG/1
2 TABLET ORAL PRN
Status: DISCONTINUED | OUTPATIENT
Start: 2021-09-29 | End: 2021-09-29 | Stop reason: HOSPADM

## 2021-09-29 RX ORDER — MORPHINE SULFATE 2 MG/ML
2 INJECTION, SOLUTION INTRAMUSCULAR; INTRAVENOUS EVERY 5 MIN PRN
Status: DISCONTINUED | OUTPATIENT
Start: 2021-09-29 | End: 2021-09-29 | Stop reason: HOSPADM

## 2021-09-29 RX ORDER — PROMETHAZINE HYDROCHLORIDE 25 MG/ML
6.25 INJECTION, SOLUTION INTRAMUSCULAR; INTRAVENOUS
Status: DISCONTINUED | OUTPATIENT
Start: 2021-09-29 | End: 2021-09-29 | Stop reason: HOSPADM

## 2021-09-29 RX ADMIN — SODIUM CHLORIDE, POTASSIUM CHLORIDE, SODIUM LACTATE AND CALCIUM CHLORIDE: 600; 310; 30; 20 INJECTION, SOLUTION INTRAVENOUS at 09:58

## 2021-09-29 RX ADMIN — SODIUM CHLORIDE, POTASSIUM CHLORIDE, SODIUM LACTATE AND CALCIUM CHLORIDE: 600; 310; 30; 20 INJECTION, SOLUTION INTRAVENOUS at 09:49

## 2021-09-29 RX ADMIN — Medication 0.2 MG: at 09:49

## 2021-09-29 RX ADMIN — PROPOFOL INJECTABLE EMULSION 30 MG: 10 INJECTION, EMULSION INTRAVENOUS at 09:53

## 2021-09-29 RX ADMIN — LIDOCAINE HYDROCHLORIDE 100 MG: 20 INJECTION, SOLUTION EPIDURAL; INFILTRATION; INTRACAUDAL; PERINEURAL at 09:52

## 2021-09-29 RX ADMIN — PROPOFOL INJECTABLE EMULSION 30 MG: 10 INJECTION, EMULSION INTRAVENOUS at 09:58

## 2021-09-29 RX ADMIN — MIDAZOLAM HYDROCHLORIDE 2 MG: 1 INJECTION, SOLUTION INTRAMUSCULAR; INTRAVENOUS at 09:49

## 2021-09-29 RX ADMIN — PROPOFOL INJECTABLE EMULSION 50 MG: 10 INJECTION, EMULSION INTRAVENOUS at 09:52

## 2021-09-29 RX ADMIN — PROPOFOL INJECTABLE EMULSION 30 MG: 10 INJECTION, EMULSION INTRAVENOUS at 09:55

## 2021-09-29 RX ADMIN — PROPOFOL 120 MCG/KG/MIN: 10 INJECTION, EMULSION INTRAVENOUS at 09:58

## 2021-09-29 RX ADMIN — KETAMINE HYDROCHLORIDE 50 MG: 100 INJECTION, SOLUTION, CONCENTRATE INTRAMUSCULAR; INTRAVENOUS at 09:52

## 2021-09-29 ASSESSMENT — PULMONARY FUNCTION TESTS
PIF_VALUE: 1
PIF_VALUE: 0
PIF_VALUE: 1
PIF_VALUE: 0
PIF_VALUE: 2

## 2021-09-29 ASSESSMENT — PAIN - FUNCTIONAL ASSESSMENT
PAIN_FUNCTIONAL_ASSESSMENT: 0-10
PAIN_FUNCTIONAL_ASSESSMENT: ACTIVITIES ARE NOT PREVENTED

## 2021-09-29 ASSESSMENT — PAIN SCALES - GENERAL
PAINLEVEL_OUTOF10: 0

## 2021-09-29 ASSESSMENT — PAIN DESCRIPTION - DESCRIPTORS: DESCRIPTORS: BURNING

## 2021-09-29 NOTE — H&P
Procedure History and Physical    Pre-Procedural Diagnosis:  Dyspepsia and diarrhea    Indications:  same    Procedure Planned: endoscopy and colonoscopy     History Obtained From:  patient    HISTORY OF PRESENT ILLNESS:       The patient is a 39 y.o. female who presents for the above procedure. Past Medical History:    Past Medical History:   Diagnosis Date    Allergic rhinitis     Anxiety     Chest pain     with anxiety    Chronic kidney disease     CKD (chronic kidney disease) stage 1, GFR 90 ml/min or greater 7/10/2018    Depression     Hashimoto's thyroiditis     Headache(784.0)     Heart murmur     Hematuria     Irritable bowel syndrome with diarrhea 2016    Midline low back pain without sciatica 2016    Obesity (BMI 30-39. 9) 2017    Orthostatic hypotension     Panic attack as reaction to stress     certain anxiety medications will cause a panic attack    Panic disorder with agoraphobia 2017    some medications for anxiety will cause a panic attack.  Vertigo 10/2018    hx of       Past Surgical History:    Past Surgical History:   Procedure Laterality Date    APPENDECTOMY      LAPAROSCOPY N/A 2019    LAPAROSCOPY OPERATIVE, LYSIS OF ADHESIONS performed by Mateo Storey MD at [facility] OR       Medications:  No current facility-administered medications for this encounter. Allergies:    Allergies   Allergen Reactions    Diflucan [Fluconazole]     Flagyl [Metronidazole] Hives    Penicillins      When a child                 Social   Social History     Tobacco Use    Smoking status: Former Smoker     Packs/day: 0.25     Years: 3.00     Pack years: 0.75     Types: Cigarettes     Quit date: 2005     Years since quittin.7    Smokeless tobacco: Never Used   Substance Use Topics    Alcohol use: No     Comment: Hx alcohol abuse as teenager        PSYCH HISTORY:  Depression No  Anxiety No  Suicide No       Family History   Problem

## 2021-09-29 NOTE — ANESTHESIA PRE PROCEDURE
Department of Anesthesiology  Preprocedure Note       Name:  Bebe Knapp   Age:  39 y.o.  :  1985                                          MRN:  [id number]         Date:  2021      Surgeon: Vamshi Novak):  Toya Chang MD    Procedure: Procedure(s):  EGD BIOPSY  COLONOSCOPY DIAGNOSTIC    Medications prior to admission:   Prior to Admission medications    Medication Sig Start Date End Date Taking? Authorizing Provider   psyllium (METAMUCIL) 28 % packet Take 1 packet by mouth daily as needed (abnormal bowel movements) Take with full glass of h20 or juice 9/21/21 10/21/21  Toya Chang MD   polyethylene glycol (GOLYTELY) 236 g solution Follow instructions provided by physicians office 21   Toya Chang MD   AJOVY 225 MG/1.5ML SOSY INJECT 225MG INTO THE SKIN EVERY 30 DAYS 21   Historical Provider, MD   miconazole (MONISTAT 1 COMBINATION PACK) 1200 & 2 MG & % kit Place vaginally once. 21   Rosina Duron MD   Riboflavin 100 MG TABS Take 100 mg by mouth daily 21   Indigo Douglass MD   vitamin D (ERGOCALCIFEROL) 1.25 MG (95862 UT) CAPS capsule Take 1 capsule by mouth once a week 21   Indigo Douglass MD   EPINEPHrine (EPIPEN) 0.3 MG/0.3ML SOAJ injection  21   Historical Provider, MD   magnesium oxide (MAG-OX) 400 (241.3 Mg) MG TABS tablet TAKE 1 TABLET BY MOUTH IN THE EVENING WITH FOOD 21   Historical Provider, MD   Misc Natural Products (CRANBERRY/PROBIOTIC) TABS Take 1 tablet by mouth daily Please dispense according to patients insurance. 21   Indigo Douglass MD   butalbital-acetaminophen-caffeine (FIORICET, ESGIC) -40 MG per tablet Take 1 tablet by mouth every 6 hours as needed for Headaches or Migraine MAX 3 DAYS/WEEK 21   Indigo Douglass MD   ammonium lactate (LAC-HYDRIN) 12 % lotion Apply topically daily.  21   Indigo Douglass MD   acetaminophen (TYLENOL) 500 MG tablet Take 1 tablet by mouth every 4 hours as needed for Pain 6/6/18   Juliette Lloyd MD       Current medications:    Current Facility-Administered Medications   Medication Dose Route Frequency Provider Last Rate Last Admin    lactated ringers infusion   IntraVENous Continuous Aron MD Eddie           Allergies: Allergies   Allergen Reactions    Diflucan [Fluconazole]     Flagyl [Metronidazole] Hives    Penicillins      When a child       Problem List:    Patient Active Problem List   Diagnosis Code    Chronic bilateral low back pain without sciatica M54.5, G89.29    Irritable bowel syndrome with both constipation and diarrhea K58.2    DEREK (generalized anxiety disorder) F41.1    PMS (premenstrual syndrome) N94.3    Chronic fatigue R53.82    Palpitations R00.2    Panic disorder with agoraphobia F40.01    Obesity (BMI 30-39. 9) E66.9    Hematuria R31.9    Murmur R01.1    Psychophysiological insomnia F51.04    Microscopic hematuria R31.29    Lower abdominal pain R10.30    Nocturia R35.1    Mild episode of recurrent major depressive disorder (HCC) F33.0    Allergic rhinitis due to allergen J30.9    Vertigo R42    Pelvic pain in female R10.2    S/P Operative laparoscopy, JAMIE 5/2/19 Z98.890    Chronic RLQ pain R10.31, G89.29    Alternating constipation and diarrhea R19.8    Bilateral temporomandibular joint pain M26.623    Eustachian tube disorder, bilateral H69.93    Allergic dermatitis L23.9    Hashimoto's thyroiditis E06.3    Musculoskeletal pain M79.18    Pruritic dermatitis L29.9    Influenza vaccination declined Z28.21    Worsening headaches R51.9    Intractable migraine with aura without status migrainosus G43.119    Generalized abdominal pain R10.84    COVID-19 virus vaccination declined Z28.21    Pneumococcal vaccination declined Z28.21    Vitamin D deficiency E55.9    Dysfunction of both eustachian tubes H69.83    Corpus luteum cyst N83.10    Acute vaginitis N76.0       Past Medical History:        Diagnosis Date    Allergic rhinitis     Anxiety     Chest pain     with anxiety    Chronic kidney disease     CKD (chronic kidney disease) stage 1, GFR 90 ml/min or greater 7/10/2018    Depression     Hashimoto's thyroiditis     Headache(784.0)     Heart murmur     Hematuria     Irritable bowel syndrome with diarrhea 2016    Midline low back pain without sciatica 2016    Obesity (BMI 30-39. 9) 2017    Orthostatic hypotension     Panic attack as reaction to stress     certain anxiety medications will cause a panic attack    Panic disorder with agoraphobia 2017    some medications for anxiety will cause a panic attack.     Vertigo 10/2018    hx of       Past Surgical History:        Procedure Laterality Date    APPENDECTOMY      LAPAROSCOPY N/A 2019    LAPAROSCOPY OPERATIVE, LYSIS OF ADHESIONS performed by Kiki Parsons MD at [facility] [place] History:    Social History     Tobacco Use    Smoking status: Former Smoker     Packs/day: 0.25     Years: 3.00     Pack years: 0.75     Types: Cigarettes     Quit date: 2005     Years since quittin.7    Smokeless tobacco: Never Used   Substance Use Topics    Alcohol use: No     Comment: Hx alcohol abuse as teenager                                Counseling given: Not Answered      Vital Signs (Current):   Vitals:    21 0845 21 0900   BP:  119/69   Pulse:  87   Resp:  20   Temp: 98.3 °F (36.8 °C)    TempSrc: Temporal    SpO2:  100%   Weight: 189 lb 6.4 oz (85.9 kg) 189 lb 6.4 oz (85.9 kg)   Height: 5' 4\" (1.626 m) 5' 4\" (1.626 m)                                              BP Readings from Last 3 Encounters:   21 119/69   21 126/80   21 112/79       NPO Status:                                                                                 BMI:   Wt Readings from Last 3 Encounters:   21 189 lb 6.4 oz (85.9 kg)   21 191 lb (86.6 kg)   21 191 lb (86.6 kg)     Body mass index is 32.51 kg/m².    CBC:   Lab Results   Component Value Date    WBC 6.2 09/21/2021    RBC 4.32 09/21/2021    HGB 13.2 09/21/2021    HCT 40.4 09/21/2021    MCV 93.5 09/21/2021    RDW 11.6 09/21/2021     09/21/2021       CMP:   Lab Results   Component Value Date     09/21/2021    K 3.9 09/21/2021     09/21/2021    CO2 25 09/21/2021    BUN 10 09/21/2021    CREATININE 0.64 09/21/2021    GFRAA >60 09/21/2021    LABGLOM >60 09/21/2021    GLUCOSE 79 09/21/2021    PROT 7.6 09/21/2021    CALCIUM 9.0 09/21/2021    BILITOT 0.54 09/21/2021    ALKPHOS 76 09/21/2021    AST 24 09/21/2021    ALT 32 09/21/2021       POC Tests: No results for input(s): POCGLU, POCNA, POCK, POCCL, POCBUN, POCHEMO, POCHCT in the last 72 hours.     Coags: No results found for: PROTIME, INR, APTT    HCG (If Applicable):   Lab Results   Component Value Date    PREGTESTUR NEGATIVE 08/31/2020    HCG NEGATIVE 05/02/2019        ABGs: No results found for: PHART, PO2ART, FRX3DGF, VID0LYI, BEART, P4KZNOXB     Type & Screen (If Applicable):  No results found for: LABABO, LABRH    Drug/Infectious Status (If Applicable):  Lab Results   Component Value Date    HEPCAB NONREACTIVE 05/19/2021       COVID-19 Screening (If Applicable):   Lab Results   Component Value Date    COVID19 Not Detected 09/29/2021           Anesthesia Evaluation  Patient summary reviewed and Nursing notes reviewed  Airway: Mallampati: I  TM distance: >3 FB   Neck ROM: full  Mouth opening: > = 3 FB Dental: normal exam         Pulmonary:Negative Pulmonary ROS and normal exam                              ROS comment: -QUIT SMOKING 2005   Cardiovascular:                   ROS comment: -VERTIGO  -ORTHOSTATIC HYPOTENSION     Neuro/Psych:   Negative Neuro/Psych ROS              GI/Hepatic/Renal: Neg GI/Hepatic/Renal ROS            Endo/Other:                      ROS comment: -HASHIMOTO'S THYROIDITIS - SEEING SPECIALIST SOON FOR OVERACTIVE THYROID  -NPO AFTER MIDNIGHT  -ALLERGIES - DIFLUCAN, FLAGYL, PCN Abdominal:             Vascular: negative vascular ROS. Other Findings:             Anesthesia Plan      MAC     ASA 2       Induction: intravenous. MIPS: Postoperative opioids intended and Prophylactic antiemetics administered. Anesthetic plan and risks discussed with patient. Plan discussed with CRNA.     Attending anesthesiologist reviewed and agrees with Brandon Ca MD   9/29/2021

## 2021-09-29 NOTE — OP NOTE
Operative Note      Patient: Vianney Kenny  YOB: 1985  MRN: [id number]    Date of Procedure: 9/29/2021    Pre-Op Diagnosis: K30  DYSPEPSIA  K58.0  IBS WITH CONSTIPATION AND DIARRHEA    Post-Op Diagnosis: Mild gastritis, unremarkable colonic mucosa, moderate external hemorrhoids       Procedure(s):  EGD BIOPSY  COLONOSCOPY WITH BIOPSY    Surgeon(s):  Jaime Samaniego MD    Assistant:   * No surgical staff found *    Anesthesia: Monitor Anesthesia Care    Estimated Blood Loss (mL): Minimal    Complications: None    Specimens:   ID Type Source Tests Collected by Time Destination   A : STOMACH BIOPSY Tissue Stomach SURGICAL PATHOLOGY Jaime Samaniego MD 9/29/2021 0955    B : RANDOM COLON BIOPSY Tissue Colon SURGICAL PATHOLOGY Jaime Samaniego MD 9/29/2021 1005    C : RANDOM LEFT COLON Tissue Colon SURGICAL PATHOLOGY Jaime Samaniego MD 9/29/2021 1011        Implants:  * No implants in log *      Drains: * No LDAs found *          [facility]    [place] [facility]    EGD    PROCEDURE DATE: 09/29/21    REFERRING PHYSICIAN: No ref. provider found     PRIMARY CARE PROVIDER: Madison Hatchet, MD    ATTENDING PHYSICIAN: Jaime Samaniego MD     HISTORY: Ms. Vianney Kenny is a 39 y.o. female who presents to the  Endoscopy unit for upper endoscopy. The patient's clinical history is remarkable for DEREK, obesity,  Irregular bowel movements, possible IBS, referred for EGD and colonoscopy. She is currently medically stable and appropriate for the planned procedure. PREOPERATIVE DIAGNOSIS: Dyspepsia and irregular bowel movements. PROCEDURES:   1) Transoral Upper Endoscopy with cold biopsy. POSTOPERATIVE DIAGNOSIS:     1) Small hiatal hernia, 1 cm in length. No esophagitis. 2) Mild scattered areas of erythema, minimal gastritis.  No ulcerations, no erosions  3) Normal appearing duodenal mucosa      MEDICATIONS:   MAC per anesthesia     EBL: <10cc    INSTRUMENT: Olympus GIF-H190  flexible Gastroscope. PREPARATION: The nature and character of the procedure as well as risks, benefits, and alternatives were discussed with the patient and informed consent was obtained. Complications were said to include, but were not limited to: medication allergy, medication reaction, cardiovascular and respiratory problems, bleeding, perforation, infection, and/or missed diagnosis. Following arrival in the endoscopy room, the patient was placed in the left lateral decubitus position and final time-out accomplished in the presence of the nursing staff. Baseline vital signs were obtained and reviewed, and IV sedation was subsequently initiated. FINDINGS:   Esophagus: The esophagus was inspected to the Z-line. The endoscopic exam showed small hiatal hernia. Stomach: The stomach was inspected in both forward and retroflex fashion and was appropriately distensible. The cardia, fundus, incisura, antrum and pylorus were identified via direct visualization. The endoscopic exam showed mild gastritis. Duodenum: The proximal small bowel was inspected through the bulb, sweep, and second portion of the duodenum. The endoscopic exam showed normal appearing duodenal mucosa . IMPRESSION:    1) Small hiatal hernia, 1 cm in length. No esophagitis. 2) Mild scattered areas of erythema, minimal gastritis. No ulcerations, no erosions  3) Normal appearing duodenal mucosa        RECOMMENDATIONS:   1) Follow up path in GI clinic  2) Proceed with colonoscopy       [facility]   09/29/21    this note is created with the assistance of a speech recognition program.  While intending to generate a document that actually reflects the content of the visit, the document can still have some errors including those of syntax and sound a like substitutions which may escape proof reading. It such instances, actual meaning can be extrapolated by contextual diversion.     The patient was counseled at length about the risks of kiko Covid-19 during their perioperative period and any recovery window from their procedure. The patient was made aware that kiko Covid-19  may worsen their prognosis for recovering from their procedure  and lend to a higher morbidity and/or mortality risk. All material risks, benefits, and reasonable alternatives including postponing the procedure were discussed. The patient DOES wish to proceed with the procedure at this time. [facility]     [place] [facility]     COLONOSCOPY    PROCEDURE DATE: 09/29/21    REFERRING PHYSICIAN: No ref. provider found     PRIMARY CARE PROVIDER: Lissy Yepez MD    ATTENDING PHYSICIAN: Gary Castillo MD     HISTORY: Ms. Clover Newman is a 39 y.o. female who presents to the  endoscopy unit for colonoscopy. The patient's clinical history is remarkable for history as detailed above. She is currently medically stable and appropriate for the planned procedure. PREOPERATIVE DIAGNOSIS: Diarrhea/constipation, abnormal bowel movements. Evaluation for IBD. PROCEDURES:   Transanal Colonoscopy with biopsy. POSTPROCEDURE DIAGNOSIS:    FAIR PREP    1) No evidence of ulcerations, erosions, colitis, or ileitis. Random left and right colon cold biopsies taken to evaluate for microscopic colitis  2) Moderate sized external hemorrhoids    MEDICATIONS:     MAC per anesthesia     EBL <10cc      INSTRUMENT: Olympus CF-H180 AL Pediatric flexible Colonoscope. PREPARATION: The nature and character of the procedure as well as risks, benefits, and alternatives were discussed with the patient and informed consent was obtained. Complications were said to include, but were not limited to: medication allergy, medication reaction, cardiovascular and respiratory problems, bleeding, perforation, infection, and/or missed diagnosis.  Following arrival in the endoscopy room, the patient was placed in the left perioperative period and any recovery window from their procedure. The patient was made aware that kiko Covid-19  may worsen their prognosis for recovering from their procedure  and lend to a higher morbidity and/or mortality risk. All material risks, benefits, and reasonable alternatives including postponing the procedure were discussed. The patient DOES wish to proceed with the procedure at this time.       Electronically signed by Antonia Mayer MD on 9/29/2021 at 10:17 AM

## 2021-09-29 NOTE — ANESTHESIA POSTPROCEDURE EVALUATION
Department of Anesthesiology  Postprocedure Note    Patient: Gilles Villagran  MRN: [id number]  YOB: 1985  Date of evaluation: 9/29/2021  Time:  10:18 AM     Procedure Summary     Date: 09/29/21 Room / Location: [address] [place] [address]    Anesthesia Start: 9854 Anesthesia Stop: 1016    Procedures:       EGD BIOPSY (N/A )      COLONOSCOPY WITH BIOPSY (N/A ) Diagnosis:       (K30  DYSPEPSIA)      (K58.0  IBS WITH CONSTIPATION AND DIARRHEA)    Surgeons: Kellee Astudillo MD Responsible Provider: Viktoria Reid MD    Anesthesia Type: MAC ASA Status: 2          Anesthesia Type: MAC    Dea Phase I: Dea Score: 10    Dea Phase II:      Last vitals: Reviewed and per EMR flowsheets.        Anesthesia Post Evaluation    Patient location during evaluation: PACU  Patient participation: complete - patient participated  Level of consciousness: awake and alert  Airway patency: patent  Nausea & Vomiting: no nausea and no vomiting  Complications: no  Cardiovascular status: hemodynamically stable  Respiratory status: nasal cannula and spontaneous ventilation  Hydration status: euvolemic

## 2021-10-01 LAB — SURGICAL PATHOLOGY REPORT: NORMAL

## 2021-10-01 NOTE — RESULT ENCOUNTER NOTE
Mychart comment sent to patient.   Normal biopsies  Future Appointments  10/18/2021 9:30 AM    Augustina Montemayor MD      [facility]  12/1/2021  2:30 PM    Talia Tolentino MD           [facility]  1/5/2022   2:30 PM    [facility]ust[facility] Hollis [facility]     [facility]

## 2021-10-15 ENCOUNTER — TELEPHONE (OUTPATIENT)
Dept: SURGERY | Age: 36
End: 2021-10-15

## 2021-10-18 ENCOUNTER — OFFICE VISIT (OUTPATIENT)
Dept: OBGYN CLINIC | Age: 36
End: 2021-10-18
Payer: MEDICARE

## 2021-10-18 VITALS
HEART RATE: 91 BPM | WEIGHT: 189 LBS | SYSTOLIC BLOOD PRESSURE: 120 MMHG | BODY MASS INDEX: 32.27 KG/M2 | DIASTOLIC BLOOD PRESSURE: 78 MMHG | HEIGHT: 64 IN

## 2021-10-18 DIAGNOSIS — N76.0 ACUTE VAGINITIS: Primary | ICD-10-CM

## 2021-10-18 DIAGNOSIS — R10.2 PELVIC PAIN IN FEMALE: ICD-10-CM

## 2021-10-18 PROCEDURE — G8417 CALC BMI ABV UP PARAM F/U: HCPCS | Performed by: SPECIALIST

## 2021-10-18 PROCEDURE — G8427 DOCREV CUR MEDS BY ELIG CLIN: HCPCS | Performed by: SPECIALIST

## 2021-10-18 PROCEDURE — 99213 OFFICE O/P EST LOW 20 MIN: CPT | Performed by: SPECIALIST

## 2021-10-18 PROCEDURE — G8484 FLU IMMUNIZE NO ADMIN: HCPCS | Performed by: SPECIALIST

## 2021-10-18 PROCEDURE — 1036F TOBACCO NON-USER: CPT | Performed by: SPECIALIST

## 2021-10-18 ASSESSMENT — ENCOUNTER SYMPTOMS
ABDOMINAL DISTENTION: 0
ABDOMINAL PAIN: 0
DIARRHEA: 0
COUGH: 0
EYE PAIN: 0
APNEA: 0
VOMITING: 0
CONSTIPATION: 0
NAUSEA: 0

## 2021-10-18 NOTE — PROGRESS NOTES
Subjective:      Patient ID: Michaela Martin is a 39 y.o. female. Chief Complaint   Patient presents with    Follow-up     /78 (Site: Right Upper Arm, Position: Sitting, Cuff Size: Medium Adult)   Pulse 91   Ht 5' 4\" (1.626 m)   Wt 189 lb (85.7 kg)   LMP 10/09/2021 (Approximate)   Breastfeeding No   BMI 32.44 kg/m²   Patient's last menstrual period was 10/09/2021 (approximate). [id number]    Past Medical History:   Diagnosis Date    Acute constipation 2021    Allergic rhinitis     Anxiety     Chest pain     with anxiety    Cholelithiasis 2021    Chronic kidney disease     CKD (chronic kidney disease) stage 1, GFR 90 ml/min or greater 7/10/2018    Depression     Hashimoto's thyroiditis     Headache(784.0)     Heart murmur     Hematuria 2016    Irritable bowel syndrome with diarrhea 11/17/2016    Midline low back pain without sciatica 11/17/2016    Obesity (BMI 30-39. 9) 8/26/2017    Orthostatic hypotension     Panic attack as reaction to stress     certain anxiety medications will cause a panic attack    Panic disorder with agoraphobia 08/26/2017    some medications for anxiety will cause a panic attack.  Vertigo 10/2018    hx of     Current Outpatient Medications Ordered in Epic   Medication Sig Dispense Refill    psyllium (METAMUCIL) 28 % packet Take 1 packet by mouth daily as needed (abnormal bowel movements) Take with full glass of h20 or juice 30 packet 1    Riboflavin 100 MG TABS Take 100 mg by mouth daily 90 tablet 3    vitamin D (ERGOCALCIFEROL) 1.25 MG (78141 UT) CAPS capsule Take 1 capsule by mouth once a week 12 capsule 0    EPINEPHrine (EPIPEN) 0.3 MG/0.3ML SOAJ injection       magnesium oxide (MAG-OX) 400 (241.3 Mg) MG TABS tablet TAKE 1 TABLET BY MOUTH IN THE EVENING WITH FOOD      Misc Natural Products (CRANBERRY/PROBIOTIC) TABS Take 1 tablet by mouth daily Please dispense according to patients insurance.  90 tablet 3    butalbital-acetaminophen-caffeine (FIORICET, ESGIC) -40 MG per tablet Take 1 tablet by mouth every 6 hours as needed for Headaches or Migraine MAX 3 DAYS/WEEK 30 tablet 0    ammonium lactate (LAC-HYDRIN) 12 % lotion Apply topically daily. 1 Bottle 0    acetaminophen (TYLENOL) 500 MG tablet Take 1 tablet by mouth every 4 hours as needed for Pain 60 tablet 0    AJOVY 225 MG/1.5ML SOSY INJECT 225MG INTO THE SKIN EVERY 30 DAYS (Patient not taking: Reported on 10/18/2021)       No current [facility]-ordered facility-administered medications on file. Problem List Items Addressed This Visit     Pelvic pain in female    Relevant Orders    US PELVIS COMPLETE    Acute vaginitis - Primary        Allergies   Allergen Reactions    Diflucan [Fluconazole]     Flagyl [Metronidazole] Hives    Penicillins      When a child     Orders Placed This Encounter   Procedures    US PELVIS COMPLETE     Standing Status:   Future     Standing Expiration Date:   10/18/2022        Patient here to follow up for pelvic pain and vaginitis. She states that she waited to take the antibiotic until recently and it is starting to work now. She keeps getting a vaginal discharge between treatments. She has changed her soap to try and stop it. She is still having the pain. Review of Systems   Constitutional: Negative for activity change, appetite change and fever. HENT: Negative for ear discharge and ear pain. Eyes: Negative for pain and visual disturbance. Respiratory: Negative for apnea and cough. Cardiovascular: Negative for chest pain, palpitations and leg swelling. Gastrointestinal: Negative for abdominal distention, abdominal pain, constipation, diarrhea, nausea and vomiting. Endocrine: Negative. Genitourinary: Positive for pelvic pain and vaginal discharge. Negative for difficulty urinating, dysuria and menstrual problem. Musculoskeletal: Negative for neck pain and neck stiffness. Skin: Negative.     Neurological: Negative for light-headedness and numbness. Hematological: Negative. Does not bruise/bleed easily. Objective:   Physical Exam  Vitals and nursing note reviewed. Constitutional:       Appearance: She is well-developed. HENT:      Head: Normocephalic and atraumatic. Neck:      Thyroid: No thyromegaly. Cardiovascular:      Rate and Rhythm: Normal rate and regular rhythm. Pulmonary:      Effort: Pulmonary effort is normal.      Breath sounds: Normal breath sounds. No wheezing. Abdominal:      General: Bowel sounds are normal. There is no distension. Palpations: Abdomen is soft. There is no mass. Tenderness: There is no abdominal tenderness. There is no guarding. Musculoskeletal:         General: Normal range of motion. Cervical back: Normal range of motion and neck supple. Skin:     General: Skin is dry. Neurological:      Mental Status: She is alert and oriented to person, place, and time. Psychiatric:         Behavior: Behavior normal.         Thought Content: Thought content normal.         Assessment:      Patient with clinical vaginitis improving with treatment. Patient was reassured that bacterial vaginitis is common and can occur when the ph balance of the vagina is altered. Patient with persistent pelvic pain and history of corpus luteum cyst seen on CT scan. Will evaluate with ultrasound. Plan:      Orders Placed This Encounter   Procedures    US PELVIS COMPLETE     Appointment for ultrasound. Bran Petersen am scribing for, and in the presence of Dr. Eva Galaviz. Electronically signed by: Sally Virk 10/18/21 10:10 AM       I agree to the above documentation placed by my scribe Sally Virk. I reviewed the scribe's note and agree with the documented findings and plan of care. Any areas of disagreement are noted on the chart. I have personally evaluated this patient. Additional findings are as noted.  I agree with the chief complaint, past medical history, past surgical history, allergies, medications, social and family history as documented unless otherwise noted below.      Electronically signed by Henrique Escobar MD on 10/18/2021 at 10:23 AM

## 2021-10-20 ENCOUNTER — TELEPHONE (OUTPATIENT)
Dept: NEUROLOGY | Age: 36
End: 2021-10-20

## 2021-10-27 DIAGNOSIS — B37.9 CANDIDIASIS: Primary | ICD-10-CM

## 2021-11-03 NOTE — PROGRESS NOTES
[facility] at [address] [address]   O: [phone]  V:[phone]    Anoop Rosen is a 39 y.o. female evaluated via telephone on 11/4/2021. CONSENT:  She and/or health care decision maker is aware that that she may receive a bill for this telephone service, depending on her insurance coverage, and has provided verbal consent to proceed: Yes    DOCUMENTATION:  Patient scheduled this appointment today due to Leg Pain and Hand Pain    LEG PAIN  Patient reported some left leg/calf pain for a long time. Patient states that she has had a broken glass accident when she was 23years old. She accidentally fell on a broken table made with glass. Did not seek any evaluation done. Patient is concerned that this may be causing some damage. Patient is sure that this is not possible. Patient reported some recurrent and easily bruised left leg. She also has some pain on palmar side of her foot occasionally. This is very unclear if this is related to the calf pain that she is having. LEFT 3rd and 4th finger pain  Patient has had some flexion limitation on her left third and fourth metacarpals. No injury noted. Patient is right-hand dominant. Patient states that she is having a hard time putting her seatbelt on at times. Patient does admit to having some arthritis history in the family. MIGRAINE  Known chronic migraine headaches. No report of worsening or increasing in incidence. Currently on Ajovy. Having some issues with insurance coverage. Doing well with therapy. ALLERGIC RHINITIS  Anoop Rosen is a 39 y.o. female patient  has a known history of Common allergies. Symptoms include: clear rhinorrhea and postnasal drip and present in a nonseasonal pattern. Patient reports precipitants which includes: Pollen, dust, smoke, etc. Treatment currently includes will start Fexofenadine.      No data recorded  I communicated with the patient and/or health care decision maker about: PLAN     Review of Systems   Constitutional: Positive for activity change. Negative for chills and fever. HENT: Positive for postnasal drip and rhinorrhea. Respiratory: Negative for shortness of breath. Cardiovascular: Negative for chest pain and palpitations. Gastrointestinal: Negative for abdominal pain. Genitourinary: Negative for dysuria. Musculoskeletal: Positive for arthralgias, gait problem, joint swelling and myalgias. Left calf, left fingers pain   Allergic/Immunologic: Positive for environmental allergies. Neurological: Positive for headaches. Psychiatric/Behavioral: Negative for sleep disturbance and suicidal ideas. The patient is nervous/anxious. Details of this discussion including any medical advice provided: Under assessment. PHYSICAL EXAM[INSTRUCTIONS:  \"[x]\" Indicates a positive item  \"[]\" Indicates a negative item  -- DELETE ALL ITEMS NOT EXAMINED]  Patient-Reported Vitals 11/2/2021   Patient-Reported Weight 189   Patient-Reported Height -   Patient-Reported Temperature -     Constitutional: [x] No apparent distress      [] Abnormal -   Mental status: [x] Alert and awake  [x] Oriented to person/place/time[] Abnormal -   Pulmonary/Chest: [x] Respiratory effort normal         [] Abnormal -          Psychiatric:       [x] Normal Affect [] Abnormal -        [x] No Hallucinations  Other pertinent observable physical exam findings:-No noticeable deformity on left fingers and left calf  Controlled Substance Monitoring:  Acute and Chronic Pain Monitoring:   RX Monitoring 11/2/2021   Attestation -   Periodic Controlled Substance Monitoring No signs of potential drug abuse or diversion identified. ASSESSMENT  1. Arthritis of hand, left  Failure to Improve  Continue current therapy. DISCUSSED and ADVISED TO:  Stay at a healthy weight. Continue exercises/PT  Stretch to help prevent stiffness and to prevent injury before exercise.    Gentle forms of yoga help keep joints and muscles flexible. Walk instead of jog, ride a bike, swim, and water exercise. Lift weights as tolerated. strong muscles help reduce stress on joints. Take pain medicines exactly as directed and only as needed. - XR HAND LEFT (2 VIEWS); Future    2. Injury of left lower extremity, sequela  Failure to Improve  DISCUSSED AND ADVISED TO:  Raise legs above the swollen area when resting. Take frequent breaks from standing and sitting positions. Walk around to promote blood flow to legs. Wear support stockings as discussed   Cut down salt on diet. 3. Pain of left calf  Failure to Improve  Continue to evaluate  Consult vascular specialist  - Argelia Ac MD, Vascular Surgery, [place]    4. Migraine with aura and without status migrainosus, not intractable  Stable  Take medications as discussed. DISCUSSED AND ADVISED TO:  Find ways to manage stress. Rest well, cut down on caffeine, and alcohol intake  Report for increasing incidences and worsening symptoms. 5. Seasonal allergic rhinitis due to other allergic trigger  Stable  Continue with the same therapy   ADVISED TO:  Avoid known allergens/irritants. Stop smoking or avoid second hand smoke. Stay hydrated. Report for worsening symptoms    - fexofenadine (ALLEGRA) 180 MG tablet; Take 1 tablet by mouth daily  Dispense: 90 tablet; Refill: 2    Total Time: minutes: 21-30 minutes  I affirm this is a Patient Initiated Episode with a Patient who has not had a related appointment within my department in the past 7 days or scheduled within the next 24 hours.   Patient identification was verified at the start of the visit: Yes  Note: not billable if this call serves to triage the patient into an appointment for the relevant concern  Patient-Reported Vitals 11/2/2021   Patient-Reported Weight 189   Patient-Reported Height -   Patient-Reported Temperature -     Patient Active Problem List   Diagnosis    Chronic bilateral low back pain without sciatica    Irritable bowel syndrome with both constipation and diarrhea    DEREK (generalized anxiety disorder)    PMS (premenstrual syndrome)    Chronic fatigue    Palpitations    Panic disorder with agoraphobia    Obesity (BMI 30-39. 9)    Hematuria    Murmur    Psychophysiological insomnia    Microscopic hematuria    Lower abdominal pain    Nocturia    Mild episode of recurrent major depressive disorder (HCC)    Allergic rhinitis due to allergen    Vertigo    Pelvic pain in female    S/P Operative laparoscopy, JAMIE 5/2/19    Dyspepsia    Chronic RLQ pain    Abnormal bowel movement    Bilateral temporomandibular joint pain    Eustachian tube disorder, bilateral    Allergic dermatitis    Hashimoto's thyroiditis    Musculoskeletal pain    Pruritic dermatitis    Influenza vaccination declined    Worsening headaches    Migraine with aura and without status migrainosus, not intractable    Generalized abdominal pain    COVID-19 virus vaccination declined    Pneumococcal vaccination declined    Vitamin D deficiency    Dysfunction of both eustachian tubes    Corpus luteum cyst    Acute vaginitis    Arthritis of hand, left     Miranda Marie is a 39 y.o. female patient  being evaluated by a Virtual Visit (video visit) encounter to address concerns as mentioned above. A caregiver was present when appropriate. Due to this being a TeleHealth encounter (During ECJJK-94 public health emergency), evaluation of the following organ systems was limited:Vitals/Constitutional/EENT/Resp/CV/GI//MS/Neuro/Skin/Heme-Lymph-Imm. Services were provided through a video synchronous discussion virtually to substitute for in-person clinic visit. This is a telehealth visit that was performed with the originating site at Patient Location: home and provider Location of [place], [place].    Pursuant to the emergency declaration under the [address] [place], [address] waiver authority and the [employer] Act, this Virtual Visit was conducted with patient's (and/or legal guardian's) consent, to reduce the patient's risk of exposure to COVID-19 and provide necessary medical care. The patient (and/or legal guardian) has also been advised to contact this office for worsening conditions or problems, and seek emergency medical treatment and/or call 911 if deemed necessary. This note was completed by using the assistance of a speech-recognition program. However, inadvertent computerized transcription errors may be present. Although every effort was made to ensure accuracy, no guarantees can be provided that every mistake has been identified and corrected by editing.   Electronically signed by EL Hernández CNP on 11/2/21 at 9:11 PM EDT

## 2021-11-04 ENCOUNTER — TELEMEDICINE (OUTPATIENT)
Dept: FAMILY MEDICINE CLINIC | Age: 36
End: 2021-11-04
Payer: MEDICARE

## 2021-11-04 DIAGNOSIS — S89.92XS INJURY OF LEFT LOWER EXTREMITY, SEQUELA: ICD-10-CM

## 2021-11-04 DIAGNOSIS — M79.662 PAIN OF LEFT CALF: ICD-10-CM

## 2021-11-04 DIAGNOSIS — G43.109 MIGRAINE WITH AURA AND WITHOUT STATUS MIGRAINOSUS, NOT INTRACTABLE: ICD-10-CM

## 2021-11-04 DIAGNOSIS — M19.042 ARTHRITIS OF HAND, LEFT: Primary | ICD-10-CM

## 2021-11-04 DIAGNOSIS — J30.89 SEASONAL ALLERGIC RHINITIS DUE TO OTHER ALLERGIC TRIGGER: ICD-10-CM

## 2021-11-04 PROCEDURE — 99214 OFFICE O/P EST MOD 30 MIN: CPT | Performed by: FAMILY MEDICINE

## 2021-11-04 PROCEDURE — G8427 DOCREV CUR MEDS BY ELIG CLIN: HCPCS | Performed by: FAMILY MEDICINE

## 2021-11-04 RX ORDER — FEXOFENADINE HCL 180 MG/1
180 TABLET ORAL DAILY
Qty: 90 TABLET | Refills: 2 | Status: SHIPPED | OUTPATIENT
Start: 2021-11-04 | End: 2022-02-02

## 2021-11-04 ASSESSMENT — ENCOUNTER SYMPTOMS
SHORTNESS OF BREATH: 0
RHINORRHEA: 1
ABDOMINAL PAIN: 0

## 2021-11-04 NOTE — PATIENT INSTRUCTIONS
Patient Education        Learning About Venous Insufficiency  What is it? Venous insufficiency is a problem with the flow of blood from the veins of the legs back to the heart. It's also called chronic venous insufficiency or chronic venous stasis. Your veins bring blood back to the heart after it flows through your body. Veins have valves that keep the blood moving in one direction--toward the heart. But with venous insufficiency, the veins of the legs might not work as they should. This can allow blood to leak backward. Fluid can pool in the legs. This can lead to problems that include varicose veins. What causes it? Venous insufficiency is sometimes caused by deep vein thrombosis and high blood pressure inside leg veins. You are more likely to have venous insufficiency if you:  · Are older. · Are female. · Are overweight. · Don't get enough physical activity. · Smoke. · Have a family history of varicose veins. What are the symptoms? Symptoms of venous insufficiency affect the legs. They may include:  · Swelling, often in the ankles. · A rash. · Varicose veins. · Itching. · Cramping. · Skin sores (ulcers). · Aching or a feeling of heaviness. · Changes in skin color. How is it diagnosed? Your doctor can diagnose venous insufficiency by examining your legs and by using a type of ultrasound test (duplex Doppler) to find out how well blood is flowing in your legs. How is it treated? To reduce swelling and relieve pain caused by venous insufficiency, you can wear compression stockings. They are tighter at the ankles than at the top of the legs. They also can help venous skin ulcers heal. But there are different types of stockings, and they need to fit right. So your doctor will recommend what you need. You also can try to:  · Get more exercise, especially walking. It can increase blood flow. · Avoid standing still or sitting for a long time, which can make the fluid pool in your legs.   · Try not to sit with your legs crossed at the knee. · Keep your legs raised above your heart when you're lying down. This reduces swelling. If these treatments don't work, you may need medicine or a procedure to help relieve symptoms. Procedures might be done to close the vein, to remove the vein, or to improve blood flow. Follow-up care is a key part of your treatment and safety. Be sure to make and go to all appointments, and call your doctor if you are having problems. It's also a good idea to know your test results and keep a list of the medicines you take. Current as of: July 6, 2021               Content Version: 13.0  © 2006-2021 [employer]. Care instructions adapted under license by [facility] ([facility]). If you have questions about a medical condition or this instruction, always ask your healthcare professional. [address] any warranty or liability for your use of this information.

## 2021-11-11 DIAGNOSIS — E55.9 VITAMIN D DEFICIENCY: ICD-10-CM

## 2021-11-12 ENCOUNTER — TELEPHONE (OUTPATIENT)
Dept: NEUROLOGY | Age: 36
End: 2021-11-12

## 2021-11-12 RX ORDER — ERGOCALCIFEROL 1.25 MG/1
50000 CAPSULE ORAL WEEKLY
Qty: 4 CAPSULE | Refills: 3 | Status: SHIPPED | OUTPATIENT
Start: 2021-11-12 | End: 2022-01-20 | Stop reason: SDUPTHER

## 2021-11-12 NOTE — TELEPHONE ENCOUNTER
RENEWED PRIOR AUTH STARTED :       AJOVY 225MG      LAMBERTO MATTHEW Key: [id number] - [place] Case ID: [id number] help?  Call us at [phone]  Status  Sent to [place] 225MG/1.5ML syringes  Form

## 2021-11-15 NOTE — TELEPHONE ENCOUNTER
LAMBERTO MATTHEW ([place]: [id number]) - [id number]  Ajovy 225MG/1.5ML syringes       Status: [place] Response - Approved    Created: November 12th, 2021    Sent: November 12th, 2021    Open  Archive

## 2022-01-05 NOTE — PROGRESS NOTES
Social History    Marital status: Single     Spouse name: N/A    Number of children: N/A    Years of education: N/A     Occupational History    Not on file. Social History Main Topics    Smoking status: Former Smoker     Types: Cigarettes     Quit date: 1/1/2005    Smokeless tobacco: Never Used    Alcohol use No      Comment: Velma alcohol abuse as teenager    Drug use: No      Comment:  Velma, 15-17 yrs of age, smoked MJ daily 3 joints / day.  Sexual activity: Yes     Partners: Male     Other Topics Concern    Not on file     Social History Narrative    No narrative on file       TOBACCO:   reports that she quit smoking about 13 years ago. Her smoking use included Cigarettes. She has never used smokeless tobacco.  ETOH:   reports that she does not drink alcohol. Family History:   Family History   Problem Relation Age of Onset    Mental Illness Mother     Breast Cancer Maternal Grandmother     Heart Attack Maternal Grandfather          Plan:  Pt interventions:  Provided education, Discussed self-care (sleep, nutrition, rewarding activities, social support, exercise), Discussed potential barriers to change, Supportive techniques, Emphasized self-care as important for managing overall health and Collaborative treatment planning,Clarified role of [facility] in primary care,Recommended that pt establish with a mental health clinician with whom they can meet regularly for psychotherapy services      Pt Behavioral Change Plan:  Patient agrees to transfer to outpatient therapy. She was provided the following information of a therapist who is close to her home and is on her insurance panel.   She agrees to call her.  -She may return for a follow up as we transition her to another therapist.     Patient Melvi Fails, [facility]  [address] [place] [address] Dr MONTANO  [phone]
Hyperlipidemia

## 2022-01-07 ENCOUNTER — TELEPHONE (OUTPATIENT)
Dept: NEUROLOGY | Age: 37
End: 2022-01-07

## 2022-01-07 NOTE — TELEPHONE ENCOUNTER
Cover  [employer] sent fax to update A for Ajovy. [employer] only approved it last time for one month as they had no new documentation. pt. has not been seen. Will need a follow up before we can resubmit to [employer] for approval. I called and lm for Bette that she needs to make a follow up appointment.  It can be virtual.

## 2022-01-12 RX ORDER — FREMANEZUMAB-VFRM 225 MG/1.5ML
225 INJECTION SUBCUTANEOUS
Qty: 1 EACH | Refills: 0 | Status: SHIPPED | OUTPATIENT
Start: 2022-01-12 | End: 2022-09-07

## 2022-01-20 ENCOUNTER — TELEMEDICINE (OUTPATIENT)
Dept: FAMILY MEDICINE CLINIC | Age: 37
End: 2022-01-20
Payer: MEDICARE

## 2022-01-20 DIAGNOSIS — E55.9 VITAMIN D DEFICIENCY: ICD-10-CM

## 2022-01-20 DIAGNOSIS — N76.0 RECURRENT VAGINITIS: ICD-10-CM

## 2022-01-20 DIAGNOSIS — B96.89 ACUTE BACTERIAL SINUSITIS: Primary | ICD-10-CM

## 2022-01-20 DIAGNOSIS — Z86.16 HISTORY OF 2019 NOVEL CORONAVIRUS DISEASE (COVID-19): ICD-10-CM

## 2022-01-20 DIAGNOSIS — M79.672 LEFT FOOT PAIN: ICD-10-CM

## 2022-01-20 DIAGNOSIS — E06.3 HASHIMOTO'S THYROIDITIS: ICD-10-CM

## 2022-01-20 DIAGNOSIS — B37.31 YEAST INFECTION OF THE VAGINA: ICD-10-CM

## 2022-01-20 DIAGNOSIS — H04.123 DRY EYE SYNDROME OF BOTH EYES: ICD-10-CM

## 2022-01-20 DIAGNOSIS — J01.90 ACUTE BACTERIAL SINUSITIS: Primary | ICD-10-CM

## 2022-01-20 PROBLEM — G89.29 CHRONIC PAIN IN LEFT FOOT: Status: ACTIVE | Noted: 2021-10-20

## 2022-01-20 PROCEDURE — G8427 DOCREV CUR MEDS BY ELIG CLIN: HCPCS | Performed by: FAMILY MEDICINE

## 2022-01-20 PROCEDURE — 99214 OFFICE O/P EST MOD 30 MIN: CPT | Performed by: FAMILY MEDICINE

## 2022-01-20 RX ORDER — ECHINACEA PURPUREA EXTRACT 125 MG
2 TABLET ORAL PRN
Qty: 30 ML | Refills: 0 | Status: SHIPPED | OUTPATIENT
Start: 2022-01-20 | End: 2022-04-19

## 2022-01-20 RX ORDER — PERPHENAZINE/AMITRIPTYLINE HCL 2 MG-10 MG
1 TABLET ORAL DAILY
Qty: 90 TABLET | Refills: 3 | Status: SHIPPED | OUTPATIENT
Start: 2022-01-20

## 2022-01-20 RX ORDER — LIOTHYRONINE SODIUM 5 UG/1
TABLET ORAL
COMMUNITY
Start: 2021-12-06 | End: 2022-07-08

## 2022-01-20 RX ORDER — AZITHROMYCIN 250 MG/1
TABLET, FILM COATED ORAL
Qty: 6 TABLET | Refills: 0 | Status: SHIPPED | OUTPATIENT
Start: 2022-01-20 | End: 2022-01-25

## 2022-01-20 RX ORDER — LEVOTHYROXINE SODIUM 25 UG/1
TABLET ORAL
COMMUNITY
Start: 2021-11-21 | End: 2022-07-08

## 2022-01-20 RX ORDER — ERGOCALCIFEROL 1.25 MG/1
50000 CAPSULE ORAL WEEKLY
Qty: 4 CAPSULE | Refills: 3 | Status: SHIPPED | OUTPATIENT
Start: 2022-01-20 | End: 2022-08-29 | Stop reason: DRUGHIGH

## 2022-01-20 ASSESSMENT — ENCOUNTER SYMPTOMS
VOMITING: 0
SHORTNESS OF BREATH: 0
NAUSEA: 0
RHINORRHEA: 1
SINUS PAIN: 1
WHEEZING: 0
DIARRHEA: 0
ABDOMINAL PAIN: 0
SINUS PRESSURE: 1
ABDOMINAL DISTENTION: 0
COUGH: 0
CHEST TIGHTNESS: 0
CONSTIPATION: 0

## 2022-01-20 ASSESSMENT — PATIENT HEALTH QUESTIONNAIRE - PHQ9
1. LITTLE INTEREST OR PLEASURE IN DOING THINGS: 0
SUM OF ALL RESPONSES TO PHQ QUESTIONS 1-9: 0
2. FEELING DOWN, DEPRESSED OR HOPELESS: 0
SUM OF ALL RESPONSES TO PHQ QUESTIONS 1-9: 0
SUM OF ALL RESPONSES TO PHQ9 QUESTIONS 1 & 2: 0

## 2022-01-20 NOTE — PROGRESS NOTES
2022    TELEHEALTH EVALUATION -- Audio/Visual (During NUPHV-14 public health emergency)      Jannet Babin (:  1985) is a 39 y.o. female,Established patient, here for evaluation of the following chief complaint(s): Sinusitis, Headache, Anxiety, Concern For COVID-19, and Foot Pain (left)        ASSESSMENT/PLAN:    1. Acute bacterial sinusitis  Failing to change as expected. -     azithromycin (ZITHROMAX) 250 MG tablet; 500 mg orally on day one followed by 250 mg daily on days two through five, Disp-6 tablet, R-0Normal  -     sodium chloride (ALTAMIST SPRAY) 0.65 % nasal spray; 2 sprays by Nasal route as needed for Congestion (Q 2-3 HOURS prn), Disp-30 mL, R-0Normal  2. History of 2019 novel coronavirus disease (COVID-19)  Strongly advised to get COVID 19 vaccine when symptoms fully resolve     3. Left foot pain  Failing to change as expected. -     XR FOOT LEFT (2 VIEWS); Future  -     camphor-menthol-methyl salicylate (BENGAY ULTRA STRENGTH) 4-10-30 % CREA cream; Apply topically 3 times daily as needed for Pain, Apply externally, 3 TIMES DAILY PRN Starting Thu 2022, Disp-113 g, R-0, Normal  4. Recurrent vaginitis  Failing to resolve  -     miconazole (MICONAZOLE 7) 2 % vaginal cream; Place vaginally nightly., Disp-45 g, R-1Normal  -     Misc Natural Products (CRANBERRY/PROBIOTIC) TABS; DAILY Starting u 2022, Disp-90 tablet, R-3, NormalPlease dispense according to patients insurance. Advised probiotics, advised to change soap to baby soap, Ritika [employer] no tears head to toe, and see if that would help    5. Vitamin D deficiency   slowly improving  -     vitamin D (ERGOCALCIFEROL) 1.25 MG (29197 UT) CAPS capsule; Take 1 capsule by mouth once a week, Disp-4 capsule, R-3Normal  6. Dry eye syndrome of both eyes  Improved with tears given by ophthalmologist, will prescribe her nighttime  Ophthalmic gel  -     hypromellose 0.3 % GEL ophthalmic gel;  At nighttime, Disp-10 g, R-3Normal  Prior SHAYNE was negative, no Sjogren's syndrome  Lab Results   Component Value Date    SHAYNE NEGATIVE 2018       7. Yeast infection of the vagina  Failing to resolve. -     miconazole (MICONAZOLE 7) 2 % vaginal cream; Place vaginally nightly., Disp-45 g, R-1Normal  -     Misc Natural Products (CRANBERRY/PROBIOTIC) TABS; DAILY Starting Thu 2022, Disp-90 tablet, R-3, NormalPlease dispense according to patients insurance. 8. Hashimoto's thyroiditis  Improved with medications, to continue L-thyroxine 25 MCG and Cytomel 5 MCG  Advised to take Synthroid in the morning, on empty stomach, without any other meds and with a full glass of water. Bette received counseling on the following healthy behaviors: nutrition, exercise and weight loss      Reviewed prior labs and health maintenance  Discussed use, benefit, and side effects of prescribed medications. Barriers to medication compliance addressed. Patient given educational materials - see patient instructions  All patient questions answered. Patient voiced understanding. The patient's past medical,surgical, social, and family history as well as her current medications and allergies were reviewed as documented in today's encounter. Medications, labs, diagnostic studies, consultations and follow-up as documented in this encounter. Return in about 4 months (around 2022) for Face-2F-30mins PHYSICAL, VISION screen, PHQ9. Alicia Victor    [facility] Appointments   Date Time Provider Irineo Rueda   3/3/2022  8:00 AM EL Menjivar - CNP Neuro Spec [facility]   2022  1:00 PM [facility]         SUBJECTIVE/OBJECTIVE:  Sandra Flores (:  1985) has requested an audio/video evaluation for the following concern(s):Sinusitis, Headache, Anxiety, Concern For COVID-19, and Foot Pain (left)    Patient reports sinus headache not improving, for 3 weeks.   Had covid infection 1/3/2022, had a lot of pain, \"twisted muscles\" and muscle aches. Now she still has Sinus pressure, makes her tired and cannot sleep due to sinus pressure under the eyes and above the eyes bilaterally, yellow mucus, postnasal drip, runny nose with yellow mucus. She feels tired all the time and \"completely drained\". Denies cough or shortness of breath. Having headache since the  COVID-19 infection started, but she says this headache is different than her usual migraine headaches for which she is on Anjovy and follows with neurologist, advised to see ophthalmologist.    Having diarrhea and nausea  Took clindamycin from dentist a few weeks ago, which she thinks gave her stomach pain and diarrhea. She reports she still has diarrhea but sometimes the stools are formed. Patient reports having left foot pain on the lateral side, it hurts and barely walking, for several months, not getting better. She denies any injury. Denies swelling. Intensity of pain is 5-6/10. She doesn't work, she is not on her feet the whole time. Hypothyroidism and Hashimoto thyroiditis: Recent symptoms: fatigue, diarrhea, weight gain and anxiety. She denies weight loss, cold intolerance, heat intolerance, hair loss, constipation, edema, tremor, palpitations and dysphagia. Patient  taking her medication consistently on an empty stomach. TSH is Normal.  Saw Dr. Sandra Garcia adjusted her meds, but didn't start yet    TSH was 1.79 on 10/25/2021, within normal limits, with free T4 0.91, within normal limits    No results found for: [facility]  Lab Results   Component Value Date    TSH 6.54 (H) 09/07/2021    TSH 1.86 05/19/2021    TSH 2.13 08/01/2019       Patient says she feels tired not getting better, feeling \"drained\". Denies fever or chills.     She complains of very dry eyes, seeing the eye doctor and giving her some samples but they do not have prescriptions for her, she would like a prescription    Negative depression screening  PHQ Scores 1/20/2022 8/17/2021 5/19/2021 1/5/2021 12/17/2019 11/1/2019 8/1/2019   PHQ2 Score 0 0 2 1 3 3 2   PHQ9 Score 0 0 2 1 11 15 12     Bette has Vitamin D deficiency. Bette  is  taking Vitamin D supplementation   she feels tired. Lab Results   Component Value Date    VITD25 26.9 (L) 05/19/2021   Last vitamin D in [place] was 33.7 on 10/25/2021    Reports recurrent vaginal infections, currently having white vaginal white discharge, very pruritic. Reports he has been taking Probiotics which she is not sure if helping or not. Has been sexually active only with her . She reports severe vaginal pain during intercourse. Review of Systems   Constitutional: Positive for fatigue and unexpected weight change. Negative for activity change, appetite change, chills, diaphoresis and fever. HENT: Positive for congestion, postnasal drip, rhinorrhea, sinus pressure and sinus pain. Negative for ear pain and sore throat. Eyes: Positive for visual disturbance (stable). Respiratory: Negative for cough, chest tightness, shortness of breath and wheezing. Cardiovascular: Negative for chest pain, palpitations and leg swelling. Gastrointestinal: Negative for abdominal distention, abdominal pain, constipation, diarrhea, nausea and vomiting. Endocrine: Negative for cold intolerance, heat intolerance, polydipsia, polyphagia and polyuria. Genitourinary: Positive for dyspareunia and vaginal discharge. Musculoskeletal: Positive for arthralgias (left foot) and gait problem. Negative for joint swelling. Allergic/Immunologic: Positive for environmental allergies. Neurological: Positive for headaches. Psychiatric/Behavioral: Negative for dysphoric mood. The patient is nervous/anxious. Prior to Visit Medications    Medication Sig Taking?  Authorizing Provider   EUTHYROX 25 MCG tablet TAKE 1 TABLET BY MOUTH ONCE DAILY Yes Historical Provider, MD   liothyronine (CYTOMEL) 5 MCG tablet TAKE 1 TABLET BY MOUTH ONCE DAILY Yes Historical Provider, MD Macedoumab-vfrm (AJOVY) 225 MG/1.5ML SOSY Inject 225 mg into the skin every 30 days Yes EL Esteves CNP   vitamin D (ERGOCALCIFEROL) 1.25 MG (52570 UT) CAPS capsule Take 1 capsule by mouth once a week Yes Coleen Olson MD   fexofenadine (ALLEGRA) 180 MG tablet Take 1 tablet by mouth daily Yes EL Sandra CNP   Riboflavin 100 MG TABS Take 100 mg by mouth daily Yes Coleen Olson MD   EPINEPHrine (EPIPEN) 0.3 MG/0.3ML SOAJ injection  Yes Historical Provider, MD   magnesium oxide (MAG-OX) 400 (241.3 Mg) MG TABS tablet TAKE 1 TABLET BY MOUTH IN THE EVENING WITH FOOD Yes Historical Provider, MD   Misc Natural Products (CRANBERRY/PROBIOTIC) TABS Take 1 tablet by mouth daily Please dispense according to patients insurance. Yes Coleen Olson MD   butalbital-acetaminophen-caffeine (FIORICET, ESGIC) -40 MG per tablet Take 1 tablet by mouth every 6 hours as needed for Headaches or Migraine MAX 3 DAYS/WEEK Yes Coleen Olson MD   ammonium lactate (LAC-HYDRIN) 12 % lotion Apply topically daily. Yes Coleen Olson MD   acetaminophen (TYLENOL) 500 MG tablet Take 1 tablet by mouth every 4 hours as needed for Pain Yes Alan Morris MD       Social History     Tobacco Use    Smoking status: Former Smoker     Packs/day: 0.25     Years: 4.00     Pack years: 1.00     Types: Cigarettes     Start date: 2001     Quit date: 2005     Years since quittin.6    Smokeless tobacco: Never Used    Tobacco comment: Young and smoked with with friends   Vaping Use    Vaping Use: Never used   Substance Use Topics    Alcohol use: No     Comment: Hx alcohol abuse as teenager    Drug use: Not Currently     Types: Marijuana Laurita Hernandes)     Comment: When teen          PHYSICAL EXAMINATION:    Vital Signs: (As obtained by patient/caregiver or practitioner observation)  -vital signs stable and within normal limits except Obesity per BMI. Refill:  0    miconazole (MICONAZOLE 7) 2 % vaginal cream     Sig: Place vaginally nightly. Dispense:  45 g     Refill:  1    Misc Natural Products (CRANBERRY/PROBIOTIC) TABS     Sig: Take 1 tablet by mouth daily Please dispense according to patients insurance. Dispense:  90 tablet     Refill:  3    camphor-menthol-methyl salicylate (BENGAY ULTRA STRENGTH) 4-10-30 % CREA cream     Sig: Apply topically 3 times daily as needed for Pain     Dispense:  113 g     Refill:  0    vitamin D (ERGOCALCIFEROL) 1.25 MG (13555 UT) CAPS capsule     Sig: Take 1 capsule by mouth once a week     Dispense:  4 capsule     Refill:  3    sodium chloride (ALTAMIST SPRAY) 0.65 % nasal spray     Si sprays by Nasal route as needed for Congestion (Q 2-3 HOURS prn)     Dispense:  30 mL     Refill:  0    hypromellose 0.3 % GEL ophthalmic gel     Sig: At nighttime     Dispense:  10 g     Refill:  3       Orders Placed This Encounter   Procedures    XR FOOT LEFT (2 VIEWS)     Standing Status:   Future     Standing Expiration Date:   2023       Medications Discontinued During This Encounter   Medication Reason    AJOVY 225 MG/1.5ML SOSY Therapy completed    Misc Natural Products (CRANBERRY/PROBIOTIC) TABS REORDER    vitamin D (ERGOCALCIFEROL) 1.25 MG (84262 UT) CAPS capsule REORDER              Bette Sabillon, was evaluated through a synchronous (real-time) audio-video encounter. The patient (or guardian if applicable) is aware that this is a billable service, which includes applicable co-pays. The virtual visit was conducted with patient's (and/or legal guardian consent). Verbal consent to proceed has been obtained within the past 12 months. The visit was conducted pursuant to the emergency declaration under the [address] [place] [address], [address] [place] [address] authority and the [employer] Act.   Patient identification was verified    Patient was alone   The patient was located in a state where the provider was licensed to provide care. On this date 1/20/2022 I have spent 35 minutes reviewing previous notes, test results and face to face with the patient discussing the diagnosis and importance of compliance with the treatment plan as well as documenting on the day of the visit. --Joe Dickens MD on 1/23/2022 at 1:59 PM    An electronic signature was used to authenticate this note.

## 2022-01-23 PROBLEM — H04.123 DRY EYE SYNDROME OF BOTH EYES: Status: ACTIVE | Noted: 2022-01-23

## 2022-01-23 ASSESSMENT — ENCOUNTER SYMPTOMS: SORE THROAT: 0

## 2022-03-03 ENCOUNTER — TELEMEDICINE (OUTPATIENT)
Dept: NEUROLOGY | Age: 37
End: 2022-03-03
Payer: MEDICARE

## 2022-03-03 DIAGNOSIS — G43.109 MIGRAINE WITH AURA AND WITHOUT STATUS MIGRAINOSUS, NOT INTRACTABLE: Primary | ICD-10-CM

## 2022-03-03 PROCEDURE — G8427 DOCREV CUR MEDS BY ELIG CLIN: HCPCS | Performed by: NURSE PRACTITIONER

## 2022-03-03 PROCEDURE — 99213 OFFICE O/P EST LOW 20 MIN: CPT | Performed by: NURSE PRACTITIONER

## 2022-03-03 RX ORDER — RIZATRIPTAN BENZOATE 10 MG/1
10 TABLET ORAL
Qty: 12 TABLET | Refills: 5 | Status: SHIPPED | OUTPATIENT
Start: 2022-03-03 | End: 2022-03-03

## 2022-03-03 RX ORDER — FREMANEZUMAB-VFRM 225 MG/1.5ML
225 INJECTION SUBCUTANEOUS
Qty: 1 PEN | Refills: 5 | Status: SHIPPED | OUTPATIENT
Start: 2022-03-03

## 2022-03-03 NOTE — PROGRESS NOTES
[facility]  [address], [place], [address] [facility]  Phone: [phone]  Fax: [phone]    MD Leonidas Morgan MD Ahmed B. Doneta Reddish, MD Sanders Gambles, MD Londell Mallow, LUCINDA    TELEHEALTH VISIT        3/3/2022      HISTORY OF PRESENT ILLNESS:       I had the pleasure of seeing Makenna García, who returns via Telehealth for continued neurologic care. The patient was seen last on May 13, 2021 for treatment of episodic migraine without aura. The patient has episodic migraine without aura. The patient is currently getting 4 migraines a month. The patient is prescribed Ajovy 225 mg monthly injections, magnesium 400 mg daily, and riboflavin 100 mg daily. The patient is prescribed Maxalt 10 mg for abortive therapy. For rescue, the patient is prescribed Fioricet. The patient is here today reporting she is no longer receiving the Ajovy injection, because it was not authorized by her insurance company. . The patient reports she has not had injection since December. The patient states on Ajovy she would have less than 1 headache a month. The patient states they were an intensity of 3-4/10 with a decreased intensity. The patient states without Ajovy she would have to leave work and lay down for the rest of the day. The patient states since not having Ajovy her headaches have increased significantly in frequency and intensity.      Headache location: right temporal region  Headache quality: dull, sharp, throbbing  Associated factors:  nausea, vomiting, Photophobia, Phonophobia, osmophobia  Intensity: 3-4/10 on Ajovy, 10/10 without Ajovy  Headache chronicity: 6 years  Headache frequency: 1/month with Ajovy  Aggravating factors : bright lights, physical activity, loud sounds  Relieving factors: rest  Prophylactic medications: Magnesium, Riboflavin  Abortive medications: Fioicet  Previously used medications: none    Testing Reviewed:  MRI Brain (2019)  Impression   Normal brain MRI.                PAST MEDICAL HISTORY:         Diagnosis Date    Acute constipation     Allergic rhinitis     Anxiety     Chest pain     with anxiety    Cholelithiasis     Chronic kidney disease     CKD (chronic kidney disease) stage 1, GFR 90 ml/min or greater 7/10/2018    Depression     Hashimoto's thyroiditis     Headache(784.0)     Heart murmur     Hematuria     Hypothyroidism     Irritable bowel syndrome with diarrhea 2016    Midline low back pain without sciatica 2016    Obesity (BMI 30-39. 9) 2017    Orthostatic hypotension     Panic attack as reaction to stress     certain anxiety medications will cause a panic attack    Panic disorder with agoraphobia 2017    some medications for anxiety will cause a panic attack.     Vertigo 10/2018    hx of        PAST SURGICAL HISTORY:         Procedure Laterality Date    APPENDECTOMY      COLONOSCOPY  2021    COLONOSCOPY DIAGNOSTIC (N/A)    COLONOSCOPY N/A 2021    COLONOSCOPY WITH BIOPSY performed by Marcelo Frazier MD at [place] [address]. N/A 2019    LAPAROSCOPY OPERATIVE, LYSIS OF ADHESIONS performed by Henrique Silva MD at [facility] N/A 2021     EGD BIOPSY (N/A )     UPPER GASTROINTESTINAL ENDOSCOPY N/A 2021    EGD BIOPSY performed by Marcelo Frazier MD at [address] [place] [address] Drive:     Social History     Socioeconomic History    Marital status: Single     Spouse name: Not on file    Number of children: Not on file    Years of education: Not on file    Highest education level: Not on file   Occupational History    Not on file   Tobacco Use    Smoking status: Former Smoker     Packs/day: 0.25     Years: 4.00     Pack years: 1.00     Types: Cigarettes     Start date: 2001     Quit date: 2005     Years since quittin.7    Smokeless tobacco: Never Used    Tobacco comment: Young and smoked with with friends   Vaping Use    Vaping Use: Never used   Substance and Sexual Activity    Alcohol use: No     Comment: Hx alcohol abuse as teenager    Drug use: Not Currently     Types: Marijuana Brandi Peals)     Comment: When teen    Sexual activity: Yes     Partners: Male     Birth control/protection: Condom   Other Topics Concern    Not on file   Social History Narrative    Not on file     Social Determinants of Health     Financial Resource Strain: Medium Risk    Difficulty of Paying Living Expenses: Somewhat hard   Food Insecurity: Food Insecurity Present    Worried About Running Out of Food in the Last Year: Sometimes true    Jessica of Food in the Last Year: Sometimes true   Transportation Needs:     Lack of Transportation (Medical): Not on file    Lack of Transportation (Non-Medical):  Not on file   Physical Activity:     Days of Exercise per Week: Not on file    Minutes of Exercise per Session: Not on file   Stress:     Feeling of Stress : Not on file   Social Connections:     Frequency of Communication with Friends and Family: Not on file    Frequency of Social Gatherings with Friends and Family: Not on file    Attends Congregation Services: Not on file    Active Member of [address] [place] [address] [employer] or Organizations: Not on file    Attends Club or Organization Meetings: Not on file    Marital Status: Not on file   Intimate Partner Violence:     Fear of Current or Ex-Partner: Not on file    Emotionally Abused: Not on file    Physically Abused: Not on file    Sexually Abused: Not on file   Housing Stability:     Unable to Pay for Housing in the Last Year: Not on file    Number of [place] in the Last Year: Not on file    Unstable Housing in the Last Year: Not on file       CURRENT MEDICATIONS:     Current Outpatient Medications   Medication Sig Dispense Refill    rizatriptan (MAXALT) 10 MG tablet Take 1 tablet by mouth once as needed for Migraine May repeat in 2 hours if needed 12 tablet 5    Fremanezumab-vfrm (AJOVY) 225 MG/1.5ML SOAJ Inject 225 mg into the skin every 30 days 1 pen 5    EUTHYROX 25 MCG tablet TAKE 1 TABLET BY MOUTH ONCE DAILY      liothyronine (CYTOMEL) 5 MCG tablet TAKE 1 TABLET BY MOUTH ONCE DAILY      Misc Natural Products (CRANBERRY/PROBIOTIC) TABS Take 1 tablet by mouth daily Please dispense according to patients insurance. 90 tablet 3    camphor-menthol-methyl salicylate (BENGAY ULTRA STRENGTH) 4-10-30 % CREA cream Apply topically 3 times daily as needed for Pain 113 g 0    vitamin D (ERGOCALCIFEROL) 1.25 MG (88018 UT) CAPS capsule Take 1 capsule by mouth once a week 4 capsule 3    sodium chloride (ALTAMIST SPRAY) 0.65 % nasal spray 2 sprays by Nasal route as needed for Congestion (Q 2-3 HOURS prn) 30 mL 0    hypromellose 0.3 % GEL ophthalmic gel At nighttime 10 g 3    Fremanezumab-vfrm (AJOVY) 225 MG/1.5ML SOSY Inject 225 mg into the skin every 30 days 1 each 0    Riboflavin 100 MG TABS Take 100 mg by mouth daily 90 tablet 3    EPINEPHrine (EPIPEN) 0.3 MG/0.3ML SOAJ injection       magnesium oxide (MAG-OX) 400 (241.3 Mg) MG TABS tablet TAKE 1 TABLET BY MOUTH IN THE EVENING WITH FOOD      butalbital-acetaminophen-caffeine (FIORICET, ESGIC) -40 MG per tablet Take 1 tablet by mouth every 6 hours as needed for Headaches or Migraine MAX 3 DAYS/WEEK 30 tablet 0    ammonium lactate (LAC-HYDRIN) 12 % lotion Apply topically daily. 1 Bottle 0    acetaminophen (TYLENOL) 500 MG tablet Take 1 tablet by mouth every 4 hours as needed for Pain 60 tablet 0     No current facility-administered medications for this visit. ALLERGIES:     Allergies   Allergen Reactions    Diflucan [Fluconazole]     Flagyl [Metronidazole] Hives    Penicillins      When a child                             REVIEW OF SYSTEMS                   All items selected indicate a positive finding. Those items not selected are negative.   Constitutional [] Weight loss/gain   [] Fatigue  [] Fever/Chills   HEENT [] Hearing Loss  [] Visual Disturbance  [] Tinnitus  [] Eye pain   Respiratory [] Shortness of Breath  [] Cough  [] Snoring   Cardiovascular [] Chest Pain  [] Palpitations  [] Lightheaded   GI [] Constipation  [] Diarrhea  [] Swallowing change  [x] Nausea/vomiting    [] Urinary Frequency  [] Urinary Urgency   Musculoskeletal [] Neck pain  [] Back pain  [] Muscle pain  [] Restless legs   Dermatologic [] Skin changes   Neurologic [] Memory loss/confusion  [] Seizures  [] Trouble walking or imbalance  [] Dizziness  [] Sleep disturbance  [] Weakness  [] Numbness  [] Tremors  [] Speech Difficulty  [x] Headaches  [x] Light Sensitivity  [x] Sound Sensitivity   Endocrinology []Excessive thirst  []Excessive hunger   Psychiatric [] Anxiety/Depression  [] Hallucination   Allergy/immunology []Hives/environmental allergies   Hematologic/lymph [] Abnormal bleeding  [] Abnormal bruising          PHYSICAL EXAMINATION:                                         .                                                                                                    General Appearance:  Alert, cooperative, no signs of distress, appears stated age   Head:  Normocephalic, no signs of trauma   Eyes:  Conjunctiva/corneas clear;  eyelids intact   Ears:  Normal external ear and canals   Nose: Nares normal, no drainage    Throat: Lips and tongue normal; teeth normal;  gums normal   Extremities: Extremities normal, no cyanosis, no edema   Skin: Skin color, texture normal, no rashes, no lesions                                     NEUROLOGIC EXAMINATION      Mental status    Alert and oriented x 3; able to follow commands, speech and language intact; no hallucinations or delusions  Fund of information appropriate for level of education    Cranial nerves    II - grossly intact  III, IV, VI - extra-ocular muscles full: no nystagmus, no ptosis   V - normal facial sensation VII - normal facial symmetry                                                             VIII - intact hearing                                                                             IX, X - symmetrical palate                                                                  XI - symmetrical shoulder shrug                                                       XII - tongue midline without atrophy      Motor function  Normal muscle bulk. Strength at least 5/5 on all 4 extremities, no pronator drift      Sensory function Unable to test      Cerebellar Intact fine motor movement. No involuntary movements or tremors. No ataxia or dysmetria on finger to nose or heel to shin testing      Reflex function Unable to test      Gait                   normal base and arm swing              ASSESSMENT AND PLAN:     This is a telehealth visit that was performed with the originating site at Patient Location: home and Provider Location of [place], [place]. Verbal consent to participate in video visit was obtained. Pursuant to the emergency declaration under the [facility], [facility] waiver authority and the [employer] Act, this Virtual Visit was conducted, with patient's consent, to reduce the patient's risk of exposure to COVID-19 and provide continuity of care for an established/new patient. Services were provided through a video synchronous discussion virtually to substitute for in-person clinic visit. I discussed with the patient the nature of our telehealth visits via interactive/real-time audio/video that:  - I would evaluate the patient and recommend diagnostics and treatments based on my assessment  - Our sessions are not being recorded and that personal health information is protected  - Our team would provide follow up care in person if/when the patient needs it.       In summary, your patient, Barbara Biggs exhibits the following, with associated plan:    1. Episodic migraine without aura. The patient states she has not received an Ajovy injection since December. The patient states on Ajovy she was getting one headache a month of a 3-4/10 intensity. The patient states before Ajovy she would not be able to work and would have to lay down for the rest of the day. The patient states the headaches have increased since not receiving the injection. 1. Continue Ajovy 225 mg monthly injections  2. Start Rizatriptan for abortive therapy. This was prescribed at the patient's last visit, however she never obtained the medication. 3. Continue Magnesium 400 mg daily  4. Continue Riboflavin 100 mg daily  5. Continue Fioricet for rescue   6. The patient should return for follow up in 8 weeks        Signed: EL Melton-CNP    [facility]    Please note that this chart was generated using voice recognition Dragon dictation software. Although every effort was made to ensure the accuracy of this automated transcription, some errors in transcription may have occurred. Provider Attestation: The documentation recorded by the scribe accurately reflects the service I personally performed and the decisions made by myself. Portions of this note were transcribed by a scribe. I personally performed the history, physical exam, and medical decision-making and confirm the accuracy of the information in the transcribed note. Scribe Attestation:   By signing my name below, Jessica Yvoany, attest that this documentation has been prepared under the direction and in the presence of Imani Gill CNP.

## 2022-03-10 ENCOUNTER — TELEPHONE (OUTPATIENT)
Dept: FAMILY MEDICINE CLINIC | Age: 37
End: 2022-03-10

## 2022-03-10 NOTE — TELEPHONE ENCOUNTER
----- Message from Vijaya Oscar sent at 3/10/2022  9:44 AM EST -----  Subject: Message to Provider    QUESTIONS  Information for Provider? PT needs medical records including hospital   visits going back from 2015 printed out for  , please call back   ---------------------------------------------------------------------------  --------------  [address]  What is the best way for the office to contact you? OK to leave message on   voicemail  Preferred Call Back Phone Number? [phone]  ---------------------------------------------------------------------------  --------------  SCRIPT ANSWERS  Relationship to Patient?  Self

## 2022-03-15 ENCOUNTER — TELEPHONE (OUTPATIENT)
Dept: NEUROLOGY | Age: 37
End: 2022-03-15

## 2022-04-18 ENCOUNTER — OFFICE VISIT (OUTPATIENT)
Dept: SURGERY | Age: 37
End: 2022-04-18
Payer: MEDICARE

## 2022-04-18 VITALS
WEIGHT: 189 LBS | OXYGEN SATURATION: 100 % | BODY MASS INDEX: 32.27 KG/M2 | RESPIRATION RATE: 12 BRPM | HEIGHT: 64 IN | HEART RATE: 74 BPM

## 2022-04-18 DIAGNOSIS — R10.11 RUQ PAIN: Primary | ICD-10-CM

## 2022-04-18 PROCEDURE — 99214 OFFICE O/P EST MOD 30 MIN: CPT | Performed by: SURGERY

## 2022-04-18 PROCEDURE — G8417 CALC BMI ABV UP PARAM F/U: HCPCS | Performed by: SURGERY

## 2022-04-18 PROCEDURE — G8427 DOCREV CUR MEDS BY ELIG CLIN: HCPCS | Performed by: SURGERY

## 2022-04-18 PROCEDURE — 1036F TOBACCO NON-USER: CPT | Performed by: SURGERY

## 2022-04-18 NOTE — LETTER
[facility]  Surgical Specialties - General Surgery  [address]  [place], [address]  Phone: [phone]  Fax: [phone]      22    Patient: Khanh Barnes  MRN: [id number]  : 1985  Date of visit: 2022    Dear Chen Delvalle MD:      I saw Khanh Barnes in follow up to our initial encounter for abdominal pain, pt has since developed more persistent RUQ pain in the context of known cholelithiasis on GBUS, we will proceed with cholecystectomy. Below are the relevant portions of my assessment and plan of care. If you have questions, please do not hesitate to call me. I look forward to following Khanh Barnes along with you.     Sincerely,        Lamont Osborne, DO

## 2022-04-19 ENCOUNTER — ANESTHESIA EVENT (OUTPATIENT)
Dept: OPERATING ROOM | Age: 37
End: 2022-04-19
Payer: MEDICARE

## 2022-04-19 ENCOUNTER — TELEPHONE (OUTPATIENT)
Dept: SURGERY | Age: 37
End: 2022-04-19

## 2022-04-19 NOTE — TELEPHONE ENCOUNTER
4/19/2022- Spoke to patient, surgery scheduled at [facility] seth Graham, patient confirmed and information given to patient.      Surgery date/time: 4/21/2022 at 2:00pm  Arrival time: 12:00pm  PAT: phone call  Radha lieberman appt: 5/9/2022 at 1:10pm

## 2022-04-19 NOTE — PROGRESS NOTES
[facility]  Progress Note    PATIENT NAME: [address] VISIT DATE: 4/18/2022    SUBJECTIVE:  Rylee Camilo is a 39 y.o. female who presents to the clinic today for follow up to previous encounter, pt reports that she has developed more persistent RUQ since our initial meeting, pt has known cholelithiasis. Denies food triggers, denies history of jaundice or other complaints. OBJECTIVE:    Pulse 74   Resp 12   Ht 5' 4\" (1.626 m)   Wt 189 lb (85.7 kg)   SpO2 100%   BMI 32.44 kg/m²     General Appearance:  awake, alert, no acute distress, well developed, well nourished   Skin:  Skin color, texture, turgor normal. No rashes or lesions. Lungs:  Normal chest expansion, unlabored breathing without accessory muscle use. No audible rales, rhonchi, or wheezing. Cardiovascular: S1S2. No evidence of JVD. No evidence of pulsatile masses in abdomen  Abdomen:  Soft, mildly tender to deep palpation of the RUQ  Musculoskeletal: No evidence of bony/muscular deformities, trauma, atrophy of either left/right upper/lower extremity. No evidence of digital clubbing or cyanosis. ASSESSMENT:  1. RUQ pain          PLAN:  1. Schedule for cholecystectomy.      Electronically signed by Mari Hernandes DO on 4/19/2022 at 8:34 AM

## 2022-04-20 ENCOUNTER — TELEPHONE (OUTPATIENT)
Dept: SURGERY | Age: 37
End: 2022-04-20

## 2022-04-20 NOTE — TELEPHONE ENCOUNTER
4/20/2022- [facility] letting patient know surgery start time moved to 1:30pm, arrival time is 11:30am. Requested patient call back to confirm change.

## 2022-04-20 NOTE — TELEPHONE ENCOUNTER
4/20/2022-[facility] letting patient know surgery start time was moved again, to 1:00pm, arrival time is 11:00am. Requested patient call back to confirm.

## 2022-04-21 ENCOUNTER — ANESTHESIA (OUTPATIENT)
Dept: OPERATING ROOM | Age: 37
End: 2022-04-21
Payer: MEDICARE

## 2022-04-21 ENCOUNTER — TELEPHONE (OUTPATIENT)
Dept: FAMILY MEDICINE CLINIC | Age: 37
End: 2022-04-21

## 2022-04-21 ENCOUNTER — HOSPITAL ENCOUNTER (OUTPATIENT)
Age: 37
Setting detail: OUTPATIENT SURGERY
Discharge: HOME | End: 2022-04-21
Attending: SURGERY | Admitting: SURGERY
Payer: MEDICARE

## 2022-04-21 VITALS
RESPIRATION RATE: 17 BRPM | TEMPERATURE: 96.7 F | SYSTOLIC BLOOD PRESSURE: 91 MMHG | HEART RATE: 83 BPM | WEIGHT: 201 LBS | DIASTOLIC BLOOD PRESSURE: 64 MMHG | OXYGEN SATURATION: 100 % | BODY MASS INDEX: 34.31 KG/M2 | HEIGHT: 64 IN

## 2022-04-21 VITALS
OXYGEN SATURATION: 100 % | TEMPERATURE: 83.1 F | DIASTOLIC BLOOD PRESSURE: 58 MMHG | SYSTOLIC BLOOD PRESSURE: 112 MMHG | RESPIRATION RATE: 13 BRPM

## 2022-04-21 DIAGNOSIS — K80.20 SYMPTOMATIC CHOLELITHIASIS: Primary | ICD-10-CM

## 2022-04-21 PROCEDURE — 2500000003 HC RX 250 WO HCPCS: Performed by: ANESTHESIOLOGY

## 2022-04-21 PROCEDURE — 2500000003 HC RX 250 WO HCPCS: Performed by: NURSE ANESTHETIST, CERTIFIED REGISTERED

## 2022-04-21 PROCEDURE — 2500000003 HC RX 250 WO HCPCS

## 2022-04-21 PROCEDURE — 2500000003 HC RX 250 WO HCPCS: Performed by: SURGERY

## 2022-04-21 PROCEDURE — 88304 TISSUE EXAM BY PATHOLOGIST: CPT

## 2022-04-21 PROCEDURE — 7100000010 HC PHASE II RECOVERY - FIRST 15 MIN: Performed by: SURGERY

## 2022-04-21 PROCEDURE — 2580000003 HC RX 258: Performed by: ANESTHESIOLOGY

## 2022-04-21 PROCEDURE — 7100000001 HC PACU RECOVERY - ADDTL 15 MIN: Performed by: SURGERY

## 2022-04-21 PROCEDURE — 47562 LAPAROSCOPIC CHOLECYSTECTOMY: CPT | Performed by: SURGERY

## 2022-04-21 PROCEDURE — 3700000000 HC ANESTHESIA ATTENDED CARE: Performed by: SURGERY

## 2022-04-21 PROCEDURE — 3600000009 HC SURGERY ROBOT BASE: Performed by: SURGERY

## 2022-04-21 PROCEDURE — 6360000002 HC RX W HCPCS: Performed by: NURSE ANESTHETIST, CERTIFIED REGISTERED

## 2022-04-21 PROCEDURE — 6360000002 HC RX W HCPCS

## 2022-04-21 PROCEDURE — S2900 ROBOTIC SURGICAL SYSTEM: HCPCS | Performed by: SURGERY

## 2022-04-21 PROCEDURE — 64488 TAP BLOCK BI INJECTION: CPT | Performed by: ANESTHESIOLOGY

## 2022-04-21 PROCEDURE — 7100000000 HC PACU RECOVERY - FIRST 15 MIN: Performed by: SURGERY

## 2022-04-21 PROCEDURE — 2709999900 HC NON-CHARGEABLE SUPPLY: Performed by: SURGERY

## 2022-04-21 PROCEDURE — 3700000001 HC ADD 15 MINUTES (ANESTHESIA): Performed by: SURGERY

## 2022-04-21 PROCEDURE — C9290 INJ, BUPIVACAINE LIPOSOME: HCPCS | Performed by: ANESTHESIOLOGY

## 2022-04-21 PROCEDURE — 7100000011 HC PHASE II RECOVERY - ADDTL 15 MIN: Performed by: SURGERY

## 2022-04-21 PROCEDURE — 3600000019 HC SURGERY ROBOT ADDTL 15MIN: Performed by: SURGERY

## 2022-04-21 PROCEDURE — 6360000002 HC RX W HCPCS: Performed by: ANESTHESIOLOGY

## 2022-04-21 RX ORDER — GLYCOPYRROLATE 1 MG/5 ML
SYRINGE (ML) INTRAVENOUS PRN
Status: DISCONTINUED | OUTPATIENT
Start: 2022-04-21 | End: 2022-04-21 | Stop reason: SDUPTHER

## 2022-04-21 RX ORDER — SODIUM CHLORIDE, SODIUM LACTATE, POTASSIUM CHLORIDE, CALCIUM CHLORIDE 600; 310; 30; 20 MG/100ML; MG/100ML; MG/100ML; MG/100ML
INJECTION, SOLUTION INTRAVENOUS CONTINUOUS
Status: DISCONTINUED | OUTPATIENT
Start: 2022-04-21 | End: 2022-04-21

## 2022-04-21 RX ORDER — BUPIVACAINE HYDROCHLORIDE 2.5 MG/ML
INJECTION, SOLUTION EPIDURAL; INFILTRATION; INTRACAUDAL
Status: COMPLETED
Start: 2022-04-21 | End: 2022-04-21

## 2022-04-21 RX ORDER — ROCURONIUM BROMIDE 10 MG/ML
INJECTION, SOLUTION INTRAVENOUS PRN
Status: DISCONTINUED | OUTPATIENT
Start: 2022-04-21 | End: 2022-04-21 | Stop reason: SDUPTHER

## 2022-04-21 RX ORDER — METOCLOPRAMIDE HYDROCHLORIDE 5 MG/ML
INJECTION INTRAMUSCULAR; INTRAVENOUS
Status: COMPLETED
Start: 2022-04-21 | End: 2022-04-21

## 2022-04-21 RX ORDER — INDOCYANINE GREEN AND WATER 25 MG
5 KIT INJECTION ONCE
Status: COMPLETED | OUTPATIENT
Start: 2022-04-21 | End: 2022-04-21

## 2022-04-21 RX ORDER — METOCLOPRAMIDE HYDROCHLORIDE 5 MG/ML
10 INJECTION INTRAMUSCULAR; INTRAVENOUS ONCE
Status: COMPLETED | OUTPATIENT
Start: 2022-04-21 | End: 2022-04-21

## 2022-04-21 RX ORDER — ONDANSETRON 2 MG/ML
INJECTION INTRAMUSCULAR; INTRAVENOUS PRN
Status: DISCONTINUED | OUTPATIENT
Start: 2022-04-21 | End: 2022-04-21 | Stop reason: SDUPTHER

## 2022-04-21 RX ORDER — ONDANSETRON 2 MG/ML
4 INJECTION INTRAMUSCULAR; INTRAVENOUS
Status: COMPLETED | OUTPATIENT
Start: 2022-04-21 | End: 2022-04-21

## 2022-04-21 RX ORDER — CLINDAMYCIN PHOSPHATE 900 MG/50ML
900 INJECTION INTRAVENOUS
Status: COMPLETED | OUTPATIENT
Start: 2022-04-21 | End: 2022-04-21

## 2022-04-21 RX ORDER — MIDAZOLAM HYDROCHLORIDE 2 MG/2ML
2 INJECTION, SOLUTION INTRAMUSCULAR; INTRAVENOUS
Status: COMPLETED | OUTPATIENT
Start: 2022-04-21 | End: 2022-04-21

## 2022-04-21 RX ORDER — MIDAZOLAM HYDROCHLORIDE 1 MG/ML
INJECTION INTRAMUSCULAR; INTRAVENOUS PRN
Status: DISCONTINUED | OUTPATIENT
Start: 2022-04-21 | End: 2022-04-21 | Stop reason: SDUPTHER

## 2022-04-21 RX ORDER — IPRATROPIUM BROMIDE AND ALBUTEROL SULFATE 2.5; .5 MG/3ML; MG/3ML
1 SOLUTION RESPIRATORY (INHALATION)
Status: DISCONTINUED | OUTPATIENT
Start: 2022-04-21 | End: 2022-04-21 | Stop reason: HOSPADM

## 2022-04-21 RX ORDER — SODIUM CHLORIDE 0.9 % (FLUSH) 0.9 %
5-40 SYRINGE (ML) INJECTION PRN
Status: DISCONTINUED | OUTPATIENT
Start: 2022-04-21 | End: 2022-04-21 | Stop reason: HOSPADM

## 2022-04-21 RX ORDER — MIDAZOLAM HYDROCHLORIDE 2 MG/2ML
2 INJECTION, SOLUTION INTRAMUSCULAR; INTRAVENOUS ONCE
Status: DISCONTINUED | OUTPATIENT
Start: 2022-04-21 | End: 2022-04-21 | Stop reason: HOSPADM

## 2022-04-21 RX ORDER — SODIUM CHLORIDE 9 MG/ML
25 INJECTION, SOLUTION INTRAVENOUS PRN
Status: DISCONTINUED | OUTPATIENT
Start: 2022-04-21 | End: 2022-04-21 | Stop reason: HOSPADM

## 2022-04-21 RX ORDER — SODIUM CHLORIDE 0.9 % (FLUSH) 0.9 %
5-40 SYRINGE (ML) INJECTION EVERY 12 HOURS SCHEDULED
Status: DISCONTINUED | OUTPATIENT
Start: 2022-04-21 | End: 2022-04-21 | Stop reason: HOSPADM

## 2022-04-21 RX ORDER — PROMETHAZINE HYDROCHLORIDE 25 MG/ML
INJECTION, SOLUTION INTRAMUSCULAR; INTRAVENOUS
Status: COMPLETED
Start: 2022-04-21 | End: 2022-04-21

## 2022-04-21 RX ORDER — FENTANYL CITRATE 50 UG/ML
INJECTION, SOLUTION INTRAMUSCULAR; INTRAVENOUS
Status: COMPLETED
Start: 2022-04-21 | End: 2022-04-21

## 2022-04-21 RX ORDER — BUPIVACAINE HYDROCHLORIDE 2.5 MG/ML
INJECTION, SOLUTION EPIDURAL; INFILTRATION; INTRACAUDAL
Status: COMPLETED | OUTPATIENT
Start: 2022-04-21 | End: 2022-04-21

## 2022-04-21 RX ORDER — SODIUM CHLORIDE 9 MG/ML
INJECTION, SOLUTION INTRAVENOUS PRN
Status: DISCONTINUED | OUTPATIENT
Start: 2022-04-21 | End: 2022-04-21 | Stop reason: HOSPADM

## 2022-04-21 RX ORDER — MORPHINE SULFATE 2 MG/ML
2 INJECTION, SOLUTION INTRAMUSCULAR; INTRAVENOUS EVERY 5 MIN PRN
Status: DISCONTINUED | OUTPATIENT
Start: 2022-04-21 | End: 2022-04-21 | Stop reason: HOSPADM

## 2022-04-21 RX ORDER — DEXAMETHASONE SODIUM PHOSPHATE 10 MG/ML
INJECTION INTRAMUSCULAR; INTRAVENOUS PRN
Status: DISCONTINUED | OUTPATIENT
Start: 2022-04-21 | End: 2022-04-21 | Stop reason: SDUPTHER

## 2022-04-21 RX ORDER — SODIUM CHLORIDE, SODIUM LACTATE, POTASSIUM CHLORIDE, CALCIUM CHLORIDE 600; 310; 30; 20 MG/100ML; MG/100ML; MG/100ML; MG/100ML
INJECTION, SOLUTION INTRAVENOUS CONTINUOUS
Status: DISCONTINUED | OUTPATIENT
Start: 2022-04-21 | End: 2022-04-21 | Stop reason: HOSPADM

## 2022-04-21 RX ORDER — NEOSTIGMINE METHYLSULFATE 5 MG/5 ML
SYRINGE (ML) INTRAVENOUS PRN
Status: DISCONTINUED | OUTPATIENT
Start: 2022-04-21 | End: 2022-04-21 | Stop reason: SDUPTHER

## 2022-04-21 RX ORDER — MORPHINE SULFATE 2 MG/ML
INJECTION, SOLUTION INTRAMUSCULAR; INTRAVENOUS
Status: COMPLETED
Start: 2022-04-21 | End: 2022-04-21

## 2022-04-21 RX ORDER — ONDANSETRON 4 MG/1
4 TABLET, FILM COATED ORAL EVERY 8 HOURS PRN
Qty: 10 TABLET | Refills: 0 | Status: SHIPPED | OUTPATIENT
Start: 2022-04-21 | End: 2022-04-26

## 2022-04-21 RX ORDER — OXYCODONE HYDROCHLORIDE AND ACETAMINOPHEN 5; 325 MG/1; MG/1
1 TABLET ORAL EVERY 6 HOURS PRN
Qty: 15 TABLET | Refills: 0 | Status: SHIPPED | OUTPATIENT
Start: 2022-04-21 | End: 2022-04-28

## 2022-04-21 RX ORDER — FENTANYL CITRATE 50 UG/ML
INJECTION, SOLUTION INTRAMUSCULAR; INTRAVENOUS PRN
Status: DISCONTINUED | OUTPATIENT
Start: 2022-04-21 | End: 2022-04-21 | Stop reason: SDUPTHER

## 2022-04-21 RX ORDER — LABETALOL 20 MG/4 ML (5 MG/ML) INTRAVENOUS SYRINGE
10
Status: DISCONTINUED | OUTPATIENT
Start: 2022-04-21 | End: 2022-04-21 | Stop reason: HOSPADM

## 2022-04-21 RX ORDER — FENTANYL CITRATE 50 UG/ML
100 INJECTION, SOLUTION INTRAMUSCULAR; INTRAVENOUS ONCE
Status: DISCONTINUED | OUTPATIENT
Start: 2022-04-21 | End: 2022-04-21 | Stop reason: HOSPADM

## 2022-04-21 RX ORDER — DIPHENHYDRAMINE HYDROCHLORIDE 50 MG/ML
12.5 INJECTION INTRAMUSCULAR; INTRAVENOUS
Status: DISCONTINUED | OUTPATIENT
Start: 2022-04-21 | End: 2022-04-21 | Stop reason: HOSPADM

## 2022-04-21 RX ORDER — PROPOFOL 10 MG/ML
INJECTION, EMULSION INTRAVENOUS PRN
Status: DISCONTINUED | OUTPATIENT
Start: 2022-04-21 | End: 2022-04-21 | Stop reason: SDUPTHER

## 2022-04-21 RX ORDER — MEPERIDINE HYDROCHLORIDE 50 MG/ML
12.5 INJECTION INTRAMUSCULAR; INTRAVENOUS; SUBCUTANEOUS EVERY 5 MIN PRN
Status: DISCONTINUED | OUTPATIENT
Start: 2022-04-21 | End: 2022-04-21 | Stop reason: HOSPADM

## 2022-04-21 RX ORDER — INDOCYANINE GREEN AND WATER 25 MG
KIT INJECTION
Status: COMPLETED
Start: 2022-04-21 | End: 2022-04-21

## 2022-04-21 RX ORDER — MIDAZOLAM HYDROCHLORIDE 1 MG/ML
INJECTION INTRAMUSCULAR; INTRAVENOUS
Status: COMPLETED
Start: 2022-04-21 | End: 2022-04-21

## 2022-04-21 RX ORDER — PROMETHAZINE HYDROCHLORIDE 25 MG/ML
6.25 INJECTION, SOLUTION INTRAMUSCULAR; INTRAVENOUS EVERY 5 MIN PRN
Status: DISCONTINUED | OUTPATIENT
Start: 2022-04-21 | End: 2022-04-21 | Stop reason: HOSPADM

## 2022-04-21 RX ORDER — CLINDAMYCIN PHOSPHATE 900 MG/50ML
INJECTION INTRAVENOUS
Status: COMPLETED
Start: 2022-04-21 | End: 2022-04-21

## 2022-04-21 RX ORDER — OXYCODONE HYDROCHLORIDE AND ACETAMINOPHEN 5; 325 MG/1; MG/1
1 TABLET ORAL
Status: DISCONTINUED | OUTPATIENT
Start: 2022-04-21 | End: 2022-04-21 | Stop reason: HOSPADM

## 2022-04-21 RX ORDER — LIDOCAINE HYDROCHLORIDE 10 MG/ML
1 INJECTION, SOLUTION EPIDURAL; INFILTRATION; INTRACAUDAL; PERINEURAL
Status: DISCONTINUED | OUTPATIENT
Start: 2022-04-21 | End: 2022-04-21 | Stop reason: HOSPADM

## 2022-04-21 RX ORDER — DOCUSATE SODIUM 100 MG/1
100 CAPSULE, LIQUID FILLED ORAL 2 TIMES DAILY
Qty: 20 CAPSULE | Refills: 0 | Status: SHIPPED | OUTPATIENT
Start: 2022-04-21 | End: 2022-07-09 | Stop reason: ALTCHOICE

## 2022-04-21 RX ORDER — OXYCODONE HYDROCHLORIDE AND ACETAMINOPHEN 5; 325 MG/1; MG/1
2 TABLET ORAL
Status: DISCONTINUED | OUTPATIENT
Start: 2022-04-21 | End: 2022-04-21 | Stop reason: HOSPADM

## 2022-04-21 RX ORDER — LIDOCAINE HYDROCHLORIDE 20 MG/ML
INJECTION, SOLUTION INFILTRATION; PERINEURAL PRN
Status: DISCONTINUED | OUTPATIENT
Start: 2022-04-21 | End: 2022-04-21 | Stop reason: SDUPTHER

## 2022-04-21 RX ORDER — ONDANSETRON 2 MG/ML
INJECTION INTRAMUSCULAR; INTRAVENOUS
Status: COMPLETED
Start: 2022-04-21 | End: 2022-04-21

## 2022-04-21 RX ADMIN — MORPHINE SULFATE 2 MG: 2 INJECTION, SOLUTION INTRAMUSCULAR; INTRAVENOUS at 15:08

## 2022-04-21 RX ADMIN — Medication 4 MG: at 14:27

## 2022-04-21 RX ADMIN — LIDOCAINE HYDROCHLORIDE 40 MG: 20 INJECTION, SOLUTION INFILTRATION; PERINEURAL at 13:09

## 2022-04-21 RX ADMIN — PROPOFOL 200 MG: 10 INJECTION, EMULSION INTRAVENOUS at 13:09

## 2022-04-21 RX ADMIN — Medication 0.8 MG: at 14:27

## 2022-04-21 RX ADMIN — BUPIVACAINE HYDROCHLORIDE 60 ML: 2.5 INJECTION, SOLUTION EPIDURAL; INFILTRATION; INTRACAUDAL; PERINEURAL at 12:15

## 2022-04-21 RX ADMIN — MIDAZOLAM HYDROCHLORIDE 2 MG: 2 INJECTION, SOLUTION INTRAMUSCULAR; INTRAVENOUS at 12:08

## 2022-04-21 RX ADMIN — ONDANSETRON 4 MG: 2 INJECTION INTRAMUSCULAR; INTRAVENOUS at 15:06

## 2022-04-21 RX ADMIN — ONDANSETRON 4 MG: 2 INJECTION INTRAMUSCULAR; INTRAVENOUS at 13:50

## 2022-04-21 RX ADMIN — MIDAZOLAM HYDROCHLORIDE 2 MG: 1 INJECTION, SOLUTION INTRAMUSCULAR; INTRAVENOUS at 12:08

## 2022-04-21 RX ADMIN — MIDAZOLAM HYDROCHLORIDE 2 MG: 1 INJECTION, SOLUTION INTRAMUSCULAR; INTRAVENOUS at 13:04

## 2022-04-21 RX ADMIN — METOCLOPRAMIDE 10 MG: 5 INJECTION, SOLUTION INTRAMUSCULAR; INTRAVENOUS at 15:51

## 2022-04-21 RX ADMIN — PROMETHAZINE HYDROCHLORIDE 6.25 MG: 25 INJECTION INTRAMUSCULAR; INTRAVENOUS at 15:24

## 2022-04-21 RX ADMIN — ROCURONIUM BROMIDE 50 MG: 10 INJECTION INTRAVENOUS at 13:09

## 2022-04-21 RX ADMIN — INDOCYANINE GREEN AND WATER 5 MG: KIT at 13:03

## 2022-04-21 RX ADMIN — METOCLOPRAMIDE HYDROCHLORIDE 10 MG: 5 INJECTION INTRAMUSCULAR; INTRAVENOUS at 15:51

## 2022-04-21 RX ADMIN — PROMETHAZINE HYDROCHLORIDE 6.25 MG: 25 INJECTION, SOLUTION INTRAMUSCULAR; INTRAVENOUS at 15:24

## 2022-04-21 RX ADMIN — SODIUM CHLORIDE, POTASSIUM CHLORIDE, SODIUM LACTATE AND CALCIUM CHLORIDE: 600; 310; 30; 20 INJECTION, SOLUTION INTRAVENOUS at 14:02

## 2022-04-21 RX ADMIN — BUPIVACAINE 20 ML: 13.3 INJECTION, SUSPENSION, LIPOSOMAL INFILTRATION at 12:15

## 2022-04-21 RX ADMIN — DEXAMETHASONE SODIUM PHOSPHATE 10 MG: 10 INJECTION INTRAMUSCULAR; INTRAVENOUS at 13:19

## 2022-04-21 RX ADMIN — FENTANYL CITRATE 100 MCG: 50 INJECTION, SOLUTION INTRAMUSCULAR; INTRAVENOUS at 12:08

## 2022-04-21 RX ADMIN — SODIUM CHLORIDE, POTASSIUM CHLORIDE, SODIUM LACTATE AND CALCIUM CHLORIDE: 600; 310; 30; 20 INJECTION, SOLUTION INTRAVENOUS at 13:04

## 2022-04-21 RX ADMIN — FENTANYL CITRATE 100 MCG: 50 INJECTION, SOLUTION INTRAMUSCULAR; INTRAVENOUS at 13:04

## 2022-04-21 RX ADMIN — CLINDAMYCIN IN 5 PERCENT DEXTROSE 900 MG: 18 INJECTION, SOLUTION INTRAVENOUS at 13:04

## 2022-04-21 ASSESSMENT — PULMONARY FUNCTION TESTS
PIF_VALUE: 18
PIF_VALUE: 16
PIF_VALUE: 12
PIF_VALUE: 22
PIF_VALUE: 22
PIF_VALUE: 12
PIF_VALUE: 0
PIF_VALUE: 22
PIF_VALUE: 2
PIF_VALUE: 22
PIF_VALUE: 12
PIF_VALUE: 22
PIF_VALUE: 17
PIF_VALUE: 19
PIF_VALUE: 23
PIF_VALUE: 12
PIF_VALUE: 20
PIF_VALUE: 30
PIF_VALUE: 23
PIF_VALUE: 23
PIF_VALUE: 16
PIF_VALUE: 2
PIF_VALUE: 22
PIF_VALUE: 1
PIF_VALUE: 23
PIF_VALUE: 16
PIF_VALUE: 12
PIF_VALUE: 12
PIF_VALUE: 26
PIF_VALUE: 23
PIF_VALUE: 23
PIF_VALUE: 18
PIF_VALUE: 23
PIF_VALUE: 23
PIF_VALUE: 14
PIF_VALUE: 22
PIF_VALUE: 4
PIF_VALUE: 12
PIF_VALUE: 22
PIF_VALUE: 23
PIF_VALUE: 22
PIF_VALUE: 18
PIF_VALUE: 23
PIF_VALUE: 21
PIF_VALUE: 3
PIF_VALUE: 17
PIF_VALUE: 23
PIF_VALUE: 2
PIF_VALUE: 22
PIF_VALUE: 22
PIF_VALUE: 23
PIF_VALUE: 21
PIF_VALUE: 23
PIF_VALUE: 22
PIF_VALUE: 14
PIF_VALUE: 23
PIF_VALUE: 22
PIF_VALUE: 1
PIF_VALUE: 23
PIF_VALUE: 22
PIF_VALUE: 12
PIF_VALUE: 17
PIF_VALUE: 18
PIF_VALUE: 18
PIF_VALUE: 12
PIF_VALUE: 23
PIF_VALUE: 22
PIF_VALUE: 21
PIF_VALUE: 17
PIF_VALUE: 21
PIF_VALUE: 22
PIF_VALUE: 30
PIF_VALUE: 22
PIF_VALUE: 22
PIF_VALUE: 23
PIF_VALUE: 1
PIF_VALUE: 22
PIF_VALUE: 22
PIF_VALUE: 23
PIF_VALUE: 22
PIF_VALUE: 22
PIF_VALUE: 21
PIF_VALUE: 22
PIF_VALUE: 23
PIF_VALUE: 23
PIF_VALUE: 18
PIF_VALUE: 23
PIF_VALUE: 29
PIF_VALUE: 33
PIF_VALUE: 12
PIF_VALUE: 23
PIF_VALUE: 22
PIF_VALUE: 22

## 2022-04-21 ASSESSMENT — PAIN DESCRIPTION - ORIENTATION: ORIENTATION: RIGHT;UPPER

## 2022-04-21 ASSESSMENT — PAIN SCALES - GENERAL
PAINLEVEL_OUTOF10: 6
PAINLEVEL_OUTOF10: 2
PAINLEVEL_OUTOF10: 8
PAINLEVEL_OUTOF10: 2
PAINLEVEL_OUTOF10: 7

## 2022-04-21 ASSESSMENT — PAIN DESCRIPTION - LOCATION
LOCATION: ABDOMEN
LOCATION: ABDOMEN

## 2022-04-21 ASSESSMENT — PAIN DESCRIPTION - DESCRIPTORS
DESCRIPTORS: ACHING;DISCOMFORT;DULL
DESCRIPTORS: ACHING;DULL

## 2022-04-21 ASSESSMENT — PAIN - FUNCTIONAL ASSESSMENT
PAIN_FUNCTIONAL_ASSESSMENT: ACTIVITIES ARE NOT PREVENTED
PAIN_FUNCTIONAL_ASSESSMENT: 0-10
PAIN_FUNCTIONAL_ASSESSMENT: ACTIVITIES ARE NOT PREVENTED

## 2022-04-21 NOTE — OP NOTE
Operative Note      Patient: Noel Rodríguez  YOB: 1985  MRN: [id number]    Date of Procedure: 4/21/2022    Pre-Op Diagnosis: SYMPTOMATIC CHOLELITHIASIS    Post-Op Diagnosis:   Chronic cholecystitis with significant omental adhesions to the gallbladder       Procedure(s):  CHOLECYSTECTOMY LAPAROSCOPIC ROBOTIC    Surgeon(s):  Tres Isbell DO    Assistant:   * No surgical staff found *    Anesthesia: General    Estimated Blood Loss (mL): Minimal    Complications: None    Specimens:   ID Type Source Tests Collected by Time Destination   A : GALLBLADDER  Tissue Tissue SURGICAL PATHOLOGY Tres Isbell DO 4/21/2022 1345        Implants:  * No implants in log *      Drains:   NG/OG/NJ/NE Tube Orogastric 18 fr Center mouth (Active)       Findings: as above. Wound class 2    Detailed Description of Procedure:         HISTORY: The patient is a 39y.o. year old female with history of above preop diagnosis. The risk, benefits, expected outcome, and alternatives to the procedure were explained to the patient's understanding and written informed consent was obtained. TAP block was performed prior to procedure. DESCRIPTION OF PROCEDURE:  Patient was brought to the operating suite and placed on the operating table in supine position. Timeout was performed verifying correct patient, position, equipment and procedure to be performed. EPC cuffs were applied and preoperative antibiotics were infused. General anesthesia administered and the patient was endotracheally intubated. The patient's abdomen was prepped and draped in the usual sterile fashion. Scalpel was used to make a transverse incision 1cm at Leal's point, 8mm Optiview trocar was used to gain entry into the abdomen under direct visualization, no injury to the underlying structures were seen. 1cm transverse incisions were then made below  the umbilicus and two additional incisions in the RLQ at the midclavicular and anterior axillary lines.  8mm robot ports were placed under direct visualization at these sites. Pneumoperitoneum established with 15mmHg CO2. The patient was then positioned in reverse Trendelenburg with rotation the patient's left, then the robot was docked in the usual fashion. The gallbladder was completely covered in a layer of omentum that was moderately to densely adherent to it, lysis of adhesions required at least 50% of the case time. Dissection of the omentum of the gallbladder started at the body of the gallbladder after it was elevated above the dome of the liver. Once the wall of the gallbladder was identified, dissection then continued laterally until the liver edge was met, then dissection continued until this part of the omentum was completely freed from the liver. Starting from the midline gallbladder, the omental layer was then dissected laterally until the cystic artery was encountered, this was then skeletonized circumferentially. Dissection of the omental layer was then continued proximally until the infundibulum was reached. ICG had been administered preoperatively, firefly was used to identify the cystic duct. Chronic scarring was so significant that the infundibulum had a 90deg bend, I was able to positively identify the course of the cystic duct with firefly and the area of dissection was safely away from the cystic duct insertion into the common bile duct. The cystic duct was skeletonized at the infundibulum, critical view of safety was achieved. The cystic duct was clipped proximally and distally with Hemalok clips. The cystic artery was clipped proximally with Hemalok clips. The duct and artery were then transected with scissors . The gallbladder was dissected from the gallbladder fossa with electrocautery, then placed into an laparoscopic specimen bag which was removed from the abdomen through the LUQ incision.  On inspection of the area of dissection, hemostasis was excellent, no leakage of bile was noted, clips were intact. The working ports were removed under direct visualization, the camera and camera port were then removed and the abdomen dessuflated. The port sites were then closed with subcuticular sutures of 4-0 monocryl then covered with skin glue. The patient tolerated the procedure well without complications, all sponge needle and instrument counts were correct at the end of the case. The patient was successfully extubated and taken to PACU in stable condition.       Electronically signed by Abel Riedel, DO on 4/21/2022 at 2:48 PM

## 2022-04-21 NOTE — ANESTHESIA POSTPROCEDURE EVALUATION
Department of Anesthesiology  Postprocedure Note    Patient: Ajit Jackson  MRN: [id number]  YOB: 1985  Date of evaluation: 4/21/2022  Time:  2:57 PM     Procedure Summary     Date: 04/21/22 Room / Location: [address] [place] [address] / [facility]    Anesthesia Start: 9290 Anesthesia Stop: 1447    Procedure: CHOLECYSTECTOMY LAPAROSCOPIC ROBOTIC (N/A ) Diagnosis: (SYMPTOMATIC CHOLELITHIASIS)    Surgeons: Carlos A Ko DO Responsible Provider: Bran Varela MD    Anesthesia Type: general, regional ASA Status: 2          Anesthesia Type: general, regional    Dea Phase I: Dea Score: 4    Dea Phase II:      Last vitals: Reviewed and per EMR flowsheets.        Anesthesia Post Evaluation    Patient location during evaluation: PACU  Patient participation: complete - patient participated  Level of consciousness: awake and alert  Airway patency: patent  Nausea & Vomiting: no nausea and no vomiting  Complications: no  Cardiovascular status: hemodynamically stable  Respiratory status: nasal cannula and spontaneous ventilation  Hydration status: euvolemic  Multimodal analgesia pain management approach

## 2022-04-21 NOTE — PROGRESS NOTES
CLINICAL PHARMACY NOTE: MEDS TO BEDS    Total # of Prescriptions Filled: 3   The following medications were delivered to the patient:  · Dok 100mg  · Ondansetron 4mg  · Percocet 5-325    Additional Documentation:

## 2022-04-21 NOTE — H&P
[address]      Patient's Name/ Date of Birth/ Gender: Magali Khanna / 1985 (39 y.o.) / female     Attending physician: Karli Cuellar DO    CC: symptomatic cholelithiasis    History of present Illness: Magali Khanna is a 39 y.o. female, presents for cholecystectomy. Past Medical History:  has a past medical history of Acute constipation, Allergic rhinitis, Anxiety, Chest pain, Cholelithiasis, Chronic kidney disease, CKD (chronic kidney disease) stage 1, GFR 90 ml/min or greater, COVID, Depression, Hashimoto's thyroiditis, Headache(784.0), Heart murmur, Hematuria, Hypothyroidism, Irritable bowel syndrome with diarrhea, Midline low back pain without sciatica, Obesity (BMI 30-39.9), Orthostatic hypotension, Panic attack as reaction to stress, Panic disorder with agoraphobia, and Vertigo. Past Surgical History:   Past Surgical History:   Procedure Laterality Date    APPENDECTOMY      COLONOSCOPY  09/29/2021    COLONOSCOPY DIAGNOSTIC (N/A)    COLONOSCOPY N/A 9/29/2021    COLONOSCOPY WITH BIOPSY performed by Jimena Goodwin MD at [address] [facility] 5/2/2019    LAPAROSCOPY OPERATIVE, LYSIS OF ADHESIONS performed by Zeinab Benson MD at [address] 09/29/2021     EGD BIOPSY (N/A )     UPPER GASTROINTESTINAL ENDOSCOPY N/A 9/29/2021    EGD BIOPSY performed by Jimena Goodwin MD at [address] [place] [address] History:  reports that she quit smoking about 16 years ago. Her smoking use included cigarettes. She started smoking about 20 years ago. She has a 1.00 pack-year smoking history. She has never used smokeless tobacco. She reports previous alcohol use. She reports previous drug use. Drug: Marijuana Shari Ing).     Family History: family history includes Breast Cancer in her maternal grandmother; Heart Attack in her maternal grandfather; Mental Illness in her mother; No Known Problems in her father. Review of Systems:   General: Denies fever, chills, night sweats, weight loss, malaise, fatigue  HEENT: Denies sore throat, sinus problems, allergic rhinosinusitis  Card: Denies chest pain, palpitations, orthopnea/PND. Denies h/o murmurs  Pulm: Denies cough, shortness of breath, BURRELL  GI:  RUQ pain    : Denies polyuria, dysuria, hematuria  Endo: Denies diabetes, thyroid problems. Heme: Denies anemia, h/o bleeding or clotting problems. Neuro: Denies h/o CVA, TIA  Skin: Denies rashes, ulcers  Musculoskeletal: Denies muscle, joint, back pain.     Allergies: Diflucan [fluconazole], Flagyl [metronidazole], and Penicillins    Current Meds:  Current Facility-Administered Medications:     lidocaine PF 1 % injection 1 mL, 1 mL, IntraDERmal, Once PRN, John Pham MD    lactated ringers infusion, , IntraVENous, Continuous, John Pham MD    sodium chloride flush 0.9 % injection 5-40 mL, 5-40 mL, IntraVENous, 2 times per day, John Pham MD    sodium chloride flush 0.9 % injection 5-40 mL, 5-40 mL, IntraVENous, PRN, John Pham MD    0.9 % sodium chloride infusion, , IntraVENous, PRN, John Pham MD    indocyanine green (IC-GREEN) syringe 5 mg, 5 mg, IntraVENous, Once, César Yadav DO    sodium chloride flush 0.9 % injection 5-40 mL, 5-40 mL, IntraVENous, 2 times per day, John Pham MD    sodium chloride flush 0.9 % injection 5-40 mL, 5-40 mL, IntraVENous, PRN, John Pham MD    0.9 % sodium chloride infusion, 25 mL, IntraVENous, PRN, John Pham MD    meperidine (DEMEROL) injection 12.5 mg, 12.5 mg, IntraVENous, Q5 Min PRN, John Pham MD    morphine (PF) injection 2 mg, 2 mg, IntraVENous, Q5 Min PRN, John Pham MD    HYDROmorphone (DILAUDID) injection 0.5 mg, 0.5 mg, IntraVENous, Q5 Min PRN, Dong Edilia, MD    ondansetron (ZOFRAN) injection 4 mg, 4 mg, IntraVENous, Once PRN, John Pham MD    diphenhydrAMINE (BENADRYL) injection 12.5 mg, 12.5 mg, IntraVENous, Once PRN, Erick Au MD    labetalol (NORMODYNE;TRANDATE) injection syringe 10 mg, 10 mg, IntraVENous, Q15 Min PRN, Erick Au MD    ipratropium-albuterol (DUONEB) nebulizer solution 1 ampule, 1 ampule, Inhalation, Once PRN, Erick Au MD    oxyCODONE-acetaminophen (PERCOCET) 5-325 MG per tablet 1 tablet, 1 tablet, Oral, Once PRN, Erick Au MD    oxyCODONE-acetaminophen (PERCOCET) 5-325 MG per tablet 2 tablet, 2 tablet, Oral, Once PRN, Erick Au MD    promethazine (PHENERGAN) injection 6.25 mg, 6.25 mg, IntraMUSCular, Q5 Min PRN, Erick Au MD    fentaNYL (SUBLIMAZE) injection 100 mcg, 100 mcg, IntraVENous, Once, Erick Au MD    midazolam PF (VERSED) injection 2 mg, 2 mg, IntraVENous, Once, Erick Au MD    indocyanine green (IC-GREEN) 25 MG syringe, , , ,     clindamycin (CLEOCIN) 900 mg in dextrose 5 % 50 mL IVPB, 900 mg, IntraVENous, On Call to OR, Carlos Beasley, DO    clindamycin (CLEOCIN) 900 MG/50ML IVPB, , , ,     Vital Signs:  Vitals:    04/21/22 1215   BP: 112/69   Pulse: 64   Resp: 18   Temp:    SpO2: 100%       Physical Exam:  Vital signs and Nurse's note reviewed  Gen:  A&Ox3, NAD  HEENT: NCAT, PERRLA, EOMI, no scleral icterus, oral mucosa moist  Neck: Trachea midline without obvious masses or lesions  Chest: Symmetric rise with inhalation, no evidence of trauma  CVS: S1S2  Resp: Good bilateral air entry, no audible wheeze or rhonchi  Abd: soft, non-tender, non-distended, no hepatosplenomegaly or palpable masses  Ext: No clubbing, cyanosis, edema, peripheral pulses 2+ Rad/Fem/DP/PT  CNS: Moves all extremities, no gross focal motor deficits  Skin: No erythema or ulcerations           Assessment:    Sheryl Ferrer is a 39 y.o. female with     symptomatic cholelithiasis    Plan:    1.  To OR for cholecystectomy, all questions answered, written informed consent obtained      Bri Lazcano Amie Martinez,   4/21/2022

## 2022-04-21 NOTE — ANESTHESIA PRE PROCEDURE
Department of Anesthesiology  Preprocedure Note       Name:  Yamini Daniels   Age:  39 y.o.  :  1985                                          MRN:  [id number]         Date:  2022      Surgeon: Ezequiel Hardin):  Kanu Coronel DO    Procedure: Procedure(s):  CHOLECYSTECTOMY LAPAROSCOPIC ROBOTIC    Medications prior to admission:   Prior to Admission medications    Medication Sig Start Date End Date Taking? Authorizing Provider   rizatriptan (MAXALT) 10 MG tablet Take 1 tablet by mouth once as needed for Migraine May repeat in 2 hours if needed 3/3/22 3/3/22  EL Theodore CNP   Fremanezumab-vfrm (AJOVY) 225 MG/1.5ML SOAJ Inject 225 mg into the skin every 30 days 3/3/22   EL Theodore CNP   EUTHYROX 25 MCG tablet TAKE 1 TABLET BY MOUTH ONCE DAILY 21   Historical Provider, MD   liothyronine (CYTOMEL) 5 MCG tablet TAKE 1 TABLET BY MOUTH ONCE DAILY  Patient not taking: Reported on 2022   Historical Provider, MD   Misc Natural Products (CRANBERRY/PROBIOTIC) TABS Take 1 tablet by mouth daily Please dispense according to patients insurance.  22   Shawn Lopez MD   camphor-menthol-methyl salicylate (BENGAY ULTRA STRENGTH) 4-10-30 % CREA cream Apply topically 3 times daily as needed for Pain 22   Shawn Lopez MD   vitamin D (ERGOCALCIFEROL) 1.25 MG (26697 UT) CAPS capsule Take 1 capsule by mouth once a week 22   Shawn Lopez MD   hypromellose 0.3 % GEL ophthalmic gel At nighttime  Patient not taking: Reported on 2022   Shawn Lopez MD   Fremanezumab-vfrm (AJOVY) 225 MG/1.5ML SOSY Inject 225 mg into the skin every 30 days 22   EL Theodore CNP   Riboflavin 100 MG TABS Take 100 mg by mouth daily 21   Shawn Lopez MD   EPINEPHrine (EPIPEN) 0.3 MG/0.3ML SOAJ injection  21   Historical Provider, MD   magnesium oxide (MAG-OX) 400 (241.3 Mg) MG TABS tablet TAKE 1 TABLET BY MOUTH IN THE EVENING WITH FOOD 2/6/21   Historical Provider, MD   butalbital-acetaminophen-caffeine (FIORICET, ESGIC) -40 MG per tablet Take 1 tablet by mouth every 6 hours as needed for Headaches or Migraine MAX 3 DAYS/WEEK 2/18/21   Amie Buitrago MD   ammonium lactate (LAC-HYDRIN) 12 % lotion Apply topically daily. Patient not taking: Reported on 4/19/2022 2/18/21   Amie Buitrago MD   acetaminophen (TYLENOL) 500 MG tablet Take 1 tablet by mouth every 4 hours as needed for Pain 6/6/18   Alla Green MD       Current medications:    Current Facility-Administered Medications   Medication Dose Route Frequency Provider Last Rate Last Admin    bupivacaine (PF) (MARCAINE) 0.25 % injection             fentaNYL (SUBLIMAZE) 100 MCG/2ML injection             midazolam (VERSED) 2 MG/2ML injection             bupivacaine liposome (EXPAREL) 1.3 % injection                Allergies: Allergies   Allergen Reactions    Diflucan [Fluconazole] Rash     Throat itches    Flagyl [Metronidazole] Hives    Penicillins      When a child       Problem List:    Patient Active Problem List   Diagnosis Code    Chronic bilateral low back pain without sciatica M54.50, G89.29    Irritable bowel syndrome with both constipation and diarrhea K58.2    DEREK (generalized anxiety disorder) F41.1    PMS (premenstrual syndrome) N94.3    Chronic fatigue R53.82    Palpitations R00.2    Panic disorder with agoraphobia F40.01    Obesity (BMI 30-39. 9) E66.9    Hematuria R31.9    Murmur R01.1    Psychophysiological insomnia F51.04    Microscopic hematuria R31.29    Lower abdominal pain R10.30    Nocturia R35.1    Mild episode of recurrent major depressive disorder (HCC) F33.0    Allergic rhinitis due to allergen J30.9    Vertigo R42    Pelvic pain in female R10.2    S/P Operative laparoscopy, JAMIE 5/2/19 Z98.890    Dyspepsia R10.13    Chronic RLQ pain R10.31, G89.29    Abnormal bowel movement R19.8    Bilateral temporomandibular joint pain M26.623    Eustachian tube disorder, bilateral H69.93    Allergic dermatitis L23.9    Hashimoto's thyroiditis E06.3    Musculoskeletal pain M79.18    Pruritic dermatitis L29.9    Influenza vaccination declined Z28.21    Worsening headaches R51.9    Migraine with aura and without status migrainosus, not intractable G43. 109    Generalized abdominal pain R10.84    COVID-19 virus vaccination declined Z28.21    Pneumococcal vaccination declined Z28.21    Vitamin D deficiency E55.9    Dysfunction of both eustachian tubes H69.83    Corpus luteum cyst N83.10    Recurrent vaginitis N76.0    Arthritis of hand, left M19.042    Left foot pain M79.672    History of 2019 novel coronavirus disease (COVID-19) Z86.16    Dry eye syndrome of both eyes H04.123       Past Medical History:        Diagnosis Date    Acute constipation 2021    Allergic rhinitis     Anxiety     Chest pain     with anxiety    Cholelithiasis 2021    Chronic kidney disease     CKD (chronic kidney disease) stage 1, GFR 90 ml/min or greater 7/10/2018    COVID 01/2022    Depression     Hashimoto's thyroiditis     Headache(784.0)     Heart murmur     Hematuria 2016    Hypothyroidism     Irritable bowel syndrome with diarrhea 11/17/2016    Midline low back pain without sciatica 11/17/2016    Obesity (BMI 30-39. 9) 8/26/2017    Orthostatic hypotension     Panic attack as reaction to stress     certain anxiety medications will cause a panic attack    Panic disorder with agoraphobia 08/26/2017    some medications for anxiety will cause a panic attack.     Vertigo 10/2018    hx of       Past Surgical History:        Procedure Laterality Date    APPENDECTOMY      COLONOSCOPY  09/29/2021    COLONOSCOPY DIAGNOSTIC (N/A)    COLONOSCOPY N/A 9/29/2021    COLONOSCOPY WITH BIOPSY performed by Krista Lugo MD at [address] [facility] 5/2/2019    LAPAROSCOPY OPERATIVE, LYSIS OF ADHESIONS performed [facility] Vinay Morales Yolie Dunn MD [facility] N/A 2021     EGD BIOPSY (N/A )     UPPER GASTROINTESTINAL ENDOSCOPY N/A 2021    EGD BIOPSY performed by Dev Saul MD at [facility] History:    Social History     Tobacco Use    Smoking status: Former Smoker     Packs/day: 0.25     Years: 4.00     Pack years: 1.00     Types: Cigarettes     Start date: 2001     Quit date: 2005     Years since quittin.8    Smokeless tobacco: Never Used    Tobacco comment: Young and smoked with with friends   Substance Use Topics    Alcohol use: Not Currently     Comment: Hx alcohol abuse as teenager                                Counseling given: Not Answered  Comment: Young and smoked with with friends      Vital Signs (Current):   Vitals:    22 1027   Weight: 200 lb (90.7 kg)                                              BP Readings from Last 3 Encounters:   10/18/21 120/78   21 105/72   21 108/76       NPO Status: Time of last liquid consumption:                         Time of last solid consumption:                         Date of last liquid consumption: 22                        Date of last solid food consumption: 22    BMI:   Wt Readings from Last 3 Encounters:   22 200 lb (90.7 kg)   22 189 lb (85.7 kg)   10/18/21 189 lb (85.7 kg)     Body mass index is 34.33 kg/m².     CBC:   Lab Results   Component Value Date    WBC 6.2 2021    RBC 4.32 2021    HGB 13.2 2021    HCT 40.4 2021    MCV 93.5 2021    RDW 11.6 2021     2021       CMP:   Lab Results   Component Value Date     2021    K 3.9 2021     2021    CO2 25 2021    BUN 10 2021    CREATININE 0.64 2021    GFRAA >60 2021    LABGLOM >60 2021    GLUCOSE 79 2021    PROT 7.6 2021    CALCIUM 9.0 2021    BILITOT 0.54 2021    ALKPHOS 76 09/21/2021    AST 24 09/21/2021    ALT 32 09/21/2021       POC Tests: No results for input(s): POCGLU, POCNA, POCK, POCCL, POCBUN, POCHEMO, POCHCT in the last 72 hours. Coags: No results found for: PROTIME, INR, APTT    HCG (If Applicable):   Lab Results   Component Value Date    PREGTESTUR NEGATIVE 08/31/2020    HCG NEGATIVE 05/02/2019        ABGs: No results found for: PHART, PO2ART, ZRB3PPV, SUF7WUU, BEART, Z7XZLWWP     Type & Screen (If Applicable):  No results found for: LABABO, LABRH    Drug/Infectious Status (If Applicable):  Lab Results   Component Value Date    HEPCAB NONREACTIVE 05/19/2021       COVID-19 Screening (If Applicable):   Lab Results   Component Value Date    COVID19 Not Detected 09/29/2021           Anesthesia Evaluation  Patient summary reviewed and Nursing notes reviewed  Airway: Mallampati: I  TM distance: >3 FB   Neck ROM: full  Mouth opening: > = 3 FB Dental: normal exam         Pulmonary:Negative Pulmonary ROS and normal exam                              ROS comment: -QUIT SMOKING 2005   Cardiovascular:                   ROS comment: -VERTIGO - ASYMPTOMATIC RECENTLY     Neuro/Psych:                ROS comment: -NUMBNESS TINGLING LEFT FOOT GI/Hepatic/Renal: Neg GI/Hepatic/Renal ROS            Endo/Other:    (+) hypothyroidism::., .                  ROS comment: -NPO AFTER MIDNIGHT  -ALLERGIES - DIFLUCAN, FLAGYL, PCN Abdominal:             Vascular: negative vascular ROS. Other Findings:             Anesthesia Plan      general and regional     ASA 2     (GETA, TAP BLOCK)  Induction: intravenous. MIPS: Postoperative opioids intended and Prophylactic antiemetics administered. Anesthetic plan and risks discussed with patient. Plan discussed with CRNA.     Attending anesthesiologist reviewed and agrees with Salomon Zhang MD   4/21/2022

## 2022-04-23 ENCOUNTER — HOSPITAL ENCOUNTER (EMERGENCY)
Age: 37
Discharge: LEFT BEFORE DISCHARGE | End: 2022-04-23

## 2022-04-25 ENCOUNTER — TELEPHONE (OUTPATIENT)
Dept: SURGERY | Age: 37
End: 2022-04-25

## 2022-04-25 DIAGNOSIS — G43.109 MIGRAINE WITH AURA AND WITHOUT STATUS MIGRAINOSUS, NOT INTRACTABLE: ICD-10-CM

## 2022-04-25 LAB — SURGICAL PATHOLOGY REPORT: NORMAL

## 2022-04-25 RX ORDER — CRANBERRY CONC/C/BACILL COAG 250-30-15
TABLET ORAL
Qty: 30 TABLET | Refills: 5 | Status: SHIPPED | OUTPATIENT
Start: 2022-04-25

## 2022-04-25 NOTE — TELEPHONE ENCOUNTER
Please Approve or Refuse.   Send to Pharmacy per Pt's Request:      Next Visit Date:  7/8/2022   Last Visit Date: 1/20/2022    Hemoglobin A1C (%)   Date Value   01/16/2019 4.6             ( goal A1C is < 7)   BP Readings from Last 3 Encounters:   04/21/22 (!) 112/58   04/21/22 91/64   10/18/21 120/78          (goal 120/80)  BUN   Date Value Ref Range Status   09/21/2021 10 6 - 20 mg/dL Final     CREATININE   Date Value Ref Range Status   09/21/2021 0.64 0.50 - 0.90 mg/dL Final     Potassium   Date Value Ref Range Status   09/21/2021 3.9 3.7 - 5.3 mmol/L Final

## 2022-04-25 NOTE — TELEPHONE ENCOUNTER
Spoke to pt she state she is feeling well,offer her a sooner appt 4/25/22,4/26/22, pt decline she state she is doing better she wan to keep her appt 5/09/22

## 2022-04-27 ENCOUNTER — TELEPHONE (OUTPATIENT)
Dept: SURGERY | Age: 37
End: 2022-04-27

## 2022-04-27 NOTE — RESULT ENCOUNTER NOTE
Colleen comment sent to patient.   Gallstones and chronic cholecystitis    Future Appointments  5/9/2022   1:10 PM    Abel Riedel, DO            [facility]          [facility]  7/8/2022   1:00 PM    Samia Eaton MD      sc               [facility]  9/7/2022   1:00 PM    EL Joyner - * Neuro Spec          Claudette Nani

## 2022-04-27 NOTE — TELEPHONE ENCOUNTER
Spoke whit pt she was concern about a rash around the incision pt denied fever,redness or pain pt was instructed to take benadryl for a possible allergic reaction to glue pt will call if symptoms not get improve

## 2022-05-09 ENCOUNTER — OFFICE VISIT (OUTPATIENT)
Dept: SURGERY | Age: 37
End: 2022-05-09

## 2022-05-09 VITALS — OXYGEN SATURATION: 100 % | BODY MASS INDEX: 34.31 KG/M2 | HEIGHT: 64 IN | WEIGHT: 201 LBS | RESPIRATION RATE: 16 BRPM

## 2022-05-09 DIAGNOSIS — Z90.49 STATUS POST LAPAROSCOPIC CHOLECYSTECTOMY: Primary | ICD-10-CM

## 2022-05-09 PROCEDURE — 99024 POSTOP FOLLOW-UP VISIT: CPT | Performed by: SURGERY

## 2022-05-09 NOTE — PROGRESS NOTES
[facility]  Progress Note    PATIENT NAME: [address] VISIT DATE: 5/9/2022    SUBJECTIVE:  Vidya Byrnes is a 39 y.o. female who presents to the clinic today for follow up to robot tammy performed 4/21/22. Pt reports recent increase in incisional tenderness once she started her menstrual cycle, otherwise pt is tolerating regular diet and having normal BM. Pathology as follows:    -- Diagnosis --   GALLBLADDER, CHOLECYSTECTOMY:        - [address] WITH CHOLESTEROLOSIS AND CHOLELITHIASIS. OBJECTIVE:    Resp 16   Ht 5' 4\" (1.626 m)   Wt 201 lb (91.2 kg)   SpO2 100%   BMI 34.50 kg/m²     General Appearance:  awake, alert, no acute distress, well developed, well nourished   Skin:  Skin color, texture, turgor normal. No rashes or lesions. Lungs:  Normal chest expansion, unlabored breathing without accessory muscle use. No audible rales, rhonchi, or wheezing. Cardiovascular: S1S2. No evidence of JVD. No evidence of pulsatile masses in abdomen  Abdomen:  Soft, non-tender, no organomegaly, no masses. Incisions C/D/I without evidence of bleeding/infection  Musculoskeletal: No evidence of bony/muscular deformities, trauma, atrophy of either left/right upper/lower extremity. No evidence of digital clubbing or cyanosis. ASSESSMENT:  1. Status post laparoscopic cholecystectomy          PLAN:  1. Lifting restriction to less than 20lbs for the next 4 weeks, unrestricted after this.   2. F/U prn    Electronically signed by Sukumar Hutchison DO on 5/9/2022 at 1:33 PM

## 2022-05-09 NOTE — LETTER
[facility]  Surgical Specialties - General Surgery  [address]  [place], [address]  Phone: [phone]  Fax: [phone]      22    Patient: Vj Lerner  MRN: [id number]  : 1985  Date of visit: 2022    Dear Paige Pennington MD:      I saw Vj Lerner in follow up to robot cholecystectomy done 22, she is recovering well. Below are the relevant portions of my assessment and plan of care. If you have questions, please do not hesitate to call me. I look forward to following Vj Lerner along with you.     Sincerely,        Andry Cameron, DO

## 2022-05-28 NOTE — TELEPHONE ENCOUNTER
Pt calling having sx's of bv odor and slimy discharge x7 days getting worse pelvic pain also. Last seen 05/21 requesting rx be sent to pharmacy. verbal cues/nonverbal cues (demo/gestures)

## 2022-07-08 ENCOUNTER — OFFICE VISIT (OUTPATIENT)
Dept: FAMILY MEDICINE CLINIC | Age: 37
End: 2022-07-08
Payer: MEDICARE

## 2022-07-08 VITALS
BODY MASS INDEX: 32.95 KG/M2 | WEIGHT: 193 LBS | OXYGEN SATURATION: 98 % | DIASTOLIC BLOOD PRESSURE: 84 MMHG | HEIGHT: 64 IN | SYSTOLIC BLOOD PRESSURE: 120 MMHG | TEMPERATURE: 97.4 F | HEART RATE: 69 BPM

## 2022-07-08 DIAGNOSIS — Z28.21 COVID-19 VACCINATION DECLINED: ICD-10-CM

## 2022-07-08 DIAGNOSIS — E06.3 HASHIMOTO'S THYROIDITIS: ICD-10-CM

## 2022-07-08 DIAGNOSIS — J30.89 NON-SEASONAL ALLERGIC RHINITIS DUE TO OTHER ALLERGIC TRIGGER: ICD-10-CM

## 2022-07-08 DIAGNOSIS — G89.29 CHRONIC BILATERAL LOW BACK PAIN WITHOUT SCIATICA: ICD-10-CM

## 2022-07-08 DIAGNOSIS — Z71.1 CONCERN ABOUT STD IN FEMALE WITHOUT DIAGNOSIS: ICD-10-CM

## 2022-07-08 DIAGNOSIS — E78.5 HYPERLIPIDEMIA WITH TARGET LDL LESS THAN 100: ICD-10-CM

## 2022-07-08 DIAGNOSIS — Z00.01 ENCOUNTER FOR WELL ADULT EXAM WITH ABNORMAL FINDINGS: Primary | ICD-10-CM

## 2022-07-08 DIAGNOSIS — M54.50 CHRONIC BILATERAL LOW BACK PAIN WITHOUT SCIATICA: ICD-10-CM

## 2022-07-08 DIAGNOSIS — K52.9 CHRONIC DIARRHEA: ICD-10-CM

## 2022-07-08 DIAGNOSIS — B37.31 YEAST VAGINITIS: ICD-10-CM

## 2022-07-08 DIAGNOSIS — R53.82 CHRONIC FATIGUE: ICD-10-CM

## 2022-07-08 DIAGNOSIS — Z23 NEED FOR PROPHYLACTIC VACCINATION WITH TETANUS-DIPHTHERIA (TD): ICD-10-CM

## 2022-07-08 DIAGNOSIS — F33.2 SEVERE EPISODE OF RECURRENT MAJOR DEPRESSIVE DISORDER, WITHOUT PSYCHOTIC FEATURES (HCC): ICD-10-CM

## 2022-07-08 DIAGNOSIS — R10.30 LOWER ABDOMINAL PAIN: ICD-10-CM

## 2022-07-08 DIAGNOSIS — E55.9 VITAMIN D DEFICIENCY: ICD-10-CM

## 2022-07-08 PROCEDURE — 99214 OFFICE O/P EST MOD 30 MIN: CPT | Performed by: FAMILY MEDICINE

## 2022-07-08 PROCEDURE — 99395 PREV VISIT EST AGE 18-39: CPT | Performed by: FAMILY MEDICINE

## 2022-07-08 PROCEDURE — G8417 CALC BMI ABV UP PARAM F/U: HCPCS | Performed by: FAMILY MEDICINE

## 2022-07-08 PROCEDURE — 90471 IMMUNIZATION ADMIN: CPT | Performed by: FAMILY MEDICINE

## 2022-07-08 PROCEDURE — G8427 DOCREV CUR MEDS BY ELIG CLIN: HCPCS | Performed by: FAMILY MEDICINE

## 2022-07-08 PROCEDURE — 1036F TOBACCO NON-USER: CPT | Performed by: FAMILY MEDICINE

## 2022-07-08 PROCEDURE — 90715 TDAP VACCINE 7 YRS/> IM: CPT | Performed by: FAMILY MEDICINE

## 2022-07-08 RX ORDER — BROMPHENIRAMIN/PSEUDOEPHEDRINE 1-15MG/5ML
1 LIQUID (ML) ORAL EVERY EVENING
Qty: 21 G | Refills: 1 | Status: SHIPPED | OUTPATIENT
Start: 2022-07-08

## 2022-07-08 RX ORDER — LORATADINE 10 MG/1
10 TABLET ORAL DAILY
Qty: 90 TABLET | Refills: 1 | Status: SHIPPED | OUTPATIENT
Start: 2022-07-08

## 2022-07-08 RX ORDER — LIDOCAINE 40 MG/G
CREAM TOPICAL
Qty: 120 G | Refills: 3 | Status: SHIPPED | OUTPATIENT
Start: 2022-07-08

## 2022-07-08 SDOH — ECONOMIC STABILITY: TRANSPORTATION INSECURITY
IN THE PAST 12 MONTHS, HAS THE LACK OF TRANSPORTATION KEPT YOU FROM MEDICAL APPOINTMENTS OR FROM GETTING MEDICATIONS?: NO

## 2022-07-08 SDOH — ECONOMIC STABILITY: FOOD INSECURITY: WITHIN THE PAST 12 MONTHS, THE FOOD YOU BOUGHT JUST DIDN'T LAST AND YOU DIDN'T HAVE MONEY TO GET MORE.: NEVER TRUE

## 2022-07-08 SDOH — ECONOMIC STABILITY: INCOME INSECURITY: IN THE LAST 12 MONTHS, WAS THERE A TIME WHEN YOU WERE NOT ABLE TO PAY THE MORTGAGE OR RENT ON TIME?: NO

## 2022-07-08 SDOH — ECONOMIC STABILITY: TRANSPORTATION INSECURITY
IN THE PAST 12 MONTHS, HAS LACK OF TRANSPORTATION KEPT YOU FROM MEETINGS, WORK, OR FROM GETTING THINGS NEEDED FOR DAILY LIVING?: NO

## 2022-07-08 SDOH — ECONOMIC STABILITY: FOOD INSECURITY: WITHIN THE PAST 12 MONTHS, YOU WORRIED THAT YOUR FOOD WOULD RUN OUT BEFORE YOU GOT MONEY TO BUY MORE.: NEVER TRUE

## 2022-07-08 SDOH — ECONOMIC STABILITY: HOUSING INSECURITY
IN THE LAST 12 MONTHS, WAS THERE A TIME WHEN YOU DID NOT HAVE A STEADY PLACE TO SLEEP OR SLEPT IN A SHELTER (INCLUDING NOW)?: NO

## 2022-07-08 ASSESSMENT — COLUMBIA-SUICIDE SEVERITY RATING SCALE - C-SSRS
1. WITHIN THE PAST MONTH, HAVE YOU WISHED YOU WERE DEAD OR WISHED YOU COULD GO TO SLEEP AND NOT WAKE UP?: NO
6. HAVE YOU EVER DONE ANYTHING, STARTED TO DO ANYTHING, OR PREPARED TO DO ANYTHING TO END YOUR LIFE?: NO
2. HAVE YOU ACTUALLY HAD ANY THOUGHTS OF KILLING YOURSELF?: NO

## 2022-07-08 ASSESSMENT — PATIENT HEALTH QUESTIONNAIRE - PHQ9
SUM OF ALL RESPONSES TO PHQ QUESTIONS 1-9: 22
SUM OF ALL RESPONSES TO PHQ9 QUESTIONS 1 & 2: 4
9. THOUGHTS THAT YOU WOULD BE BETTER OFF DEAD, OR OF HURTING YOURSELF: 0
7. TROUBLE CONCENTRATING ON THINGS, SUCH AS READING THE NEWSPAPER OR WATCHING TELEVISION: 3
10. IF YOU CHECKED OFF ANY PROBLEMS, HOW DIFFICULT HAVE THESE PROBLEMS MADE IT FOR YOU TO DO YOUR WORK, TAKE CARE OF THINGS AT HOME, OR GET ALONG WITH OTHER PEOPLE: 1
5. POOR APPETITE OR OVEREATING: 3
SUM OF ALL RESPONSES TO PHQ QUESTIONS 1-9: 22
4. FEELING TIRED OR HAVING LITTLE ENERGY: 3
1. LITTLE INTEREST OR PLEASURE IN DOING THINGS: 1
3. TROUBLE FALLING OR STAYING ASLEEP: 3
6. FEELING BAD ABOUT YOURSELF - OR THAT YOU ARE A FAILURE OR HAVE LET YOURSELF OR YOUR FAMILY DOWN: 3
2. FEELING DOWN, DEPRESSED OR HOPELESS: 3
8. MOVING OR SPEAKING SO SLOWLY THAT OTHER PEOPLE COULD HAVE NOTICED. OR THE OPPOSITE, BEING SO FIGETY OR RESTLESS THAT YOU HAVE BEEN MOVING AROUND A LOT MORE THAN USUAL: 3
SUM OF ALL RESPONSES TO PHQ QUESTIONS 1-9: 22
SUM OF ALL RESPONSES TO PHQ QUESTIONS 1-9: 22

## 2022-07-08 ASSESSMENT — ENCOUNTER SYMPTOMS
ABDOMINAL PAIN: 1
DIARRHEA: 1
WHEEZING: 0
ABDOMINAL DISTENTION: 0
CONSTIPATION: 0
BLOOD IN STOOL: 0
BACK PAIN: 1
VOMITING: 0
CHEST TIGHTNESS: 0
NAUSEA: 0
COUGH: 0
SHORTNESS OF BREATH: 0

## 2022-07-08 ASSESSMENT — SOCIAL DETERMINANTS OF HEALTH (SDOH): HOW HARD IS IT FOR YOU TO PAY FOR THE VERY BASICS LIKE FOOD, HOUSING, MEDICAL CARE, AND HEATING?: NOT HARD AT ALL

## 2022-07-08 NOTE — PATIENT INSTRUCTIONS

## 2022-07-08 NOTE — PROGRESS NOTES
2022      Nimisha Lopez (:  1985) is a 44 y.o. female, here for a preventive medicine evaluation, Annual Exam, Fatigue, Allergies, Back Pain, Abdominal Pain, and Weight Gain   . ASSESSMENT/PLAN:    1. Encounter for well adult exam with abnormal findings    Low carb, low fat diet, increase fruits and vegetables, and exercise 4-5 times a week 30-40 minutes a day, or walk 1-2 hours per day, or wear a pedometer and get at least 10,000 steps per day. Dental exam 2-3 times /year advised. Immunizations reviewed. Health Maintenance reviewed   Smoking cessation counseling given not to ever restart smoking    Annual eye exam advised. 2. Chronic bilateral low back pain without sciatica  Failing to improve  Does not want medications  Declines physical therapy, home exercising and stretching discussed  -To start   camphor-menthol-methyl salicylate (BENGAY ULTRA STRENGTH) 4-10-30 % CREA cream; Apply topically 3 times daily as needed for Pain, Apply externally, 3 TIMES DAILY PRN Starting Fri 2022, Disp-113 g, R-0, Normal  -To start    lidocaine (LMX) 4 % cream; 5 g topical 2-3 times a day as needed for pain, maximum 20 g/day, Disp-120 g, R-3, Normal    -     [place] OFFICE/OUTPATIENT ESTABLISHED MOD MDM 30-39 MIN  3. Lower abdominal pain  Failing to resolve  We discussed to follow-up with GYN, Dr. Ashu Naqvi, to discuss recent diagnosis of chronic cholecystitis with significant omental adhesions to the gallbladder, and remote history of perforated gangrenous appendicitis at 4year-old, also personal history of prior adhesions which were released by laparoscopic means.  -     Urinalysis with Reflex to Culture; Future  -     [place] OFFICE/OUTPATIENT ESTABLISHED MOD MDM 30-39 MIN  4.  Chronic diarrhea  Failing to improve, likely exacerbated by cholecystectomy, I discussed with patient possible colestipol or Questran, however she declines any medications at this time  Continue probiotics, advised cheese and rice, and to avoid dehydration  Patient has a prior diagnosis of IBS with both diarrhea and constipation, however since the cholecystectomy, she has been having mostly diarrhea  I also expressed to the patient that magnesium can give her diarrhea and she could consider to stop it for a while and see if that would help  -     [place] OFFICE/OUTPATIENT ESTABLISHED MOD MDM 30-39 MIN  5. Chronic fatigue  Failing to resolve  Will do basic labs to rule out certain common medical conditions: hematologic, renal, hepatic, electrolyte imbalances, thyroid disorders. -TSH  -     CBC; Future  -     Comprehensive Metabolic Panel; Future  -     [place] OFFICE/OUTPATIENT ESTABLISHED MOD MDM 30-39 MIN  6. Hyperlipidemia with target LDL less than 100  Inadequately controlled  Low carb, low fat diet, increase fruits and vegetables, and exercise 4-5 times a week 30-40 minutes a day, or walk 1-2 hours per day, or wear a pedometer and get at least 10,000 steps per day. Recheck lipid panel  Lab Results   Component Value Date    LDLCHOLESTEROL 118 05/19/2021       -     Lipid Panel; Future  -     [place] OFFICE/OUTPATIENT ESTABLISHED MOD MDM 30-39 MIN  7. Hashimoto's thyroiditis  Most recently euthyroid, Dr. Minh Thomas stopped Synthroid supplementation  To recheck thyroid profile  -     TSH; Future  -     T4, Free; Future  -     CO OFFICE/OUTPATIENT ESTABLISHED MOD MDM 30-39 MIN  8. Vitamin D deficiency  Unsure if improving or not. Will recheck level  Continue supplementation.    -     Vitamin D 25 Hydroxy; Future  -     [place] OFFICE/OUTPATIENT ESTABLISHED MOD MDM 30-39 MIN  9. Non-seasonal allergic rhinitis due to other allergic trigger  Worsening  -    start loratadine (CLARITIN) 10 MG tablet; Take 1 tablet by mouth daily, Disp-90 tablet, R-1Normal  -     [place] OFFICE/OUTPATIENT ESTABLISHED MOD MDM 30-39 MIN  10. Need for prophylactic vaccination with tetanus-diphtheria (Td)  -     Tdap (age 6y and older) IM (Boostrix)  6.  Yeast vaginitis  recurrent  - clotrimazole (CLOTRIMAZOLE 3) 2 % CREA vaginal cream; Place 1 applicator vaginally every evening ** taken before , no reaction**, Disp-21 g, R-1Normal  Follow-up with GYN, continue probiotics  12. Concern about STD in female without diagnosis  -     C.trachomatis N.gonorrhoeae DNA, Urine; Future  -     Hepatitis B Surface Antigen; Future  -     Hepatitis C Antibody; Future  -     HERPES PROFILE; Future  -     HIV Screen; Future  -     T. pallidum Ab; Future  -     Trichomonas Vaginali, Molecular; Future  -     [place] OFFICE/OUTPATIENT ESTABLISHED MOD MDM 30-39 MIN  13. COVID-19 vaccination declined, despite counseling  14. Severe episode of recurrent major depressive disorder, without psychotic features ([facility] [address]    -     SD OFFICE/OUTPATIENT ESTABLISHED MOD MDM 30-39 MIN  Worsening  Patient has been having marital issues, patient has been following with counselor at [facility]. I strongly advised her to start medication, patient declines, she will let me know if symptoms get worse. Careful review of urgent symptoms and need for immediate medical attention if condition worsens. Patient expressed understanding. Advised to go to ED if severe symptoms develop. Patient expressed understanding. Bette received counseling on the following healthy behaviors: nutrition, exercise, medication adherence and weight loss  Reviewed prior labs and health maintenance  Discussed use, benefit, and side effects of prescribed medications. Barriers to medication compliance addressed. Patient given educational materials - see patient instructions  All patient questions answered. Patient voiced understanding. The patient's past medical, surgical, social, and family history as well as her  current medications and allergies were reviewed as documented in today's encounter. Medications, labs, diagnostic studies, consultations and follow-up as documented in thisencounter.     Return in 1 year (on 7/8/2023) for Annual .    another appt in 30 mins, in 4 months    Future Appointments   Date Time Provider Irineo Marmolejoi   9/7/2022  1:00 PM [facility]   12/7/2022  9:30 AM [facility]   7/12/2023  3:15 PM [facility]           Patient Active Problem List   Diagnosis    Chronic bilateral low back pain without sciatica    Irritable bowel syndrome with diarrhea    DEREK (generalized anxiety disorder)    PMS (premenstrual syndrome)    Chronic fatigue    Palpitations    Panic disorder with agoraphobia    Obesity (BMI 30-39. 9)    Hematuria    Psychophysiological insomnia    Microscopic hematuria    Lower abdominal pain    Nocturia    Severe episode of recurrent major depressive disorder, without psychotic features ([address]    Allergic rhinitis due to allergen    Pelvic pain in female    S/P Operative laparoscopy, JAMIE 5/2/19    Dyspepsia    Chronic RLQ pain    Bilateral temporomandibular joint pain    Allergic dermatitis    Hashimoto's thyroiditis    Influenza vaccination declined    Migraine with aura and without status migrainosus, not intractable    COVID-19 vaccination declined    Pneumococcal vaccination declined    Vitamin D deficiency    Corpus luteum cyst    Recurrent vaginitis    Arthritis of hand, left    Left foot pain    History of 2019 novel coronavirus disease (COVID-19)    Dry eye syndrome of both eyes    Hyperlipidemia with target LDL less than 100    Chronic diarrhea       Prior to Visit Medications    Medication Sig Taking?  Authorizing Provider   Cranberry-Vitamin C-Probiotic (AZO CRANBERRY) 250-30 MG TABS Take 1 tablet by mouth once daily Yes Josephine Arreola MD   vitamin B-2 (RIBOFLAVIN) 100 MG TABS tablet Take 1 tablet by mouth once daily Yes Josephine Arreola MD   docusate sodium (COLACE) 100 MG capsule Take 1 capsule by mouth 2 times daily Yes Alonso Andrade DO   Fremanezumab-vfrm (AJOVY) 225 MG/1.5ML Kimberley Barlow bowel syndrome with diarrhea 2016    Midline low back pain without sciatica 2016    Obesity (BMI 30-39.9) 2017    Orthostatic hypotension     Panic attack as reaction to stress     certain anxiety medications will cause a panic attack    Panic disorder with agoraphobia 2017    some medications for anxiety will cause a panic attack.  Pruritic dermatitis 2021    Severe episode of recurrent major depressive disorder, without psychotic features ([facility] [address] 2018    Vertigo 10/2018    hx of    Worsening headaches 2021       Past Surgical History:   Procedure Laterality Date    APPENDECTOMY      at 3 yo, perforated and was gangrenous    CHOLECYSTECTOMY  2022    laparoscopic;robotic    CHOLECYSTECTOMY, LAPAROSCOPIC N/A 2022    CHOLECYSTECTOMY LAPAROSCOPIC ROBOTIC performed by Perla Nichole DO at [place] [address]  2021    COLONOSCOPY DIAGNOSTIC (N/A)    COLONOSCOPY N/A 2021    COLONOSCOPY WITH BIOPSY performed by Nicolas Ventura MD at [facility]. N/A 2019    LAPAROSCOPY OPERATIVE, LYSIS OF ADHESIONS performed by Joe Jarrell MD at [address] 2021     EGD BIOPSY (N/A )     UPPER GASTROINTESTINAL ENDOSCOPY N/A 2021    EGD BIOPSY performed by Nicolas Ventura MD at [address] [facility].       .   Social History     Tobacco Use    Smoking status: Former Smoker     Packs/day: 0.25     Years: 4.00     Pack years: 1.00     Types: Cigarettes     Start date: 2001     Quit date: 2005     Years since quittin.1    Smokeless tobacco: Never Used    Tobacco comment: Young and smoked with with friends   Vaping Use    Vaping Use: Never used   Substance Use Topics    Alcohol use: Not Currently     Comment: Hx alcohol abuse as teenager    Drug use: Not Currently     Types: Marijuana Samia Postin)     Comment: When teen       Family History   Problem Relation Age of For the summer she cares for him 24/7. Patient reports worsening depression, worsening anxiety, and marital issues, sleep is poor, worries a lot, does not want any medications as makes her anxiety worse. Patient previously diagnosed by Dr. Serjio Vasquez with Hashimoto thyroiditis, she was on Synthroid 25 MCG daily, patient says Dr. Serjio Vasquez took her off Synthroid  She has noticed unintentional weight gain since she has came off Synthroid. She also reports feeling warm all the time, and diarrhea. Denies fever, chills, night sweats. Hyperlipidemia:  Patient is not following a low fat, low cholesterol diet. She is  exercising regularly. OTC Supplements: None    LDL is high  Lab Results   Component Value Date    LDLCHOLESTEROL 118 05/19/2021     Lab Results   Component Value Date    TRIG 44 05/19/2021    TRIG 45 09/19/2017     Bette has Vitamin D deficiency. Bette  is  taking Vitamin D supplementation   she feels tired. Lab Results   Component Value Date    VITD25 26.9 (L) 05/19/2021     Patient reports worsening allergies, postnasal drip, congestion, runny nose, sneezing, clear mucus. She denies sinus pain or sinus pressure. Patient reports having allergies all year-round. She does not like nasal sprays. She does have chronic migraines for which she follows with neurologist.      sexually active: Yes, patient reports her  has been unfaithful, and she admits she has been under a lot of stress. She is worried about STD exposure. She does report vaginal discharge which is white and pruritic similar with prior when she had yeast infection. last pap: was normal 10/14/2020    The patient has history of abnormal PAP    Last mammogram: at 34 yo  The patient has  family history of breast cancer    Wears seatbelts: yes     Regular exercise: yes  Ever been transfused or tattooed?: no  The patient reports that domestic violence in her life is absent.      Last eye exam: up to date: Yes    Abnormal visual screening. Spike Kamara  reports she is up-to-date with her eye exam.  Has glasses      Visual Acuity Screening    Right eye Left eye Both eyes   Without correction: 20/13 20/13 20/13   With correction:          Hearing:normal :Yes    Last dental exam and preventative dental cleaning: up to date : Yes    Nutritional assessment: Body mass index is 33.13 kg/m². Obesity. .     Weight is increasing, has gained 14 pounds in 1 year, unintentionally. She says she mostly drinks water, she does drink \"little pop\". Does not drink any alcoholic beverages. Wt Readings from Last 3 Encounters:   07/08/22 193 lb (87.5 kg)   05/09/22 201 lb (91.2 kg)   04/21/22 201 lb (91.2 kg)     05/19/21 179 lb 3.2 oz (81.3 kg)         Healthful diet and Physical activity counseling to prevent CVD- low carb, low fat diet, increase fruits and vegetables, and exercise 4-5 times a day 30-40minutes a day discussed    Nicotine dependence. no    Counseling given: Yes  Comment: Young and smoked with with friends      Alcohol use: no    Immunization History   Administered Date(s) Administered    Tdap (Boostrix, Adacel) 07/08/2022       COVID-19 vaccine:  No: Declines despite counseling     Tdap one time, then Td every 10 years-up to date:  No:, will receive today    Influenza annually-up to date:  No    PPSV 23 in all adults 19-65 yo with chronic conditions,smokers, alcoholism,  nursing home residents; then PCV 13 at 70 yo-up to date:  N/A    Menopause: No:   Patient's last menstrual period was 06/26/2022.      Colon cancer screening recommended at 39years old    The patient has NO  family history of colon cancer    Blood pressure is normal.  BP Readings from Last 3 Encounters:   07/08/22 120/84   04/21/22 (!) 112/58   04/21/22 91/64       Pulse is normal.  Pulse Readings from Last 3 Encounters:   07/08/22 69   04/21/22 83   04/18/22 74       A1c is normal  Lab Results   Component Value Date    LABA1C 4.6 01/16/2019     Cardiovascular risk is: Low    The ASCVD Risk score (Clemente Gee et al., 2013) failed to calculate for the following reasons: The 2013 ASCVD risk score is only valid for ages 36 to 78    Hepatitis C screening-nonreactive  Lab Results   Component Value Date    HEPCAB NONREACTIVE 05/19/2021       Depression worsening, patient reports this is due to her  being unfaithful  Patient reports she has been [facility]  Her  has been unfaithful, and she admits she has been under a lot of stress. She absolutely declines any medications. PHQ-2 Over the past 2 weeks, how often have you been bothered by any of the following problems? Little interest or pleasure in doing things: Several days  Feeling down, depressed, or hopeless: Nearly every day  PHQ-2 Score: 4  PHQ-9 Over the past 2 weeks, how often have you been bothered by any of the following problems? Trouble falling or staying asleep, or sleeping too much: Nearly every day  Feeling tired or having little energy: Nearly every day  Poor appetite or overeating: Nearly every day  Feeling bad about yourself - or that you are a failure or have let yourself or your family down: Nearly every day  Trouble concentrating on things, such as reading the newspaper or watching television: Nearly every day  Moving or speaking so slowly that other people could have noticed. Or the opposite - being so fidgety or restless that you have been moving around a lot more than usual: Nearly every day  Thoughts that you would be better off dead, or of hurting yourself in some way: Not at all  If you checked off any problems, how difficult have these problems made it for you to do your work, take care of things at home, or get along with other people?: Somewhat difficult  PHQ-9 Total Score: 22  PHQ-9 Total Score: 22      AMB C-SSRS Suicide Screening     1) Within the past month, have you wished you were dead or wished you could go to sleep and not wake up?  No   2) Have you actually had any thoughts of killing yourself? No   6) Have you ever done anything, started to do anything, or prepared to do anything to end your life? No     She denies hallucinations   [x]20-27 = Severe depression    PHQ Scores 7/8/2022 1/20/2022 8/17/2021 5/19/2021 1/5/2021 12/17/2019 11/1/2019   PHQ2 Score 4 0 0 2 1 3 3   PHQ9 Score 22 0 0 2 1 11 13       Health Maintenance   Topic Date Due    COVID-19 Vaccine (1) Never done    Flu vaccine (1) 09/01/2022    Depression Monitoring  07/08/2023    Cervical cancer screen  10/14/2025    DTaP/Tdap/Td vaccine (2 - Td or Tdap) 07/08/2032    Hepatitis C screen  Completed    HIV screen  Completed    Hepatitis A vaccine  Aged Out    Hepatitis B vaccine  Aged Out    Hib vaccine  Aged Out    Meningococcal (ACWY) vaccine  Aged Out    Pneumococcal 0-64 years Vaccine  Aged Out    Varicella vaccine  Discontinued       -rest of complaints with corresponding details per ROS    Review of Systems   Constitutional: Positive for appetite change, fatigue and unexpected weight change. Negative for activity change, chills, diaphoresis and fever. HENT: Positive for congestion, postnasal drip, rhinorrhea and sneezing. Negative for dental problem, ear discharge, hearing loss, sinus pressure, sinus pain and sore throat. Eyes: Positive for itching and visual disturbance. Respiratory: Negative for cough, chest tightness, shortness of breath and wheezing. Cardiovascular: Negative for chest pain, palpitations and leg swelling. Gastrointestinal: Positive for abdominal pain and diarrhea. Negative for abdominal distention, blood in stool, constipation, nausea and vomiting. Endocrine: Positive for heat intolerance. Negative for cold intolerance, polydipsia, polyphagia and polyuria. Genitourinary: Positive for frequency and urgency. Negative for difficulty urinating, dysuria and vaginal pain. Musculoskeletal: Positive for back pain.  Negative for arthralgias and myalgias. Skin: Negative for rash. Allergic/Immunologic: Positive for environmental allergies. Neurological: Positive for headaches (none now, seeing neurology). Negative for dizziness, weakness and numbness. Hematological: Does not bruise/bleed easily. Psychiatric/Behavioral: Positive for decreased concentration, dysphoric mood and sleep disturbance. Negative for self-injury and suicidal ideas. The patient is nervous/anxious.          -vital signs stable and within normal limits except obesity per BMI    /84   Pulse 69   Temp 97.4 °F (36.3 °C)   Ht 5' 4\" (1.626 m)   Wt 193 lb (87.5 kg)   LMP 06/26/2022   SpO2 98%   BMI 33.13 kg/m²   Vitals:    07/08/22 1252   BP: 120/84   Pulse: 69   Temp: 97.4 °F (36.3 °C)   SpO2: 98%   Weight: 193 lb (87.5 kg)   Height: 5' 4\" (1.626 m)     Estimated body mass index is 33.13 kg/m² as calculated from the following:    Height as of this encounter: 5' 4\" (1.626 m). Weight as of this encounter: 193 lb (87.5 kg). Physical Exam  Vitals and nursing note reviewed. Constitutional:       General: She is not in acute distress. Appearance: Normal appearance. She is well-developed. She is obese. She is not diaphoretic. HENT:      Head: Normocephalic and atraumatic. Right Ear: Hearing, tympanic membrane, ear canal and external ear normal.      Left Ear: Hearing, tympanic membrane, ear canal and external ear normal.      Nose: No mucosal edema. Mouth/Throat:      Comments: I did not examine the mouth due to coronavirus pandemic and wearing masks. Eyes:      General: Lids are normal. No scleral icterus. Right eye: No discharge. Left eye: No discharge. Extraocular Movements: Extraocular movements intact. Conjunctiva/sclera: Conjunctivae normal.   Neck:      Thyroid: No thyromegaly. Cardiovascular:      Rate and Rhythm: Normal rate and regular rhythm. Heart sounds: Normal heart sounds. No murmur heard.       Pulmonary: Effort: Pulmonary effort is normal. No respiratory distress. Breath sounds: Normal breath sounds. No wheezing or rales. Chest:      Chest wall: No tenderness. Abdominal:      General: Bowel sounds are normal. There is no distension. Palpations: Abdomen is soft. There is no hepatomegaly or splenomegaly. Tenderness: There is abdominal tenderness in the right lower quadrant, periumbilical area, suprapubic area and left lower quadrant. There is no right CVA tenderness, left CVA tenderness or guarding. Comments: Recent laparoscopic surgical incisions well-healed   Musculoskeletal:      Cervical back: Normal range of motion and neck supple. Lumbar back: Tenderness and bony tenderness present. Decreased range of motion. Negative right straight leg raise test and negative left straight leg raise test.      Right lower leg: No edema. Left lower leg: No edema. Skin:     General: Skin is warm and dry. Capillary Refill: Capillary refill takes less than 2 seconds. Findings: No rash. Neurological:      Mental Status: She is alert and oriented to person, place, and time. Cranial Nerves: No cranial nerve deficit. Motor: No abnormal muscle tone. Gait: Gait normal.      Deep Tendon Reflexes: Reflexes normal.      Reflex Scores:       Patellar reflexes are 2+ on the right side and 2+ on the left side. Psychiatric:         Mood and Affect: Mood is anxious and depressed. Speech: Speech is rapid and pressured. Behavior: Behavior normal.         Thought Content: Thought content normal.         Judgment: Judgment normal.         No flowsheet data found. I personally reviewed testing with patient.   Vitamin D deficiency  Hyperlipidemia  TSH increased      Otherwise labs within normal limits      Lab Results   Component Value Date    WBC 6.2 09/21/2021    HGB 13.2 09/21/2021    HCT 40.4 09/21/2021    MCV 93.5 09/21/2021     09/21/2021       Lab Results   Component Value Date/Time     2021 02:06 PM    K 3.9 2021 02:06 PM     2021 02:06 PM    CO2 25 2021 02:06 PM    BUN 10 2021 02:06 PM    CREATININE 0.64 2021 02:06 PM    GLUCOSE 79 2021 02:06 PM    CALCIUM 9.0 2021 02:06 PM        Lab Results   Component Value Date    ALT 32 2021    AST 24 2021    ALKPHOS 76 2021    BILITOT 0.54 2021       Lab Results   Component Value Date    TSH 6.54 (H) 2021       Lab Results   Component Value Date    LDLCHOLESTEROL 118 2021       Lab Results   Component Value Date    LABA1C 4.6 2019         Lab Results   Component Value Date    VITD25 26.9 (L) 2021         Orders Placed This Encounter   Medications    camphor-menthol-methyl salicylate (BENGAY ULTRA STRENGTH) 4-10-30 % CREA cream     Sig: Apply topically 3 times daily as needed for Pain     Dispense:  113 g     Refill:  0    lidocaine (LMX) 4 % cream     Si g topical 2-3 times a day as needed for pain, maximum 20 g/day     Dispense:  120 g     Refill:  3    clotrimazole (CLOTRIMAZOLE 3) 2 % CREA vaginal cream     Sig: Place 1 applicator vaginally every evening ** taken before , no reaction**     Dispense:  21 g     Refill:  1    loratadine (CLARITIN) 10 MG tablet     Sig: Take 1 tablet by mouth daily     Dispense:  90 tablet     Refill:  1         Medications Discontinued During This Encounter   Medication Reason    acetaminophen (TYLENOL) 500 MG tablet     ammonium lactate (LAC-HYDRIN) 12 % lotion     camphor-menthol-methyl salicylate (BENGAY ULTRA STRENGTH) 4-10-30 % CREA cream     hypromellose 0.3 % GEL ophthalmic gel     EUTHYROX 25 MCG tablet     liothyronine (CYTOMEL) 5 MCG tablet     docusate sodium (COLACE) 100 MG capsule Therapy completed         Orders Placed This Encounter   Procedures    C.trachomatis N.gonorrhoeae DNA, Urine     Standing Status:   Future     Standing Expiration Date: 7/9/2022 at 5:17 PM

## 2022-07-08 NOTE — PROGRESS NOTES
Visit Information    Have you changed or started any medications since your last visit including any over-the-counter medicines, vitamins, or herbal medicines? no   Are you having any side effects from any of your medications? -  no  Have you stopped taking any of your medications? Is so, why? -  no    Have you seen any other physician or provider since your last visit? No  Have you had any other diagnostic tests since your last visit? Yes - Records Obtained  Have you been seen in the emergency room and/or had an admission to a hospital since we last saw you? Yes - Records Obtained  Have you had your routine dental cleaning in the past 6 months? yes     Have you activated your [facility] account? If not, what are your barriers?  Yes     Patient Care Team:  Diamond Loyd MD as PCP - General (Family Medicine)  Diamond Loyd MD as PCP - [facility]  Cortney Willard MD as Consulting Physician (Obstetrics & Gynecology)  David Godinez MD as Consulting Physician (Urology)  MD David as Consulting Physician (Nephrology)  Aicha Cervantes (Certified Nurse Practitioner)  Corey Phelps (Audiology)  Erick Hernandez MD as Consulting Physician (Endocrinology)  Anupam Taylor MD as Consulting Physician (Oral Maxillofacial Surgery)  Lazarus Householder, APRN - CNP as Nurse Practitioner (Neurology)  Taylor Kovacs DO as Consulting Physician (Otolaryngology)  Alyssa Hennessy MD as Consulting Physician (Gastroenterology)  Taylor Kovacs DO as Consulting Physician (Otolaryngology)  Demarcus Alaniz DO as Surgeon (General Surgery)    Medical History Review  Past Medical, Family, and Social History reviewed and does contribute to the patient presenting condition    Health Maintenance   Topic Date Due    COVID-19 Vaccine (1) Never done    DTaP/Tdap/Td vaccine (1 - Tdap) Never done    Flu vaccine (1) 09/01/2022    Depression Monitoring  01/20/2023    Cervical cancer screen  10/14/2025  Hepatitis C screen  Completed    HIV screen  Completed    Hepatitis A vaccine  Aged Out    Hepatitis B vaccine  Aged Out    Hib vaccine  Aged Out    Meningococcal (ACWY) vaccine  Aged Out    Pneumococcal 0-64 years Vaccine  Aged Out    Varicella vaccine  Discontinued

## 2022-07-09 PROBLEM — M79.18 MUSCULOSKELETAL PAIN: Status: RESOLVED | Noted: 2021-01-05 | Resolved: 2022-07-09

## 2022-07-09 PROBLEM — H69.83 DYSFUNCTION OF BOTH EUSTACHIAN TUBES: Status: RESOLVED | Noted: 2021-08-23 | Resolved: 2022-07-09

## 2022-07-09 PROBLEM — H69.93 EUSTACHIAN TUBE DISORDER, BILATERAL: Status: RESOLVED | Noted: 2020-10-27 | Resolved: 2022-07-09

## 2022-07-09 PROBLEM — E78.5 HYPERLIPIDEMIA WITH TARGET LDL LESS THAN 100: Status: ACTIVE | Noted: 2022-07-09

## 2022-07-09 PROBLEM — R19.8 ABNORMAL BOWEL MOVEMENT: Status: RESOLVED | Noted: 2019-08-05 | Resolved: 2022-07-09

## 2022-07-09 PROBLEM — R51.9 WORSENING HEADACHES: Status: RESOLVED | Noted: 2021-02-21 | Resolved: 2022-07-09

## 2022-07-09 PROBLEM — R42 VERTIGO: Status: RESOLVED | Noted: 2018-10-31 | Resolved: 2022-07-09

## 2022-07-09 PROBLEM — R01.1 MURMUR: Status: RESOLVED | Noted: 2017-09-26 | Resolved: 2022-07-09

## 2022-07-09 PROBLEM — F33.2 SEVERE EPISODE OF RECURRENT MAJOR DEPRESSIVE DISORDER, WITHOUT PSYCHOTIC FEATURES (HCC): Status: ACTIVE | Noted: 2018-05-13

## 2022-07-09 PROBLEM — R10.84 GENERALIZED ABDOMINAL PAIN: Status: RESOLVED | Noted: 2021-05-19 | Resolved: 2022-07-09

## 2022-07-09 PROBLEM — L30.8 PRURITIC DERMATITIS: Status: RESOLVED | Noted: 2021-01-05 | Resolved: 2022-07-09

## 2022-07-09 PROBLEM — L29.9 PRURITIC DERMATITIS: Status: RESOLVED | Noted: 2021-01-05 | Resolved: 2022-07-09

## 2022-07-09 PROBLEM — K52.9 CHRONIC DIARRHEA: Status: ACTIVE | Noted: 2022-07-09

## 2022-07-09 PROBLEM — H69.93 DYSFUNCTION OF BOTH EUSTACHIAN TUBES: Status: RESOLVED | Noted: 2021-08-23 | Resolved: 2022-07-09

## 2022-07-09 LAB
ALBUMIN SERPL-MCNC: 4.5 G/DL
ALP BLD-CCNC: 83 U/L
ALT SERPL-CCNC: 26 U/L
ANION GAP SERPL CALCULATED.3IONS-SCNC: 10 MMOL/L
ANTIGEN INTERPRETATION: NEGATIVE
AST SERPL-CCNC: 20 U/L
BASOPHILS ABSOLUTE: NORMAL
BASOPHILS RELATIVE PERCENT: NORMAL
BILIRUB SERPL-MCNC: 0.7 MG/DL (ref 0.1–1.4)
BILIRUBIN URINE: ABNORMAL
BLOOD, URINE: POSITIVE
BUN BLDV-MCNC: 12 MG/DL
CALCIUM SERPL-MCNC: 9.3 MG/DL
CHLORIDE BLD-SCNC: 104 MMOL/L
CHOLESTEROL, TOTAL: 201 MG/DL
CHOLESTEROL/HDL RATIO: 3.5
CLARITY: ABNORMAL
CO2: 28 MMOL/L
COLOR: YELLOW
CREAT SERPL-MCNC: 0.86 MG/DL
EOSINOPHILS ABSOLUTE: NORMAL
EOSINOPHILS RELATIVE PERCENT: NORMAL
GFR CALCULATED: >60
GLUCOSE BLD-MCNC: 80 MG/DL
GLUCOSE URINE: NEGATIVE
HCT VFR BLD CALC: 40.3 % (ref 36–46)
HDLC SERPL-MCNC: 58 MG/DL (ref 35–70)
HEMOGLOBIN: 13.4 G/DL (ref 12–16)
HIV AG/AB: NORMAL
KETONES, URINE: NEGATIVE
LDL CHOLESTEROL CALCULATED: 132 MG/DL (ref 0–160)
LEUKOCYTE ESTERASE, URINE: POSITIVE
LYMPHOCYTES ABSOLUTE: NORMAL
LYMPHOCYTES RELATIVE PERCENT: NORMAL
MCH RBC QN AUTO: 31.3 PG
MCHC RBC AUTO-ENTMCNC: 33.2 G/DL
MCV RBC AUTO: 94 FL
MONOCYTES ABSOLUTE: NORMAL
MONOCYTES RELATIVE PERCENT: NORMAL
NEUTROPHILS ABSOLUTE: NORMAL
NEUTROPHILS RELATIVE PERCENT: NORMAL
NITRITE, URINE: NEGATIVE
NONHDLC SERPL-MCNC: NORMAL MG/DL
PH UA: 6 (ref 4.5–8)
PLATELET # BLD: 258 K/ΜL
PMV BLD AUTO: 8.7 FL
POTASSIUM SERPL-SCNC: 3.7 MMOL/L
PROTEIN UA: ABNORMAL
RBC # BLD: NORMAL 10*6/UL
SODIUM BLD-SCNC: 142 MMOL/L
SPECIFIC GRAVITY UA: 1.03 (ref 1–1.03)
T4 FREE: 0.99
TOTAL PROTEIN: 7.6
TRIGL SERPL-MCNC: 57 MG/DL
UROBILINOGEN, URINE: ABNORMAL
VITAMIN D 25-HYDROXY: 34.9
VITAMIN D2, 25 HYDROXY: NORMAL
VITAMIN D3,25 HYDROXY: NORMAL
VLDLC SERPL CALC-MCNC: NORMAL MG/DL
WBC # BLD: 4.8 10^3/ML

## 2022-07-09 ASSESSMENT — ENCOUNTER SYMPTOMS
SINUS PAIN: 0
EYE ITCHING: 1
SORE THROAT: 0
RHINORRHEA: 1
SINUS PRESSURE: 0

## 2022-07-11 DIAGNOSIS — R51.9 WORSENING HEADACHES: ICD-10-CM

## 2022-07-11 DIAGNOSIS — R53.82 CHRONIC FATIGUE: ICD-10-CM

## 2022-07-11 DIAGNOSIS — R10.30 LOWER ABDOMINAL PAIN: ICD-10-CM

## 2022-07-11 DIAGNOSIS — Z71.1 CONCERN ABOUT STD IN FEMALE WITHOUT DIAGNOSIS: ICD-10-CM

## 2022-07-11 DIAGNOSIS — E55.9 VITAMIN D DEFICIENCY: ICD-10-CM

## 2022-07-11 DIAGNOSIS — E78.5 HYPERLIPIDEMIA WITH TARGET LDL LESS THAN 100: ICD-10-CM

## 2022-07-11 DIAGNOSIS — E06.3 HASHIMOTO'S THYROIDITIS: ICD-10-CM

## 2022-07-11 DIAGNOSIS — G43.109 MIGRAINE WITH AURA AND WITHOUT STATUS MIGRAINOSUS, NOT INTRACTABLE: Primary | ICD-10-CM

## 2022-07-11 NOTE — RESULT ENCOUNTER NOTE
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     Future Appointments  9/7/2022   1:00 PM    EL Williamson - * Neuro Spec          [facility]  12/7/2022  9:30 AM    Raffi Joseph MD     [facility]  7/12/2023  3:15 PM    Raffi Joseph MD     [facility]               Destin Strauss

## 2022-07-11 NOTE — RESULT ENCOUNTER NOTE
Please notify patient: Vitamin D borderline low should continue vitamin D supplementation  Urine test is abnormal with blood in the urine and small amount, proteins, leukocytes  Urine culture says urogenital normal rodger in [facility]  However if the symptoms persist to let us know and we will recheck a urine test at [facility] lab    Urine culture  Specimen:  Urine - Urine specimen collection, clean catch (procedure)  Component 3 d ago  Culture  10-50,000 ORGANISMS/mL NORMAL UROGENITAL RODGER         Otherwise labs within normal limits  continue current treatment    Future Appointments  9/7/2022   1:00 PM    EL Santos - * Neuro Spec          [facility]  12/7/2022  9:30 AM    Ly Pierre MD     [facility]  7/12/2023  3:15 PM    Ly Pierre MD     [facility]

## 2022-07-11 NOTE — RESULT ENCOUNTER NOTE
Please notify patient: Potassium is just borderline low otherwise all the labs within normal limits . Could increase the potassium in diet  Otherwise labs within normal limits  continue current treatment    POTASSIUM-RICH FOODS advised: Fresh, unprocessed whole foods have the most. These foods include: milk and other dairy products; vegetables, especially broccoli, cooked dry beans, tomatoes, potatoes, carrots, artichokes, winter squash, and leafy green vegetables. (These greens include spinach, lettuce, and parsley.); fruits, especially citrus fruits, bananas, and apricots. meat, poultry, and fish.       Future Appointments  9/7/2022   1:00 PM    EL Schmitz - * Neuro Spec          [facility]  12/7/2022  9:30 AM    Roberta Conner MD     [facility]  7/12/2023  3:15 PM    Roberta Conner MD     [facility]               Madelaine Nicolas

## 2022-07-12 RX ORDER — BUTALBITAL, ACETAMINOPHEN AND CAFFEINE 50; 325; 40 MG/1; MG/1; MG/1
TABLET ORAL
Qty: 30 TABLET | Refills: 0 | Status: SHIPPED | OUTPATIENT
Start: 2022-07-12 | End: 2022-08-29

## 2022-07-13 DIAGNOSIS — Z71.1 CONCERN ABOUT STD IN FEMALE WITHOUT DIAGNOSIS: ICD-10-CM

## 2022-07-13 NOTE — RESULT ENCOUNTER NOTE
Please notify patient: Negative GC chlamydia, negative for syphilis  Otherwise labs within normal limits  continue current treatment    Future Appointments  9/7/2022   1:00 PM    EL Ramos - * Neuro Spec          [facility]  12/7/2022  9:30 AM    Moisés Field MD     [facility]  7/12/2023  3:15 PM    Moisés Field MD     [facility]               Scot Ruiz

## 2022-07-20 ENCOUNTER — PATIENT MESSAGE (OUTPATIENT)
Dept: FAMILY MEDICINE CLINIC | Age: 37
End: 2022-07-20

## 2022-07-20 ENCOUNTER — TELEPHONE (OUTPATIENT)
Dept: FAMILY MEDICINE CLINIC | Age: 37
End: 2022-07-20

## 2022-07-20 DIAGNOSIS — M54.50 BILATERAL LOW BACK PAIN WITHOUT SCIATICA, UNSPECIFIED CHRONICITY: Primary | ICD-10-CM

## 2022-07-20 DIAGNOSIS — B37.9 YEAST INFECTION: Primary | ICD-10-CM

## 2022-07-20 RX ORDER — BACLOFEN 10 MG/1
10 TABLET ORAL NIGHTLY PRN
Qty: 30 TABLET | Refills: 0 | Status: SHIPPED | OUTPATIENT
Start: 2022-07-20 | End: 2022-09-07 | Stop reason: ALTCHOICE

## 2022-07-20 NOTE — TELEPHONE ENCOUNTER
Patient called stating that she had a back injury yesterday night when going to  a heavy box, she has now been having severe back pain rates pain a 8, also she will like a referral for a specialist in regard to back pain. She stated pain is unbearable makes it hard to walk, stand or pull. Please advise.

## 2022-07-20 NOTE — TELEPHONE ENCOUNTER
Please let the patient know to  prescription from the pharmacy. If worsening symptoms in few days or not getting better as expected needs to let us know and make appointment. Orders Placed This Encounter   Medications    baclofen (LIORESAL) 10 MG tablet     Sig: Take 1 tablet by mouth nightly as needed (MUSCLE SPASMS) Causes sedation, do not drive while taking this medication     Dispense:  30 tablet     Refill:  [facility] [address] [place] [address]  [address]  Phone: [phone] Fax: [phone]      Orders Placed This Encounter   Procedures    [facility] Pain Management     Referral Priority:   Routine     Referral Type:   Eval and Treat     Referral Reason:   Specialty Services Required     Referred to Provider:   Nikko Calix MD     Requested Specialty:   Pain Management     Number of Visits Requested:   1       Future Appointments   Date Time Provider Irineo Rueda   9/7/2022  1:00 PM EL Carr - [facility] Neuro Spec [facility]   12/7/2022  9:30 AM Sanjuanita De León MD [facility]   7/12/2023  3:15 PM Sanjuanita De León MD [facility]       Thank you!

## 2022-07-20 NOTE — TELEPHONE ENCOUNTER
From: Yvon Bruner  To: Dr. Omalley [employer] [place] [employer] [place]: 7/20/2022 1:33 PM EDT  Subject: Yeast    Yeast infection did not go away with three day treatment? Is there something more aggressive I can try?  Tried call on hold 40 mins

## 2022-08-28 DIAGNOSIS — G43.109 MIGRAINE WITH AURA AND WITHOUT STATUS MIGRAINOSUS, NOT INTRACTABLE: ICD-10-CM

## 2022-08-28 DIAGNOSIS — E55.9 VITAMIN D DEFICIENCY: ICD-10-CM

## 2022-08-29 RX ORDER — BUTALBITAL, ACETAMINOPHEN AND CAFFEINE 50; 325; 40 MG/1; MG/1; MG/1
TABLET ORAL
Qty: 30 TABLET | Refills: 0 | Status: SHIPPED | OUTPATIENT
Start: 2022-08-29

## 2022-08-29 RX ORDER — CHOLECALCIFEROL (VITAMIN D3) 50 MCG
2000 TABLET ORAL DAILY
Qty: 30 TABLET | Refills: 3 | Status: SHIPPED | OUTPATIENT
Start: 2022-08-29

## 2022-08-29 RX ORDER — ERGOCALCIFEROL 1.25 MG/1
50000 CAPSULE ORAL WEEKLY
Qty: 4 CAPSULE | Refills: 0 | OUTPATIENT
Start: 2022-08-29

## 2022-08-29 NOTE — TELEPHONE ENCOUNTER
Lab Results   Component Value Date    VITD25 34.9 07/08/2022      Vitamin D high dosage can be stopped, can take vitamin D 2000 units from over-the-counter daily

## 2022-08-29 NOTE — TELEPHONE ENCOUNTER
Please Approve or Refuse.   Send to Pharmacy per Pt's Request:      Next Visit Date:  12/7/2022   Last Visit Date: 7/8/2022    Hemoglobin A1C (%)   Date Value   01/16/2019 4.6             ( goal A1C is < 7)   BP Readings from Last 3 Encounters:   07/08/22 120/84   04/21/22 (!) 112/58   04/21/22 91/64          (goal 120/80)  BUN   Date Value Ref Range Status   07/08/2022 12 mg/dL Final     Creatinine   Date Value Ref Range Status   07/08/2022 0.86  Final     Potassium   Date Value Ref Range Status   07/08/2022 3.7 mmol/L Final

## 2022-09-07 ENCOUNTER — TELEMEDICINE (OUTPATIENT)
Dept: NEUROLOGY | Age: 37
End: 2022-09-07
Payer: MEDICARE

## 2022-09-07 DIAGNOSIS — H81.13 BENIGN PAROXYSMAL POSITIONAL VERTIGO DUE TO BILATERAL VESTIBULAR DISORDER: ICD-10-CM

## 2022-09-07 DIAGNOSIS — G43.109 MIGRAINE WITH AURA AND WITHOUT STATUS MIGRAINOSUS, NOT INTRACTABLE: Primary | ICD-10-CM

## 2022-09-07 PROCEDURE — 99214 OFFICE O/P EST MOD 30 MIN: CPT | Performed by: NURSE PRACTITIONER

## 2022-09-07 PROCEDURE — G8427 DOCREV CUR MEDS BY ELIG CLIN: HCPCS | Performed by: NURSE PRACTITIONER

## 2022-09-07 RX ORDER — MECLIZINE HCL 12.5 MG/1
12.5 TABLET ORAL 3 TIMES DAILY PRN
Qty: 15 TABLET | Refills: 2 | Status: SHIPPED | OUTPATIENT
Start: 2022-09-07 | End: 2022-09-17

## 2022-09-07 NOTE — PROGRESS NOTES
[facility]            [address]          [place], [address] [facility]          Dept: [phone]          Dept Fax: [phone]    MD Jacinta Rico MD   [facility], MD Calin Michele, CNP    TELEHEALTH VISIT         9/7/2022      HISTORY OF PRESENT ILLNESS:       I had the pleasure of seeing Booker Mixon, who returns for continuing neurologic care. The patient was seen last on March 3, 2022 for treatment of migraine headaches. For prophylaxis of her migraine headaches she was prescribed ajovy 225 mg injections every 30 days, magnesium 400 mg daily and riboflavin 100 mg daily. For abortive therapy she was prescribed fioricet. She is here today reporting she has received ajovy and notes that she is currently having 1-2 headaches/month. She has not used maxalt for abortive therapy of her headaches as she typically uses fioricet which is effective. Headache location: right temporal region  Headache quality: dull, sharp, throbbing  Associated factors:  nausea, vomiting, Photophobia, Phonophobia, osmophobia  Intensity: 3-4/10 on Ajovy, 10/10 without Ajovy  Headache chronicity: 6 years  Headache frequency: 1-2/month with Ajovy  Aggravating factors : bright lights, physical activity, loud sounds  Relieving factors: rest  Prophylactic medications: Magnesium, Riboflavin  Abortive medications: Fioicet  Previously used medications: none    She has also been having vertigo present with movements of her head. She has previously attended vestibular rehabilitation for several months which provided her with no benefit. Testing reviewed:    MRI Brain (1/24/2019)  Impression   Normal brain MRI.          PAST MEDICAL HISTORY:         Diagnosis Date    Acute appendicitis 1993    Acute constipation 2021    Allergic rhinitis     Anxiety     Chest pain     with anxiety    Cholecystitis 04/21/2022    Pathology report showed significant chronic cholecystitis, had significant omental adhesions to the gallbladder per surgical report    Cholelithiasis 2021    Chronic kidney disease     CKD (chronic kidney disease) stage 1, GFR 90 ml/min or greater 07/10/2018    COVID 01/2022    Depression     Dysfunction of both eustachian tubes 8/23/2021    Eustachian tube disorder, bilateral 10/27/2020    Generalized abdominal pain 5/19/2021    Hashimoto's thyroiditis     Headache(784.0)     Heart murmur     Hematuria 2016    Hypothyroidism     Irritable bowel syndrome with diarrhea 11/17/2016    Midline low back pain without sciatica 11/17/2016    Obesity (BMI 30-39.9) 08/26/2017    Orthostatic hypotension     Panic attack as reaction to stress     certain anxiety medications will cause a panic attack    Panic disorder with agoraphobia 08/26/2017    some medications for anxiety will cause a panic attack.     Pruritic dermatitis 1/5/2021    Severe episode of recurrent major depressive disorder, without psychotic features ([facility] [address] 5/13/2018    Vertigo 10/2018    hx of    Worsening headaches 2/21/2021        PAST SURGICAL HISTORY:         Procedure Laterality Date    APPENDECTOMY      at 5 yo, perforated and was gangrenous    CHOLECYSTECTOMY  04/21/2022    laparoscopic;robotic    CHOLECYSTECTOMY, LAPAROSCOPIC N/A 04/21/2022    CHOLECYSTECTOMY LAPAROSCOPIC ROBOTIC performed by Keerthi Parnell DO at [address] [place] [address]  09/29/2021    COLONOSCOPY DIAGNOSTIC (N/A)    COLONOSCOPY N/A 09/29/2021    COLONOSCOPY WITH BIOPSY performed by Antonia Mayer MD at [facility] N/A 05/02/2019    LAPAROSCOPY OPERATIVE, LYSIS OF ADHESIONS performed by Sarahy Fiore MD at [place] [address]. N/A 09/29/2021     EGD BIOPSY (N/A )     UPPER GASTROINTESTINAL ENDOSCOPY N/A 09/29/2021    EGD BIOPSY performed by Antonia Mayer MD at [address] [place] [address] Drive:     Social History     Socioeconomic History    Marital status: Single     Spouse name: Not on file    Number of children: Not on file    Years of education: Not on file    Highest education level: Not on file   Occupational History    Not on file   Tobacco Use    Smoking status: Former     Packs/day: 0.25     Years: 4.00     Pack years: 1.00     Types: Cigarettes     Start date: 2001     Quit date: 2005     Years since quittin.2    Smokeless tobacco: Never    Tobacco comments:     Young and smoked with with friends   Vaping Use    Vaping Use: Never used   Substance and Sexual Activity    Alcohol use: Not Currently     Comment: Hx alcohol abuse as teenager    Drug use: Not Currently     Types: Marijuana [place])     Comment: When teen    Sexual activity: Yes     Partners: Male     Birth control/protection: Condom   Other Topics Concern    Not on file   Social History Narrative    Not on file     Social Determinants of Health     Financial Resource Strain: Low Risk     Difficulty of Paying Living Expenses: Not hard at all   Food Insecurity: No Food Insecurity    Worried About Running Out of Food in the Last Year: Never true    [address] in the Last Year: Never true   Transportation Needs: No Transportation Needs    Lack of Transportation (Medical): No    Lack of Transportation (Non-Medical):  No   Physical Activity: Not on file   Stress: Not on file   Social Connections: Not on file   Intimate Partner Violence: Not on file   Housing Stability: Unknown    Unable to Pay for Housing in the Last Year: No    Number of Places Lived in the Last Year: Not on file    Unstable Housing in the Last Year: No       CURRENT MEDICATIONS:     Current Outpatient Medications   Medication Sig Dispense Refill    meclizine (ANTIVERT) 12.5 MG tablet Take 1 tablet by mouth 3 times daily as needed for Dizziness 15 tablet 2    Ascorbic Acid (VITAMIN C PO) Take 1 tablet by mouth daily      butalbital-acetaminophen-caffeine (FIORICET, ESGIC) -40 MG per tablet TAKE 1 TABLET BY MOUTH EVERY 6 HOURS AS NEEDED FOR HEADACHE OR MIGRAINE. DO NOT EXCEED FOR 3 DAYS PER WEEK 30 tablet 0    vitamin D (CHOLECALCIFEROL) 50 MCG (2000 UT) TABS tablet Take 1 tablet by mouth daily Dose decreased 8/29/2022 30 tablet 3    Cranberry-Vitamin C-Probiotic (AZO CRANBERRY) 250-30 MG TABS Take 1 tablet by mouth once daily 30 tablet 5    vitamin B-2 (RIBOFLAVIN) 100 MG TABS tablet Take 1 tablet by mouth once daily 30 tablet 5    Fremanezumab-vfrm (AJOVY) 225 MG/1.5ML SOAJ Inject 225 mg into the skin every 30 days 1 pen 5    Misc Natural Products (CRANBERRY/PROBIOTIC) TABS Take 1 tablet by mouth daily Please dispense according to patients insurance. 90 tablet 3    EPINEPHrine (EPIPEN) 0.3 MG/0.3ML SOAJ injection       magnesium oxide (MAG-OX) 400 (241.3 Mg) MG TABS tablet TAKE 1 TABLET BY MOUTH IN THE EVENING WITH FOOD      camphor-menthol-methyl salicylate (BENGAY ULTRA STRENGTH) 4-10-30 % CREA cream Apply topically 3 times daily as needed for Pain (Patient not taking: Reported on 9/6/2022) 113 g 0    lidocaine (LMX) 4 % cream 5 g topical 2-3 times a day as needed for pain, maximum 20 g/day (Patient not taking: Reported on 9/6/2022) 120 g 3    clotrimazole (CLOTRIMAZOLE 3) 2 % CREA vaginal cream Place 1 applicator vaginally every evening ** taken before , no reaction** (Patient not taking: Reported on 9/6/2022) 21 g 1    loratadine (CLARITIN) 10 MG tablet Take 1 tablet by mouth daily (Patient not taking: Reported on 9/6/2022) 90 tablet 1    rizatriptan (MAXALT) 10 MG tablet Take 1 tablet by mouth once as needed for Migraine May repeat in 2 hours if needed (Patient not taking: Reported on 9/6/2022) 12 tablet 5     No current facility-administered medications for this visit.         ALLERGIES:     Allergies   Allergen Reactions    Diflucan [Fluconazole] Rash     Throat itches    Flagyl [Metronidazole] Hives    Penicillins      When a child                                 REVIEW OF SYSTEMS        All items selected indicate a positive finding. Those items not selected are negative.   Constitutional [] Weight loss/gain   [] Fatigue  [] Fever/Chills   HEENT [] Hearing Loss  [] Visual Disturbance  [] Tinnitus  [] Eye pain   Respiratory [] Shortness of Breath  [] Cough  [] Snoring   Cardiovascular [] Chest Pain  [] Palpitations  [] Lightheaded   GI [] Constipation  [] Diarrhea  [] Swallowing change  [x] Nausea/vomiting    [] Urinary Frequency  [] Urinary Urgency   Musculoskeletal [] Neck pain  [] Back pain  [] Muscle pain  [] Restless legs   Dermatologic [] Skin changes   Neurologic [] Memory loss/confusion  [] Seizures  [] Trouble walking or imbalance  [x] Dizziness  [] Sleep disturbance  [] Weakness  [] Numbness  [] Tremors  [] Speech Difficulty  [x] Headaches  [x] Light Sensitivity  [x] Sound Sensitivity   Endocrinology []Excessive thirst  []Excessive hunger   Psychiatric [] Anxiety/Depression  [] Hallucination   Allergy/immunology []Hives/environmental allergies   Hematologic/lymph [] Abnormal bleeding  [] Abnormal bruising         PHYSICAL EXAMINATION:                                            .                                                                                                    General Appearance:  Alert, cooperative, no signs of distress, appears stated age   Head:  Normocephalic, no signs of trauma   Eyes:  Conjunctiva/corneas clear;  eyelids intact   Ears:  Normal external ear and canals   Nose: Nares normal, mucosa normal, no drainage    Throat: Lips and tongue normal; teeth normal;  gums normal   Neck: Supple, intact flexion, extension and rotation;   trachea midline;  no adenopathy;   thyroid: not enlarged;   no carotid pulse abnormality   Back:   Symmetric, no curvature, ROM adequate   Lungs:   Respirations unlabored   Heart:  Regular rate and rhythm           Extremities: Extremities normal, no cyanosis, no edema   Pulses: Symmetric over head and neck   Skin: Skin color, texture normal, no rashes, no lesions                                     NEUROLOGIC EXAMINATION    Neurologic Exam  Mental status    Alert and oriented x 3; intact memory with no confusion, speech or language problems; no hallucinations or delusions  Fund of information appropriate for level of education    Cranial nerves    II - visual fields intact to confrontation bilaterally  III, IV, VI - extra-ocular muscles full: no pupillary defect; no RADHIKA, no nystagmus, no ptosis   V - normal facial sensation                                                               VII - normal facial symmetry                                                             VIII - intact hearing                                                                             IX, X - symmetrical palate                                                                  XI - symmetrical shoulder shrug                                                       XII - tongue midline without atrophy or fasciculation      Motor function  Normal muscle bulk and tone; strength 5/5 on all 4 extremities, no pronator drift      Sensory function Intact to light touch, pinprick, vibration, proprioception on all 4 extremities      Cerebellar Intact fine motor movement. No involuntary movements or tremors. No ataxia or dysmetria on finger to nose or heel to shin testing      Reflex function DTR 2+ on bilateral UE and LE, symmetric. Down going toes bilaterally      Gait                   normal base and arm swing                  Medical Decision Making: This is a telehealth visit that was performed with the originating site at Patient Location: home and Provider Location of [place], [place]. Verbal consent to participate in video visit was obtained.  Pursuant to the emergency declaration under the [facility] waiver authority and the [employer] and [employer] General Act, this Virtual Visit was conducted, with patient's consent, to reduce the patient's risk of exposure to COVID-19 and provide continuity of care for an established/new patient. Services were provided through a video synchronous discussion virtually to substitute for in-person clinic visit. I discussed with the patient the nature of our telehealth visits via interactive/real-time audio/video that:  - I would evaluate the patient and recommend diagnostics and treatments based on my assessment  - Our sessions are not being recorded and that personal health information is protected  - Our team would provide follow up care in person if/when the patient needs it. In summary, your patient, Toby Asher exhibits the following, with associated plan:    Episodic migraine without aura, the patient is currently getting 4 migraines a month  Continue Ajovy 225 mg monthly injections  Continue Magnesium 400 mg daily  Continue Riboflavin 100 mg daily  Continue Maxalt 10 mg for abortive therapy, recommended she use this instead of fioricet at the onset of a headache   Continue Fioricet for rescue   Chronic benign positional vertigo, she has previously attended vestibular rehabilitation for several months without benefit  Recommended she perform home Epley maneuvers as she has previously stopped attending vestibular rehab as she felt uncomfortable preforming the maneuvers in person  Start meclizine 12.5 mg three times daily as needed   Return for follow up visit in 6 months             Signed: Viktoria Ordaz CNP      *Please note that portions of this note were completed with a voice recognition program.  Although every effort was made to insure the accuracy of this automated transcription, some errors in transcription may have occurred, occasionally words and are mis-transcribed    Provider Attestation: The documentation recorded by the scribe accurately reflects the service I personally performed and the decisions made by myself. Portions of this note were transcribed by a scribe.  I personally performed the history, physical exam, and medical decision-making and confirm the accuracy of the information in the transcribed note. Scribe Attestation:   By signing my name below, Emil Gbison, attest that this documentation has been prepared under the direction and in the presence of Lance Handley CNP.

## 2022-11-04 NOTE — TELEPHONE ENCOUNTER
Pharmacy requesting refill of Ajovy.       Medication active on med list yes      Date of last fill: 3/3/22  verified on 11/4/2022   verified by [facility] LPN      Date of last appointment 9/7/22    Next Visit Date:  Visit date not found

## 2022-11-07 ENCOUNTER — E-VISIT (OUTPATIENT)
Dept: FAMILY MEDICINE CLINIC | Age: 37
End: 2022-11-07
Payer: MEDICARE

## 2022-11-07 DIAGNOSIS — L91.0 CHELOID SCAR: Primary | ICD-10-CM

## 2022-11-07 PROCEDURE — 99422 OL DIG E/M SVC 11-20 MIN: CPT | Performed by: FAMILY MEDICINE

## 2022-11-07 RX ORDER — FREMANEZUMAB-VFRM 225 MG/1.5ML
225 INJECTION SUBCUTANEOUS
Qty: 1.5 ML | Refills: 0 | Status: SHIPPED | OUTPATIENT
Start: 2022-11-07 | End: 2022-12-07

## 2022-11-08 NOTE — PROGRESS NOTES
Maricel Barajas (1985) initiated an asynchronous digital communication through [address] [place] [address]. HPI: per image, it seems that she has a raised reddish scar on the abdominal wall, about 3 cm, raised since April. EXAM: not applicable    Diagnoses and all orders for this visit:    1. Cheloid scar  Will give trial of hydrocortisone 2.5 % ointment; Apply topically 2 times daily for scar for 10 days. Do not use in the skin folds, neck or face  Dispense: 20 g; Refill: 0    If not improving, she might benefit from steroid injections given by dermatologist     No orders of the defined types were placed in this encounter. Patient was advised to contact PCP if symptoms worsen or failing to change as expected        11-20 minutes were spent on the digital evaluation and management of this patient.   Electronically signed by Dorothea Truong MD on 11/7/22 at  8:03 PM

## 2022-11-09 ENCOUNTER — TELEPHONE (OUTPATIENT)
Dept: FAMILY MEDICINE CLINIC | Age: 37
End: 2022-11-09

## 2022-11-22 DIAGNOSIS — J30.89 SEASONAL ALLERGIC RHINITIS DUE TO OTHER ALLERGIC TRIGGER: ICD-10-CM

## 2022-11-22 RX ORDER — FEXOFENADINE HCL 180 MG/1
TABLET ORAL
Qty: 90 TABLET | Refills: 3 | Status: SHIPPED | OUTPATIENT
Start: 2022-11-22

## 2022-12-01 RX ORDER — FREMANEZUMAB-VFRM 225 MG/1.5ML
225 INJECTION SUBCUTANEOUS
Qty: 2 ML | Refills: 0 | Status: CANCELLED | OUTPATIENT
Start: 2022-12-01

## 2022-12-01 NOTE — TELEPHONE ENCOUNTER
Pharmacy requesting refill of Ajovy 225 mg.      Medication active on med list yes      Date of last Rx: 11/7/2022 with 0 refills          verified by SB, RMA      Date of last appointment 9/7/2022    Next Visit Date:  To return in 6 months (March 2023)

## 2022-12-02 RX ORDER — FREMANEZUMAB-VFRM 225 MG/1.5ML
INJECTION SUBCUTANEOUS
Qty: 1.5 ML | Refills: 3 | Status: SHIPPED | OUTPATIENT
Start: 2022-12-02

## 2022-12-14 NOTE — PROGRESS NOTES
Preoperative Instructions:    Stop eating solid foods at midnight the night prior to your surgery. Stop drinking clear liquids at midnight the night prior to your surgery. Arrive at the surgery center (3rd entrance) on ______[id number]_________ by ___1200____________. Please stop any blood thinning medications as directed by your surgeon or prescribing physician. Failure to stop certain medications may interfere with your scheduled surgery. These may include: Aspirin, Coumadin, Plavix, NSAIDS (Motrin, Aleve, Advil, Mobic, Celebrex), Eliquis, Pradaxa, Xarelto, Fish oil, and herbal supplements. You may continue the rest of your medications through the night before surgery unless instructed otherwise. Day of surgery please take only the following medication(s) with a small sip of water:None ( Thyroid meds if taking them)       Please shower with antibacterial or Hibiclens soap (preferred) the night before and the morning of surgery. Reminders:  -If you are going home the day of your procedure, you will need a family member or friend to stay during the procedure and drive you home after your procedure. Your  must be 25years of age or older and able to sign off on your discharge instructions.    -If you are going home the same day of your surgery, someone must remain with you for the first 24 hours after your surgery if you receive sedation or anesthesia.      -Please do not wear any jewelery, lotions, contacts  or body piercing the day of surgery Tetracycline Pregnancy And Lactation Text: This medication is Pregnancy Category D and not consider safe during pregnancy. It is also excreted in breast milk. Erythromycin Counseling:  I discussed with the patient the risks of erythromycin including but not limited to GI upset, allergic reaction, drug rash, diarrhea, increase in liver enzymes, and yeast infections. Tazorac Counseling:  Patient advised that medication is irritating and drying.  Patient may need to apply sparingly and wash off after an hour before eventually leaving it on overnight.  The patient verbalized understanding of the proper use and possible adverse effects of tazorac.  All of the patient's questions and concerns were addressed. Azithromycin Pregnancy And Lactation Text: This medication is considered safe during pregnancy and is also secreted in breast milk. Topical Retinoid counseling:  Patient advised to apply a pea-sized amount only at bedtime and wait 30 minutes after washing their face before applying.  If too drying, patient may add a non-comedogenic moisturizer. The patient verbalized understanding of the proper use and possible adverse effects of retinoids.  All of the patient's questions and concerns were addressed. Spironolactone Pregnancy And Lactation Text: This medication can cause feminization of the male fetus and should be avoided during pregnancy. The active metabolite is also found in breast milk. Topical Sulfur Applications Counseling: Topical Sulfur Counseling: Patient counseled that this medication may cause skin irritation or allergic reactions.  In the event of skin irritation, the patient was advised to reduce the amount of the drug applied or use it less frequently.   The patient verbalized understanding of the proper use and possible adverse effects of topical sulfur application.  All of the patient's questions and concerns were addressed. High Dose Vitamin A Counseling: Side effects reviewed, pt to contact office should one occur. Bactrim Pregnancy And Lactation Text: This medication is Pregnancy Category D and is known to cause fetal risk.  It is also excreted in breast milk. Isotretinoin Counseling: Patient should get monthly blood tests, not donate blood, not drive at night if vision affected, not share medication, and not undergo elective surgery for 6 months after tx completed. Side effects reviewed, pt to contact office should one occur. Topical Clindamycin Counseling: Patient counseled that this medication may cause skin irritation or allergic reactions.  In the event of skin irritation, the patient was advised to reduce the amount of the drug applied or use it less frequently.   The patient verbalized understanding of the proper use and possible adverse effects of clindamycin.  All of the patient's questions and concerns were addressed. Topical Sulfur Applications Pregnancy And Lactation Text: This medication is Pregnancy Category C and has an unknown safety profile during pregnancy. It is unknown if this topical medication is excreted in breast milk. Dapsone Counseling: I discussed with the patient the risks of dapsone including but not limited to hemolytic anemia, agranulocytosis, rashes, methemoglobinemia, kidney failure, peripheral neuropathy, headaches, GI upset, and liver toxicity.  Patients who start dapsone require monitoring including baseline LFTs and weekly CBCs for the first month, then every month thereafter.  The patient verbalized understanding of the proper use and possible adverse effects of dapsone.  All of the patient's questions and concerns were addressed. High Dose Vitamin A Pregnancy And Lactation Text: High dose vitamin A therapy is contraindicated during pregnancy and breast feeding. Azelaic Acid Counseling: Patient counseled that medicine may cause skin irritation and to avoid applying near the eyes.  In the event of skin irritation, the patient was advised to reduce the amount of the drug applied or use it less frequently.   The patient verbalized understanding of the proper use and possible adverse effects of azelaic acid.  All of the patient's questions and concerns were addressed. Use Enhanced Medication Counseling?: No Benzoyl Peroxide Counseling: Patient counseled that medicine may cause skin irritation and bleach clothing.  In the event of skin irritation, the patient was advised to reduce the amount of the drug applied or use it less frequently.   The patient verbalized understanding of the proper use and possible adverse effects of benzoyl peroxide.  All of the patient's questions and concerns were addressed. Winlevi Pregnancy And Lactation Text: This medication is considered safe during pregnancy and breastfeeding. Doxycycline Counseling:  Patient counseled regarding possible photosensitivity and increased risk for sunburn.  Patient instructed to avoid sunlight, if possible.  When exposed to sunlight, patients should wear protective clothing, sunglasses, and sunscreen.  The patient was instructed to call the office immediately if the following severe adverse effects occur:  hearing changes, easy bruising/bleeding, severe headache, or vision changes.  The patient verbalized understanding of the proper use and possible adverse effects of doxycycline.  All of the patient's questions and concerns were addressed. Azithromycin Counseling:  I discussed with the patient the risks of azithromycin including but not limited to GI upset, allergic reaction, drug rash, diarrhea, and yeast infections. Tetracycline Counseling: Patient counseled regarding possible photosensitivity and increased risk for sunburn.  Patient instructed to avoid sunlight, if possible.  When exposed to sunlight, patients should wear protective clothing, sunglasses, and sunscreen.  The patient was instructed to call the office immediately if the following severe adverse effects occur:  hearing changes, easy bruising/bleeding, severe headache, or vision changes.  The patient verbalized understanding of the proper use and possible adverse effects of tetracycline.  All of the patient's questions and concerns were addressed. Patient understands to avoid pregnancy while on therapy due to potential birth defects. Topical Retinoid Pregnancy And Lactation Text: This medication is Pregnancy Category C. It is unknown if this medication is excreted in breast milk. Spironolactone Counseling: Patient advised regarding risks of diarrhea, abdominal pain, hyperkalemia, birth defects (for female patients), liver toxicity and renal toxicity. The patient may need blood work to monitor liver and kidney function and potassium levels while on therapy. The patient verbalized understanding of the proper use and possible adverse effects of spironolactone.  All of the patient's questions and concerns were addressed. Detail Level: Detailed Erythromycin Pregnancy And Lactation Text: This medication is Pregnancy Category B and is considered safe during pregnancy. It is also excreted in breast milk. Birth Control Pills Counseling: Birth Control Pill Counseling: I discussed with the patient the potential side effects of OCPs including but not limited to increased risk of stroke, heart attack, thrombophlebitis, deep venous thrombosis, hepatic adenomas, breast changes, GI upset, headaches, and depression.  The patient verbalized understanding of the proper use and possible adverse effects of OCPs. All of the patient's questions and concerns were addressed. Topical Clindamycin Pregnancy And Lactation Text: This medication is Pregnancy Category B and is considered safe during pregnancy. It is unknown if it is excreted in breast milk. Tazorac Pregnancy And Lactation Text: This medication is not safe during pregnancy. It is unknown if this medication is excreted in breast milk. Aklief counseling:  Patient advised to apply a pea-sized amount only at bedtime and wait 30 minutes after washing their face before applying.  If too drying, patient may add a non-comedogenic moisturizer.  The most commonly reported side effects including irritation, redness, scaling, dryness, stinging, burning, itching, and increased risk of sunburn.  The patient verbalized understanding of the proper use and possible adverse effects of retinoids.  All of the patient's questions and concerns were addressed. Bactrim Counseling:  I discussed with the patient the risks of sulfa antibiotics including but not limited to GI upset, allergic reaction, drug rash, diarrhea, dizziness, photosensitivity, and yeast infections.  Rarely, more serious reactions can occur including but not limited to aplastic anemia, agranulocytosis, methemoglobinemia, blood dyscrasias, liver or kidney failure, lung infiltrates or desquamative/blistering drug rashes. Isotretinoin Pregnancy And Lactation Text: This medication is Pregnancy Category X and is considered extremely dangerous during pregnancy. It is unknown if it is excreted in breast milk. Azelaic Acid Pregnancy And Lactation Text: This medication is considered safe during pregnancy and breast feeding. Sarecycline Counseling: Patient advised regarding possible photosensitivity and discoloration of the teeth, skin, lips, tongue and gums.  Patient instructed to avoid sunlight, if possible.  When exposed to sunlight, patients should wear protective clothing, sunglasses, and sunscreen.  The patient was instructed to call the office immediately if the following severe adverse effects occur:  hearing changes, easy bruising/bleeding, severe headache, or vision changes.  The patient verbalized understanding of the proper use and possible adverse effects of sarecycline.  All of the patient's questions and concerns were addressed. Birth Control Pills Pregnancy And Lactation Text: This medication should be avoided if pregnant and for the first 30 days post-partum. Minocycline Counseling: Patient advised regarding possible photosensitivity and discoloration of the teeth, skin, lips, tongue and gums.  Patient instructed to avoid sunlight, if possible.  When exposed to sunlight, patients should wear protective clothing, sunglasses, and sunscreen.  The patient was instructed to call the office immediately if the following severe adverse effects occur:  hearing changes, easy bruising/bleeding, severe headache, or vision changes.  The patient verbalized understanding of the proper use and possible adverse effects of minocycline.  All of the patient's questions and concerns were addressed. Winlevi Counseling:  I discussed with the patient the risks of topical clascoterone including but not limited to erythema, scaling, itching, and stinging. Patient voiced their understanding. Aklief Pregnancy And Lactation Text: It is unknown if this medication is safe to use during pregnancy.  It is unknown if this medication is excreted in breast milk.  Breastfeeding women should use the topical cream on the smallest area of the skin for the shortest time needed while breastfeeding.  Do not apply to nipple and areola. Doxycycline Pregnancy And Lactation Text: This medication is Pregnancy Category D and not consider safe during pregnancy. It is also excreted in breast milk but is considered safe for shorter treatment courses. Benzoyl Peroxide Pregnancy And Lactation Text: This medication is Pregnancy Category C. It is unknown if benzoyl peroxide is excreted in breast milk. Dapsone Pregnancy And Lactation Text: This medication is Pregnancy Category C and is not considered safe during pregnancy or breast feeding. Detail Level: Zone

## 2023-01-17 DIAGNOSIS — M54.50 CHRONIC BILATERAL LOW BACK PAIN WITHOUT SCIATICA: ICD-10-CM

## 2023-01-17 DIAGNOSIS — G89.29 CHRONIC BILATERAL LOW BACK PAIN WITHOUT SCIATICA: ICD-10-CM

## 2023-01-17 DIAGNOSIS — K58.0 IRRITABLE BOWEL SYNDROME WITH DIARRHEA: Primary | ICD-10-CM

## 2023-01-17 DIAGNOSIS — E55.9 VITAMIN D DEFICIENCY: ICD-10-CM

## 2023-01-17 RX ORDER — CHOLECALCIFEROL (VITAMIN D3) 50 MCG
2000 TABLET ORAL DAILY
Qty: 90 TABLET | Refills: 3 | Status: SHIPPED | OUTPATIENT
Start: 2023-01-17

## 2023-01-17 RX ORDER — LIDOCAINE 40 MG/G
CREAM TOPICAL
Qty: 120 G | Refills: 3 | Status: SHIPPED | OUTPATIENT
Start: 2023-01-17

## 2023-01-17 RX ORDER — CRANBERRY CONC/C/BACILL COAG 250-30-15
1 TABLET ORAL DAILY
Qty: 90 TABLET | Refills: 3 | Status: SHIPPED | OUTPATIENT
Start: 2023-01-17

## 2023-01-17 NOTE — TELEPHONE ENCOUNTER
Pt is also requesting a new prescription for  magnesium oxide (MAG-OX) 400 (241.3 Mg) MG TABS tablet. Please Approve or Refuse.   Send to Pharmacy per Pt's Request: Georgia Acevedo ([facility])      Next Visit Date:  2/16/2023   Last Visit Date: 11/7/2022    Hemoglobin A1C (%)   Date Value   01/16/2019 4.6             ( goal A1C is < 7)   BP Readings from Last 3 Encounters:   07/08/22 120/84   04/21/22 (!) 112/58   04/21/22 91/64          (goal 120/80)  BUN   Date Value Ref Range Status   07/08/2022 12 mg/dL Final     Creatinine   Date Value Ref Range Status   07/08/2022 0.86  Final     Potassium   Date Value Ref Range Status   07/08/2022 3.7 mmol/L Final

## 2023-02-14 ENCOUNTER — HOSPITAL ENCOUNTER (OUTPATIENT)
Age: 38
Setting detail: SPECIMEN
Discharge: HOME | End: 2023-02-14

## 2023-02-14 ENCOUNTER — OFFICE VISIT (OUTPATIENT)
Dept: OBGYN CLINIC | Age: 38
End: 2023-02-14
Payer: MEDICAID

## 2023-02-14 VITALS
SYSTOLIC BLOOD PRESSURE: 126 MMHG | WEIGHT: 201 LBS | HEART RATE: 94 BPM | DIASTOLIC BLOOD PRESSURE: 78 MMHG | HEIGHT: 64 IN | BODY MASS INDEX: 34.31 KG/M2

## 2023-02-14 DIAGNOSIS — N76.0 ACUTE VAGINITIS: ICD-10-CM

## 2023-02-14 DIAGNOSIS — N94.6 DYSMENORRHEA: ICD-10-CM

## 2023-02-14 DIAGNOSIS — Z11.51 SCREENING FOR HPV (HUMAN PAPILLOMAVIRUS): ICD-10-CM

## 2023-02-14 DIAGNOSIS — N92.0 MENORRHAGIA WITH REGULAR CYCLE: ICD-10-CM

## 2023-02-14 DIAGNOSIS — R10.2 PELVIC PAIN IN FEMALE: ICD-10-CM

## 2023-02-14 DIAGNOSIS — Z01.419 WELL WOMAN EXAM: Primary | ICD-10-CM

## 2023-02-14 DIAGNOSIS — R39.9 UTI SYMPTOMS: ICD-10-CM

## 2023-02-14 LAB
BILIRUBIN, POC: NORMAL
BLOOD URINE, POC: NORMAL
CLARITY, POC: CLEAR
COLOR, POC: YELLOW
GLUCOSE URINE, POC: NORMAL
KETONES, POC: NORMAL
LEUKOCYTE EST, POC: NORMAL
NITRITE, POC: NORMAL
PH, POC: 5
PROTEIN, POC: NORMAL
SPECIFIC GRAVITY, POC: 1.03
UROBILINOGEN, POC: NORMAL

## 2023-02-14 PROCEDURE — 99395 PREV VISIT EST AGE 18-39: CPT | Performed by: CLINICAL NURSE SPECIALIST

## 2023-02-14 PROCEDURE — 81003 URINALYSIS AUTO W/O SCOPE: CPT | Performed by: CLINICAL NURSE SPECIALIST

## 2023-02-14 RX ORDER — METRONIDAZOLE 7.5 MG/G
1 GEL VAGINAL NIGHTLY
Qty: 1 EACH | Refills: 0 | Status: SHIPPED | OUTPATIENT
Start: 2023-02-14 | End: 2023-02-16 | Stop reason: ALTCHOICE

## 2023-02-14 RX ORDER — GREEN TEA/HOODIA GORDONII 315-12.5MG
1 CAPSULE ORAL DAILY
Qty: 30 TABLET | Refills: 12 | Status: SHIPPED | OUTPATIENT
Start: 2023-02-14

## 2023-02-14 ASSESSMENT — PATIENT HEALTH QUESTIONNAIRE - PHQ9
SUM OF ALL RESPONSES TO PHQ QUESTIONS 1-9: 5
5. POOR APPETITE OR OVEREATING: 1
SUM OF ALL RESPONSES TO PHQ QUESTIONS 1-9: 5
6. FEELING BAD ABOUT YOURSELF - OR THAT YOU ARE A FAILURE OR HAVE LET YOURSELF OR YOUR FAMILY DOWN: 0
4. FEELING TIRED OR HAVING LITTLE ENERGY: 1
SUM OF ALL RESPONSES TO PHQ9 QUESTIONS 1 & 2: 1
10. IF YOU CHECKED OFF ANY PROBLEMS, HOW DIFFICULT HAVE THESE PROBLEMS MADE IT FOR YOU TO DO YOUR WORK, TAKE CARE OF THINGS AT HOME, OR GET ALONG WITH OTHER PEOPLE: 1
1. LITTLE INTEREST OR PLEASURE IN DOING THINGS: 0
SUM OF ALL RESPONSES TO PHQ QUESTIONS 1-9: 5
SUM OF ALL RESPONSES TO PHQ QUESTIONS 1-9: 5
9. THOUGHTS THAT YOU WOULD BE BETTER OFF DEAD, OR OF HURTING YOURSELF: 0
8. MOVING OR SPEAKING SO SLOWLY THAT OTHER PEOPLE COULD HAVE NOTICED. OR THE OPPOSITE, BEING SO FIGETY OR RESTLESS THAT YOU HAVE BEEN MOVING AROUND A LOT MORE THAN USUAL: 0
3. TROUBLE FALLING OR STAYING ASLEEP: 1
7. TROUBLE CONCENTRATING ON THINGS, SUCH AS READING THE NEWSPAPER OR WATCHING TELEVISION: 1
2. FEELING DOWN, DEPRESSED OR HOPELESS: 1

## 2023-02-14 NOTE — PROGRESS NOTES
[facility]  [address] [address]  Dept: [phone]        DATE OF VISIT:  23        History and Physical    Jaky Gomez    :  1985  CHIEF COMPLAINT:    Chief Complaint   Patient presents with    Annual Exam                    Jaky Gomez is a 44 y.o. female who presents for annual well woman exam.  Patient reports that she has recurrent BV symptoms. Patient reports that she has a lot of cramping and she does report odor after bathing. The patient was seen and examined. Per the patient bowels areregular. She has no voiding complaints. She denies any bloating as well as vaginal discharge. Chaperone for Intimate Exam  Chaperone was offered as part of the rooming process. Patient declined and agrees to continue with exam without a chaperone.   Chaperone: none     _____________________________________________________________________  Past Medical History:   Diagnosis Date    Acute appendicitis     Acute constipation     Allergic rhinitis     Anxiety     Chest pain     with anxiety    Cholecystitis 2022    Pathology report showed significant chronic cholecystitis, had significant omental adhesions to the gallbladder per surgical report    Cholelithiasis     Chronic kidney disease     CKD (chronic kidney disease) stage 1, GFR 90 ml/min or greater 07/10/2018    COVID 2022    Depression     Dysfunction of both eustachian tubes 2021    Eustachian tube disorder, bilateral 10/27/2020    Generalized abdominal pain 2021    Hashimoto's thyroiditis     Headache(784.0)     Heart murmur     Hematuria     Hypothyroidism     Irritable bowel syndrome with diarrhea 2016    Midline low back pain without sciatica 2016    Obesity (BMI 30-39.9) 2017    Orthostatic hypotension     Panic attack as reaction to stress     certain anxiety medications will cause a panic attack    Panic disorder with agoraphobia 2017    some medications for anxiety will cause a panic attack.     Pruritic dermatitis 2021    Severe episode of recurrent major depressive disorder, without psychotic features ([facility] [address] 2018    Vertigo 10/2018    hx of    Worsening headaches 2021                                                                   Past Surgical History:   Procedure Laterality Date    APPENDECTOMY      at 5 yo, perforated and was gangrenous    CHOLECYSTECTOMY  2022    laparoscopic;robotic    CHOLECYSTECTOMY, LAPAROSCOPIC N/A 2022    CHOLECYSTECTOMY LAPAROSCOPIC ROBOTIC performed by Katja Cruz DO at [address]  2021    COLONOSCOPY DIAGNOSTIC (N/A)    COLONOSCOPY N/A 2021    COLONOSCOPY WITH BIOPSY performed by Lazarus Sayers, MD at [facility] N/A 2019    LAPAROSCOPY OPERATIVE, LYSIS OF ADHESIONS performed by Pankaj Archibald MD at [address] N/A 2021     EGD BIOPSY (N/A )     UPPER GASTROINTESTINAL ENDOSCOPY N/A 2021    EGD BIOPSY performed by Lazarus Sayers, MD at [address] [facility] History   Problem Relation Age of Onset    Mental Illness Mother     No Known Problems Father     Breast Cancer Maternal Grandmother     Heart Attack Maternal Grandfather     Colon Cancer Neg Hx      Social History     Tobacco Use   Smoking Status Former    Packs/day: 0.25    Years: 4.00    Pack years: 1.00    Types: Cigarettes    Start date: 2001    Quit date: 2005    Years since quittin.7   Smokeless Tobacco Never   Tobacco Comments    Young and smoked with with friends     Social History     Substance and Sexual Activity   Alcohol Use Not Currently    Comment: Hx alcohol abuse as teenager     Current Outpatient Medications   Medication Sig Dispense Refill    metroNIDAZOLE (METROGEL VAGINAL) 0.75 % vaginal gel Place 1 Tube vaginally nightly Insert into vagina at bedtime for 5 nights. 1 each 0    Probiotic Acidophilus (FLORANEX) TABS Take 1 tablet by mouth daily 30 tablet 12    Cranberry-Vitamin C-Probiotic (AZO CRANBERRY) 250-30 MG TABS Take 1 tablet by mouth daily 90 tablet 3    vitamin D (CHOLECALCIFEROL) 50 MCG (2000 UT) TABS tablet Take 1 tablet by mouth daily Dose decreased 8/29/2022 90 tablet 3    lidocaine (LMX) 4 % cream 5 g topical 2-3 times a day as needed for pain, maximum 20 g/day 120 g 3    Fremanezumab-vfrm (AJOVY) 225 MG/1.5ML SOAJ INJECT 225MG SUB-Q EVERY 30 DAYS MUST MAKE APPOINTMENT 1.5 mL 3    fexofenadine (ALLEGRA) 180 MG tablet Take 1 tablet by mouth once daily 90 tablet 3    hydrocortisone 2.5 % ointment Apply topically 2 times daily for scar for 10 days. Do not use in the skin folds, neck or face 20 g 0    Ascorbic Acid (VITAMIN C PO) Take 1 tablet by mouth daily      butalbital-acetaminophen-caffeine (FIORICET, ESGIC) -40 MG per tablet TAKE 1 TABLET BY MOUTH EVERY 6 HOURS AS NEEDED FOR HEADACHE OR MIGRAINE. DO NOT EXCEED FOR 3 DAYS PER WEEK 30 tablet 0    vitamin B-2 (RIBOFLAVIN) 100 MG TABS tablet Take 1 tablet by mouth once daily 30 tablet 5    Misc Natural Products (CRANBERRY/PROBIOTIC) TABS Take 1 tablet by mouth daily Please dispense according to patients insurance. 90 tablet 3    EPINEPHrine (EPIPEN) 0.3 MG/0.3ML SOAJ injection       magnesium oxide (MAG-OX) 400 (241.3 Mg) MG TABS tablet TAKE 1 TABLET BY MOUTH IN THE EVENING WITH FOOD      loratadine (CLARITIN) 10 MG tablet Take 1 tablet by mouth daily (Patient not taking: Reported on 9/6/2022) 90 tablet 1    rizatriptan (MAXALT) 10 MG tablet Take 1 tablet by mouth once as needed for Migraine May repeat in 2 hours if needed (Patient not taking: Reported on 9/6/2022) 12 tablet 5     No current facility-administered medications for this visit. Allergies:   Allergies   Allergen Reactions    Diflucan [Fluconazole] Rash     Throat itches    Flagyl [Metronidazole] Hives Penicillins      When a child       Gynecologic History:  Patient's last menstrual period was 2023. Sexually Active: Yes  STD History:Yes chlamydia 2005  Birth Control: No    OB History    Para Term  AB Living   1 1 1 0 0 1   SAB IAB Ectopic Molar Multiple Live Births   0 0 0 0 0 0     ______________________________________________________________________    Review of Systems    REVIEW OFSYSTEMS:        Constitutional:  Unexpected weight change, extreme fatigue, night sweats              no  Skin:                           Rashes due to thyroid, moles   yes  Neurological:  Frequent headaches and is on meds for them, seizures         yes  Ophthalmic:  Recent visual changes more blurry and needs to make an eye appt. Last appt was 6-8 months ago yes  ENT:   Difficulty swallowing  no  Breast:              Masses, pain intermittently shooting pain , nipple discharge                            yes     Respiratory:  Shortness of breath, coughing Due to allergies          yes    Cardiovascular: Chest pain   no     Gastrointestinal: Chronic diarrhea/constipation goes between the two, nausea/vomiting           yes   Urogenital:  Urinary incontinence, frequency, urgency which has been ongoing for the past 5 yrs and follows urology          yes                                         Heavy on days 1-2 /irregular periods           yes                                      Vaginal discharge       white to yellow with odor            yes  Hematological: Bruises easy Denies clotting disorder  yes     Endocrine:  Hot flashes rarely  yes     Hot/Cold Intolerance  no    Psychological:            Mood and affect were within normal limits. yes                 Physical Exam    Physical Exam:    Vitals:    23 0829   BP: 126/78   Pulse: 94   Weight: 201 lb (91.2 kg)   Height: 5' 4\" (1.626 m)       General Appearance: This  is a well developed, well nourished, well groomed female.       Her BMI was reviewed. Nutritional decision making andexercise were discussed. Neurological:  The patient is alert and oriented to time,place, person, and situation    Skin:  A brief inspection of the skin revealed no rashes or lesions. Neck:  The neck was supple. Respiratory: There was unlabored respiratory effort. Lungs clear to ascultation. Cardiovascular: The patients extremities were without calf tenderness or edema. Heart with a regular rate and rhythm. Abdomen: The abdomen was soft and non-tender with no guarding, rebound or rigidity. No hernias were appreciated. Breast:   The patients breasts were symmetrical.  There were no masses, discharge or retractions noted. Self breast exams were reviewed. Pelvic Exam:  The external genitalia was with a normal appearance. The vaginal vault was normal. There were no cystocele, rectocele, or enterocele appreciated. There was scant thin white vaginal discharge. The cervix was without lesions. There was no cervical motion tenderness. The uterus was mobile, midline and regular. The adnexa no fullness, tenderness or masses appreciated. ASSESSMENT: Normal annual well woman exam with vaginitis    44 y.o. Female; Annual   Diagnosis Orders   1. Well woman exam  PAP SMEAR      2. Screening for HPV (human papillomavirus)  PAP SMEAR      3. Acute vaginitis  C.trachomatis N.gonorrhoeae DNA    Vaginitis DNA Probe    Culture, Ureaplasma/Mycoplasma hominis    metroNIDAZOLE (METROGEL VAGINAL) 0.75 % vaginal gel    Probiotic Acidophilus (FLORANEX) TABS      4. Pelvic pain in female        5. Dysmenorrhea        6. Menorrhagia with regular cycle        7. UTI symptoms  POCT Urinalysis No Micro (Auto)                      PLAN:  - Discussed new papsmear guidelines. - Birth control Discussed. - Smoking risk factors Discussed  - Diet and exercise reviewed.    - Routine healthmaintenance per patients PCP.  - Return to clinic in 1 year or earlier with questions, problems, concerns. Return for 1 year for Annual and as needed.         Electronically signed by EL Castellano CNP on 2/14/2023 at 9:22 AM

## 2023-02-15 DIAGNOSIS — N76.0 ACUTE VAGINITIS: ICD-10-CM

## 2023-02-15 LAB
C TRACH DNA SPEC QL PROBE+SIG AMP: NEGATIVE
CANDIDA SPECIES, DNA PROBE: NEGATIVE
GARDNERELLA VAGINALIS, DNA PROBE: NEGATIVE
N GONORRHOEA DNA SPEC QL PROBE+SIG AMP: NEGATIVE
SOURCE: NORMAL
SPECIMEN DESCRIPTION: NORMAL
TRICHOMONAS VAGINALIS DNA: NEGATIVE

## 2023-02-15 ASSESSMENT — PATIENT HEALTH QUESTIONNAIRE - PHQ9
SUM OF ALL RESPONSES TO PHQ QUESTIONS 1-9: 4
4. FEELING TIRED OR HAVING LITTLE ENERGY: 1
SUM OF ALL RESPONSES TO PHQ QUESTIONS 1-9: 4
SUM OF ALL RESPONSES TO PHQ QUESTIONS 1-9: 4
9. THOUGHTS THAT YOU WOULD BE BETTER OFF DEAD, OR OF HURTING YOURSELF: 0
SUM OF ALL RESPONSES TO PHQ QUESTIONS 1-9: 4
5. POOR APPETITE OR OVEREATING: 1
7. TROUBLE CONCENTRATING ON THINGS, SUCH AS READING THE NEWSPAPER OR WATCHING TELEVISION: 0
10. IF YOU CHECKED OFF ANY PROBLEMS, HOW DIFFICULT HAVE THESE PROBLEMS MADE IT FOR YOU TO DO YOUR WORK, TAKE CARE OF THINGS AT HOME, OR GET ALONG WITH OTHER PEOPLE: 1
1. LITTLE INTEREST OR PLEASURE IN DOING THINGS: 0
SUM OF ALL RESPONSES TO PHQ9 QUESTIONS 1 & 2: 1
6. FEELING BAD ABOUT YOURSELF - OR THAT YOU ARE A FAILURE OR HAVE LET YOURSELF OR YOUR FAMILY DOWN: 0
2. FEELING DOWN, DEPRESSED OR HOPELESS: 1
8. MOVING OR SPEAKING SO SLOWLY THAT OTHER PEOPLE COULD HAVE NOTICED. OR THE OPPOSITE, BEING SO FIGETY OR RESTLESS THAT YOU HAVE BEEN MOVING AROUND A LOT MORE THAN USUAL: 0
3. TROUBLE FALLING OR STAYING ASLEEP: 1

## 2023-02-15 NOTE — PROGRESS NOTES
Visit Information    Have you changed or started any medications since your last visit including any over-the-counter medicines, vitamins, or herbal medicines? no   Are you having any side effects from any of your medications? -  no  Have you stopped taking any of your medications? Is so, why? -  no    Have you seen any other physician or provider since your last visit? No  Have you had any other diagnostic tests since your last visit? No  Have you been seen in the emergency room and/or had an admission to a hospital since we last saw you? No  Have you had your routine dental cleaning in the past 6 months? yes - December 2022    Have you activated your [email or web address] account? If not, what are your barriers?  Yes     Patient Care Team:  Forest Arzate MD as PCP - General (Family Medicine)  [facility]ate [facility]  Rubi Birmingham MD as Consulting Physician (Obstetrics & Gynecology)  Brandt Sanchez MD as Consulting Physician (Urology)  Leotha Gaucher, MD as Consulting Physician (Nephrology)  Annabel Funez (Certified Nurse Practitioner)  Yasmeen Echevarria (Audiology)  Vicenta Ruff MD as Consulting Physician (Endocrinology)  Toya Valente MD as Consulting Physician (Oral Maxillofacial Surgery)  EL Marinelli CNP as Nurse Practitioner (Neurology)  Zeynep Ellis DO as Consulting Physician (Otolaryngology)  Carrol Little MD as Consulting Physician (Gastroenterology)  Zeynep Ellis DO as Consulting Physician (Otolaryngology)  Sonya Soriano DO as Surgeon (General Surgery)    Medical History Review  Past Medical, Family, and Social History reviewed and does contribute to the patient presenting condition    Health Maintenance   Topic Date Due    COVID-19 Vaccine (1) Never done    Flu vaccine (1) Never done    Depression Monitoring  02/14/2024    Cervical cancer screen  10/14/2025    DTaP/Tdap/Td vaccine (3 - Td or Tdap) 07/08/2032    Hepatitis C screen Completed    HIV screen  Completed    Hepatitis A vaccine  Aged Out    Hib vaccine  Aged Out    Meningococcal (ACWY) vaccine  Aged Out    Pneumococcal 0-64 years Vaccine  Aged Out    Varicella vaccine  Discontinued

## 2023-02-16 ENCOUNTER — TELEMEDICINE (OUTPATIENT)
Dept: FAMILY MEDICINE CLINIC | Age: 38
End: 2023-02-16
Payer: MEDICAID

## 2023-02-16 ENCOUNTER — HOSPITAL ENCOUNTER (OUTPATIENT)
Age: 38
Discharge: HOME | End: 2023-02-16
Payer: MEDICAID

## 2023-02-16 DIAGNOSIS — G89.29 CHRONIC RUQ PAIN: ICD-10-CM

## 2023-02-16 DIAGNOSIS — R53.82 CHRONIC FATIGUE: ICD-10-CM

## 2023-02-16 DIAGNOSIS — E06.3 HASHIMOTO'S THYROIDITIS: ICD-10-CM

## 2023-02-16 DIAGNOSIS — E78.5 HYPERLIPIDEMIA WITH TARGET LDL LESS THAN 100: ICD-10-CM

## 2023-02-16 DIAGNOSIS — E55.9 VITAMIN D DEFICIENCY: ICD-10-CM

## 2023-02-16 DIAGNOSIS — F33.0 MILD EPISODE OF RECURRENT MAJOR DEPRESSIVE DISORDER (HCC): ICD-10-CM

## 2023-02-16 DIAGNOSIS — H10.32 ACUTE BACTERIAL CONJUNCTIVITIS OF LEFT EYE: Primary | ICD-10-CM

## 2023-02-16 DIAGNOSIS — J30.2 SEASONAL ALLERGIC RHINITIS, UNSPECIFIED TRIGGER: ICD-10-CM

## 2023-02-16 DIAGNOSIS — R10.11 CHRONIC RUQ PAIN: ICD-10-CM

## 2023-02-16 DIAGNOSIS — F41.9 ANXIETY: ICD-10-CM

## 2023-02-16 LAB
25(OH)D3 SERPL-MCNC: 45.7 NG/ML
ABSOLUTE EOS #: 0.1 K/UL (ref 0–0.4)
ABSOLUTE LYMPH #: 1.6 K/UL (ref 1–4.8)
ABSOLUTE MONO #: 0.4 K/UL (ref 0.1–1.3)
ALBUMIN SERPL-MCNC: 4.5 G/DL (ref 3.5–5.2)
ALP SERPL-CCNC: 95 U/L (ref 35–104)
ALT SERPL-CCNC: 20 U/L (ref 5–33)
ANION GAP SERPL CALCULATED.3IONS-SCNC: 9 MMOL/L (ref 9–17)
AST SERPL-CCNC: 19 U/L
BASOPHILS # BLD: 1 % (ref 0–2)
BASOPHILS ABSOLUTE: 0 K/UL (ref 0–0.2)
BILIRUB SERPL-MCNC: 0.4 MG/DL (ref 0.3–1.2)
BUN SERPL-MCNC: 9 MG/DL (ref 6–20)
CALCIUM SERPL-MCNC: 8.7 MG/DL (ref 8.6–10.4)
CHLORIDE SERPL-SCNC: 103 MMOL/L (ref 98–107)
CHOLEST SERPL-MCNC: 176 MG/DL
CHOLESTEROL/HDL RATIO: 3.3
CO2 SERPL-SCNC: 28 MMOL/L (ref 20–31)
CREAT SERPL-MCNC: 0.73 MG/DL (ref 0.5–0.9)
EOSINOPHILS RELATIVE PERCENT: 1 % (ref 0–4)
GFR SERPL CREATININE-BSD FRML MDRD: >60 ML/MIN/1.73M2
GLUCOSE SERPL-MCNC: 87 MG/DL (ref 70–99)
HCT VFR BLD AUTO: 39.4 % (ref 36–46)
HDLC SERPL-MCNC: 54 MG/DL
HGB BLD-MCNC: 13.2 G/DL (ref 12–16)
HPV SAMPLE: NORMAL
HPV, GENOTYPE 16: NOT DETECTED
HPV, GENOTYPE 18: NOT DETECTED
HPV, HIGH RISK OTHER: NOT DETECTED
HPV, INTERPRETATION: NORMAL
LDLC SERPL CALC-MCNC: 113 MG/DL (ref 0–130)
LYMPHOCYTES # BLD: 25 % (ref 24–44)
MCH RBC QN AUTO: 30.7 PG (ref 26–34)
MCHC RBC AUTO-ENTMCNC: 33.4 G/DL (ref 31–37)
MCV RBC AUTO: 91.9 FL (ref 80–100)
MONOCYTES # BLD: 6 % (ref 1–7)
PDW BLD-RTO: 12.5 % (ref 11.5–14.9)
PLATELET # BLD AUTO: 274 K/UL (ref 150–450)
PMV BLD AUTO: 8.1 FL (ref 6–12)
POTASSIUM SERPL-SCNC: 3.8 MMOL/L (ref 3.7–5.3)
PROT SERPL-MCNC: 7.4 G/DL (ref 6.4–8.3)
RBC # BLD: 4.29 M/UL (ref 4–5.2)
SEG NEUTROPHILS: 67 % (ref 36–66)
SEGMENTED NEUTROPHILS ABSOLUTE COUNT: 4.5 K/UL (ref 1.3–9.1)
SODIUM SERPL-SCNC: 140 MMOL/L (ref 135–144)
SPECIMEN DESCRIPTION: NORMAL
TRIGL SERPL-MCNC: 47 MG/DL
TSH SERPL-ACNC: 4.01 UIU/ML (ref 0.3–5)
WBC # BLD AUTO: 6.7 K/UL (ref 3.5–11)

## 2023-02-16 PROCEDURE — 86005 ALLG SPEC IGE MULTIALLG SCR: CPT

## 2023-02-16 PROCEDURE — 99443 PR PHYS/QHP TELEPHONE EVALUATION 21-30 MIN: CPT | Performed by: FAMILY MEDICINE

## 2023-02-16 PROCEDURE — 80061 LIPID PANEL: CPT

## 2023-02-16 PROCEDURE — 86003 ALLG SPEC IGE CRUDE XTRC EA: CPT

## 2023-02-16 PROCEDURE — 36415 COLL VENOUS BLD VENIPUNCTURE: CPT

## 2023-02-16 PROCEDURE — 85025 COMPLETE CBC W/AUTO DIFF WBC: CPT

## 2023-02-16 PROCEDURE — 82306 VITAMIN D 25 HYDROXY: CPT

## 2023-02-16 PROCEDURE — 80053 COMPREHEN METABOLIC PANEL: CPT

## 2023-02-16 PROCEDURE — 84443 ASSAY THYROID STIM HORMONE: CPT

## 2023-02-16 RX ORDER — OFLOXACIN 3 MG/ML
1 SOLUTION/ DROPS OPHTHALMIC 4 TIMES DAILY
Qty: 5 ML | Refills: 0 | Status: SHIPPED | OUTPATIENT
Start: 2023-02-16 | End: 2023-02-26

## 2023-02-16 RX ORDER — DIPHENHYDRAMINE HCL 25 MG
25 TABLET ORAL NIGHTLY PRN
Qty: 30 TABLET | Refills: 3 | Status: SHIPPED | OUTPATIENT
Start: 2023-02-16

## 2023-02-16 RX ORDER — LANOLIN ALCOHOL/MO/W.PET/CERES
400 CREAM (GRAM) TOPICAL EVERY EVENING
Qty: 90 TABLET | Refills: 3 | Status: SHIPPED | OUTPATIENT
Start: 2023-02-16

## 2023-02-16 NOTE — PROGRESS NOTES
[facility] Phone  Send Link Via Text  No text has been sent  Last text sent02/16/2023 12:03 PM  [phone]  Email  Send Link Via Email  No email has been sent  Last email sent02/16/2023 12:03 PM  [email or web address]          Bette contacted provider via telephone encounter. Delphine Stewart is a 44 y.o. female, Established patient, evaluated via telephone on 2/16/2023 for Fatigue (Still blood in urine, thyroid check), Conjunctivitis (left), Abdominal Pain (RUQ), Allergies, Anxiety, and Hyperlipidemia  . Documentation:  I communicated with the patient and/or health care decision maker about the following A/P. Details of this discussion including any medical advice provided    ASSESSMENT/PLAN:    1. Acute bacterial conjunctivitis of left eye  Worsening  -     ofloxacin (OCUFLOX) 0.3 % solution; Place 1 drop into the left eye 4 times daily for 10 days, Disp-5 mL, R-0Normal  Call or return if symptoms persist or fail to improve. 2. Chronic fatigue  Failing to improve  Patient follows with endocrinologist for Hashimoto thyroiditis, and she is on not on thyroid supplementation  Last TSH within normal limits  Lab Results   Component Value Date    TSH 4.01 02/16/2023       Will do basic labs to rule out certain common medical conditions: hematologic, renal, hepatic, electrolyte imbalances, vitamin D deficiency  -     CBC with Auto Differential; Future  -     Comprehensive Metabolic Panel; Future  -     Vitamin D 25 Hydroxy; Future  3. Chronic RUQ pain  Persistent  We need to rule out liver disease and choledocholithiasis  Patient had cholecystectomy 4/21/2022, might have scar tissue and adhesions  We will do labs and ultrasound of the liver  -     US LIVER; Future  -     CBC with Auto Differential; Future  -     Comprehensive Metabolic Panel; Future  4.  Seasonal allergic rhinitis, unspecified trigger  Worsening  Could not tolerate multiple antihistaminics due to making her dry    -     Allergen, Inhalant, Environment Profile 10; Future  -   Trial of diphenhydrAMINE (BENADRYL) 25 MG tablet; Take 1 tablet by mouth nightly as needed for Allergies or Itching, Disp-30 tablet, R-3Normal  5. Anxiety  Worsening  -   Trial of diphenhydrAMINE (BENADRYL) 25 MG tablet; Take 1 tablet by mouth nightly as needed for Allergies or Itching, Disp-30 tablet, R-3Normal  -   restart  magnesium oxide (MAG-OX) 400 (240 Mg) MG tablet; Take 1 tablet by mouth every evening Take with food, Disp-90 tablet, R-3Normal  Advised counseling, and relaxation techniques   6. Vitamin D deficiency  Unsure if improving or not. Will recheck level  Continue supplementation.    -     Vitamin D 25 Hydroxy; Future  7. Hyperlipidemia with target LDL less than 100  Improving  Lab Results   Component Value Date    LDLCALC 132 07/08/2022    LDLCHOLESTEROL 113 02/16/2023     Recheck lipid panel, not on statin  Continue lifestyle changes low-carb, low-fat diet and exercise  -     Lipid Panel; Future    8. Mild episode of recurrent major depressive disorder ([facility] [address]  Stable  Will continue to monitor. Declines meds  Needs to restart counseling, patient advised      Bette received counseling on the following healthy behaviors: nutrition, exercise, medication adherence, and weight loss. Reviewed prior labs and health maintenance  Discussed use, benefit, and side effects of prescribed medications. Barriers to medication compliance addressed. Patient given educational materials - see patient instructions  Was a self-tracking handout given in paper form or via [employer]? No  All patient questions answered. Patient voiced understanding. The patient's past medical,surgical, social, and family history as well as her current medications and allergies were reviewed as documented in today's encounter. Medications, labs, diagnostic studies, consultations and follow-up as documented in this encounter.     Return in about 5 months (around 7/16/2023) for Visit type PHYSICAL, VISION screen, PHQ9. [place]      Future Appointments   Date Time Provider Irineo Rueda   3/30/2023  1:00 PM EL Galloway - CNP Neuro Spec Neurology -   7/12/2023  3:15 PM [facility]       Consent:  She is aware that that she may receive a bill for this telephone service, depending on her insurance coverage, and has provided verbal consent to proceed: Yes      Documentation and HPI:  I communicated with the patient about: Fatigue (Still blood in urine, thyroid check), Conjunctivitis (left), Abdominal Pain (RUQ), Allergies, Anxiety, and Hyperlipidemia       Bette reports:     She has Pink eye, for the past 2 days, left eye red, itchy, with crusting in the morning  Her son having it, and now she caught it. She denies fever, chills, night sweats. She denies swelling of the eyelids. Patient reports feeling tired all the time. Having more heavy periods , with clots, seeing GYN. Saw Dr. Mariah Marcum for [facility] thyroiditis, she had last 1 week, but not available, I advised her to contact Dr. Tiana Prather office. she denies fever, chills, trouble swallowing. Patient says she was told  she still has blood in the urine, has had extensive work-up by urologist and nephrologist which was negative for malignancy or other causes. POC UA on 2/14/2023 showed trace blood, done at GYN. Has a bruise on the right foot and back of the ankle, doesn't hurt, but they are large, and it bothers her, because she does not know if she hit herself or not. She reports easy bruising. Dry patches on the arms , using the lotion has been helping    She reports abdominal pain and of the right ribs,  that comes and goes, not always related to food. Onset since she had gallbladder removed, in May of 2022. She denies nausea, vomiting, diarrhea or constipation at this time  Patient had a cholecystectomy 4/21/2022    Bette has Vitamin D deficiency.   Laney Bender  is taking Vitamin D supplementation   she feels tired. Lab Results   Component Value Date    VITD25 34.9 07/08/2022         Having headaches different than the migraines, which she thinks is related to sinus headache and allergies. On Anjovy from neurologist.  Patient describes having \"pressure in the head\"  Thinks she is allergic to her dog, gets itchy around the dog. Patient reports intermittently being dizzy , and has runny nose with clear mucus, and she is always itchy around the dog. Taking loratadine sometimes but makes her cry    Getting Anxiety at nighttime, she says her mind cannot shut down. She worries a lot about different things, especially her health, and her child health who is autistic. Also mildly depressed    PHQ-2 Over the past 2 weeks, how often have you been bothered by any of the following problems? Little interest or pleasure in doing things: Not at all  Feeling down, depressed, or hopeless: Several days  PHQ-2 Score: 1  PHQ-9 Over the past 2 weeks, how often have you been bothered by any of the following problems? Trouble falling or staying asleep, or sleeping too much: Several days  Feeling tired or having little energy: Several days  Poor appetite or overeating: Several days  Feeling bad about yourself - or that you are a failure or have let yourself or your family down: Not at all  Trouble concentrating on things, such as reading the newspaper or watching television: Not at all  Moving or speaking so slowly that other people could have noticed.  Or the opposite - being so fidgety or restless that you have been moving around a lot more than usual: Not at all  Thoughts that you would be better off dead, or of hurting yourself in some way: Not at all  If you checked off any problems, how difficult have these problems made it for you to do your work, take care of things at home, or get along with other people?: Somewhat difficult  PHQ-9 Total Score: 4  PHQ-9 Total Score: 4          PHQ Scores 2/15/2023 2/14/2023 7/8/2022 1/20/2022 8/17/2021 5/19/2021 1/5/2021   PHQ2 Score 1 1 4 0 0 2 1   PHQ9 Score 4 5 22 0 0 2 1       Hyperlipidemia:  Patient is  following a low fat, low cholesterol diet. She is  exercising regularly. OTC Supplements:none    LDL is high  Lab Results   Component Value Date    LDLCALC 132 07/08/2022    LDLCHOLESTEROL 118 05/19/2021     Lab Results   Component Value Date    TRIG 57 07/08/2022    TRIG 44 05/19/2021    TRIG 45 09/19/2017         The patient's past medical, surgical, social, and family history as well as her current medications and allergies were reviewed as documented in today's encounter. Prior to Visit Medications    Medication Sig Taking? Authorizing Provider   metroNIDAZOLE (METROGEL VAGINAL) 0.75 % vaginal gel Place 1 Tube vaginally nightly Insert into vagina at bedtime for 5 nights. Yes EL Alvarez CNP   Probiotic Acidophilus [facility]) TABS Take 1 tablet by mouth daily Yes EL Alvarez CNP   Cranberry-Vitamin C-Probiotic (AZO CRANBERRY) 250-30 MG TABS Take 1 tablet by mouth daily Yes Claria Mcardle, MD   vitamin D (CHOLECALCIFEROL) 50 MCG (2000 UT) TABS tablet Take 1 tablet by mouth daily Dose decreased 8/29/2022 Yes Claria Mcardle, MD   lidocaine (LMX) 4 % cream 5 g topical 2-3 times a day as needed for pain, maximum 20 g/day Yes Claria Mcardle, MD   Fremanezumab-vfrm (AJOVY) 225 MG/1.5ML SOAJ INJECT 225MG SUB-Q EVERY 30 DAYS MUST MAKE APPOINTMENT Yes NapoleEL Soto CNP   fexofenadine (ALLEGRA) 180 MG tablet Take 1 tablet by mouth once daily Yes Claria Mcardle, MD   hydrocortisone 2.5 % ointment Apply topically 2 times daily for scar for 10 days.   Do not use in the skin folds, neck or face Yes Claria Mcardle, MD   Ascorbic Acid (VITAMIN C PO) Take 1 tablet by mouth daily Yes Historical Provider, MD   butalbital-acetaminophen-caffeine (FIORICET, ESGIC) -40 MG per tablet TAKE 1 TABLET BY MOUTH EVERY 6 HOURS AS NEEDED FOR HEADACHE OR MIGRAINE. DO NOT EXCEED FOR 3 DAYS PER WEEK Yes Naila Thorpe MD   vitamin B-2 (RIBOFLAVIN) 100 MG TABS tablet Take 1 tablet by mouth once daily Yes Maylin Rankin MD   Misc Natural Products (CRANBERRY/PROBIOTIC) TABS Take 1 tablet by mouth daily Please dispense according to patients insurance.  Yes Maylin Rankin MD   EPINEPHrine (EPIPEN) 0.3 MG/0.3ML SOAJ injection  Yes Historical Provider, MD   magnesium oxide (MAG-OX) 400 (241.3 Mg) MG TABS tablet TAKE 1 TABLET BY MOUTH IN THE EVENING WITH FOOD Yes Historical Provider, MD   loratadine (CLARITIN) 10 MG tablet Take 1 tablet by mouth daily  Patient not taking: Reported on 2/15/2023  Maylin Rankin MD   rizatriptan (MAXALT) 10 MG tablet Take 1 tablet by mouth once as needed for Migraine May repeat in 2 hours if needed  Patient not taking: Reported on 9/6/2022  Chepe Bennett APRN - CNP       -vital signs stable and within normal limits except obesity per BMI, BMI 34.50 kg/M2  Patient-Reported Vitals 2/16/2023   Patient-Reported Weight 198 lbs   Patient-Reported Height 5'4\"   Patient-Reported Systolic -   Patient-Reported Diastolic -   Patient-Reported Temperature -      Most recent labs reviewed consistent with mild hypokalemia, high TSH but improved from prior, hyperlipidemia  Vitamin D deficiency    Lab Results   Component Value Date    WBC 4.8 07/08/2022    HGB 13.4 07/08/2022    HCT 40.3 07/08/2022    MCV 94 07/08/2022     07/08/2022     Lab Results   Component Value Date    WBC 4.8 07/08/2022    HGB 13.4 07/08/2022    HCT 40.3 07/08/2022    MCV 94 07/08/2022     07/08/2022       Lab Results   Component Value Date/Time     07/08/2022 02:22 PM    K 3.7 07/08/2022 02:22 PM     07/08/2022 02:22 PM    CO2 28 07/08/2022 02:22 PM    BUN 12 07/08/2022 02:22 PM    CREATININE 0.86 07/08/2022 02:22 PM    GLUCOSE 80 07/08/2022 02:22 PM    CALCIUM 9.3 07/08/2022 02:22 PM        Lab Results Component Value Date    ALT 26 07/08/2022    AST 20 07/08/2022    ALKPHOS 83 07/08/2022    BILITOT 0.7 07/08/2022     TSH 1.78 on 7/8/2022  Lab Results   Component Value Date    TSH 6.54 (H) 09/07/2021       Lab Results   Component Value Date    CHOL 201 07/08/2022    CHOL 182 05/19/2021    CHOL 176 09/19/2017     Lab Results   Component Value Date    TRIG 57 07/08/2022    TRIG 44 05/19/2021    TRIG 45 09/19/2017     Lab Results   Component Value Date    HDL 58 07/08/2022    HDL 55 05/19/2021    HDL 62 09/19/2017     Lab Results   Component Value Date    LDLCALC 132 07/08/2022    LDLCHOLESTEROL 118 05/19/2021    LDLCHOLESTEROL 105 09/19/2017       Lab Results   Component Value Date    CHOLHDLRATIO 3.5 07/08/2022    CHOLHDLRATIO 3.3 05/19/2021    CHOLHDLRATIO 2.8 09/19/2017       Lab Results   Component Value Date    LABA1C 4.6 01/16/2019       No results found for: JOMTMBXC20    No results found for: FOLATE    No results found for: IRON, TIBC, FERRITIN    Lab Results   Component Value Date    VITD25 34.9 07/08/2022         Orders Placed This Encounter   Medications    ofloxacin (OCUFLOX) 0.3 % solution     Sig: Place 1 drop into the left eye 4 times daily for 10 days     Dispense:  5 mL     Refill:  0    diphenhydrAMINE (BENADRYL) 25 MG tablet     Sig: Take 1 tablet by mouth nightly as needed for Allergies or Itching     Dispense:  30 tablet     Refill:  3    magnesium oxide (MAG-OX) 400 (240 Mg) MG tablet     Sig: Take 1 tablet by mouth every evening Take with food     Dispense:  90 tablet     Refill:  3         Orders Placed This Encounter   Procedures    US LIVER     This procedure can be scheduled via [employer]. Access your [employer] account by visiting [email or web address].      Standing Status:   Future     Standing Expiration Date:   2/16/2024     Order Specific Question:   Reason for exam:     Answer:   elevated LFTs    Allergen, Inhalant, Environment Profile 10     Standing Status:   Future     Standing Expiration Date:   2/16/2024    CBC with Auto Differential     Standing Status:   Future     Standing Expiration Date:   2/16/2024    Comprehensive Metabolic Panel     Standing Status:   Future     Standing Expiration Date:   2/16/2024    Vitamin D 25 Hydroxy     Standing Status:   Future     Standing Expiration Date:   2/16/2024    Lipid Panel     Standing Status:   Future     Standing Expiration Date:   2/16/2024     Order Specific Question:   Is Patient Fasting?/# of Hours     Answer:   8-10 Hours, water ok to drink         Medications Discontinued During This Encounter   Medication Reason    metroNIDAZOLE (METROGEL VAGINAL) 0.75 % vaginal gel Therapy completed    magnesium oxide (MAG-OX) 400 (241.3 Mg) MG TABS tablet REORDER    loratadine (CLARITIN) 10 MG tablet Alternate therapy       I affirm this is a Patient Initiated Episode with an Established Patient who has not had a related appointment within my department in the past 7 days or scheduled within the next 24 hours. Patient identification was verified at the start of the visit: Yes      Total Time: minutes: 21-30 minutes    Sonja Oshea was evaluated through a synchronous (real-time) audio encounter. Patient identification was verified at the start of the visit. She (or guardian if applicable) is aware that this is a billable service, which includes applicable co-pays. This visit was conducted with the patient's (and/or legal guardian's) verbal consent. She has not had a related appointment within my department in the past 7 days or scheduled within the next 24 hours. The patient was located at Home: [address] [place] [address]. The provider was located at [place] [address] (Appt Dept): [place] [address]. Emiliano Clements MD      An electronic signature was used to authenticate this note.   Electronically signed by Emiliano Clements MD on 2/20/2023  at 10:58 PM.

## 2023-02-18 LAB
ALLERGEN DOG DANDER IGE: <0.1 KU/L (ref 0–0.34)
ALLERGEN GREER HOUSE DUST: <0.1 KU/L (ref 0–0.34)
ALLERGEN HORMODENDRUM IGE: <0.1 KUL/L (ref 0–0.34)
ALTERNARIA ALTERNATA: <0.1 KU/L (ref 0–0.34)
ASPERGILLUS FUMIGATUS: <0.1 KU/L (ref 0–0.34)
CAT DANDER ANTIBODY: <0.1 KU/L (ref 0–0.34)
D. FARINAE: 0.1 KU/L (ref 0–0.34)
D. PTERONYSSINUS: 0.11 KU/L (ref 0–0.34)
EPID ALLERG MIX73 IGE QN: NEGATIVE KU/L
P. NOTATUM: <0.1 KU/L (ref 0–0.34)

## 2023-02-18 NOTE — RESULT ENCOUNTER NOTE
Please notify patient: Basic environmental allergies was negative, including to cats and dogs      Otherwise labs within normal limits  continue current treatment    Future Appointments  3/30/2023  1:00 PM    EL Hernandez - * Neuro Spec          Neurology -  7/12/2023  3:15 PM    Tho Peters MD     fp jitendra Luz

## 2023-02-18 NOTE — RESULT ENCOUNTER NOTE
Please notify patient: labs within normal limits  continue current treatment  Only pending allergy testing      Future Appointments  3/30/2023  1:00 PM    EL Slade - * Neuro Spec          Neurology -  7/12/2023  3:15 PM    Bruno Mcfarlane MD     fp [facility]

## 2023-02-24 ENCOUNTER — HOSPITAL ENCOUNTER (OUTPATIENT)
Dept: ULTRASOUND IMAGING | Age: 38
Discharge: HOME | End: 2023-02-24
Payer: MEDICAID

## 2023-02-24 DIAGNOSIS — G89.29 CHRONIC RUQ PAIN: ICD-10-CM

## 2023-02-24 DIAGNOSIS — R10.11 CHRONIC RUQ PAIN: ICD-10-CM

## 2023-02-24 PROCEDURE — 76705 ECHO EXAM OF ABDOMEN: CPT

## 2023-02-24 NOTE — RESULT ENCOUNTER NOTE
Please notify patient: Normal ultrasound of the liver  Future Appointments  6/2/2023   12:40 PM   Lake Ruff MD     [address]          Neurology -  7/12/2023  3:15 PM    Sofia Quintana MD     [facility]

## 2023-02-28 ENCOUNTER — TELEPHONE (OUTPATIENT)
Dept: OBGYN CLINIC | Age: 38
End: 2023-02-28

## 2023-02-28 RX ORDER — CLINDAMYCIN PHOSPHATE 20 MG/G
CREAM VAGINAL
Qty: 1 EACH | Refills: 1 | Status: SHIPPED | OUTPATIENT
Start: 2023-02-28 | End: 2023-03-07

## 2023-02-28 NOTE — TELEPHONE ENCOUNTER
Patient has vaginal discharge and itching. She would like a vaginal cream sent to the pharmacy. Please advise.

## 2023-03-31 ENCOUNTER — OFFICE VISIT (OUTPATIENT)
Dept: NEUROLOGY | Age: 38
End: 2023-03-31
Payer: MEDICAID

## 2023-03-31 VITALS
HEART RATE: 93 BPM | SYSTOLIC BLOOD PRESSURE: 118 MMHG | DIASTOLIC BLOOD PRESSURE: 78 MMHG | BODY MASS INDEX: 35 KG/M2 | WEIGHT: 205 LBS | HEIGHT: 64 IN

## 2023-03-31 DIAGNOSIS — G43.109 MIGRAINE WITH AURA AND WITHOUT STATUS MIGRAINOSUS, NOT INTRACTABLE: Primary | ICD-10-CM

## 2023-03-31 DIAGNOSIS — R42 DIZZINESS: ICD-10-CM

## 2023-03-31 PROCEDURE — 99214 OFFICE O/P EST MOD 30 MIN: CPT | Performed by: PSYCHIATRY & NEUROLOGY

## 2023-03-31 RX ORDER — BUTALBITAL, ACETAMINOPHEN AND CAFFEINE 50; 325; 40 MG/1; MG/1; MG/1
TABLET ORAL
Qty: 90 TABLET | Refills: 0 | Status: SHIPPED | OUTPATIENT
Start: 2023-03-31

## 2023-03-31 RX ORDER — FREMANEZUMAB-VFRM 225 MG/1.5ML
INJECTION SUBCUTANEOUS
Qty: 1 ADJUSTABLE DOSE PRE-FILLED PEN SYRINGE | Refills: 5 | Status: SHIPPED | OUTPATIENT
Start: 2023-03-31

## 2023-03-31 NOTE — PROGRESS NOTES
normal . No aphasia   Cranial nerve 2 normal acuety and visual fields  Cranial nerve 3, 4 and 6 . Extraocular muscles are intact . Pupils are equal and reactive   Cranial nerve 5 . Intact corneal reflex. Normal facial sensation  Cranial nerve 7 normal exam   Cranial nerve 8. Grossly intact hearing   Cranial nerve 9 and 10. Symmetric palate elevation   Cranial nerve 11 , 5 out of 5 strength   Cranial Nerve 12 midline tongue . No atrophy  Sensation . Normal pinprick and light touch   Deep Tendon Reflexes normal  Plantar response flexor bilaterally      ASSESSMENT/PLAN      Diagnosis Orders   1. Migraine with aura and without status migrainosus, not intractable  butalbital-acetaminophen-caffeine (FIORICET, ESGIC) -40 MG per tablet      2. Dizziness        She is to continue current medication regimen .  There appears to be some vestibular migraine component to her dizziness      As above

## 2023-04-18 ENCOUNTER — E-VISIT (OUTPATIENT)
Dept: FAMILY MEDICINE CLINIC | Age: 38
End: 2023-04-18
Payer: MEDICAID

## 2023-04-18 DIAGNOSIS — F40.01 PANIC DISORDER WITH AGORAPHOBIA: ICD-10-CM

## 2023-04-18 DIAGNOSIS — J30.89 SEASONAL ALLERGIC RHINITIS DUE TO OTHER ALLERGIC TRIGGER: Primary | ICD-10-CM

## 2023-04-18 DIAGNOSIS — H04.123 DRY EYE SYNDROME OF BOTH EYES: ICD-10-CM

## 2023-04-18 DIAGNOSIS — F33.1 MODERATE EPISODE OF RECURRENT MAJOR DEPRESSIVE DISORDER (HCC): ICD-10-CM

## 2023-04-18 DIAGNOSIS — M79.672 LEFT FOOT PAIN: ICD-10-CM

## 2023-04-18 DIAGNOSIS — F41.1 GAD (GENERALIZED ANXIETY DISORDER): ICD-10-CM

## 2023-04-18 PROCEDURE — 99423 OL DIG E/M SVC 21+ MIN: CPT | Performed by: FAMILY MEDICINE

## 2023-04-18 RX ORDER — HYDROCODONE/ACETAMINOPHEN 5 MG-500MG
6 TABLET ORAL DAILY
Qty: 90 CAPSULE | Refills: 0 | Status: SHIPPED | OUTPATIENT
Start: 2023-04-18

## 2023-04-18 RX ORDER — ECHINACEA PURPUREA EXTRACT 125 MG
2 TABLET ORAL PRN
Qty: 104 ML | Refills: 0 | Status: SHIPPED | OUTPATIENT
Start: 2023-04-18 | End: 2023-05-18

## 2023-04-18 RX ORDER — LORATADINE 10 MG/1
10 TABLET ORAL DAILY
Qty: 90 TABLET | Refills: 3 | Status: SHIPPED | OUTPATIENT
Start: 2023-04-18

## 2023-04-18 RX ORDER — SELENIUM 50 MCG
1 TABLET ORAL DAILY
COMMUNITY
Start: 2023-04-01

## 2023-04-18 RX ORDER — FLUTICASONE PROPIONATE 50 MCG
2 SPRAY, SUSPENSION (ML) NASAL DAILY
Qty: 16 G | Refills: 1 | Status: SHIPPED | OUTPATIENT
Start: 2023-04-18

## 2023-04-18 ASSESSMENT — PATIENT HEALTH QUESTIONNAIRE - PHQ9
10. IF YOU CHECKED OFF ANY PROBLEMS, HOW DIFFICULT HAVE THESE PROBLEMS MADE IT FOR YOU TO DO YOUR WORK, TAKE CARE OF THINGS AT HOME, OR GET ALONG WITH OTHER PEOPLE: 2
SUM OF ALL RESPONSES TO PHQ QUESTIONS 1-9: 16
2. FEELING DOWN, DEPRESSED OR HOPELESS: MORE THAN HALF THE DAYS
SUM OF ALL RESPONSES TO PHQ QUESTIONS 1-9: 16
5. POOR APPETITE OR OVEREATING: 3
1. LITTLE INTEREST OR PLEASURE IN DOING THINGS: 2
9. THOUGHTS THAT YOU WOULD BE BETTER OFF DEAD, OR OF HURTING YOURSELF: 0
7. TROUBLE CONCENTRATING ON THINGS, SUCH AS READING THE NEWSPAPER OR WATCHING TELEVISION: 1
5. POOR APPETITE OR OVEREATING: NEARLY EVERY DAY
3. TROUBLE FALLING OR STAYING ASLEEP: NEARLY EVERY DAY
9. THOUGHTS THAT YOU WOULD BE BETTER OFF DEAD, OR OF HURTING YOURSELF: NOT AT ALL
7. TROUBLE CONCENTRATING ON THINGS, SUCH AS READING THE NEWSPAPER OR WATCHING TELEVISION: SEVERAL DAYS
2. FEELING DOWN, DEPRESSED OR HOPELESS: 2
4. FEELING TIRED OR HAVING LITTLE ENERGY: 3
SUM OF ALL RESPONSES TO PHQ9 QUESTIONS 1 & 2: 4
8. MOVING OR SPEAKING SO SLOWLY THAT OTHER PEOPLE COULD HAVE NOTICED. OR THE OPPOSITE - BEING SO FIDGETY OR RESTLESS THAT YOU HAVE BEEN MOVING AROUND A LOT MORE THAN USUAL: SEVERAL DAYS
SUM OF ALL RESPONSES TO PHQ9 QUESTIONS 1 & 2: 4
1. LITTLE INTEREST OR PLEASURE IN DOING THINGS: MORE THAN HALF THE DAYS
4. FEELING TIRED OR HAVING LITTLE ENERGY: NEARLY EVERY DAY
3. TROUBLE FALLING OR STAYING ASLEEP: 3
6. FEELING BAD ABOUT YOURSELF - OR THAT YOU ARE A FAILURE OR HAVE LET YOURSELF OR YOUR FAMILY DOWN: 1
SUM OF ALL RESPONSES TO PHQ QUESTIONS 1-9: 16
10. IF YOU CHECKED OFF ANY PROBLEMS, HOW DIFFICULT HAVE THESE PROBLEMS MADE IT FOR YOU TO DO YOUR WORK, TAKE CARE OF THINGS AT HOME, OR GET ALONG WITH OTHER PEOPLE: VERY DIFFICULT
6. FEELING BAD ABOUT YOURSELF - OR THAT YOU ARE A FAILURE OR HAVE LET YOURSELF OR YOUR FAMILY DOWN: SEVERAL DAYS
SUM OF ALL RESPONSES TO PHQ QUESTIONS 1-9: 16
8. MOVING OR SPEAKING SO SLOWLY THAT OTHER PEOPLE COULD HAVE NOTICED. OR THE OPPOSITE, BEING SO FIGETY OR RESTLESS THAT YOU HAVE BEEN MOVING AROUND A LOT MORE THAN USUAL: 1
SUM OF ALL RESPONSES TO PHQ QUESTIONS 1-9: 16

## 2023-04-18 ASSESSMENT — ANXIETY QUESTIONNAIRES
5. BEING SO RESTLESS THAT IT IS HARD TO SIT STILL: MORE THAN HALF THE DAYS
IF YOU CHECKED OFF ANY PROBLEMS ON THIS QUESTIONNAIRE, HOW DIFFICULT HAVE THESE PROBLEMS MADE IT FOR YOU TO DO YOUR WORK, TAKE CARE OF THINGS AT HOME, OR GET ALONG WITH OTHER PEOPLE: VERY DIFFICULT
GAD7 TOTAL SCORE: 16
4. TROUBLE RELAXING: 3
6. BECOMING EASILY ANNOYED OR IRRITABLE: 2
3. WORRYING TOO MUCH ABOUT DIFFERENT THINGS: MORE THAN HALF THE DAYS
IF YOU CHECKED OFF ANY PROBLEMS ON THIS QUESTIONNAIRE, HOW DIFFICULT HAVE THESE PROBLEMS MADE IT FOR YOU TO DO YOUR WORK, TAKE CARE OF THINGS AT HOME, OR GET ALONG WITH OTHER PEOPLE: VERY DIFFICULT
2. NOT BEING ABLE TO STOP OR CONTROL WORRYING: MORE THAN HALF THE DAYS
6. BECOMING EASILY ANNOYED OR IRRITABLE: MORE THAN HALF THE DAYS
4. TROUBLE RELAXING: NEARLY EVERY DAY
1. FEELING NERVOUS, ANXIOUS, OR ON EDGE: NEARLY EVERY DAY
5. BEING SO RESTLESS THAT IT IS HARD TO SIT STILL: 2
1. FEELING NERVOUS, ANXIOUS, OR ON EDGE: 3
7. FEELING AFRAID AS IF SOMETHING AWFUL MIGHT HAPPEN: 2
3. WORRYING TOO MUCH ABOUT DIFFERENT THINGS: 2
2. NOT BEING ABLE TO STOP OR CONTROL WORRYING: 2
7. FEELING AFRAID AS IF SOMETHING AWFUL MIGHT HAPPEN: MORE THAN HALF THE DAYS

## 2023-04-18 ASSESSMENT — LIFESTYLE VARIABLES
SMOKING_STATUS: NO, I'M A FORMER SMOKER
SMOKING_YEARS: 4
PACKS_PER_DAY: 1

## 2023-04-18 NOTE — PROGRESS NOTES
major depressive disorder [facility])  worsening    Patient requesting support animal letter  Requesting PHQ-9  Seeing counselor at [facility]  Will update when the patient completes the questionnaire  [facility] 4/18/2023 2/15/2023 2/14/2023 7/8/2022 1/20/2022 8/17/2021 5/19/2021   PHQ2 Score 4 1 1 4 0 0 2   PHQ9 Score 16 4 5 22 0 0 2     Interpretation of Total Score Depression Severity: 1-4 = Minimal depression, 5-9 = Mild depression, 10-14 = Moderate depression, 15-19 = Moderately severe depression, 20-27 = Severe depression    5. Panic disorder with agoraphobia  Patient requesting support animal letter  Requesting PHQ-9  Seeing counselor at [facility]      6. DEREK (generalized anxiety disorder)  Severe anxiety, persistent  Patient requesting support animal letter  Requesting PHQ-9  Seeing counselor at [facility]    We will update when the patient completes the questionnaire  DEREK-7 SCREENING 4/18/2023 12/17/2019 11/1/2019 8/1/2019 4/25/2019 8/8/2018 2/5/2018   Feeling nervous, anxious, or on edge Nearly every day - - - - - -   Not being able to stop or control worrying More than half the days - - - - - -   Worrying too much about different things More than half the days - - - - - -   Trouble relaxing Nearly every day - - - - - -   Being so restless that it is hard to sit still More than half the days - - - - - -   Becoming easily annoyed or irritable More than half the days - - - - - -   Feeling afraid as if something awful might happen More than half the days - - - - - -   DEREK-7 Total Score 16 - - - - - -   How difficult have these problems made it for you to do your work, take care of things at home, or get along with other people?  Very difficult - - - - - -   Feeling nervous, anxious, or on edge - 3-Nearly every day 3-Nearly every day 3-Nearly every day 2-Over half the days 3-Nearly every day 2-Over half the days   Not able to stop or control worrying - 2-Over half the days 2-Over half the days 2-Over half the days 3-Nearly

## 2023-05-11 ENCOUNTER — OFFICE VISIT (OUTPATIENT)
Dept: PODIATRY | Age: 38
End: 2023-05-11
Payer: MEDICAID

## 2023-05-11 VITALS — HEIGHT: 64 IN | WEIGHT: 198 LBS | BODY MASS INDEX: 33.8 KG/M2

## 2023-05-11 DIAGNOSIS — G57.82 INTERDIGITAL NEUROMA OF LEFT FOOT: Primary | ICD-10-CM

## 2023-05-11 DIAGNOSIS — M79.672 PAIN IN LEFT FOOT: ICD-10-CM

## 2023-05-11 PROCEDURE — 64455 NJX AA&/STRD PLTR COM DG NRV: CPT | Performed by: PODIATRIST

## 2023-05-11 PROCEDURE — 99203 OFFICE O/P NEW LOW 30 MIN: CPT | Performed by: PODIATRIST

## 2023-05-11 RX ORDER — BUPIVACAINE HYDROCHLORIDE 5 MG/ML
1 INJECTION, SOLUTION PERINEURAL ONCE
Status: COMPLETED | OUTPATIENT
Start: 2023-05-11 | End: 2023-05-11

## 2023-05-11 RX ADMIN — BUPIVACAINE HYDROCHLORIDE 5 MG: 5 INJECTION, SOLUTION PERINEURAL at 09:18

## 2023-05-11 ASSESSMENT — ENCOUNTER SYMPTOMS
COLOR CHANGE: 0
SHORTNESS OF BREATH: 0
BACK PAIN: 0
NAUSEA: 0
DIARRHEA: 0

## 2023-05-11 NOTE — PROGRESS NOTES
Chinyere Soria is a 44 y.o. female who presents to the office today with chief complaint of pain to the ball of the left foot. Chief Complaint   Patient presents with    Foot Pain     Left foot pain, started 2-3 months ago   Symptoms began about 3 month(s) ago. Patient denies injury to the left foot. Patient states that the pain is greatest in the mornings. Pain is rated 9 out of 10 at it's worst and is described as intermittent. Patient states that it feels like the forefoot is unstable and is concerned that the foot is broken. Patient states that she is putting a lot of her weight on her heel. Treatments prior to today's visit include: None. Allergies   Allergen Reactions    Diflucan [Fluconazole] Rash     Throat itches    Flagyl [Metronidazole] Hives    Penicillins      When a child       Past Medical History:   Diagnosis Date    Acute appendicitis 1993    Acute constipation 2021    Allergic rhinitis     Anxiety     Chest pain     with anxiety    Cholecystitis 04/21/2022    Pathology report showed significant chronic cholecystitis, had significant omental adhesions to the gallbladder per surgical report    Cholelithiasis 2021    Chronic kidney disease     CKD (chronic kidney disease) stage 1, GFR 90 ml/min or greater 07/10/2018    COVID 01/2022    Depression     Dysfunction of both eustachian tubes 8/23/2021    Eustachian tube disorder, bilateral 10/27/2020    Generalized abdominal pain 5/19/2021    Hashimoto's thyroiditis     Headache(784.0)     Heart murmur     Hematuria 2016    Hypothyroidism     Irritable bowel syndrome with diarrhea 11/17/2016    Midline low back pain without sciatica 11/17/2016    Obesity (BMI 30-39.9) 08/26/2017    Orthostatic hypotension     Panic attack as reaction to stress     certain anxiety medications will cause a panic attack    Panic disorder with agoraphobia 08/26/2017    some medications for anxiety will cause a panic attack.     Pruritic dermatitis 1/5/2021    Severe

## 2023-05-25 ENCOUNTER — OFFICE VISIT (OUTPATIENT)
Dept: PODIATRY | Age: 38
End: 2023-05-25
Payer: MEDICAID

## 2023-05-25 VITALS — HEIGHT: 64 IN | WEIGHT: 198 LBS | BODY MASS INDEX: 33.8 KG/M2

## 2023-05-25 DIAGNOSIS — G57.82 INTERDIGITAL NEUROMA OF LEFT FOOT: Primary | ICD-10-CM

## 2023-05-25 DIAGNOSIS — M79.672 PAIN IN LEFT FOOT: ICD-10-CM

## 2023-05-25 PROCEDURE — 99213 OFFICE O/P EST LOW 20 MIN: CPT | Performed by: PODIATRIST

## 2023-05-25 PROCEDURE — 64455 NJX AA&/STRD PLTR COM DG NRV: CPT | Performed by: PODIATRIST

## 2023-05-25 RX ORDER — CARBOXYMETHYLCELLULOSE SODIUM 5 MG/ML
SOLUTION/ DROPS OPHTHALMIC
COMMUNITY
Start: 2023-05-11

## 2023-05-25 RX ORDER — CYCLOSPORINE 0.5 MG/ML
EMULSION OPHTHALMIC
COMMUNITY
Start: 2023-05-11

## 2023-05-25 RX ORDER — BUPIVACAINE HYDROCHLORIDE 5 MG/ML
1 INJECTION, SOLUTION PERINEURAL ONCE
Status: COMPLETED | OUTPATIENT
Start: 2023-05-25 | End: 2023-05-25

## 2023-05-25 RX ORDER — AZELASTINE HYDROCHLORIDE 0.5 MG/ML
SOLUTION/ DROPS OPHTHALMIC
COMMUNITY
Start: 2023-05-11

## 2023-05-25 RX ADMIN — BUPIVACAINE HYDROCHLORIDE 5 MG: 5 INJECTION, SOLUTION PERINEURAL at 10:39

## 2023-05-25 ASSESSMENT — ENCOUNTER SYMPTOMS
NAUSEA: 0
DIARRHEA: 0
COLOR CHANGE: 0
SHORTNESS OF BREATH: 0
BACK PAIN: 0

## 2023-05-25 NOTE — PROGRESS NOTES
[facility]  Return Patient Progress Note    Subjective: Sue Rivas 44 y.o. female that presents for follow up evaluation of neuroma to the left foot. Chief Complaint   Patient presents with    Foot Pain     Left Foot  Pcp Maurilio Yoo-4/18/2023    Patient's treatment thus far has included steroid injection x1. Pain is rated 6 out of 10 and is described as intermittent. Patient has been following my prescribed course of therapy as instructed. Review of Systems   Constitutional:  Negative for activity change, appetite change, chills, diaphoresis, fatigue and fever. Respiratory:  Negative for shortness of breath. Cardiovascular:  Negative for leg swelling. Gastrointestinal:  Negative for diarrhea and nausea. Endocrine: Negative for cold intolerance, heat intolerance and polyuria. Musculoskeletal:  Negative for arthralgias, back pain, gait problem, joint swelling and myalgias. Skin:  Negative for color change, pallor, rash and wound. Allergic/Immunologic: Negative for environmental allergies and food allergies. Neurological:  Negative for dizziness, weakness, light-headedness and numbness. Hematological:  Does not bruise/bleed easily. Psychiatric/Behavioral:  Negative for behavioral problems, confusion and self-injury. The patient is not nervous/anxious. Objective: Clinical evaluation of the patient reveals pain with palpation to the left second intermetatarsal space. There is a fullness noted with palpation to this area. There is only slight pain noted with palpation to the left second and third MTPJ's. There is no palpable click noted to the left second intermetatarsal space. There is no divergence noted clinically to the second and third toes of the left foot. Assessment:    Diagnosis Orders   1.  Interdigital neuroma of left foot  bupivacaine (MARCAINE) 0.5 % injection 5 mg    triamcinolone acetonide (KENALOG) injection 10 mg    DC NJX AA&/STRD PLANTAR

## 2023-06-22 ENCOUNTER — OFFICE VISIT (OUTPATIENT)
Dept: PODIATRY | Age: 38
End: 2023-06-22
Payer: MEDICAID

## 2023-06-22 VITALS — WEIGHT: 198 LBS | BODY MASS INDEX: 33.8 KG/M2 | HEIGHT: 64 IN

## 2023-06-22 DIAGNOSIS — M79.672 PAIN IN LEFT FOOT: ICD-10-CM

## 2023-06-22 DIAGNOSIS — G57.82 INTERDIGITAL NEUROMA OF LEFT FOOT: Primary | ICD-10-CM

## 2023-06-22 PROCEDURE — 99213 OFFICE O/P EST LOW 20 MIN: CPT | Performed by: PODIATRIST

## 2023-06-22 ASSESSMENT — ENCOUNTER SYMPTOMS
NAUSEA: 0
SHORTNESS OF BREATH: 0
BACK PAIN: 0
DIARRHEA: 0
COLOR CHANGE: 0

## 2023-06-22 NOTE — PROGRESS NOTES
[address] [place] [address] Podiatry  Return Patient Progress Note    Subjective: Traci Sands 44 y.o. female that presents for follow up evaluation of neuroma to the left foot. Chief Complaint   Patient presents with    Foot Pain     Left foot doing a little bit better     Patient's treatment thus far has included steroid injection x2. Pain is rated 6 out of 10 and is described as intermittent. Patient states that her pain is very infrequent at this time. Patient has been following my prescribed course of therapy as instructed. Review of Systems   Constitutional:  Negative for activity change, appetite change, chills, diaphoresis, fatigue and fever. Respiratory:  Negative for shortness of breath. Cardiovascular:  Negative for leg swelling. Gastrointestinal:  Negative for diarrhea and nausea. Endocrine: Negative for cold intolerance, heat intolerance and polyuria. Musculoskeletal:  Negative for arthralgias, back pain, gait problem, joint swelling and myalgias. Skin:  Negative for color change, pallor, rash and wound. Allergic/Immunologic: Negative for environmental allergies and food allergies. Neurological:  Negative for dizziness, weakness, light-headedness and numbness. Hematological:  Does not bruise/bleed easily. Psychiatric/Behavioral:  Negative for behavioral problems, confusion and self-injury. The patient is not nervous/anxious. Objective: Clinical evaluation of the patient reveals only very slight pain with palpation to the left second intermetatarsal space. There is a decrease in fullness noted with palpation to this area. There is no pain noted with palpation to the left second and third MTPJ's. There is no palpable click noted to the left second intermetatarsal space. There is no divergence noted clinically to the second and third toes of the left foot. Assessment:    Diagnosis Orders   1. Interdigital neuroma of left foot        2.  Pain in left foot              Plan:

## 2023-06-26 ENCOUNTER — TELEPHONE (OUTPATIENT)
Dept: PODIATRY | Age: 38
End: 2023-06-26

## 2023-07-12 ENCOUNTER — OFFICE VISIT (OUTPATIENT)
Dept: FAMILY MEDICINE CLINIC | Age: 38
End: 2023-07-12
Payer: MEDICAID

## 2023-07-12 VITALS
TEMPERATURE: 98.1 F | HEIGHT: 64 IN | BODY MASS INDEX: 36.29 KG/M2 | OXYGEN SATURATION: 99 % | DIASTOLIC BLOOD PRESSURE: 60 MMHG | WEIGHT: 212.6 LBS | SYSTOLIC BLOOD PRESSURE: 100 MMHG | HEART RATE: 80 BPM

## 2023-07-12 DIAGNOSIS — M79.18 MUSCLE PAIN, MYOFASCIAL: ICD-10-CM

## 2023-07-12 DIAGNOSIS — E06.3 HASHIMOTO'S THYROIDITIS: ICD-10-CM

## 2023-07-12 DIAGNOSIS — Z00.01 ENCOUNTER FOR WELL ADULT EXAM WITH ABNORMAL FINDINGS: Primary | ICD-10-CM

## 2023-07-12 DIAGNOSIS — E66.01 SEVERE OBESITY (BMI 35.0-39.9) WITH COMORBIDITY (HCC): ICD-10-CM

## 2023-07-12 DIAGNOSIS — G89.29 CHRONIC PAIN OF LEFT KNEE: ICD-10-CM

## 2023-07-12 DIAGNOSIS — R10.2 PELVIC PAIN IN FEMALE: ICD-10-CM

## 2023-07-12 DIAGNOSIS — R53.82 CHRONIC FATIGUE: ICD-10-CM

## 2023-07-12 DIAGNOSIS — M25.562 CHRONIC PAIN OF LEFT KNEE: ICD-10-CM

## 2023-07-12 DIAGNOSIS — E55.9 VITAMIN D DEFICIENCY: ICD-10-CM

## 2023-07-12 DIAGNOSIS — F41.1 GAD (GENERALIZED ANXIETY DISORDER): ICD-10-CM

## 2023-07-12 DIAGNOSIS — R63.5 UNINTENDED WEIGHT GAIN: ICD-10-CM

## 2023-07-12 DIAGNOSIS — F33.1 MODERATE EPISODE OF RECURRENT MAJOR DEPRESSIVE DISORDER (HCC): ICD-10-CM

## 2023-07-12 DIAGNOSIS — E78.5 HYPERLIPIDEMIA WITH TARGET LDL LESS THAN 100: ICD-10-CM

## 2023-07-12 PROBLEM — R10.11 CHRONIC RUQ PAIN: Status: RESOLVED | Noted: 2023-02-16 | Resolved: 2023-07-12

## 2023-07-12 PROBLEM — E66.9 OBESITY (BMI 30-39.9): Status: RESOLVED | Noted: 2017-08-26 | Resolved: 2023-07-12

## 2023-07-12 PROBLEM — R35.1 NOCTURIA: Status: RESOLVED | Noted: 2018-05-13 | Resolved: 2023-07-12

## 2023-07-12 PROBLEM — H81.13 BENIGN PAROXYSMAL POSITIONAL VERTIGO DUE TO BILATERAL VESTIBULAR DISORDER: Status: RESOLVED | Noted: 2022-09-07 | Resolved: 2023-07-12

## 2023-07-12 PROBLEM — R10.31 CHRONIC RLQ PAIN: Status: RESOLVED | Noted: 2019-08-05 | Resolved: 2023-07-12

## 2023-07-12 PROBLEM — H10.32 ACUTE BACTERIAL CONJUNCTIVITIS OF LEFT EYE: Status: RESOLVED | Noted: 2023-02-16 | Resolved: 2023-07-12

## 2023-07-12 PROBLEM — R10.13 DYSPEPSIA: Status: RESOLVED | Noted: 2019-08-05 | Resolved: 2023-07-12

## 2023-07-12 PROBLEM — N83.10 CORPUS LUTEUM CYST: Status: RESOLVED | Noted: 2021-09-17 | Resolved: 2023-07-12

## 2023-07-12 PROBLEM — K52.9 CHRONIC DIARRHEA: Status: RESOLVED | Noted: 2022-07-09 | Resolved: 2023-07-12

## 2023-07-12 PROBLEM — R31.9 HEMATURIA: Status: RESOLVED | Noted: 2017-09-22 | Resolved: 2023-07-12

## 2023-07-12 PROCEDURE — 99214 OFFICE O/P EST MOD 30 MIN: CPT | Performed by: FAMILY MEDICINE

## 2023-07-12 PROCEDURE — 99395 PREV VISIT EST AGE 18-39: CPT | Performed by: FAMILY MEDICINE

## 2023-07-12 SDOH — ECONOMIC STABILITY: FOOD INSECURITY: WITHIN THE PAST 12 MONTHS, THE FOOD YOU BOUGHT JUST DIDN'T LAST AND YOU DIDN'T HAVE MONEY TO GET MORE.: OFTEN TRUE

## 2023-07-12 SDOH — ECONOMIC STABILITY: FOOD INSECURITY: WITHIN THE PAST 12 MONTHS, YOU WORRIED THAT YOUR FOOD WOULD RUN OUT BEFORE YOU GOT MONEY TO BUY MORE.: OFTEN TRUE

## 2023-07-12 SDOH — ECONOMIC STABILITY: INCOME INSECURITY: HOW HARD IS IT FOR YOU TO PAY FOR THE VERY BASICS LIKE FOOD, HOUSING, MEDICAL CARE, AND HEATING?: SOMEWHAT HARD

## 2023-07-12 ASSESSMENT — ENCOUNTER SYMPTOMS
CONSTIPATION: 1
COUGH: 0
BACK PAIN: 0
SHORTNESS OF BREATH: 0
NAUSEA: 0
WHEEZING: 0
CHEST TIGHTNESS: 0
DIARRHEA: 0
ABDOMINAL PAIN: 1
VOMITING: 0
BLOOD IN STOOL: 0
ABDOMINAL DISTENTION: 0

## 2023-07-12 ASSESSMENT — PATIENT HEALTH QUESTIONNAIRE - PHQ9
8. MOVING OR SPEAKING SO SLOWLY THAT OTHER PEOPLE COULD HAVE NOTICED. OR THE OPPOSITE, BEING SO FIGETY OR RESTLESS THAT YOU HAVE BEEN MOVING AROUND A LOT MORE THAN USUAL: 1
9. THOUGHTS THAT YOU WOULD BE BETTER OFF DEAD, OR OF HURTING YOURSELF: 0
2. FEELING DOWN, DEPRESSED OR HOPELESS: 1
10. IF YOU CHECKED OFF ANY PROBLEMS, HOW DIFFICULT HAVE THESE PROBLEMS MADE IT FOR YOU TO DO YOUR WORK, TAKE CARE OF THINGS AT HOME, OR GET ALONG WITH OTHER PEOPLE: 3
SUM OF ALL RESPONSES TO PHQ QUESTIONS 1-9: 12
5. POOR APPETITE OR OVEREATING: 1
4. FEELING TIRED OR HAVING LITTLE ENERGY: 3
SUM OF ALL RESPONSES TO PHQ9 QUESTIONS 1 & 2: 2
7. TROUBLE CONCENTRATING ON THINGS, SUCH AS READING THE NEWSPAPER OR WATCHING TELEVISION: 2
SUM OF ALL RESPONSES TO PHQ QUESTIONS 1-9: 12
6. FEELING BAD ABOUT YOURSELF - OR THAT YOU ARE A FAILURE OR HAVE LET YOURSELF OR YOUR FAMILY DOWN: 0
SUM OF ALL RESPONSES TO PHQ QUESTIONS 1-9: 12
3. TROUBLE FALLING OR STAYING ASLEEP: 3
1. LITTLE INTEREST OR PLEASURE IN DOING THINGS: 1
SUM OF ALL RESPONSES TO PHQ QUESTIONS 1-9: 12

## 2023-07-12 NOTE — PROGRESS NOTES
2023      Jose Lopez (:  1985) is a 43 y.o. female, here for a preventive medicine evaluation, Annual Exam, Generalized Body Aches (Left foot, left knee, lower back sore all the time), Fatigue, Abdominal Pain (lower), Weight Gain, Thyroid Problem (Hair loss), Anxiety, and Depression   . ASSESSMENT/PLAN:    1. Encounter for well adult exam with abnormal findings    Low carb, low fat diet, increase fruits and vegetables, and exercise 4-5 times a week 30-40 minutes a day, or walk 1-2 hours per day, or wear a pedometer and get at least 10,000 steps per day. Dental exam 2-3 times /year advised. Immunizations reviewed. Health Maintenance reviewed   Smoking cessation counseling given not to ever start smoking    Annual eye exam advised. Referral to  placed for better options for eating due to food insecurities    2. Chronic pain of left knee  Worsening  Likely osteoarthritis  We will refer to orthopedics, they will also do x-rays  Failed knee brace  Tylenol as needed for pain  Declines topical creams  -     Beverly Morse MD, Orthopaedic Surgery, [address] [facility] OFFICE/OUTPATIENT [address] Referral for Social Determinants of Health (Primary Care Practices)    3. Moderate episode of recurrent major depressive disorder ([address])  Improving  Continue counseling at [facility]  Patient declined referral to psychiatrist at [facility], declines any medications because it would make her anxiety worse  Lifestyle changes discussed  We will refer to  to help with financial needs and food options  -     [place] OFFICE/OUTPATIENT ESTABLISHED MOD MDM 30-39 30 [facility] Referral for Social Determinants of Health (Primary Care Practices)    4.  Pelvic pain in female  Failing to improve  We will rule out UTI  Patient strongly advised to follow-up with Dr. Raeann Pichardo, she might need another laparoscopy and lysis of adhesions, she does have history of

## 2023-07-20 ENCOUNTER — OFFICE VISIT (OUTPATIENT)
Dept: PODIATRY | Age: 38
End: 2023-07-20
Payer: MEDICAID

## 2023-07-20 VITALS — WEIGHT: 212 LBS | BODY MASS INDEX: 36.19 KG/M2 | HEIGHT: 64 IN

## 2023-07-20 DIAGNOSIS — M89.8X7 EXOSTOSIS OF LEFT FOOT: ICD-10-CM

## 2023-07-20 DIAGNOSIS — G57.82 INTERDIGITAL NEUROMA OF LEFT FOOT: Primary | ICD-10-CM

## 2023-07-20 DIAGNOSIS — M79.672 PAIN IN LEFT FOOT: ICD-10-CM

## 2023-07-20 PROCEDURE — 99213 OFFICE O/P EST LOW 20 MIN: CPT | Performed by: PODIATRIST

## 2023-07-20 PROCEDURE — 64455 NJX AA&/STRD PLTR COM DG NRV: CPT | Performed by: PODIATRIST

## 2023-07-20 RX ORDER — BUPIVACAINE HYDROCHLORIDE 5 MG/ML
1 INJECTION, SOLUTION PERINEURAL ONCE
Status: COMPLETED | OUTPATIENT
Start: 2023-07-20 | End: 2023-07-20

## 2023-07-20 RX ADMIN — BUPIVACAINE HYDROCHLORIDE 5 MG: 5 INJECTION, SOLUTION PERINEURAL at 13:53

## 2023-07-20 ASSESSMENT — ENCOUNTER SYMPTOMS
BACK PAIN: 0
COLOR CHANGE: 0
DIARRHEA: 0
NAUSEA: 0
SHORTNESS OF BREATH: 0

## 2023-07-20 NOTE — PROGRESS NOTES
[address] [facility] Podiat[facility]  Return Patient Progress Note    Subjective: Albina Monday 43 y.o. female that presents for follow up evaluation of neuroma to the left foot. Chief Complaint   Patient presents with    Foot Pain     Heel Pain Left Foot   PCP Naila Thorpe-7/12/2023    Patient's treatment thus far has included steroid injection x2. Pain is rated 7 out of 10 and is described as intermittent. Patient has been following my prescribed course of therapy as instructed. Patient also complains of increased pain to the top of the left foot where she was previously diagnosed with a bone spur. Review of Systems   Constitutional:  Negative for activity change, appetite change, chills, diaphoresis, fatigue and fever. Respiratory:  Negative for shortness of breath. Cardiovascular:  Negative for leg swelling. Gastrointestinal:  Negative for diarrhea and nausea. Endocrine: Negative for cold intolerance, heat intolerance and polyuria. Musculoskeletal:  Negative for arthralgias, back pain, gait problem, joint swelling and myalgias. Skin:  Negative for color change, pallor, rash and wound. Allergic/Immunologic: Negative for environmental allergies and food allergies. Neurological:  Negative for dizziness, weakness, light-headedness and numbness. Hematological:  Does not bruise/bleed easily. Psychiatric/Behavioral:  Negative for behavioral problems, confusion and self-injury. The patient is not nervous/anxious. Objective: Clinical evaluation of the patient reveals increased pain with palpation to the left second intermetatarsal space. There is a return in fullness with palpation to this area. There is no pain noted with palpation to the left second and third MTPJ's. There is a palpable click noted to the left second intermetatarsal space. There is divergence noted clinically to the second and third toes of the left foot.   There is a palpable bone spur noted to the dorsal aspect of the

## 2023-09-12 ENCOUNTER — HOSPITAL ENCOUNTER (OUTPATIENT)
Age: 38
Discharge: HOME | End: 2023-09-12
Payer: COMMERCIAL

## 2023-09-12 DIAGNOSIS — F41.1 GAD (GENERALIZED ANXIETY DISORDER): ICD-10-CM

## 2023-09-12 DIAGNOSIS — R53.82 CHRONIC FATIGUE: ICD-10-CM

## 2023-09-12 DIAGNOSIS — R63.5 UNINTENDED WEIGHT GAIN: ICD-10-CM

## 2023-09-12 DIAGNOSIS — E55.9 VITAMIN D DEFICIENCY: ICD-10-CM

## 2023-09-12 DIAGNOSIS — M79.18 MUSCLE PAIN, MYOFASCIAL: ICD-10-CM

## 2023-09-12 DIAGNOSIS — E66.01 SEVERE OBESITY (BMI 35.0-39.9) WITH COMORBIDITY (HCC): ICD-10-CM

## 2023-09-12 DIAGNOSIS — R10.2 PELVIC PAIN IN FEMALE: ICD-10-CM

## 2023-09-12 DIAGNOSIS — R31.29 OTHER MICROSCOPIC HEMATURIA: Primary | ICD-10-CM

## 2023-09-12 DIAGNOSIS — E06.3 HASHIMOTO'S THYROIDITIS: ICD-10-CM

## 2023-09-12 DIAGNOSIS — M79.672 LEFT FOOT PAIN: ICD-10-CM

## 2023-09-12 LAB
25(OH)D3 SERPL-MCNC: 39.1 NG/ML
25(OH)D3 SERPL-MCNC: 40.8 NG/ML (ref 30–100)
BACTERIA URNS QL MICRO: ABNORMAL
BILIRUB UR QL STRIP: NEGATIVE
CASTS #/AREA URNS LPF: ABNORMAL /LPF
CK SERPL-CCNC: 170 U/L (ref 26–192)
CLARITY UR: CLEAR
COLOR UR: YELLOW
EPI CELLS #/AREA URNS HPF: ABNORMAL /HPF
GLUCOSE UR STRIP-MCNC: NEGATIVE MG/DL
HGB UR QL STRIP.AUTO: ABNORMAL
KETONES UR STRIP-MCNC: ABNORMAL MG/DL
LEUKOCYTE ESTERASE UR QL STRIP: NEGATIVE
MAGNESIUM SERPL-MCNC: 2.1 MG/DL (ref 1.6–2.6)
NITRITE UR QL STRIP: NEGATIVE
PH UR STRIP: 5 [PH] (ref 5–8)
PROT UR STRIP-MCNC: NEGATIVE MG/DL
RBC #/AREA URNS HPF: ABNORMAL /HPF
SP GR UR STRIP: 1.03 (ref 1–1.03)
T3FREE SERPL-MCNC: 2.88 PG/ML (ref 2.02–4.43)
T4 FREE SERPL-MCNC: 1.2 NG/DL (ref 0.9–1.7)
T4 FREE SERPL-MCNC: 1.2 NG/DL (ref 0.9–1.7)
TSH SERPL DL<=0.05 MIU/L-ACNC: 1.76 UIU/ML (ref 0.3–5)
TSH SERPL DL<=0.05 MIU/L-ACNC: 1.78 UIU/ML (ref 0.3–5)
URATE SERPL-MCNC: 4.2 MG/DL (ref 2.4–5.7)
UROBILINOGEN UR STRIP-ACNC: NORMAL EU/DL (ref 0–1)
WBC #/AREA URNS HPF: ABNORMAL /HPF

## 2023-09-12 PROCEDURE — 81001 URINALYSIS AUTO W/SCOPE: CPT

## 2023-09-12 PROCEDURE — 84481 FREE ASSAY (FT-3): CPT

## 2023-09-12 PROCEDURE — 83735 ASSAY OF MAGNESIUM: CPT

## 2023-09-12 PROCEDURE — 36415 COLL VENOUS BLD VENIPUNCTURE: CPT

## 2023-09-12 PROCEDURE — 84550 ASSAY OF BLOOD/URIC ACID: CPT

## 2023-09-12 PROCEDURE — 84439 ASSAY OF FREE THYROXINE: CPT

## 2023-09-12 PROCEDURE — 82306 VITAMIN D 25 HYDROXY: CPT

## 2023-09-12 PROCEDURE — 82550 ASSAY OF CK (CPK): CPT

## 2023-09-12 PROCEDURE — 84443 ASSAY THYROID STIM HORMONE: CPT

## 2023-09-13 LAB
REASON FOR REJECTION: NORMAL
SPECIMEN SOURCE: NORMAL
ZZ NTE CLEAN UP: ORDERED TEST: NORMAL

## 2023-09-13 NOTE — RESULT ENCOUNTER NOTE
Please notify patient: There is more blood in the urine than before, moderate.  I'll make referral back to urology, please give her contact info  Otherwise labs within normal limits  continue current treatment    Future Appointments  9/22/2023  11:20 AM   Susie Fish MD     [address] [place] [address]          Neurology -  11/14/2023 1:00 PM    [facility]  7/16/2024  1:00 PM    MD lizz Wang [facility]

## 2023-09-23 NOTE — TELEPHONE ENCOUNTER
Pharmacy sent fax for Ajovy 225 mg. Last seen by CM, CNP 5/13/21.  Needs a follow up appointment
Female

## 2023-09-26 DIAGNOSIS — G43.109 MIGRAINE WITH AURA AND WITHOUT STATUS MIGRAINOSUS, NOT INTRACTABLE: ICD-10-CM

## 2023-09-26 RX ORDER — RIBOFLAVIN (VITAMIN B2) 100 MG
1 TABLET ORAL DAILY
Qty: 90 TABLET | Refills: 3 | Status: SHIPPED | OUTPATIENT
Start: 2023-09-26

## 2023-09-26 RX ORDER — BUTALBITAL, ACETAMINOPHEN AND CAFFEINE 50; 325; 40 MG/1; MG/1; MG/1
TABLET ORAL
Qty: 90 TABLET | Refills: 0 | Status: SHIPPED | OUTPATIENT
Start: 2023-09-26

## 2023-09-26 NOTE — TELEPHONE ENCOUNTER
Dr. Constantine Hooker can you please assist with a refill for Dr. Scott Party patient.   Thank you        Pharmacy requesting refill of:    Butalbital-acetaminophen-caffeine (Fioricet, Esgic) -40 mg per tablet      Medication active on med list:  yes      Date of last Rx: 03/31/2023  with 0 refills   verified on 09/26/2023   verified by SNEHA DIAZ      Date of last appointment:  03/31/2023    Next Visit Date:  9/29/2023

## 2023-09-29 ENCOUNTER — HOSPITAL ENCOUNTER (OUTPATIENT)
Age: 38
Discharge: HOME | End: 2023-09-29
Payer: COMMERCIAL

## 2023-09-29 ENCOUNTER — OFFICE VISIT (OUTPATIENT)
Dept: NEUROLOGY | Age: 38
End: 2023-09-29
Payer: COMMERCIAL

## 2023-09-29 VITALS
SYSTOLIC BLOOD PRESSURE: 112 MMHG | HEART RATE: 74 BPM | BODY MASS INDEX: 36.37 KG/M2 | DIASTOLIC BLOOD PRESSURE: 76 MMHG | HEIGHT: 64 IN | WEIGHT: 213 LBS

## 2023-09-29 DIAGNOSIS — R53.82 CHRONIC FATIGUE: ICD-10-CM

## 2023-09-29 DIAGNOSIS — E78.5 HYPERLIPIDEMIA WITH TARGET LDL LESS THAN 100: ICD-10-CM

## 2023-09-29 DIAGNOSIS — R63.5 UNINTENDED WEIGHT GAIN: ICD-10-CM

## 2023-09-29 DIAGNOSIS — G43.109 MIGRAINE WITH AURA AND WITHOUT STATUS MIGRAINOSUS, NOT INTRACTABLE: Primary | ICD-10-CM

## 2023-09-29 DIAGNOSIS — R42 DIZZINESS: ICD-10-CM

## 2023-09-29 LAB
ALBUMIN SERPL-MCNC: 4.2 G/DL (ref 3.5–5.2)
ALP SERPL-CCNC: 87 U/L (ref 35–104)
ALT SERPL-CCNC: 23 U/L (ref 5–33)
ANION GAP SERPL CALCULATED.3IONS-SCNC: 9 MMOL/L (ref 9–17)
AST SERPL-CCNC: 22 U/L
BILIRUB SERPL-MCNC: 0.4 MG/DL (ref 0.3–1.2)
BUN SERPL-MCNC: 13 MG/DL (ref 6–20)
CALCIUM SERPL-MCNC: 9 MG/DL (ref 8.6–10.4)
CHLORIDE SERPL-SCNC: 106 MMOL/L (ref 98–107)
CHOLEST SERPL-MCNC: 188 MG/DL
CHOLESTEROL/HDL RATIO: 3.4
CO2 SERPL-SCNC: 28 MMOL/L (ref 20–31)
CREAT SERPL-MCNC: 0.8 MG/DL (ref 0.5–0.9)
ERYTHROCYTE [DISTWIDTH] IN BLOOD BY AUTOMATED COUNT: 12.5 % (ref 11.5–14.9)
GFR SERPL CREATININE-BSD FRML MDRD: >60 ML/MIN/1.73M2
GLUCOSE SERPL-MCNC: 137 MG/DL (ref 70–99)
HCT VFR BLD AUTO: 40.7 % (ref 36–46)
HDLC SERPL-MCNC: 56 MG/DL
HGB BLD-MCNC: 13.5 G/DL (ref 12–16)
LDLC SERPL CALC-MCNC: 120 MG/DL (ref 0–130)
MCH RBC QN AUTO: 31 PG (ref 26–34)
MCHC RBC AUTO-ENTMCNC: 33.3 G/DL (ref 31–37)
MCV RBC AUTO: 93.2 FL (ref 80–100)
PLATELET # BLD AUTO: 276 K/UL (ref 150–450)
PMV BLD AUTO: 8.1 FL (ref 6–12)
POTASSIUM SERPL-SCNC: 4.1 MMOL/L (ref 3.7–5.3)
PROT SERPL-MCNC: 7.6 G/DL (ref 6.4–8.3)
RBC # BLD AUTO: 4.36 M/UL (ref 4–5.2)
SODIUM SERPL-SCNC: 143 MMOL/L (ref 135–144)
TRIGL SERPL-MCNC: 62 MG/DL
WBC OTHER # BLD: 5.7 K/UL (ref 3.5–11)

## 2023-09-29 PROCEDURE — 36415 COLL VENOUS BLD VENIPUNCTURE: CPT

## 2023-09-29 PROCEDURE — 1036F TOBACCO NON-USER: CPT | Performed by: PSYCHIATRY & NEUROLOGY

## 2023-09-29 PROCEDURE — 85027 COMPLETE CBC AUTOMATED: CPT

## 2023-09-29 PROCEDURE — 80053 COMPREHEN METABOLIC PANEL: CPT

## 2023-09-29 PROCEDURE — G8417 CALC BMI ABV UP PARAM F/U: HCPCS | Performed by: PSYCHIATRY & NEUROLOGY

## 2023-09-29 PROCEDURE — 80061 LIPID PANEL: CPT

## 2023-09-29 PROCEDURE — G8427 DOCREV CUR MEDS BY ELIG CLIN: HCPCS | Performed by: PSYCHIATRY & NEUROLOGY

## 2023-09-29 PROCEDURE — 99214 OFFICE O/P EST MOD 30 MIN: CPT | Performed by: PSYCHIATRY & NEUROLOGY

## 2023-09-29 RX ORDER — AMITRIPTYLINE HYDROCHLORIDE 25 MG/1
25 TABLET, FILM COATED ORAL NIGHTLY
Qty: 30 TABLET | Refills: 5 | Status: SHIPPED | OUTPATIENT
Start: 2023-09-29

## 2023-09-29 RX ORDER — MECLIZINE HYDROCHLORIDE 25 MG/1
TABLET ORAL
Qty: 90 TABLET | Refills: 2 | Status: SHIPPED | OUTPATIENT
Start: 2023-09-29

## 2023-09-29 RX ORDER — FREMANEZUMAB-VFRM 225 MG/1.5ML
INJECTION SUBCUTANEOUS
Qty: 1 ADJUSTABLE DOSE PRE-FILLED PEN SYRINGE | Refills: 5 | Status: SHIPPED | OUTPATIENT
Start: 2023-09-29

## 2023-09-29 NOTE — PROGRESS NOTES
oriented x 3   Attention and concentration normal  Short term memory normal  Language process and speech normal . No aphasia   Cranial nerve 2 normal acuety and visual fields  Cranial nerve 3, 4 and 6 . Extraocular muscles are intact . Pupils are equal and reactive   Cranial nerve 5 . Intact corneal reflex. Normal facial sensation  Cranial nerve 7 normal exam   Cranial nerve 8. Grossly intact hearing   Cranial nerve 9 and 10. Symmetric palate elevation   Cranial nerve 11 , 5 out of 5 strength   Cranial Nerve 12 midline tongue . No atrophy  Sensation . Normal pinprick and light touch   Deep Tendon Reflexes normal  Plantar response flexor bilaterally      ASSESSMENT/PLAN      Diagnosis Orders   1. Migraine with aura and without status migrainosus, not intractable        2.  Dizziness           Will add to ajovy elavil 25 mg po qhs feeling that there is some vestibular migraine component to dizziness complaints to use antivert 12.5 mg po tid PRN       As above

## 2023-09-29 NOTE — RESULT ENCOUNTER NOTE
Please notify patient: Glucose level high 137, low-carb diet, advised, no pop  Otherwise labs within normal limits  continue current treatment    Lab Results       Component                Value               Date                       LABA1C                   4.6                 01/16/2019                  Future Appointments  9/29/2023  11:00 AM   Dajuan Licea MD     [address] [facility]          Neurology -  10/3/2023  9:30 AM    Wing Seats, MD        [address] [facility]  11/14/2023 1:00 PM    Negar Atkins MD     [facility]  7/16/2024  1:00 PM    [facility] Atkins [facility]     [facility]

## 2023-10-03 ENCOUNTER — OFFICE VISIT (OUTPATIENT)
Dept: UROLOGY | Age: 38
End: 2023-10-03
Payer: COMMERCIAL

## 2023-10-03 VITALS — BODY MASS INDEX: 36.37 KG/M2 | HEIGHT: 64 IN | WEIGHT: 213 LBS

## 2023-10-03 DIAGNOSIS — R31.29 MICROHEMATURIA: Primary | ICD-10-CM

## 2023-10-03 PROCEDURE — G8428 CUR MEDS NOT DOCUMENT: HCPCS | Performed by: UROLOGY

## 2023-10-03 PROCEDURE — 1036F TOBACCO NON-USER: CPT | Performed by: UROLOGY

## 2023-10-03 PROCEDURE — G8484 FLU IMMUNIZE NO ADMIN: HCPCS | Performed by: UROLOGY

## 2023-10-03 PROCEDURE — G8417 CALC BMI ABV UP PARAM F/U: HCPCS | Performed by: UROLOGY

## 2023-10-03 PROCEDURE — 99203 OFFICE O/P NEW LOW 30 MIN: CPT | Performed by: UROLOGY

## 2023-10-03 ASSESSMENT — ENCOUNTER SYMPTOMS
CONSTIPATION: 0
ABDOMINAL PAIN: 0
RESPIRATORY NEGATIVE: 1
EYE PAIN: 0
BACK PAIN: 0
GASTROINTESTINAL NEGATIVE: 1
EYE REDNESS: 0
VOMITING: 0
NAUSEA: 0
WHEEZING: 0
EYES NEGATIVE: 1
COUGH: 0
SHORTNESS OF BREATH: 0
DIARRHEA: 0

## 2023-10-03 NOTE — PROGRESS NOTES
Review of Systems   Constitutional: Negative. Negative for appetite change, chills and fever. Eyes: Negative. Negative for pain, redness and visual disturbance. Respiratory: Negative. Negative for cough, shortness of breath and wheezing. Cardiovascular: Negative. Negative for chest pain and leg swelling. Gastrointestinal: Negative. Negative for abdominal pain, constipation, diarrhea, nausea and vomiting. Genitourinary:  Positive for frequency (up 3 to 4xs at night, during the day q 20mins) and hematuria. Negative for difficulty urinating, dysuria, flank pain and urgency. Musculoskeletal: Negative. Negative for back pain, joint swelling and myalgias. Skin: Negative. Negative for rash and wound. Neurological: Negative. Negative for dizziness, tremors and numbness. Hematological: Negative. Does not bruise/bleed easily.

## 2023-10-03 NOTE — PROGRESS NOTES
[address] [facility] [address]  Dept: [address] [place] [address] Urology Office Note - New Patient    Patient:  Vitaliy Robison  YOB: 1985  Date: 10/3/2023    The patient is a 43 y.o. female who presentstoday for evaluation of the following problems:   Chief Complaint   Patient presents with    Hematuria    referred by Dawn Arreguin MD.    HPI  Here for microhematuria. She had a full work up for hematuria in 2017, which was negative. No recent gross hematuria. U/A showed 6-9 RBCs. She has had a microhematuria for years. (Patient's old records have been requested, reviewed and summarized in today's note.)    Summary of old records: N/A    History: N/A    ProceduresToday: N/A    Urinalysis today:  No results found for this visit on 10/03/23. AUA Symptom Score (10/3/2023): Last BUN andcreatinine:  Lab Results   Component Value Date    BUN 13 09/29/2023     Lab Results   Component Value Date    CREATININE 0.8 09/29/2023       Additional Lab/Culture results: U/A with micro.      Reviewed during this Office Visit: none  (results were independently reviewed byphysician and radiology report verified)    PAST MEDICAL, FAMILY AND SOCIAL HISTORY:  Past Medical History:   Diagnosis Date    Acute appendicitis 1993    Acute constipation 2021    Allergic rhinitis     Anxiety     Benign paroxysmal positional vertigo due to bilateral vestibular disorder 9/7/2022    Chest pain     with anxiety    Cholecystitis 04/21/2022    Pathology report showed significant chronic cholecystitis, had significant omental adhesions to the gallbladder per surgical report    Cholelithiasis 2021    Chronic diarrhea 7/9/2022    Chronic kidney disease     Chronic RLQ pain 8/5/2019    Chronic RUQ pain 2/16/2023    CKD (chronic kidney disease) stage 1, GFR 90 ml/min or greater 07/10/2018

## 2023-10-06 ENCOUNTER — HOSPITAL ENCOUNTER (OUTPATIENT)
Dept: ULTRASOUND IMAGING | Age: 38
End: 2023-10-06
Attending: UROLOGY
Payer: COMMERCIAL

## 2023-10-06 DIAGNOSIS — R31.29 MICROHEMATURIA: ICD-10-CM

## 2023-10-06 PROCEDURE — 76770 US EXAM ABDO BACK WALL COMP: CPT

## 2023-10-11 ENCOUNTER — HOSPITAL ENCOUNTER (OUTPATIENT)
Age: 38
Discharge: HOME | End: 2023-10-11
Payer: COMMERCIAL

## 2023-10-11 ENCOUNTER — HOSPITAL ENCOUNTER (OUTPATIENT)
Age: 38
Setting detail: SPECIMEN
Discharge: HOME | End: 2023-10-11
Payer: COMMERCIAL

## 2023-10-11 LAB
GLUCOSE P FAST SERPL-MCNC: 90 MG/DL (ref 70–99)
INSULIN COMMENT: NORMAL
INSULIN REFERENCE RANGE:: NORMAL
INSULIN: 9.8 MU/L

## 2023-10-11 PROCEDURE — 83525 ASSAY OF INSULIN: CPT

## 2023-10-11 PROCEDURE — 36415 COLL VENOUS BLD VENIPUNCTURE: CPT

## 2023-10-11 PROCEDURE — 82533 TOTAL CORTISOL: CPT

## 2023-10-11 PROCEDURE — 82947 ASSAY GLUCOSE BLOOD QUANT: CPT

## 2023-10-12 ENCOUNTER — OFFICE VISIT (OUTPATIENT)
Dept: FAMILY MEDICINE CLINIC | Age: 38
End: 2023-10-12
Payer: COMMERCIAL

## 2023-10-12 ENCOUNTER — HOSPITAL ENCOUNTER (OUTPATIENT)
Age: 38
Discharge: HOME | End: 2023-10-12
Payer: COMMERCIAL

## 2023-10-12 ENCOUNTER — HOSPITAL ENCOUNTER (OUTPATIENT)
Age: 38
Setting detail: SPECIMEN
Discharge: HOME | End: 2023-10-12

## 2023-10-12 ENCOUNTER — OFFICE VISIT (OUTPATIENT)
Dept: OBGYN CLINIC | Age: 38
End: 2023-10-12

## 2023-10-12 ENCOUNTER — HOSPITAL ENCOUNTER (OUTPATIENT)
Dept: CT IMAGING | Age: 38
Discharge: HOME | End: 2023-10-14
Payer: COMMERCIAL

## 2023-10-12 VITALS
DIASTOLIC BLOOD PRESSURE: 80 MMHG | WEIGHT: 213.6 LBS | HEIGHT: 64 IN | SYSTOLIC BLOOD PRESSURE: 110 MMHG | HEART RATE: 82 BPM | BODY MASS INDEX: 36.47 KG/M2

## 2023-10-12 VITALS
BODY MASS INDEX: 36.7 KG/M2 | DIASTOLIC BLOOD PRESSURE: 80 MMHG | WEIGHT: 215 LBS | TEMPERATURE: 98.4 F | SYSTOLIC BLOOD PRESSURE: 114 MMHG | HEIGHT: 64 IN | OXYGEN SATURATION: 90 % | HEART RATE: 93 BPM

## 2023-10-12 DIAGNOSIS — R10.2 PELVIC PAIN: ICD-10-CM

## 2023-10-12 DIAGNOSIS — N89.8 VAGINAL DISCHARGE: Primary | ICD-10-CM

## 2023-10-12 DIAGNOSIS — F41.9 ANXIETY: ICD-10-CM

## 2023-10-12 DIAGNOSIS — Z11.59 SCREENING FOR VIRAL DISEASE: ICD-10-CM

## 2023-10-12 DIAGNOSIS — J30.9 ALLERGIC RHINITIS, UNSPECIFIED SEASONALITY, UNSPECIFIED TRIGGER: ICD-10-CM

## 2023-10-12 DIAGNOSIS — R19.7 DIARRHEA, UNSPECIFIED TYPE: Primary | ICD-10-CM

## 2023-10-12 DIAGNOSIS — R19.7 DIARRHEA, UNSPECIFIED TYPE: ICD-10-CM

## 2023-10-12 LAB
ALBUMIN SERPL-MCNC: 4.4 G/DL (ref 3.5–5.2)
ALP SERPL-CCNC: 89 U/L (ref 35–104)
ALT SERPL-CCNC: 17 U/L (ref 5–33)
ANION GAP SERPL CALCULATED.3IONS-SCNC: 10 MMOL/L (ref 9–17)
AST SERPL-CCNC: 16 U/L
BASOPHILS # BLD: 0 K/UL (ref 0–0.2)
BASOPHILS NFR BLD: 1 % (ref 0–2)
BILIRUB SERPL-MCNC: 0.3 MG/DL (ref 0.3–1.2)
BILIRUBIN, POC: NEGATIVE
BLOOD URINE, POC: ABNORMAL
BUN SERPL-MCNC: 17 MG/DL (ref 6–20)
CALCIUM SERPL-MCNC: 9.2 MG/DL (ref 8.6–10.4)
CHLORIDE SERPL-SCNC: 102 MMOL/L (ref 98–107)
CLARITY, POC: CLEAR
CO2 SERPL-SCNC: 25 MMOL/L (ref 20–31)
COLOR, POC: YELLOW
CONTROL: NORMAL
CREAT SERPL-MCNC: 0.8 MG/DL (ref 0.5–0.9)
EOSINOPHIL # BLD: 0.1 K/UL (ref 0–0.4)
EOSINOPHILS RELATIVE PERCENT: 1 % (ref 0–4)
ERYTHROCYTE [DISTWIDTH] IN BLOOD BY AUTOMATED COUNT: 12.3 % (ref 11.5–14.9)
GFR SERPL CREATININE-BSD FRML MDRD: >60 ML/MIN/1.73M2
GLUCOSE SERPL-MCNC: 92 MG/DL (ref 70–99)
GLUCOSE URINE, POC: NEGATIVE
HBV SURFACE AB SERPL IA-ACNC: >1000 MIU/ML
HCT VFR BLD AUTO: 40.8 % (ref 36–46)
HGB BLD-MCNC: 13.8 G/DL (ref 12–16)
KETONES, POC: ABNORMAL
LEUKOCYTE EST, POC: NEGATIVE
LYMPHOCYTES NFR BLD: 1.8 K/UL (ref 1–4.8)
LYMPHOCYTES RELATIVE PERCENT: 22 % (ref 24–44)
MAGNESIUM SERPL-MCNC: 2.1 MG/DL (ref 1.6–2.6)
MCH RBC QN AUTO: 31.4 PG (ref 26–34)
MCHC RBC AUTO-ENTMCNC: 33.9 G/DL (ref 31–37)
MCV RBC AUTO: 92.6 FL (ref 80–100)
MONOCYTES NFR BLD: 0.6 K/UL (ref 0.1–1.3)
MONOCYTES NFR BLD: 7 % (ref 1–7)
NEUTROPHILS NFR BLD: 69 % (ref 36–66)
NEUTS SEG NFR BLD: 5.5 K/UL (ref 1.3–9.1)
NITRITE, POC: NEGATIVE
PH, POC: 6
PLATELET # BLD AUTO: 259 K/UL (ref 150–450)
PMV BLD AUTO: 7.9 FL (ref 6–12)
POTASSIUM SERPL-SCNC: 3.9 MMOL/L (ref 3.7–5.3)
PREGNANCY TEST URINE, POC: NEGATIVE
PROT SERPL-MCNC: 7.7 G/DL (ref 6.4–8.3)
PROTEIN, POC: ABNORMAL
RBC # BLD AUTO: 4.41 M/UL (ref 4–5.2)
SODIUM SERPL-SCNC: 137 MMOL/L (ref 135–144)
SPECIFIC GRAVITY, POC: 1.02
UROBILINOGEN, POC: ABNORMAL
WBC OTHER # BLD: 8 K/UL (ref 3.5–11)

## 2023-10-12 PROCEDURE — G8427 DOCREV CUR MEDS BY ELIG CLIN: HCPCS | Performed by: NURSE PRACTITIONER

## 2023-10-12 PROCEDURE — 99215 OFFICE O/P EST HI 40 MIN: CPT | Performed by: NURSE PRACTITIONER

## 2023-10-12 PROCEDURE — 83735 ASSAY OF MAGNESIUM: CPT

## 2023-10-12 PROCEDURE — 2580000003 HC RX 258: Performed by: NURSE PRACTITIONER

## 2023-10-12 PROCEDURE — 36415 COLL VENOUS BLD VENIPUNCTURE: CPT

## 2023-10-12 PROCEDURE — 85025 COMPLETE CBC W/AUTO DIFF WBC: CPT

## 2023-10-12 PROCEDURE — 80053 COMPREHEN METABOLIC PANEL: CPT

## 2023-10-12 PROCEDURE — 93005 ELECTROCARDIOGRAM TRACING: CPT

## 2023-10-12 PROCEDURE — G8484 FLU IMMUNIZE NO ADMIN: HCPCS | Performed by: NURSE PRACTITIONER

## 2023-10-12 PROCEDURE — 6360000004 HC RX CONTRAST MEDICATION: Performed by: NURSE PRACTITIONER

## 2023-10-12 PROCEDURE — 86317 IMMUNOASSAY INFECTIOUS AGENT: CPT

## 2023-10-12 PROCEDURE — G8417 CALC BMI ABV UP PARAM F/U: HCPCS | Performed by: NURSE PRACTITIONER

## 2023-10-12 PROCEDURE — 87506 IADNA-DNA/RNA PROBE TQ 6-11: CPT

## 2023-10-12 PROCEDURE — 74177 CT ABD & PELVIS W/CONTRAST: CPT

## 2023-10-12 PROCEDURE — 1036F TOBACCO NON-USER: CPT | Performed by: NURSE PRACTITIONER

## 2023-10-12 RX ORDER — 0.9 % SODIUM CHLORIDE 0.9 %
100 INTRAVENOUS SOLUTION INTRAVENOUS ONCE
Status: COMPLETED | OUTPATIENT
Start: 2023-10-12 | End: 2023-10-12

## 2023-10-12 RX ORDER — SODIUM CHLORIDE 0.9 % (FLUSH) 0.9 %
10 SYRINGE (ML) INJECTION PRN
Status: DISCONTINUED | OUTPATIENT
Start: 2023-10-12 | End: 2023-10-15 | Stop reason: HOSPADM

## 2023-10-12 RX ORDER — METRONIDAZOLE 7.5 MG/G
1 GEL VAGINAL NIGHTLY
Qty: 1 EACH | Refills: 5 | Status: SHIPPED | OUTPATIENT
Start: 2023-10-12

## 2023-10-12 RX ORDER — LOPERAMIDE HYDROCHLORIDE 2 MG/1
2 CAPSULE ORAL 4 TIMES DAILY PRN
Qty: 40 CAPSULE | Refills: 0 | Status: SHIPPED | OUTPATIENT
Start: 2023-10-12 | End: 2023-10-22

## 2023-10-12 RX ADMIN — SODIUM CHLORIDE, PRESERVATIVE FREE 10 ML: 5 INJECTION INTRAVENOUS at 16:23

## 2023-10-12 RX ADMIN — SODIUM CHLORIDE 100 ML: 9 INJECTION, SOLUTION INTRAVENOUS at 16:23

## 2023-10-12 RX ADMIN — IOPAMIDOL 75 ML: 755 INJECTION, SOLUTION INTRAVENOUS at 16:23

## 2023-10-12 SDOH — ECONOMIC STABILITY: FOOD INSECURITY: WITHIN THE PAST 12 MONTHS, THE FOOD YOU BOUGHT JUST DIDN'T LAST AND YOU DIDN'T HAVE MONEY TO GET MORE.: NEVER TRUE

## 2023-10-12 SDOH — ECONOMIC STABILITY: INCOME INSECURITY: HOW HARD IS IT FOR YOU TO PAY FOR THE VERY BASICS LIKE FOOD, HOUSING, MEDICAL CARE, AND HEATING?: NOT HARD AT ALL

## 2023-10-12 SDOH — ECONOMIC STABILITY: FOOD INSECURITY: WITHIN THE PAST 12 MONTHS, YOU WORRIED THAT YOUR FOOD WOULD RUN OUT BEFORE YOU GOT MONEY TO BUY MORE.: NEVER TRUE

## 2023-10-12 ASSESSMENT — PATIENT HEALTH QUESTIONNAIRE - PHQ9
3. TROUBLE FALLING OR STAYING ASLEEP: 0
SUM OF ALL RESPONSES TO PHQ QUESTIONS 1-9: 1
1. LITTLE INTEREST OR PLEASURE IN DOING THINGS: 0
6. FEELING BAD ABOUT YOURSELF - OR THAT YOU ARE A FAILURE OR HAVE LET YOURSELF OR YOUR FAMILY DOWN: 0
2. FEELING DOWN, DEPRESSED OR HOPELESS: 1
9. THOUGHTS THAT YOU WOULD BE BETTER OFF DEAD, OR OF HURTING YOURSELF: 0
SUM OF ALL RESPONSES TO PHQ9 QUESTIONS 1 & 2: 1
SUM OF ALL RESPONSES TO PHQ QUESTIONS 1-9: 1
SUM OF ALL RESPONSES TO PHQ QUESTIONS 1-9: 1
5. POOR APPETITE OR OVEREATING: 0
SUM OF ALL RESPONSES TO PHQ QUESTIONS 1-9: 1
10. IF YOU CHECKED OFF ANY PROBLEMS, HOW DIFFICULT HAVE THESE PROBLEMS MADE IT FOR YOU TO DO YOUR WORK, TAKE CARE OF THINGS AT HOME, OR GET ALONG WITH OTHER PEOPLE: 0
8. MOVING OR SPEAKING SO SLOWLY THAT OTHER PEOPLE COULD HAVE NOTICED. OR THE OPPOSITE, BEING SO FIGETY OR RESTLESS THAT YOU HAVE BEEN MOVING AROUND A LOT MORE THAN USUAL: 0
7. TROUBLE CONCENTRATING ON THINGS, SUCH AS READING THE NEWSPAPER OR WATCHING TELEVISION: 0
4. FEELING TIRED OR HAVING LITTLE ENERGY: 0

## 2023-10-12 ASSESSMENT — ENCOUNTER SYMPTOMS
COUGH: 0
DIARRHEA: 1
SHORTNESS OF BREATH: 0
VOMITING: 0
RESPIRATORY NEGATIVE: 1
CHEST TIGHTNESS: 0
ABDOMINAL PAIN: 1
ABDOMINAL DISTENTION: 0
CONSTIPATION: 0
EYES NEGATIVE: 1
WHEEZING: 0
GASTROINTESTINAL NEGATIVE: 1
NAUSEA: 1
ALLERGIC/IMMUNOLOGIC NEGATIVE: 1
BLOOD IN STOOL: 0
BACK PAIN: 0

## 2023-10-12 NOTE — PROGRESS NOTES
Visit Information    Have you changed or started any medications since your last visit including any over-the-counter medicines, vitamins, or herbal medicines? no   Have you stopped taking any of your medications? Is so, why? -  no  Are you having any side effects from any of your medications? - no    Have you seen any other physician or provider since your last visit?  no   Have you had any other diagnostic tests since your last visit? yes -    Have you been seen in the emergency room and/or had an admission in a hospital since we last saw you?  no   Have you had your routine dental cleaning in the past 6 months?  no     Do you have an active MyChart account? If no, what is the barrier?   Yes    Patient Care Team:  Clementina Ray MD as PCP - General (Family Medicine)  Clementina Ray MD as PCP - Empaneled Provider  Maria Del Rosario Duron MD as Consulting Physician (Obstetrics & Gynecology)  Juan Goss MD as Consulting Physician (Urology)  Francesco Dickens MD as Consulting Physician (Nephrology)  Roma Dutton (Certified Nurse Practitioner)  oLy Iqbal (Audiology)  Valencia Lawrence MD as Consulting Physician (Endocrinology)  Kerry Allison DO as Consulting Physician (Otolaryngology)  Scar Solis MD as Consulting Physician (Gastroenterology)  Huan Goss DO as Surgeon (General Surgery)  oTmmie Britton DPM as Consulting Physician (Rayray Oswald)  Ingrid Alva MD as Consulting Physician (Neurology)    Medical History Review  Past Medical, Family, and Social History reviewed and does contribute to the patient presenting condition    Health Maintenance   Topic Date Due    Hepatitis B vaccine (1 of 3 - 3-dose series) Never done    COVID-19 Vaccine (1) Never done    Flu vaccine (1) Never done    Depression Monitoring  07/12/2024    Diabetes screen  10/11/2026    Cervical cancer screen  02/14/2028    DTaP/Tdap/Td vaccine (3 - Td or Tdap) 07/08/2032    Hepatitis C
25 MG tablet Take 1 tablet by mouth nightly as needed for Allergies or Itching Yes Kaila Barnes MD   magnesium oxide (MAG-OX) 400 (240 Mg) MG tablet Take 1 tablet by mouth every evening Take with food Yes Kaila Barnes MD   Probiotic Acidophilus (FLORANEX) TABS Take 1 tablet by mouth daily Yes Leese, Randell Goldberg, APRN - CNP   Cranberry-Vitamin C-Probiotic (AZO CRANBERRY) 250-30 MG TABS Take 1 tablet by mouth daily Yes Kaila Barnes MD   vitamin D (CHOLECALCIFEROL) 50 MCG (2000 UT) TABS tablet Take 1 tablet by mouth daily Dose decreased 8/29/2022 Yes Kaila Barnes MD   lidocaine (LMX) 4 % cream 5 g topical 2-3 times a day as needed for pain, maximum 20 g/day Yes Kaila Barnes MD   fexofenadine (ALLEGRA) 180 MG tablet Take 1 tablet by mouth once daily Yes Kaila Barnes MD   hydrocortisone 2.5 % ointment Apply topically 2 times daily for scar for 10 days. Do not use in the skin folds, neck or face Yes Kaila Barnes MD   Ascorbic Acid (VITAMIN C PO) Take 1 tablet by mouth daily Yes ProviderNa MD   Misc Natural Products (CRANBERRY/PROBIOTIC) TABS Take 1 tablet by mouth daily Please dispense according to patients insurance.  Yes Kaila Barnes MD   EPINEPHrine (EPIPEN) 0.3 MG/0.3ML SOAJ injection  Yes Na Matias MD   meclizine (ANTIVERT) 25 MG tablet Take 1 po tid PRN  Patient not taking: Reported on 10/12/2023  Josep New MD   amitriptyline (ELAVIL) 25 MG tablet Take 1 tablet by mouth nightly  Patient not taking: Reported on 10/12/2023  Josep New MD   Lutein 6 MG CAPS Take 6 mg by mouth daily  Patient not taking: Reported on 10/12/2023  Kaila Barnes MD   sodium chloride (ALTAMIST SPRAY) 0.65 % nasal spray 2 sprays by Nasal route as needed for Congestion (Q 2-3 HOURS prn) To flush the mucus and prevent sinus infections  Patient not taking: Reported on 9/29/2023  Kaila Barnes MD   rizatriptan (MAXALT) 10 MG tablet

## 2023-10-12 NOTE — PROGRESS NOTES
History    Marital status: Single     Spouse name: None    Number of children: None    Years of education: None    Highest education level: None   Tobacco Use    Smoking status: Former     Packs/day: 0.25     Years: 4.00     Additional pack years: 0.00     Total pack years: 1.00     Types: Cigarettes     Start date: 2001     Quit date: 2005     Years since quittin.3    Smokeless tobacco: Never    Tobacco comments:     Venkata Lolling and smoked with with friends   Vaping Use    Vaping Use: Never used   Substance and Sexual Activity    Alcohol use: Not Currently     Comment: Hx alcohol abuse as teenager    Drug use: Not Currently     Types: Marijuana May Callander)     Comment: When teen    Sexual activity: Yes     Partners: Male     Birth control/protection: Condom     Social Determinants of Health     Financial Resource Strain: Medium Risk (2023)    Overall Financial Resource Strain (CARDIA)     Difficulty of Paying Living Expenses: Somewhat hard   Food Insecurity: Food Insecurity Present (2023)    Hunger Vital Sign     Worried About Running Out of Food in the Last Year: Often true     Ran Out of Food in the Last Year: Often true   Transportation Needs: Unknown (2023)    PRAPARE - Transportation     Lack of Transportation (Non-Medical): No   Housing Stability: Unknown (2023)    Housing Stability Vital Sign     Unstable Housing in the Last Year: No      Current Outpatient Medications   Medication Sig Dispense Refill    metroNIDAZOLE (METROGEL VAGINAL) 0.75 % vaginal gel Place 1 Tube vaginally nightly Insert into vagina at bedtime for 5 nights then take 3 night off and begin using twice weekly for the next 6 months.  1 each 5    Lactobacillus (ACIDOPHILUS PROBIOTIC) TABS Take 100 mg by mouth daily 30 tablet 11    Fremanezumab-vfrm (AJOVY) 225 MG/1.5ML SOAJ INJECT 225MG SUB-Q EVERY 30 DAYS MUST MAKE APPOINTMENT 1 Adjustable Dose Pre-filled Pen Syringe 5    butalbital-acetaminophen-caffeine (FIORICET,

## 2023-10-13 DIAGNOSIS — N89.8 VAGINAL DISCHARGE: ICD-10-CM

## 2023-10-13 DIAGNOSIS — R10.2 PELVIC PAIN: ICD-10-CM

## 2023-10-13 LAB
CAMPYLOBACTER DNA SPEC NAA+PROBE: NORMAL
CANDIDA SPECIES: NEGATIVE
EKG ATRIAL RATE: 70 BPM
EKG P AXIS: 76 DEGREES
EKG P-R INTERVAL: 168 MS
EKG Q-T INTERVAL: 388 MS
EKG QRS DURATION: 68 MS
EKG QTC CALCULATION (BAZETT): 419 MS
EKG R AXIS: 10 DEGREES
EKG T AXIS: 48 DEGREES
EKG VENTRICULAR RATE: 70 BPM
ETEC ELTA+ESTB GENES STL QL NAA+PROBE: NORMAL
GARDNERELLA VAGINALIS: NEGATIVE
P SHIGELLOIDES DNA STL QL NAA+PROBE: NORMAL
SALMONELLA DNA SPEC QL NAA+PROBE: NORMAL
SHIGA TOXIN STX GENE SPEC NAA+PROBE: NORMAL
SHIGELLA DNA SPEC QL NAA+PROBE: NORMAL
SOURCE: NORMAL
SPECIMEN DESCRIPTION: NORMAL
TRICHOMONAS: NEGATIVE
V CHOL+PARA RFBL+TRKH+TNAA STL QL NAA+PR: NORMAL
Y ENTERO RECN STL QL NAA+PROBE: NORMAL

## 2023-10-13 NOTE — RESULT ENCOUNTER NOTE
Please notify patient that the CT if the abdomen is showing no acute abnormality, please follow up with GI.

## 2023-10-13 NOTE — RESULT ENCOUNTER NOTE
Please notify patient that she is immune to Hep B. Stool culture is negative. WBC is normal. Other labs WNL. Please avoid the fatty and greasy foods as this may contribute to some of the symptoms.  Please follow up with GI.

## 2023-10-14 LAB
C TRACH DNA SPEC QL PROBE+SIG AMP: NEGATIVE
N GONORRHOEA DNA SPEC QL PROBE+SIG AMP: NEGATIVE
SPECIMEN DESCRIPTION: NORMAL

## 2023-10-15 LAB — CORTISOL SALIVARY: 0.07 UG/DL

## 2023-10-29 DIAGNOSIS — G43.109 MIGRAINE WITH AURA AND WITHOUT STATUS MIGRAINOSUS, NOT INTRACTABLE: ICD-10-CM

## 2023-10-30 ENCOUNTER — OFFICE VISIT (OUTPATIENT)
Dept: GASTROENTEROLOGY | Age: 38
End: 2023-10-30
Payer: COMMERCIAL

## 2023-10-30 VITALS
SYSTOLIC BLOOD PRESSURE: 113 MMHG | BODY MASS INDEX: 36.39 KG/M2 | DIASTOLIC BLOOD PRESSURE: 70 MMHG | TEMPERATURE: 97.7 F | WEIGHT: 212 LBS

## 2023-10-30 DIAGNOSIS — R63.5 UNINTENDED WEIGHT GAIN: Primary | ICD-10-CM

## 2023-10-30 DIAGNOSIS — R10.30 LOWER ABDOMINAL PAIN: ICD-10-CM

## 2023-10-30 DIAGNOSIS — K58.2 IRRITABLE BOWEL SYNDROME WITH BOTH CONSTIPATION AND DIARRHEA: ICD-10-CM

## 2023-10-30 DIAGNOSIS — R14.0 ABDOMINAL BLOATING: ICD-10-CM

## 2023-10-30 DIAGNOSIS — F41.9 ANXIETY: ICD-10-CM

## 2023-10-30 PROCEDURE — 1036F TOBACCO NON-USER: CPT | Performed by: INTERNAL MEDICINE

## 2023-10-30 PROCEDURE — G8484 FLU IMMUNIZE NO ADMIN: HCPCS | Performed by: INTERNAL MEDICINE

## 2023-10-30 PROCEDURE — G8417 CALC BMI ABV UP PARAM F/U: HCPCS | Performed by: INTERNAL MEDICINE

## 2023-10-30 PROCEDURE — 99204 OFFICE O/P NEW MOD 45 MIN: CPT | Performed by: INTERNAL MEDICINE

## 2023-10-30 PROCEDURE — G8427 DOCREV CUR MEDS BY ELIG CLIN: HCPCS | Performed by: INTERNAL MEDICINE

## 2023-10-30 RX ORDER — BACILLUS COAGULANS 1B CELL
1 CAPSULE ORAL DAILY
Qty: 90 EACH | Refills: 3 | Status: SHIPPED | OUTPATIENT
Start: 2023-10-30

## 2023-10-30 ASSESSMENT — ENCOUNTER SYMPTOMS
WHEEZING: 0
SORE THROAT: 0
RECTAL PAIN: 0
NAUSEA: 1
SHORTNESS OF BREATH: 0
CONSTIPATION: 1
CHOKING: 0
ABDOMINAL PAIN: 1
VOICE CHANGE: 0
COUGH: 0
VOMITING: 0
BLOOD IN STOOL: 0
ABDOMINAL DISTENTION: 0
DIARRHEA: 1
TROUBLE SWALLOWING: 0
ANAL BLEEDING: 0

## 2023-10-30 NOTE — TELEPHONE ENCOUNTER
Pharmacy requesting refill of Fioricet -40 mg.      Medication active on med list yes      Date of last Rx: 9/26/2023 with 0 refills          verified by SB, CHRISTIANA      Date of last appointment 9/29/2023    Next Visit Date:  1/12/2024

## 2023-10-30 NOTE — PROGRESS NOTES
GI NEW PATIENT OFFICE VISIT    INTERVAL HISTORY:   Erasto Lucas, APRN - CNP  [address], [address] [facility],  [address]    Chief Complaint   Patient presents with    Irritable Bowel Syndrome     Pt is here today as a NP for IBS & diarrhea, unspecified type. 1. Unintended weight gain    2. Lower abdominal pain    3. Irritable bowel syndrome with both constipation and diarrhea    4. Anxiety    5. Abdominal bloating      This patient seen my office for the first time  She has been previously evaluated at [facility]  She has history significant for what appears to be chronic irritable bowel syndrome-like symptoms  She did EGD and colonoscopy done few years ago which were reviewed with her  Patient has been complaining of some abdominal pains, off and on cramping  Also complains of abdominal bloating and gas  Has off and on nausea without any sig vomiting  Has some alternating constipation and diarrhea  Has no weight loss  Has some anxiety issues  She has been gaining weight  Has stress and anxiety issues  Denies smoking alcohol abuse illicit drug usage  Admits to consuming some processed food and drinks  No known family history for colon cancer    HISTORY OF PRESENT ILLNESS: Chuck Castaneda is a 43 y.o. female with a past history remarkable for , referred for evaluation of   Chief Complaint   Patient presents with    Irritable Bowel Syndrome     Pt is here today as a NP for IBS & diarrhea, unspecified type. Kyra Martin Past Medical,Family, and Social History reviewed and does contribute to the patient presenting condition. Patient's PMH/PSH,SH,PSYCH Hx, MEDs, ALLERGIES, and ROS were all reviewed and updated in the appropriate sections.     PAST MEDICAL HISTORY:  Past Medical History:   Diagnosis Date    Acute appendicitis 1993    Acute constipation 2021    Allergic rhinitis     Anxiety     Benign paroxysmal positional vertigo due to bilateral vestibular disorder 9/7/2022    Chest

## 2023-10-31 RX ORDER — BUTALBITAL, ACETAMINOPHEN AND CAFFEINE 50; 325; 40 MG/1; MG/1; MG/1
TABLET ORAL
Qty: 90 TABLET | Refills: 0 | Status: SHIPPED | OUTPATIENT
Start: 2023-10-31

## 2023-11-03 ENCOUNTER — TELEPHONE (OUTPATIENT)
Dept: FAMILY MEDICINE CLINIC | Age: 38
End: 2023-11-03

## 2023-11-03 DIAGNOSIS — J30.9 ALLERGIC RHINITIS, UNSPECIFIED SEASONALITY, UNSPECIFIED TRIGGER: Primary | ICD-10-CM

## 2023-11-03 RX ORDER — IPRATROPIUM BROMIDE 21 UG/1
2 SPRAY, METERED NASAL EVERY 12 HOURS
Qty: 30 ML | Refills: 0 | Status: SHIPPED | OUTPATIENT
Start: 2023-11-03

## 2023-11-03 NOTE — TELEPHONE ENCOUNTER
Please let the patient know to  prescription from the pharmacy. Nasal spray Flonase, it is a very small dosage of steroid, I doubt that this is affecting her eyes  Orders Placed This Encounter   Medications    ipratropium (ATROVENT) 0.03 % nasal spray     Si sprays by Each Nostril route in the morning and 2 sprays in the evening. Replacing Flonase, do not take more than 1 or 2 weeks at a time. Dispense:  30 mL     Refill:  [address] [place] [address], [address] [place] [address]  Phone: [phone] Fax: [phone]      No orders of the defined types were placed in this encounter. Future Appointments   Date Time Provider [address] [place] [address]   2023  8:30 AM Baltazar aGrcia MD [facility]   2023  1:00 PM Ila Womack MD [facility]   2024  9:20 AM Krista Treviño MD [address] [place] [address] Neurology -   2024 12:00 PM Yonny Diaz MD [facility]   2024  1:00 PM [facility]       Thank you!

## 2023-11-03 NOTE — TELEPHONE ENCOUNTER
Pt called in stating that she is having some eye issues and one of the causes is said to be taking a steroid. Pt states she read that her Flonase contains a steroid. Pt is inquiring if there is anything else she could replace the Flonase with that doesn't contain a steroid.

## 2023-11-07 ENCOUNTER — PROCEDURE VISIT (OUTPATIENT)
Dept: UROLOGY | Age: 38
End: 2023-11-07
Payer: COMMERCIAL

## 2023-11-07 VITALS — SYSTOLIC BLOOD PRESSURE: 128 MMHG | DIASTOLIC BLOOD PRESSURE: 78 MMHG

## 2023-11-07 DIAGNOSIS — R31.29 MICROHEMATURIA: Primary | ICD-10-CM

## 2023-11-07 PROCEDURE — 52000 CYSTOURETHROSCOPY: CPT | Performed by: UROLOGY

## 2023-11-07 RX ORDER — L. ACIDOPHILUS/PECTIN, CITRUS 25MM-100MG
1 TABLET ORAL DAILY
COMMUNITY
Start: 2023-10-12

## 2023-11-07 NOTE — PROGRESS NOTES
Cystoscopy Operative Note (11/7/23): Surgeon: Bernarda Eugene MD   Anesthesia: Urethral 2% Xylocaine  Indications: microhematuria   Position: Dorsal Lithotomy    Findings:   Risks and Benefits discussed with patient prior to procedure. The patient was prepped and draped in the usual sterile fashion. The flexible cystoscope was advanced through the urethra and into the bladder. The bladder was thoroughly inspected and the following was noted:    Vagina: normal appearing vagina with normal color and discharge, no lesions  Residual Urine: none  Urethra: not indicated and normal appearing urethra with no masses, tenderness or lesions  Bladder: No tumors or CIS noted. No bladder diverticulum. There was none trabeculation noted. Ureters: Clear efflux from both ureters. Orifices with normal configuration and location. The cystoscope was removed. The patient tolerated the procedure well. Agree with the ROS entered by the MA.

## 2023-11-14 ENCOUNTER — OFFICE VISIT (OUTPATIENT)
Dept: FAMILY MEDICINE CLINIC | Age: 38
End: 2023-11-14
Payer: COMMERCIAL

## 2023-11-14 VITALS
OXYGEN SATURATION: 98 % | TEMPERATURE: 98.1 F | WEIGHT: 212.6 LBS | DIASTOLIC BLOOD PRESSURE: 76 MMHG | HEIGHT: 64 IN | BODY MASS INDEX: 36.29 KG/M2 | HEART RATE: 78 BPM | SYSTOLIC BLOOD PRESSURE: 126 MMHG

## 2023-11-14 DIAGNOSIS — M17.0 PRIMARY OSTEOARTHRITIS OF BOTH KNEES: Primary | ICD-10-CM

## 2023-11-14 DIAGNOSIS — R73.9 HYPERGLYCEMIA: ICD-10-CM

## 2023-11-14 DIAGNOSIS — L30.9 FACIAL DERMATITIS: ICD-10-CM

## 2023-11-14 DIAGNOSIS — L30.8 PRURITIC DERMATITIS: ICD-10-CM

## 2023-11-14 DIAGNOSIS — L85.8 KERATOSIS PILARIS: ICD-10-CM

## 2023-11-14 DIAGNOSIS — F33.1 MODERATE EPISODE OF RECURRENT MAJOR DEPRESSIVE DISORDER (HCC): ICD-10-CM

## 2023-11-14 DIAGNOSIS — E78.5 HYPERLIPIDEMIA WITH TARGET LDL LESS THAN 100: ICD-10-CM

## 2023-11-14 DIAGNOSIS — E06.3 HASHIMOTO'S THYROIDITIS: ICD-10-CM

## 2023-11-14 DIAGNOSIS — R23.3 EASY BRUISING: ICD-10-CM

## 2023-11-14 LAB — HBA1C MFR BLD: 4.8 %

## 2023-11-14 PROCEDURE — G8427 DOCREV CUR MEDS BY ELIG CLIN: HCPCS | Performed by: FAMILY MEDICINE

## 2023-11-14 PROCEDURE — 99214 OFFICE O/P EST MOD 30 MIN: CPT | Performed by: FAMILY MEDICINE

## 2023-11-14 PROCEDURE — G8417 CALC BMI ABV UP PARAM F/U: HCPCS | Performed by: FAMILY MEDICINE

## 2023-11-14 PROCEDURE — 83036 HEMOGLOBIN GLYCOSYLATED A1C: CPT | Performed by: FAMILY MEDICINE

## 2023-11-14 PROCEDURE — 1036F TOBACCO NON-USER: CPT | Performed by: FAMILY MEDICINE

## 2023-11-14 PROCEDURE — G8484 FLU IMMUNIZE NO ADMIN: HCPCS | Performed by: FAMILY MEDICINE

## 2023-11-14 RX ORDER — AMMONIUM LACTATE 12 G/100G
CREAM TOPICAL
Qty: 385 G | Refills: 0 | Status: SHIPPED | OUTPATIENT
Start: 2023-11-14 | End: 2023-12-14

## 2023-11-14 RX ORDER — CHLORAL HYDRATE 500 MG
2000 CAPSULE ORAL DAILY
Qty: 60 CAPSULE | Refills: 3 | Status: SHIPPED | OUTPATIENT
Start: 2023-11-14

## 2023-11-14 RX ORDER — CLOTRIMAZOLE AND BETAMETHASONE DIPROPIONATE 10; .64 MG/G; MG/G
CREAM TOPICAL
Qty: 45 G | Refills: 0 | Status: SHIPPED | OUTPATIENT
Start: 2023-11-14

## 2023-11-14 RX ORDER — TRIAMCINOLONE ACETONIDE 1 MG/G
OINTMENT TOPICAL
Qty: 80 G | Refills: 0 | Status: SHIPPED | OUTPATIENT
Start: 2023-11-14

## 2023-11-14 SDOH — ECONOMIC STABILITY: INCOME INSECURITY: HOW HARD IS IT FOR YOU TO PAY FOR THE VERY BASICS LIKE FOOD, HOUSING, MEDICAL CARE, AND HEATING?: SOMEWHAT HARD

## 2023-11-14 SDOH — ECONOMIC STABILITY: FOOD INSECURITY: WITHIN THE PAST 12 MONTHS, THE FOOD YOU BOUGHT JUST DIDN'T LAST AND YOU DIDN'T HAVE MONEY TO GET MORE.: NEVER TRUE

## 2023-11-14 SDOH — ECONOMIC STABILITY: FOOD INSECURITY: WITHIN THE PAST 12 MONTHS, YOU WORRIED THAT YOUR FOOD WOULD RUN OUT BEFORE YOU GOT MONEY TO BUY MORE.: NEVER TRUE

## 2023-11-14 ASSESSMENT — PATIENT HEALTH QUESTIONNAIRE - PHQ9
1. LITTLE INTEREST OR PLEASURE IN DOING THINGS: 3
6. FEELING BAD ABOUT YOURSELF - OR THAT YOU ARE A FAILURE OR HAVE LET YOURSELF OR YOUR FAMILY DOWN: 0
SUM OF ALL RESPONSES TO PHQ QUESTIONS 1-9: 14
SUM OF ALL RESPONSES TO PHQ QUESTIONS 1-9: 14
3. TROUBLE FALLING OR STAYING ASLEEP: 2
9. THOUGHTS THAT YOU WOULD BE BETTER OFF DEAD, OR OF HURTING YOURSELF: 0
8. MOVING OR SPEAKING SO SLOWLY THAT OTHER PEOPLE COULD HAVE NOTICED. OR THE OPPOSITE, BEING SO FIGETY OR RESTLESS THAT YOU HAVE BEEN MOVING AROUND A LOT MORE THAN USUAL: 0
SUM OF ALL RESPONSES TO PHQ QUESTIONS 1-9: 14
2. FEELING DOWN, DEPRESSED OR HOPELESS: 3
10. IF YOU CHECKED OFF ANY PROBLEMS, HOW DIFFICULT HAVE THESE PROBLEMS MADE IT FOR YOU TO DO YOUR WORK, TAKE CARE OF THINGS AT HOME, OR GET ALONG WITH OTHER PEOPLE: 2
7. TROUBLE CONCENTRATING ON THINGS, SUCH AS READING THE NEWSPAPER OR WATCHING TELEVISION: 2
SUM OF ALL RESPONSES TO PHQ9 QUESTIONS 1 & 2: 6
4. FEELING TIRED OR HAVING LITTLE ENERGY: 3
5. POOR APPETITE OR OVEREATING: 1
SUM OF ALL RESPONSES TO PHQ QUESTIONS 1-9: 14

## 2023-11-14 ASSESSMENT — ENCOUNTER SYMPTOMS
CHEST TIGHTNESS: 0
CONSTIPATION: 0
SHORTNESS OF BREATH: 0
NAUSEA: 0
VOMITING: 0
ABDOMINAL DISTENTION: 0
WHEEZING: 0
COUGH: 0

## 2023-11-14 ASSESSMENT — COLUMBIA-SUICIDE SEVERITY RATING SCALE - C-SSRS
4. HAVE YOU HAD THESE THOUGHTS AND HAD SOME INTENTION OF ACTING ON THEM?: NO
7. DID THIS OCCUR IN THE LAST THREE MONTHS: NO
3. HAVE YOU BEEN THINKING ABOUT HOW YOU MIGHT KILL YOURSELF?: NO
5. HAVE YOU STARTED TO WORK OUT OR WORKED OUT THE DETAILS OF HOW TO KILL YOURSELF? DO YOU INTEND TO CARRY OUT THIS PLAN?: NO

## 2023-11-14 NOTE — PROGRESS NOTES
Visit Information    Have you changed or started any medications since your last visit including any over-the-counter medicines, vitamins, or herbal medicines? no   Have you stopped taking any of your medications? Is so, why? -  no  Are you having any side effects from any of your medications? - no    Have you seen any other physician or provider since your last visit?  no   Have you had any other diagnostic tests since your last visit? yes -    Have you been seen in the emergency room and/or had an admission in a hospital since we last saw you?  no   Have you had your routine dental cleaning in the past 6 months?  yes -      Do you have an active MyChart account? If no, what is the barrier?   Yes    Patient Care Team:  Fernanda Lopez MD as PCP - General (Family Medicine)  Fernanda Lopez MD as PCP - Empaneled Provider  Ayden Cisneros MD as Consulting Physician (Obstetrics & Gynecology)  Silvana Lopez MD as Consulting Physician (Urology)  Enrique Rodriguez MD as Consulting Physician (Nephrology)  Kaushik Navas (Certified Nurse Practitioner)  Silvia Trevizo (Audiology)  Kayla Newell MD as Consulting Physician (Endocrinology)  Millicent Velasquez DO as Consulting Physician (Otolaryngology)  Daniel Leon MD as Consulting Physician (Gastroenterology)  Bonnie Wells DO as Surgeon (General Surgery)  Vadim Reddy DPM as Consulting Physician (Sammi Mcmullen)  Sofía Cruz MD as Consulting Physician (Neurology)    Medical History Review  Past Medical, Family, and Social History reviewed and does contribute to the patient presenting condition    Health Maintenance   Topic Date Due    Hepatitis B vaccine (1 of 3 - 3-dose series) Never done    COVID-19 Vaccine (1) 10/01/2024 (Originally 1985)    Flu vaccine (1) 10/12/2024 (Originally 8/1/2023)    Depression Monitoring  10/12/2024    Cervical cancer screen  02/14/2028    DTaP/Tdap/Td vaccine (3 - Td or Tdap) 07/08/2032

## 2023-11-14 NOTE — PROGRESS NOTES
Bjorn Phoenix (:  1985) is a 43 y.o. female,Established patient, here for evaluation of the following chief complaint(s): Hyperglycemia (a1c), Immunizations (No to all vaccines ), Knee Pain (When she goes to the bathroom, and bending down feels like her knees are slipping out of place/been going on for a while ), and Depression      ASSESSMENT/PLAN:    1. Primary osteoarthritis of both knees  Worsening  To try knee sleeve from over-the-counter  To try topical cream, start physical therapy and will refer to orthopedics for knee injections  Start therapy preferably aqua therapy  -   start  Omega-3 Fatty Acids (FISH OIL) 1000 MG capsule; Take 2 capsules by mouth daily, Disp-60 capsule, R-3Normal  -     [facility] - José Manuel Torres MD, Orthopaedic Surgery, [place]  - start    diclofenac sodium (VOLTAREN) 1 % GEL; Apply 2 g topically 4 times daily as needed for Pain, Topical, 4 TIMES DAILY PRN Starting 2023, Disp-100 g, R-0, Normal  -     [facility] Physical Therapy [facility]  2. Hashimoto's thyroiditis  Stable, monitor TSH and free T4 by endocrinologist as well  She has never been on thyroid medication  3. Hyperlipidemia with target LDL less than 100  Inadequately controlled    -   start   Omega-3 Fatty Acids (FISH OIL) 1000 MG capsule; Take 2 capsules by mouth daily, Disp-60 capsule, R-3Normal  Low carb, low fat diet, increase fruits and vegetables, and exercise 4-5 times a week 30-40 minutes a day, or walk 1-2 hours per day, or wear a pedometer and get at least 10,000 steps per day. 4. Hyperglycemia  Mild 137 on 23  -     POCT glycosylated hemoglobin (Hb A1C) 4.8, normal  Lab Results   Component Value Date    LABA1C 4.8 2023    LABA1C 4.6 2019       5. Pruritic dermatitis--dry skin on the back  -start     ammonium lactate (LAC-HYDRIN) 12 % cream; Apply topically as needed.  For dry skin, Disp-385 g, R-0, Normal  -   start  clotrimazole-betamethasone (LOTRISONE) 1-0.05 % cream; Called and explained I have no influence over billing. Will forward to patient care representative.     Aftab Montalvo

## 2023-11-20 ASSESSMENT — ENCOUNTER SYMPTOMS
ABDOMINAL PAIN: 1
DIARRHEA: 1

## 2023-11-22 ENCOUNTER — OFFICE VISIT (OUTPATIENT)
Dept: ORTHOPEDIC SURGERY | Age: 38
End: 2023-11-22
Payer: COMMERCIAL

## 2023-11-22 VITALS — BODY MASS INDEX: 36.28 KG/M2 | WEIGHT: 212.52 LBS | HEIGHT: 64 IN

## 2023-11-22 DIAGNOSIS — M22.41 CHONDROMALACIA OF RIGHT PATELLOFEMORAL JOINT: ICD-10-CM

## 2023-11-22 DIAGNOSIS — M25.561 CHRONIC PAIN OF BOTH KNEES: Primary | ICD-10-CM

## 2023-11-22 DIAGNOSIS — M25.562 CHRONIC PAIN OF BOTH KNEES: Primary | ICD-10-CM

## 2023-11-22 DIAGNOSIS — G89.29 CHRONIC PAIN OF BOTH KNEES: Primary | ICD-10-CM

## 2023-11-22 PROCEDURE — G8484 FLU IMMUNIZE NO ADMIN: HCPCS | Performed by: PHYSICIAN ASSISTANT

## 2023-11-22 PROCEDURE — G8417 CALC BMI ABV UP PARAM F/U: HCPCS | Performed by: PHYSICIAN ASSISTANT

## 2023-11-22 PROCEDURE — 1036F TOBACCO NON-USER: CPT | Performed by: PHYSICIAN ASSISTANT

## 2023-11-22 PROCEDURE — G8427 DOCREV CUR MEDS BY ELIG CLIN: HCPCS | Performed by: PHYSICIAN ASSISTANT

## 2023-11-22 PROCEDURE — 99204 OFFICE O/P NEW MOD 45 MIN: CPT | Performed by: PHYSICIAN ASSISTANT

## 2023-11-22 NOTE — PROGRESS NOTES
04/21/2022    CHOLECYSTECTOMY LAPAROSCOPIC ROBOTIC performed by Frankie Gandhi DO at [address] [facility]  09/29/2021    COLONOSCOPY DIAGNOSTIC (N/A)    COLONOSCOPY N/A 09/29/2021    COLONOSCOPY WITH BIOPSY performed by Luz Marina Mayer MD at [address] N/A 05/02/2019    LAPAROSCOPY OPERATIVE, LYSIS OF ADHESIONS performed by Mahogany James MD at [address] 09/29/2021     EGD BIOPSY (N/A )     UPPER GASTROINTESTINAL ENDOSCOPY N/A 09/29/2021    EGD BIOPSY performed by Luz Marina Mayer MD at [facility]  09/29/2021     Family History   Problem Relation Age of Onset    Mental Illness Mother     No Known Problems Father     Breast Cancer Maternal Grandmother     Heart Attack Maternal Grandfather     Colon Cancer Neg Hx         Review of Systems   Constitutional: Negative for fever, chills, sweats. Eyes: Negative for changes in vision, or pain. HENT: Negative for ear ache, epistaxis, or sore throat. Respiratory/Cardio: Negative for Chest pain, palpitations, SOB, or cough. Gastrointestinal: Negative for abdominal pain, N/V/D. Genitourinary: Negative for dysuria, frequency, urgency, or hematuria. Neurological: Negative for headache, numbness, or weakness. Integumentary: Negative for rash, itching, laceration, or abrasion. Musculoskeletal: Positive for Knee Pain (B/L)       Physical Exam:  Constitutional: Patient is oriented to person, place, and time. Patient appears well-developed and well nourished. HENT: Negative otherwise noted  Head: Normocephalic and Atraumatic  Nose: Normal  Eyes: Conjunctivae and EOM are normal  Neck: Normal range of motion Neck supple. Respiratory/Cardio: Effort normal. No respiratory distress.   Musculoskeletal:    knee: Bilateral    Skin: warm and dry, no rash or erythema  Vasculature: 2+ pedal pulses bilaterally  Neuro: Sensation grossly intact to light touch

## 2023-11-29 ENCOUNTER — HOSPITAL ENCOUNTER (OUTPATIENT)
Dept: PHYSICAL THERAPY | Age: 38
Setting detail: THERAPIES SERIES
Discharge: HOME | End: 2023-11-29
Payer: COMMERCIAL

## 2023-11-29 PROCEDURE — 97162 PT EVAL MOD COMPLEX 30 MIN: CPT

## 2023-11-29 NOTE — THERAPY EVALUATION
out:09:28am    Electronically signed by: Dawn Bruner PT        Physician Signature:________________________________Date:__________________  By signing above or cosigning this note, I have reviewed this plan of care and certify a need for medically necessary rehabilitation services.      *PLEASE SIGN ABOVE AND FAX BACK ALL PAGES*

## 2023-12-05 ENCOUNTER — HOSPITAL ENCOUNTER (OUTPATIENT)
Dept: PHYSICAL THERAPY | Age: 38
Setting detail: THERAPIES SERIES
Discharge: HOME | End: 2023-12-05
Payer: MEDICAID

## 2023-12-05 PROCEDURE — 97113 AQUATIC THERAPY/EXERCISES: CPT

## 2023-12-05 NOTE — FLOWSHEET NOTE
[x] [facility]) - [facility] & Therapy  [address]  [place]: [phone]   F: [phone]    Physical Therapy Daily Treatment Note    Date:  2023  Patient Name:  Abdiel Cohen    :  1985  MRN: [id number]  Physician: Keila Lozoya MD                              Insurance: Craig Sellers ([address] after eval, ) Starting 23 [place] medicaid will be her primary insurance. Medical Diagnosis: M17.0 (ICD-10-CM) - Primary osteoarthritis of both knees                   Rehab Codes: M25.561 right knee pain, M25.562 Left knee pain  Onset Date: 2023                                 Next 's appt: 24 Maylin Noe  Visit# / total visits:   Cancels/No Shows: 0/0    Subjective:  Pt reports knees feeling pretty good at this time. States she has been taking her fish oil, putting biofreeze on and performing HEP has been helping so far. Pain:  [x] Yes  [] No Location: (B) knees   Pain Rating: (0-10 scale) 3/10  Pain altered Tx:  [x] No  [] Yes  Action:  Comments: initial aquatic therapy visit. Educated on postural awareness, core stability and working in pain free ranges with all exercises. Objective:    [address] [facility] [employer] Exercise Log  Aquatic Knee phase 1    Date of Eval:    23                            Primary PT: Giles March, PT      Date 23       Visit # 2/12       Walk F/L/R 2 Laps       Marching 10x       Squats 10x5\"       Heel toe raises 10x       SLR F/L/R 10x       HS Curl 10x       Step-Ups F/L        Step Down F/L        Lunges F/L        Knee/Flex /Ext                Kickboard Ex.  Med       Iso Abd.  10x5\"       Push-pull 10x       Paddling                Balance        Tandem        SLS                DEEP H2O        Cycling        Jacks        X-Country        Hang                Stretches        Achllies 2x20\"       Hamstring 2x20\"       Psoas        Quad                Cool Down 2 Laps

## 2023-12-07 ENCOUNTER — HOSPITAL ENCOUNTER (OUTPATIENT)
Dept: PHYSICAL THERAPY | Age: 38
Setting detail: THERAPIES SERIES
Discharge: HOME | End: 2023-12-07
Payer: MEDICAID

## 2023-12-07 PROCEDURE — 97113 AQUATIC THERAPY/EXERCISES: CPT

## 2023-12-07 NOTE — FLOWSHEET NOTE
[x] [facility] & Therapy  [address]  [place]: [phone]   F: [phone]    Physical Therapy Daily Treatment Note    Date:  2023  Patient Name:  Jose Lopez    :  1985  MRN: [id number]  Physician: Kassy Birmingham MD                              Insurance: [facility] Medicaid $0 copay  visits remaining. Medical Diagnosis: M17.0 (ICD-10-CM) - Primary osteoarthritis of both knees                   Rehab Codes: M25.561 right knee pain, M25.562 Left knee pain  Onset Date: 2023                                 Next 's appt: 24 Chapis Walsh  Visit# / total visits: 3/12  Cancels/No Shows: 0/0    Subjective:  Pt reports increased soreness in posterior right knee and just distal to joint line. Also notes left foot was \"burning\" after initial visit. Upon arrival posterior right knee is painful and just distal to knee on tibia. Pain:  [x] Yes  [] No Location: (B) knees R>L   Pain Rating: (0-10 scale) 3-4/10  Pain altered Tx:  [x] No  [] Yes  Action:  Comments: Reviewed postural awareness, core stability and working in pain free ranges with all exercises. Objective:    [address] [employer] Exercise Log  Aquatic Knee phase 1    Date of Eval:    23                            Primary PT: Augustus Sears, PT      Date 23      Visit # 2/12 3/12      Walk F/L/R 2 Laps 2 Laps      Marching 10x 10x      Squats 10x5\" 10x5\"      Heel toe raises 10x 10x      SLR F/L/R 10x 10x      HS Curl 10x 10x      Step-Ups F/L  Low 10x      Step Down F/L        Lunges F/L        Knee/Flex /Ext  10x              Kickboard Ex.  Med       Iso Abd.  10x5\"       Push-pull 10x       Paddling                Balance        Tandem        SLS                DEEP H2O  1 Noodle      Cycling  1'      Jacks        X-Country        Hang  3'              Stretches        Achllies 2x20\" 2x20\"      Hamstring 2x20\" 2x20\"      Psoas

## 2023-12-12 ENCOUNTER — APPOINTMENT (OUTPATIENT)
Dept: PHYSICAL THERAPY | Age: 38
End: 2023-12-12
Payer: MEDICAID

## 2023-12-13 ENCOUNTER — E-VISIT (OUTPATIENT)
Dept: FAMILY MEDICINE CLINIC | Age: 38
End: 2023-12-13
Payer: MEDICAID

## 2023-12-13 DIAGNOSIS — L30.8 PRURITIC DERMATITIS: ICD-10-CM

## 2023-12-13 DIAGNOSIS — E55.9 VITAMIN D DEFICIENCY: ICD-10-CM

## 2023-12-13 DIAGNOSIS — U07.1 COVID-19 VIRUS INFECTION: Primary | ICD-10-CM

## 2023-12-13 PROCEDURE — 99423 OL DIG E/M SVC 21+ MIN: CPT | Performed by: FAMILY MEDICINE

## 2023-12-13 RX ORDER — BENZONATATE 100 MG/1
100 CAPSULE ORAL 3 TIMES DAILY PRN
Qty: 21 CAPSULE | Refills: 0 | Status: SHIPPED | OUTPATIENT
Start: 2023-12-13 | End: 2023-12-20

## 2023-12-13 RX ORDER — CHOLECALCIFEROL (VITAMIN D3) 50 MCG
2000 TABLET ORAL DAILY
Qty: 90 TABLET | Refills: 3 | Status: SHIPPED | OUTPATIENT
Start: 2023-12-13

## 2023-12-13 RX ORDER — ACETAMINOPHEN 500 MG
500 TABLET ORAL EVERY 6 HOURS PRN
Qty: 120 TABLET | Refills: 0 | Status: SHIPPED | OUTPATIENT
Start: 2023-12-13

## 2023-12-13 RX ORDER — AMMONIUM LACTATE 12 G/100G
CREAM TOPICAL
Qty: 385 G | Refills: 0 | Status: SHIPPED | OUTPATIENT
Start: 2023-12-13 | End: 2024-01-12

## 2023-12-13 RX ORDER — ALBUTEROL SULFATE 90 UG/1
2 AEROSOL, METERED RESPIRATORY (INHALATION) EVERY 6 HOURS PRN
Qty: 8 G | Refills: 0 | Status: SHIPPED | OUTPATIENT
Start: 2023-12-13

## 2023-12-13 NOTE — PROGRESS NOTES
Roman  (1985) initiated an asynchronous digital communication through [address] [place] [address]. Date of service: 12/13/2023     HPI: per patient's questionnaire    EXAM: not applicable    Diagnoses and all orders for this visit:    1. COVID-19 virus infection  Ongoing  Tested positive this morning, symptoms started 2 days ago  - benzonatate (TESSALON PERLES) 100 MG capsule; Take 1 capsule by mouth 3 times daily as needed for Cough  Dispense: 21 capsule; Refill: 0  - nirmatrelvir/ritonavir 300/100 (PAXLOVID) 20 x 150 MG & 10 x 100MG TBPK; Take 3 tablets (two 150 mg nirmatrelvir and one 100 mg ritonavir tablets) by mouth every 12 hours for 5 days. Interaction with vitamin C, stop taking vitamin C while taking this medication  Dispense: 30 tablet; Refill: 0  - albuterol sulfate HFA (PROVENTIL HFA) 108 (90 Base) MCG/ACT inhaler; Inhale 2 puffs into the lungs every 6 hours as needed for Wheezing or Shortness of Breath Okay to substitute  Dispense: 8 g; Refill: 0  - acetaminophen (TYLENOL) 500 MG tablet; Take 1 tablet by mouth every 6 hours as needed for Pain  Dispense: 120 tablet; Refill: 0      No orders of the defined types were placed in this encounter. Patient was advised to contact PCP if symptoms worsen or failing to change as expected      Time: EV3 - 21 or more minutes were spent on the digital evaluation and management of this patient.     Electronically signed by Luis Alberto Cook MD on 12/13/23 at 12:48 PM.

## 2023-12-13 NOTE — TELEPHONE ENCOUNTER
Please Approve or Refuse.   Send to Pharmacy per Pt's Request: Mariella Jay      Next Visit Date:  3/20/2024   Last Visit Date: 11/14/2023    Hemoglobin A1C (%)   Date Value   11/14/2023 4.8   01/16/2019 4.6             ( goal A1C is < 7)   BP Readings from Last 3 Encounters:   11/14/23 126/76   11/07/23 128/78   10/30/23 113/70          (goal 120/80)  BUN   Date Value Ref Range Status   10/12/2023 17 6 - 20 mg/dL Final     Creatinine   Date Value Ref Range Status   10/12/2023 0.8 0.5 - 0.9 mg/dL Final     Potassium   Date Value Ref Range Status   10/12/2023 3.9 3.7 - 5.3 mmol/L Final

## 2023-12-14 ENCOUNTER — APPOINTMENT (OUTPATIENT)
Dept: PHYSICAL THERAPY | Age: 38
End: 2023-12-14
Payer: MEDICAID

## 2023-12-19 ENCOUNTER — APPOINTMENT (OUTPATIENT)
Dept: PHYSICAL THERAPY | Age: 38
End: 2023-12-19
Payer: MEDICAID

## 2023-12-21 ENCOUNTER — APPOINTMENT (OUTPATIENT)
Dept: PHYSICAL THERAPY | Age: 38
End: 2023-12-21
Payer: MEDICAID

## 2023-12-28 ENCOUNTER — HOSPITAL ENCOUNTER (OUTPATIENT)
Dept: PHYSICAL THERAPY | Age: 38
Setting detail: THERAPIES SERIES
Discharge: HOME | End: 2023-12-28
Payer: MEDICAID

## 2023-12-28 PROCEDURE — 97113 AQUATIC THERAPY/EXERCISES: CPT

## 2023-12-28 NOTE — FLOWSHEET NOTE
[x] [facility] & Therapy  [address]  [place]: [phone]   F: [phone]    Physical Therapy Daily Treatment Note    Date:  2023  Patient Name:  Pebbles Flornece    :  1985  MRN: [id number]  Physician: Bam Kennedy MD                              Insurance: [facility] Medicaid $0 copay  visits remaining. Medical Diagnosis: M17.0 (ICD-10-CM) - Primary osteoarthritis of both knees                   Rehab Codes: M25.561 right knee pain, M25.562 Left knee pain  Onset Date: 2023                                 Next 's appt: 24 Minnie Joseph  Visit# / total visits:   Cancels/No Shows: 0/0    Subjective:  Pt reports knee has been tolerable and manage pain recently. Improvements since therapy due to doing exercises at home and taking fish oils. States she has been able to go up the stairs without knee buckling. Pain:  [x] Yes  [] No Location: (B) knees R>L   Pain Rating: (0-10 scale) 3/10  Pain altered Tx:  [x] No  [] Yes  Action:  Comments: Reviewed postural awareness, core stability and working in pain free ranges with all exercises. Objective:    [address] [place] Exercise Log  Aquatic Knee phase 1    Date of Eval:    23                            Primary PT: Rebecca Harrison, PT      Date 23     Visit # 2/12 3/12 4/12     Walk F/L/R 2 Laps 2 Laps 2 Laps     Marching 10x 10x 12x     Squats 10x5\" 10x5\" Low box 12x5\"     Heel toe raises 10x 10x 12x     SLR F/L/R 10x 10x 12x     HS Curl 10x 10x 12x     Step-Ups F/L  Low 10x Low 10x +L     Step Down F/L        Lunges F/L        Knee/Flex /Ext  10x 12x             Kickboard Ex.  Med  Lg     Iso Abd.  10x5\"  10x5\"     Push-pull 10x  10x     Paddling                Balance        Tandem        SLS                DEEP H2O  1 Noodle 1 Noodle     Cycling  1' 1'     Jacks   1'     X-Country   1'     Hang  3'

## 2024-01-03 ENCOUNTER — OFFICE VISIT (OUTPATIENT)
Dept: ORTHOPEDIC SURGERY | Age: 39
End: 2024-01-03
Payer: MEDICAID

## 2024-01-03 VITALS — BODY MASS INDEX: 36.28 KG/M2 | HEIGHT: 64 IN | WEIGHT: 212.52 LBS

## 2024-01-03 DIAGNOSIS — M25.561 CHRONIC PAIN OF BOTH KNEES: ICD-10-CM

## 2024-01-03 DIAGNOSIS — M25.562 CHRONIC PAIN OF BOTH KNEES: ICD-10-CM

## 2024-01-03 DIAGNOSIS — G89.29 CHRONIC PAIN OF BOTH KNEES: ICD-10-CM

## 2024-01-03 DIAGNOSIS — M22.41 CHONDROMALACIA OF RIGHT PATELLOFEMORAL JOINT: Primary | ICD-10-CM

## 2024-01-03 DIAGNOSIS — M25.50 POLYARTHRALGIA: ICD-10-CM

## 2024-01-03 PROCEDURE — 99213 OFFICE O/P EST LOW 20 MIN: CPT | Performed by: PHYSICIAN ASSISTANT

## 2024-01-03 NOTE — PROGRESS NOTES
Take 1 tablet by mouth daily, Disp: 90 tablet, Rfl: 3    lidocaine (LMX) 4 % cream, 5 g topical 2-3 times a day as needed for pain, maximum 20 g/day, Disp: 120 g, Rfl: 3    fexofenadine (ALLEGRA) 180 MG tablet, Take 1 tablet by mouth once daily, Disp: 90 tablet, Rfl: 3    hydrocortisone 2.5 % ointment, Apply topically 2 times daily for scar for 10 days.  Do not use in the skin folds, neck or face, Disp: 20 g, Rfl: 0    Ascorbic Acid (VITAMIN C PO), Take 1 tablet by mouth daily, Disp: , Rfl:     rizatriptan (MAXALT) 10 MG tablet, Take 1 tablet by mouth once as needed for Migraine May repeat in 2 hours if needed, Disp: 12 tablet, Rfl: 5    Misc Natural Products (CRANBERRY/PROBIOTIC) TABS, Take 1 tablet by mouth daily Please dispense according to patients insurance., Disp: 90 tablet, Rfl: 3    EPINEPHrine (EPIPEN) 0.3 MG/0.3ML SOAJ injection, , Disp: , Rfl:   Allergies   Allergen Reactions    Diflucan [Fluconazole] Rash     Throat itches    Flagyl [Metronidazole] Hives    Penicillins      When a child     Social History     Socioeconomic History    Marital status: Single     Spouse name: Not on file    Number of children: Not on file    Years of education: Not on file    Highest education level: Not on file   Occupational History    Not on file   Tobacco Use    Smoking status: Former     Current packs/day: 0.00     Average packs/day: 0.3 packs/day for 4.0 years (1.0 ttl pk-yrs)     Types: Cigarettes     Start date: 2001     Quit date: 2005     Years since quittin.5    Smokeless tobacco: Never    Tobacco comments:     Young and smoked with with friends   Vaping Use    Vaping Use: Never used   Substance and Sexual Activity    Alcohol use: Not Currently     Comment: Hx alcohol abuse as teenager    Drug use: Not Currently     Types: Marijuana (Weed)     Comment: When teen    Sexual activity: Yes     Partners: Male     Birth control/protection: Condom   Other Topics Concern    Not on file   Social History

## 2024-01-04 ENCOUNTER — HOSPITAL ENCOUNTER (OUTPATIENT)
Dept: PHYSICAL THERAPY | Age: 39
Setting detail: THERAPIES SERIES
Discharge: HOME | End: 2024-01-04
Payer: MEDICAID

## 2024-01-04 PROCEDURE — 97113 AQUATIC THERAPY/EXERCISES: CPT

## 2024-01-04 NOTE — FLOWSHEET NOTE
[x] [facility]   Outpatient Rehabilitation & Therapy  [address]  P: [phone]   F: [phone]    Physical Therapy Daily Treatment Note    Date:  2024  Patient Name:  Bette Sabillon    :  1985  MRN: [id number]  Physician: Naila Thorpe MD                              Insurance: [facility] Medicaid $0 copay  visits remaining.   Medical Diagnosis: M17.0 (ICD-10-CM) - Primary osteoarthritis of both knees                   Rehab Codes: M25.561 right knee pain, M25.562 Left knee pain  Onset Date: 2023                                 Next 's appt: 24 Lei Sharif  Visit# / total visits:   Cancels/No Shows: 0/0    Subjective:  Patient reporting yesterday was a bad day attributing increased pain to the change in weather.  Patient reported having shooting pains the few days following therapy but felt okay immediately after therapy.  Pain:  [x] Yes  [] No Location: (B) knees R>L   Pain Rating: (0-10 scale) 4/10  Pain altered Tx:  [x] No  [] Yes  Action:  Comments: Reviewed postural awareness, core stability and working in pain free ranges with all exercises.     Objective:    [facility] Exercise Log  Aquatic Knee phase 1    Date of Eval:    23                            Primary PT: Eros Ospina, PT    Precautions- Pt reporting L foot issues making heel toe raises  difficult  *- performed on high step  Date 23    Visit # 2/12 3/12 4/12 5/12    Walk F/L/R 2 Laps 2 Laps 2 Laps 2 Laps    Marching 10x 10x 12x 2 Laps    Squats 10x5\" 10x5\" Low box 12x5\" High box*  10x5\"    Heel toe raises 10x 10x 12x 10x*    SLR F/L/R 10x 10x 12x 10x*    HS Curl 10x 10x 12x 10x*    Step-Ups F/L  Low 10x Low 10x +L 10x*    Step Down F/L        Lunges F/L        Knee/Flex /Ext  10x 12x 10x*            Kickboard Ex. Med  Lg Lg    Iso Abd.  10x5\"  10x5\" 10x 5\"    Push-pull 10x  10x 10x    Paddling

## 2024-01-04 NOTE — FLOWSHEET NOTE
Pt called with concerns about increased pain in L foot after her aquatic therapy session this morning.  Patient reporting her pain is 8.5/10 currently.  When she was in the shower earlier today (thinking it would help with pain) her pain was 10+/10.  Patient reporting that elevating her foot does help with swelling but not much.  Patient was instructed to ice and to contact her physician if her symptoms worsen.  Pt was also educated and instructed on PROM and desensitization of her foot to try to help with tolerating standing and walking.     Electronically signed by Susanna Lennon PTA on 1/4/2024 at 5:36 PM

## 2024-01-05 ENCOUNTER — CLINICAL DOCUMENTATION (OUTPATIENT)
Dept: PHYSICAL THERAPY | Age: 39
End: 2024-01-05

## 2024-01-05 ENCOUNTER — E-VISIT (OUTPATIENT)
Dept: FAMILY MEDICINE CLINIC | Age: 39
End: 2024-01-05
Payer: MEDICAID

## 2024-01-05 DIAGNOSIS — B37.31 YEAST VAGINITIS: ICD-10-CM

## 2024-01-05 DIAGNOSIS — M79.672 LEFT FOOT PAIN: Primary | ICD-10-CM

## 2024-01-05 PROCEDURE — 99423 OL DIG E/M SVC 21+ MIN: CPT | Performed by: FAMILY MEDICINE

## 2024-01-05 NOTE — FLOWSHEET NOTE
Pt was called to follow up on L foot pain in which she reports it has improved with elevation but she still noting vibrating feeling throughout the feet. She is going to follow up with physician in regards to feet and get a referral for additional PT. She is scheduled for Aquatics and re-evaluation on 1/10/23.    Electronically signed by Eros Ospina PT on 1/5/2024 at 10:03 AM

## 2024-01-05 NOTE — PROGRESS NOTES
Bette Sabillon (1985) initiated an asynchronous digital communication through [employer].  Date of service: 1/5/2024     HPI: per patient's questionnaire    She did have Xrays 5/11/2023    EXAM: not applicable    Diagnoses and all orders for this visit:    1. Left foot pain  start  - [facility]      Orders Placed This Encounter   Procedures    [facility]     Referral Priority:   Routine     Referral Type:   Eval and Treat     Referral Reason:   Specialty Services Required     Requested Specialty:   Physical Therapist     Number of Visits Requested:   1     She did have Xrays 5/11/2023-no fracture  Per Dr Cosby's note \"X-ray's taken: AP, Lateral, and Medial Oblique of the left foot. Findings: No fracture or stress fracture is noted.  There is extremely close apposition of the second and third metatarsal heads.  There is divergence noted to the second and third toes. \"    Has seen Dr Cosby on 5/11/2023 and had injections    Patient was advised to contact PCP if symptoms worsen or failing to change as expected    Addendum made on 1/5/2024 at 5:40 PM  --Naila Thorpe MD on 1/5/2024 at 5:40 PM    1. Left foot pain  Ongoing  - [facility]    2. Yeast vaginitis  Ongoing  Patient reports yeast infection, and she needs antifungal cream, cannot take oral Diflucan  - miconazole (MICONAZOLE 7) 2 % vaginal cream; Place 1 applicator vaginally nightly Place vaginally nightly for 7 days.  Patient tolerates.(Cannot take oral Diflucan)  Dispense: 45 g; Refill: 1        An electronic signature was used to authenticate this note.  Electronically signed by Naila Thorpe MD on 1/5/2024 at 5:40 PM      Time: EV3 - 21 or more minutes were spent on the digital evaluation and management of this patient.    Electronically signed by Naila Thorpe MD on 1/5/24 at 11:11 AM.

## 2024-01-10 ENCOUNTER — HOSPITAL ENCOUNTER (OUTPATIENT)
Dept: PHYSICAL THERAPY | Age: 39
Setting detail: THERAPIES SERIES
Discharge: HOME | End: 2024-01-10
Payer: MEDICAID

## 2024-01-10 PROCEDURE — 97110 THERAPEUTIC EXERCISES: CPT

## 2024-01-10 PROCEDURE — 97113 AQUATIC THERAPY/EXERCISES: CPT

## 2024-01-10 NOTE — PROGRESS NOTES
[x] [facility]   Outpatient Rehabilitation & Therapy  [address]  P: [phone]   F: [phone]    Physical Therapy Progress Note    Date:  1/10/2024  Patient Name:  Bette Sabillon    :  1985  MRN: [id number]  Physician: Naila Thorpe MD                              Insurance: [facility] Medicaid $0 copay  visits remaining.   Medical Diagnosis: M17.0 (ICD-10-CM) - Primary osteoarthritis of both knees                   M79.672 (ICD-10-CM) - Left foot pain (added 1/10/24)  Rehab Codes: M25.561 right knee pain, M25.562 Left knee pain  Onset Date: 2023                                 Next 's appt: 24 Neuro  Visit# / total visits:   Cancels/No Shows: 0/0    Subjective:    Patient reports that her right knee feels like something is out of place and left knee is an achy feeling. She does report that she finds that stairs have gotten a little easier and she reports less feeling of buckling of right knee. She reports her left foot feels better from last week and the vibration feeling has gotten better. She reports that she saw Dr. 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     Pain:  [x] Yes  [] No Location: (B) knees R>L, left foot pain   Pain Rating: (0-10 scale) 4/10, 6/10  Pain altered Tx:  [x] No  [] Yes  Action:  Comments: Reviewed postural awareness, core stability and working in pain free ranges with all exercises.     Objective: p = pain, L = Lacks, WNL=within normal limits              ROM  ° A/P STRENGTH     Left Right Left Right   Hip Flexion      4/5 4/5   Ext     4-/5 4/5   Abd     4/5 4/5   Add     4+/5 4+/5   Knee Extension 0-120 0-120 4+/5 4+/5   Flexion   4+/5 4+/5   Ankle Dorsiflexion 0 0 5/5 5/5   Plantarflexion 60 60 5/5 5/5   Inversion 30 30 4/5 5/5   Eversion 15 20 4/5 5/5         Functional Tests:  LEFI:30/80=37% impairment  FAAM ADL:= 39% functional      Assessment: [x]

## 2024-01-10 NOTE — FLOWSHEET NOTE
[x] [facility]   Outpatient Rehabilitation & Therapy  [address]  P: [phone]   F: [phone]    Physical Therapy Daily Treatment Note    Date:  1/10/2024  Patient Name:  Bette Sabillon    :  1985  MRN: [id number]  Physician: Naila Thorpe MD                              Insurance: [facility] Medicaid $0 copay  visits remaining.   Medical Diagnosis: M17.0 (ICD-10-CM) - Primary osteoarthritis of both knees                   Rehab Codes: M25.561 right knee pain, M25.562 Left knee pain  Onset Date: 2023                                 Next 's appt: 24 Lei Sharif  Visit# / total visits:   Cancels/No Shows: 0/0    Subjective:  Patient reports increased pain in left foot after last visit.  States swelling and pain lasted for 2 days.  Notes elevating and icing foot to help with pain and swelling.  Reports knees are doing alright but pain in left foot is more limiting at this time.  States knees are hurting \"about the same\" today.    Pain:  [x] Yes  [] No Location: (B) knees R>L, 6/10 L foot   Pain Rating: (0-10 scale) 4/10  Pain altered Tx:  [x] No  [] Yes  Action:  Comments: Reviewed postural awareness, core stability and working in pain free ranges with all exercises.     Objective:    [facility] Services Exercise Log  Aquatic Knee phase 1    Date of Eval:    23                            Primary PT: Eros Ospina, PT    Precautions- Pt reporting L foot issues making heel toe raises  difficult  *- performed on high step  Date 12/5/23 12/7/23 12/28/23 1/4/24 1/10/24   Visit # 2/12 3/12 4/12 5/12 6/12   Walk F/L/R 2 Laps 2 Laps 2 Laps 2 Laps 2 Laps   Marching 10x 10x 12x 2 Laps 2 Laps   Squats 10x5\" 10x5\" Low box 12x5\" High box*  10x5\"    Heel toe raises 10x 10x 12x 10x* NT   SLR F/L/R 10x 10x 12x 10x* 12x   HS Curl 10x 10x 12x 10x* 12x   Step-Ups F/L  Low 10x Low 10x +L 10x* Low 10x   Step Down F/L        Lunges F/L

## 2024-01-11 ENCOUNTER — APPOINTMENT (OUTPATIENT)
Dept: PHYSICAL THERAPY | Age: 39
End: 2024-01-11
Payer: MEDICAID

## 2024-01-16 ENCOUNTER — HOSPITAL ENCOUNTER (OUTPATIENT)
Dept: PHYSICAL THERAPY | Age: 39
Setting detail: THERAPIES SERIES
Discharge: HOME | End: 2024-01-16
Payer: MEDICAID

## 2024-01-16 NOTE — FLOWSHEET NOTE
[x] [facility]   Outpatient Rehabilitation & Therapy  [address]  P: [phone]   F: [phone]     Physical Therapy Cancel/No Show note    Date: 2024  Patient: Bette Sabillon  : 1985  MRN: [id number]    Visit Count:   Cancels/No Shows to date:     For today's appointment patient:    [x]  Cancelled    [] Rescheduled appointment    [] No-show     Reason given by patient:    []  Patient ill    []  Conflicting appointment    [] No transportation      [] Conflict with work    [x] No reason given    [] Weather related    [] COVID-19    [] Other:      Comments:        [x] Next appointment was confirmed    Electronically signed by: Hari Weems, PTA

## 2024-01-18 ENCOUNTER — APPOINTMENT (OUTPATIENT)
Dept: PHYSICAL THERAPY | Age: 39
End: 2024-01-18
Payer: MEDICAID

## 2024-01-22 ENCOUNTER — HOSPITAL ENCOUNTER (OUTPATIENT)
Age: 39
Discharge: HOME | End: 2024-01-22
Payer: MEDICAID

## 2024-01-22 DIAGNOSIS — M25.50 POLYARTHRALGIA: ICD-10-CM

## 2024-01-22 LAB
CRP SERPL HS-MCNC: <3 MG/L (ref 0–5)
ERYTHROCYTE [SEDIMENTATION RATE] IN BLOOD BY PHOTOMETRIC METHOD: 2 MM/HR (ref 0–20)
RHEUMATOID FACT SER NEPH-ACNC: <10 IU/ML

## 2024-01-22 PROCEDURE — 86140 C-REACTIVE PROTEIN: CPT

## 2024-01-22 PROCEDURE — 85652 RBC SED RATE AUTOMATED: CPT

## 2024-01-22 PROCEDURE — 86038 ANTINUCLEAR ANTIBODIES: CPT

## 2024-01-22 PROCEDURE — 86225 DNA ANTIBODY NATIVE: CPT

## 2024-01-22 PROCEDURE — 86431 RHEUMATOID FACTOR QUANT: CPT

## 2024-01-22 PROCEDURE — 36415 COLL VENOUS BLD VENIPUNCTURE: CPT

## 2024-01-23 ENCOUNTER — APPOINTMENT (OUTPATIENT)
Dept: PHYSICAL THERAPY | Age: 39
End: 2024-01-23
Payer: MEDICAID

## 2024-01-23 LAB
ANA SER QL IA: NEGATIVE
DSDNA IGG SER QL IA: 0.7 IU/ML
NUCLEAR IGG SER IA-RTO: 0.3 U/ML

## 2024-01-24 ENCOUNTER — TELEPHONE (OUTPATIENT)
Dept: ORTHOPEDIC SURGERY | Age: 39
End: 2024-01-24

## 2024-01-24 NOTE — TELEPHONE ENCOUNTER
----- Message from NEREIDA Boo sent at 1/23/2024  3:51 PM EST -----  Preliminary Rheumatologic/Inflammatory work up negative. Would advise continued PT. If patient continues to be painful not only in the knees, but multiple other joints as reported at last appt would be happy to provide referral to pain management if patient desires.

## 2024-01-24 NOTE — TELEPHONE ENCOUNTER
I called the patient with the results of her lab work, however she was in the middle of an appointment.  We will try to call back later.

## 2024-01-25 ENCOUNTER — TELEPHONE (OUTPATIENT)
Dept: FAMILY MEDICINE CLINIC | Age: 39
End: 2024-01-25

## 2024-01-25 DIAGNOSIS — L30.8 PRURITIC DERMATITIS: ICD-10-CM

## 2024-01-25 RX ORDER — AMMONIUM LACTATE 12 G/100G
CREAM TOPICAL
Qty: 385 G | Refills: 0 | Status: SHIPPED | OUTPATIENT
Start: 2024-01-25 | End: 2024-02-24

## 2024-01-25 RX ORDER — AMMONIUM LACTATE 12 G/100G
CREAM TOPICAL
Qty: 385 G | Refills: 0 | OUTPATIENT
Start: 2024-01-25

## 2024-01-25 NOTE — TELEPHONE ENCOUNTER
Please Approve or Refuse.  Send to Pharmacy per Pt's Request: [facility]     Next Visit Date:  3/20/2024   Last Visit Date: 1/5/2024    Hemoglobin A1C (%)   Date Value   11/14/2023 4.8   01/16/2019 4.6             ( goal A1C is < 7)   BP Readings from Last 3 Encounters:   11/14/23 126/76   11/07/23 128/78   10/30/23 113/70          (goal 120/80)  BUN   Date Value Ref Range Status   10/12/2023 17 6 - 20 mg/dL Final     Creatinine   Date Value Ref Range Status   10/12/2023 0.8 0.5 - 0.9 mg/dL Final     Potassium   Date Value Ref Range Status   10/12/2023 3.9 3.7 - 5.3 mmol/L Final

## 2024-01-25 NOTE — TELEPHONE ENCOUNTER
Patient calls in today requesting a refill on herAmmonium Lactaec Cream 12 % for dry skin.(Badgis).  Asking for refills.    Please Approve or Refuse.  Send to Pharmacy per Pt's Request:        Next Visit Date:  3/20/2024   Last Visit Date: 1/5/2024    Hemoglobin A1C (%)   Date Value   11/14/2023 4.8   01/16/2019 4.6             ( goal A1C is < 7)   BP Readings from Last 3 Encounters:   11/14/23 126/76   11/07/23 128/78   10/30/23 113/70          (goal 120/80)  BUN   Date Value Ref Range Status   10/12/2023 17 6 - 20 mg/dL Final     Creatinine   Date Value Ref Range Status   10/12/2023 0.8 0.5 - 0.9 mg/dL Final     Potassium   Date Value Ref Range Status   10/12/2023 3.9 3.7 - 5.3 mmol/L Final

## 2024-01-30 ENCOUNTER — HOSPITAL ENCOUNTER (OUTPATIENT)
Dept: PHYSICAL THERAPY | Age: 39
Setting detail: THERAPIES SERIES
Discharge: HOME | End: 2024-01-30
Payer: MEDICAID

## 2024-01-30 ENCOUNTER — TELEPHONE (OUTPATIENT)
Dept: ORTHOPEDIC SURGERY | Age: 39
End: 2024-01-30

## 2024-01-30 PROCEDURE — 97113 AQUATIC THERAPY/EXERCISES: CPT

## 2024-01-30 PROCEDURE — 97110 THERAPEUTIC EXERCISES: CPT

## 2024-01-30 NOTE — FLOWSHEET NOTE
[x] [facility]   Outpatient Rehabilitation & Therapy  [address]  P: [phone]   F: [phone]    Physical Therapy Daily Treatment Note      Date:  2024  Patient Name:  Bette Sabillon    :  1985  MRN: [id number]  Physician: Naila Thorpe MD      Insurance: [facility] Medicaid $0 copay  visits remaining.   Medical Diagnosis: M17.0 (ICD-10-CM) - Primary osteoarthritis of both knees                   M79.672 (ICD-10-CM) - Left foot pain (added 1/10/24)  Rehab Codes: M25.561 right knee pain, M25.562 Left knee pain  Onset Date: 2023                                 Next 's appt: 24 Neuro  Visit# / total visits:                     Cancels/No Shows: 0/0    Subjective:    Patient reports she has minimal pain in the left foot today and has been wearing cushion sleeve on forefoot with some of her footwear that has been providing relief. She reports still feeling instability in the right knee with feeling of hyperextension  Pain:  [x] Yes  [] No Location: Left foot, B knees  Pain Rating: (0-10 scale) 0/10, 4/10  Pain altered Tx:  [x] No  [] Yes  Action:  Comments:    Objective:  Modalities:   Precautions:Standard   Exercises:  Exercise Reps/ Time Weight/ Level Completed  Today Comments   Towel scrunches 2x10  x    4 way ankle x10 Red x    Toe yoga x20  x    Toe extension stretch 3x20\"  x With towel on floor   Arch doming x15  x           Heel raises x15  x    Mini Squat x10  x           Other:    Specific Instructions for next treatment: foot intrinsics, standing exercises.       Assessment: [x] Progressing toward goals.Initiated session with foot intrinsics and ankle strengthening. She had difficulty with active toe extension while in gravity dependent position but was able to with 4 way ankle. Added standing exercises with notable shakiness with heel raises and then burning noted with minisquats. As repetitions continues she reports feeling quick

## 2024-01-30 NOTE — TELEPHONE ENCOUNTER
Pt calls in requesting temporary disability placard as it is difficult for her to ambulate to physical therapy due to her knees. Pt advised a message would be sent to [employer] and a member of the office will reach back out with his response. Pt voices understanding.

## 2024-01-30 NOTE — FLOWSHEET NOTE
10x5\"   Push-pull 10x 10x NT 12x   Paddling              Balance       Tandem       SLS              DEEP H2O 1 Noodle 1 Noodle 1 Noodle 1 Noodle   Cycling 1'  2' 2'   Jacks 1'   1'   X-Country 1'      Hang  5' 3' 5'          Stretches       Achllies 2x20\" 2x20\" NT    Hamstring 2x20\" 2x20\" 2x20\" 2x20\"   Psoas       Quad              Cool Down 2 Laps   1 Lap Breast Stroke   Pain Rating  5 5 4      Specific Instructions for next treatment:  increase reps next visit.     Assessment: [x] Progressing toward goals.  Resumed aquatic therapy after missing 3 weeks.  Continued with education to avoid increased pain with all exercises and maintain core stability and postural awareness.  Good technique and tolerance to exercises performed.  Notes some instability with SL standing on RLE but overall good balance throughout.  Ended with deep water cycling and jacks for LE endurance and strengthening.  Notes feeling heavy and weak in LEs when exiting pool.        [] No change.     [] Other:       [x] Patient would continue to benefit from skilled physical therapy services in order to:  decrease bilateral knee pain, improve strength, function so that she can perform household tasks without pain.      STG: (to be met in 6 treatments)  ? Pain: 3/10 or less in B knees to improve ability to perform stairs at home. Not met  ? Strength:right knee extension and flexion to 4+/5 or better to improve ability to sit to stand  MET  ? Function: LEFI to 60% functional or better to improve ability to perform household tasks such as squatting to  objects from floor. Not Met  Independent with Home Exercise Programs Not met     LTG: (to be met in 12 treatments)  Patient will be able to perform 10x squat without valgus collapse to demonstrate improvement in proximal hip strength and dynamic stability  Patient will report being able to go throughout the day without feeling of knee giving out to demonstrate increase in activity tolerance.

## 2024-01-30 NOTE — TELEPHONE ENCOUNTER
Keven,     Would you like to provide temporary disability placard for pt? Pt is requesting placard while she is in physical therapy.

## 2024-01-31 NOTE — TELEPHONE ENCOUNTER
Disability parking letter has been printed.  The patient was contacted and will pick it up in the [place] Office.

## 2024-02-06 ENCOUNTER — HOSPITAL ENCOUNTER (OUTPATIENT)
Dept: PHYSICAL THERAPY | Age: 39
Setting detail: THERAPIES SERIES
Discharge: HOME | End: 2024-02-06
Payer: MEDICAID

## 2024-02-06 PROCEDURE — 97113 AQUATIC THERAPY/EXERCISES: CPT

## 2024-02-06 PROCEDURE — 97110 THERAPEUTIC EXERCISES: CPT

## 2024-02-06 NOTE — FLOWSHEET NOTE
Tandem            SLS                         DEEP H2O 1 Noodle 1 Noodle 1 Noodle 1 Noodle 1 Noodle   Cycling 1'   2' 2' 2'   Jacks 1'     1' 1'   X-Country 1'          Hang   5' 3' 5' 5'                Stretches            Achllies 2x20\" 2x20\" NT      Hamstring 2x20\" 2x20\" 2x20\" 2x20\" 2x20\"   Psoas            Quad                         Cool Down 2 Laps     1 Lap Breast Stroke 1 Lap Breast Stroke   Pain Rating   5 5 4 5      Specific Instructions for next treatment:  increase reps next visit.     Assessment: [] Progressing toward goals.      [] No change.     [x] Other:  decreased ROM and unable to progress reps due to high pain in knees today.  Able to complete all exercises but decreased ROM and speed due to high pain in right knee with hip fleion with knee flexion.  States ambulating forward in water is jacques painful in right knee- \"burning\" in the knee.  Added heel toe raises back into program to tolerance.  Needed 1 UE support for balance.  Ended with deep water exercises and hang for LE endurance and pain relief buy unloading knees.      [x] Patient would continue to benefit from skilled physical therapy services in order to: decrease bilateral knee pain and left foot pain, improve strength, function and ability to ambulate so that she can perform household tasks without pain.       STG: (to be met in 6 treatments)  ? Pain: 3/10 or less in B knees to improve ability to perform stairs at home. Not met  ? Strength:right knee extension and flexion to 4+/5 or better to improve ability to sit to stand  MET  ? Function: LEFI to 60% functional or better to improve ability to perform household tasks such as squatting to  objects from floor. Not Met  Independent with Home Exercise Programs Not met     LTG: (to be met in 12 treatments)  Patient will be able to perform 10x squat without valgus collapse to demonstrate improvement in proximal hip strength and dynamic stability  Patient will report being able to

## 2024-02-06 NOTE — FLOWSHEET NOTE
[x] [facility]   Outpatient Rehabilitation & Therapy  [address]  P: [phone]   F: [phone]    Physical Therapy Daily Treatment Note      Date:  2024  Patient Name:  Bette Sabillon    :  1985  MRN: [id number]  Physician: Naila Thorpe MD      Insurance: Replaced by [facility] Medicaid $0 copay  visits remaining.   Medical Diagnosis: M17.0 (ICD-10-CM) - Primary osteoarthritis of both knees                   M79.672 (ICD-10-CM) - Left foot pain (added 1/10/24)  Rehab Codes: M25.561 right knee pain, M25.562 Left knee pain  Onset Date: 2023                                 Next 's appt: 24 Neuro  Visit# / total visits:                     Cancels/No Shows: 0/0    Subjective:    Patient reports today that R knee pain is worse today. Reported some pain in foot today also with no attributable cause.   Pain:  [x] Yes  [] No Location: Left foot, B knees  Pain Rating: (0-10 scale) 4/10 L foot/L knee, 6/10 R knee  Pain altered Tx:  [x] No  [] Yes  Action:  Comments:    Objective:  Modalities:   Precautions:Standard   Exercises:  Exercise Reps/ Time Weight/ Level Completed  Today Comments   Towel scrunches 2x10  x    4 way ankle 2x10 Red x    Toe yoga x20  x    Toe extension stretch 3x20\"  x With towel on floor   Arch doming x15  x           Heel raises 2x15  x    Mini Squat x10  x    Tandem Stance 3x30\" ea Foam x    Marches on Foam 15x  x    Other:    Specific Instructions for next treatment: foot intrinsics, standing exercises.       Assessment: [x] Progressing toward goals. Continued to work on L ankle and intrinsic foot strengthening. Added tandem stance and marches on foam for additional intrinsic strengthening. Patient reported increased burning sensation with continued reps of exercises that she attributed mainly to muscle fatigue. No change in pain with treatment today.       [] No change.     [] Other:    [x] Patient would continue to benefit from skilled

## 2024-02-13 ENCOUNTER — HOSPITAL ENCOUNTER (OUTPATIENT)
Dept: PHYSICAL THERAPY | Age: 39
Setting detail: THERAPIES SERIES
Discharge: HOME | End: 2024-02-13
Payer: MEDICAID

## 2024-02-13 PROCEDURE — 97113 AQUATIC THERAPY/EXERCISES: CPT

## 2024-02-13 PROCEDURE — 97110 THERAPEUTIC EXERCISES: CPT

## 2024-02-13 NOTE — FLOWSHEET NOTE
[x] [facility]   Outpatient Rehabilitation & Therapy  [address]  P: [phone]   F: [phone]    Physical Therapy Daily Treatment Note      Date:  2024  Patient Name:  Bette Sabillon    :  1985  MRN: [id number]  Physician: Naila Thorpe MD      Insurance: [facility] Medicaid $0 copay  visits remaining.   Medical Diagnosis: M17.0 (ICD-10-CM) - Primary osteoarthritis of both knees                   M79.672 (ICD-10-CM) - Left foot pain (added 1/10/24)  Rehab Codes: M25.561 right knee pain, M25.562 Left knee pain  Onset Date: 2023                                 Next 's appt: 24 Neuro  Visit# / total visits:                     Cancels/No Shows: 0/0    Subjective:    Patient reports that the left foot is most bothersome today. She reports no attributable cause. Her knee is felling better.     Pain:  [x] Yes  [] No Location: Left foot, B knees  Pain Rating: (0-10 scale) 4/10 L foot 3/10 L knee, 4/10 R knee  Pain altered Tx:  [x] No  [] Yes  Action:  Comments:    Objective:  Modalities:   Precautions:Standard   Exercises:  Exercise Reps/ Time Weight/ Level Completed  Today Comments   Towel scrunches 2x10      4 way ankle 2x10 Red     Toe yoga x20  x    Toe extension stretch 3x20\"  x With towel on floor   Arch doming x20  x           Heel raises 2x15  x    Slant board stretch 3x20\"  x    Mini Squat x12  x    Tandem Stance 3x30\" ea Foam x    Marches on Foam 10x3  x    SL balance 2x10\" Foam  x    Other:    Specific Instructions for next treatment: GIVE [employer] for home, foot intrinsics, standing exercises.       Assessment: [x] Progressing toward goals.   Continue to progress standing exercises today with the addition of SL balance, slant board stretching. She reports with L foot forward during tandem balance she has difficulty with right knee instability and some discomfort in the left foot. SL balance patient had no significant increase in pain.

## 2024-02-13 NOTE — FLOWSHEET NOTE
[x] [facility]   Outpatient Rehabilitation & Therapy  [address]  P: [phone]   F: [phone]    Physical Therapy Daily Treatment Note    Date:  2024  Patient Name:  Bette Sabillon    :  1985  MRN: [id number]  Physician: Naila Thorpe MD      Insurance: [facility] Medicaid $0 copay  visits remaining.   Medical Diagnosis: M17.0 (ICD-10-CM) - Primary osteoarthritis of both knees                   M79.672 (ICD-10-CM) - Left foot pain (added 1/10/24)  Rehab Codes: M25.561 right knee pain, M25.562 Left knee pain  Onset Date: 2023                                 Next 's appt: 24 Neuro  Visit# / total visits:                     Cancels/No Shows: 0/0    Subjective:    Patient reports left foot hurting more last night and this morning.  States did some organizing of totes at home and increased pain last night and this morning in foot.  Pain:  [x] Yes  [] No Location: Left foot, B knees  Pain Rating: (0-10 scale) 4/10 R knee , 3/10 L knee, L foot 4-5/10  Pain altered Tx:  [x] No  [] Yes  Action:  Comments:    Objective:  [facility]   Rehabilitation Services Exercise Log  Aquatic Knee phase 1     Date of Eval:    23                            Primary PT: Eros Ospina, PT     Precautions- Pt reporting L foot issues making heel toe raises  difficult  *- performed on high step  Date 1/10/24 1/30/24 2/6/24 2/13/24   Visit #    Walk F/L/R 2 Laps 2 Laps 2 Laps 2 Laps   Marching 2 Laps 2 Laps 2 Laps 2 Laps   Squats   Low 10x5\"  Low 10x5\" Low 10x5\"   Heel toe raises NT   10x 12x   SLR F/L/R 12x 12x 12x 12x   HS Curl 12x 12x 12x    Step-Ups F/L Low 10x   Low 10x Low 10x   Step Down F/L         Lunges F/L         Knee/Flex /Ext 12x 12x 12x 12x             Kickboard Ex.   Lg Lg Lg   Iso Abd.  NT 10x5\" 10x5\" 10x5\"   Push-pull NT 12x 12x 12x   Paddling                   Balance         Tandem         SLS                   DEEP H2O 1

## 2024-02-20 ENCOUNTER — HOSPITAL ENCOUNTER (OUTPATIENT)
Dept: PHYSICAL THERAPY | Age: 39
Setting detail: THERAPIES SERIES
Discharge: HOME | End: 2024-02-20
Payer: MEDICAID

## 2024-02-20 PROCEDURE — 97113 AQUATIC THERAPY/EXERCISES: CPT

## 2024-02-20 NOTE — FLOWSHEET NOTE
SLS                     DEEP H2O 1 Noodle 1 Noodle 1 Noodle 1 Noodle   Cycling 2' 2' 3' 2'   Jacks 1' 1' 1' 1'   X-Country       1'   Hang 5' 5' 5' 5'              Stretches          Achllies          Hamstring 2x20\" 2x20\" 2x20\" 2x20\"   Psoas          Quad                     Cool Down 1 Lap Breast Stroke 1 Lap Breast Stroke 2 Laps Breast Stroke 2 Laps Breast Stroke   Pain Rating 4 5         Specific Instructions for next treatment:  increase reps next visit.     Assessment: [x] Progressing toward goals.  Continued with emphasis on core stability and postural awareness while avoiding increased pain with exercises.  Progressed step height to tall box for further strengthening.  Notes feeling more unsteady in (B) knees with lateral step ups and no issues with left foot.  Also added deep water cross country for LE endurance and hip strengthening.      [] No change.     [] Other:    [x] Patient would continue to benefit from skilled physical therapy services in order to: decrease bilateral knee pain and left foot pain, improve strength, function and ability to ambulate so that she can perform household tasks without pain.       STG: (to be met in 6 treatments)  ? Pain: 3/10 or less in B knees to improve ability to perform stairs at home. Not met  ? Strength:right knee extension and flexion to 4+/5 or better to improve ability to sit to stand  MET  ? Function: LEFI to 60% functional or better to improve ability to perform household tasks such as squatting to  objects from floor. Not Met  Independent with Home Exercise Programs Not met     LTG: (to be met in 12 treatments)  Patient will be able to perform 10x squat without valgus collapse to demonstrate improvement in proximal hip strength and dynamic stability  Patient will report being able to go throughout the day without feeling of knee giving out to de:monstrate increase in activity tolerance.   Patient will be able to perform 10x heel raises without

## 2024-02-21 ENCOUNTER — HOSPITAL ENCOUNTER (EMERGENCY)
Age: 39
Discharge: HOME | End: 2024-02-21
Attending: EMERGENCY MEDICINE
Payer: MEDICAID

## 2024-02-21 ENCOUNTER — APPOINTMENT (OUTPATIENT)
Dept: GENERAL RADIOLOGY | Age: 39
End: 2024-02-21
Payer: MEDICAID

## 2024-02-21 VITALS
WEIGHT: 212 LBS | SYSTOLIC BLOOD PRESSURE: 118 MMHG | TEMPERATURE: 97.8 F | HEIGHT: 64 IN | HEART RATE: 81 BPM | BODY MASS INDEX: 36.19 KG/M2 | RESPIRATION RATE: 18 BRPM | OXYGEN SATURATION: 99 % | DIASTOLIC BLOOD PRESSURE: 82 MMHG

## 2024-02-21 DIAGNOSIS — Z20.3 RABIES EXPOSURE: Primary | ICD-10-CM
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
## 2024-02-21 PROCEDURE — 90675 RABIES VACCINE IM: CPT | Performed by: EMERGENCY MEDICINE

## 2024-02-21 PROCEDURE — 99284 EMERGENCY DEPT VISIT MOD MDM: CPT | Performed by: EMERGENCY MEDICINE

## 2024-02-21 PROCEDURE — 90471 IMMUNIZATION ADMIN: CPT | Performed by: EMERGENCY MEDICINE

## 2024-02-21 PROCEDURE — 6370000000 HC RX 637 (ALT 250 FOR IP): Performed by: EMERGENCY MEDICINE

## 2024-02-21 PROCEDURE — 90375 RABIES IG IM/SC: CPT | Performed by: EMERGENCY MEDICINE

## 2024-02-21 PROCEDURE — 6360000002 HC RX W HCPCS: Performed by: EMERGENCY MEDICINE

## 2024-02-21 PROCEDURE — 73130 X-RAY EXAM OF HAND: CPT

## 2024-02-21 PROCEDURE — 96372 THER/PROPH/DIAG INJ SC/IM: CPT | Performed by: EMERGENCY MEDICINE

## 2024-02-21 RX ORDER — DOXYCYCLINE HYCLATE 100 MG
100 TABLET ORAL 2 TIMES DAILY
Qty: 14 TABLET | Refills: 0 | Status: SHIPPED | OUTPATIENT
Start: 2024-02-21 | End: 2024-02-28

## 2024-02-21 RX ORDER — HYDROCODONE BITARTRATE AND ACETAMINOPHEN 5; 325 MG/1; MG/1
1 TABLET ORAL EVERY 6 HOURS PRN
Qty: 10 TABLET | Refills: 0 | Status: SHIPPED | OUTPATIENT
Start: 2024-02-21 | End: 2024-02-24

## 2024-02-21 RX ORDER — DOXYCYCLINE 100 MG/1
100 CAPSULE ORAL ONCE
Status: COMPLETED | OUTPATIENT
Start: 2024-02-21 | End: 2024-02-21

## 2024-02-21 RX ORDER — IBUPROFEN 800 MG/1
800 TABLET ORAL ONCE
Status: COMPLETED | OUTPATIENT
Start: 2024-02-21 | End: 2024-02-21

## 2024-02-21 RX ADMIN — Medication 1 ML: at 21:53

## 2024-02-21 RX ADMIN — DOXYCYCLINE 100 MG: 100 CAPSULE ORAL at 21:54

## 2024-02-21 RX ADMIN — RABIES IMMUNE GLOBULIN (HUMAN) 1920 UNITS: 300 INJECTION, SOLUTION INFILTRATION; INTRAMUSCULAR at 21:51

## 2024-02-21 RX ADMIN — IBUPROFEN 800 MG: 800 TABLET, FILM COATED ORAL at 21:54

## 2024-02-21 ASSESSMENT — ENCOUNTER SYMPTOMS
VOMITING: 0
SHORTNESS OF BREATH: 0
ABDOMINAL PAIN: 0
COUGH: 0
DIARRHEA: 0
NAUSEA: 0
CONSTIPATION: 0

## 2024-02-21 ASSESSMENT — LIFESTYLE VARIABLES
HOW OFTEN DO YOU HAVE A DRINK CONTAINING ALCOHOL: NEVER
HOW MANY STANDARD DRINKS CONTAINING ALCOHOL DO YOU HAVE ON A TYPICAL DAY: PATIENT DOES NOT DRINK

## 2024-02-21 ASSESSMENT — PAIN SCALES - GENERAL: PAINLEVEL_OUTOF10: 8

## 2024-02-21 ASSESSMENT — PAIN - FUNCTIONAL ASSESSMENT: PAIN_FUNCTIONAL_ASSESSMENT: 0-10

## 2024-02-22 ENCOUNTER — TELEPHONE (OUTPATIENT)
Dept: FAMILY MEDICINE CLINIC | Age: 39
End: 2024-02-22

## 2024-02-22 NOTE — ED NOTES
Mode of arrival (squad #, walk in, police, etc) : Walk-in        Chief complaint(s): Stray cat bite        Arrival Note (brief scenario, treatment PTA, etc).: Pt states that she was at the park today and a stray cat bite her right thumb. Pt states that the swelling and pain has gotten increasingly worse.         C= \"Have you ever felt that you should Cut down on your drinking?\"  No  A= \"Have people Annoyed you by criticizing your drinking?\"  No  G= \"Have you ever felt bad or Guilty about your drinking?\"  No  E= \"Have you ever had a drink as an Eye-opener first thing in the morning to steady your nerves or to help a hangover?\"  No      Deferred []      Reason for deferring: N/A    *If yes to two or more: probable alcohol abuse.*

## 2024-02-22 NOTE — TELEPHONE ENCOUNTER
Spoke with patient to schedule ed follow up , she stated that she had to be seen within 3 days , I offered virtual today she denied. I also appointment in office wed 2/28/24 in office she denied stated that she will just call hospital to figure it out and hung up call.

## 2024-02-22 NOTE — TELEPHONE ENCOUNTER
[facility] ED Follow up Call    Reason for ED visit:    2/21/2024 (1 hours)  [facility] ED     Damian Waters MD  Last attending  Treatment team Rabies exposure +1 more  Clinical impression Animal Bite   Chief complaint              Donell Amos , this is savannah from Naila Ross's office, just calling to see how you are doing after your recent ED visit.    Did you receive discharge instructions?  No:    Do you understand the discharge instructions? No:    Did the ED give you any new prescriptions? No:    Were you able to fill your prescriptions? No:        Do you have one of our red, yellow and green  Zone sheets that help you to determine when you should go to the ED?Not Applicable      Do you need or want to make a follow up appt with your PCP?No- pt denied appt times offering to come in for ed follow up hung up phone call.    Do you have any further needs in the home i.e. Equipment?  No        FU appts/Provider:    Future Appointments   Date Time Provider Department Center   2/27/2024  8:15 AM Hari Weems PTA [facility]   2/27/2024  9:00 AM Eros Ospina, PT [facility]   3/12/2024  8:15 AM Hari Weems PTA [facility]   3/12/2024  9:30 AM Eros Ospina, PT [facility]   3/20/2024  9:30 AM Naila Thorpe MD fp [facility]   5/2/2024 11:40 AM Amari Hodges MD [facility] -   5/7/2024 11:00 AM Adrianne Gill, APRN - CNP [facility]   7/16/2024  1:00 PM Naila Thorpe MD fp [facility]

## 2024-02-22 NOTE — ED PROVIDER NOTES
[facility]  eMERGENCY dEPARTMENT eNCOUnter      Pt Name: Bette Sabillon  MRN: [id number]  Birthdate 1985  Date of evaluation: 2/21/24      CHIEF COMPLAINT       Chief Complaint   Patient presents with    Animal Bite     Cat         HISTORY OF PRESENT ILLNESS    Bette Sabillon is a 38 y.o. female who presents complaining of cat bite.  Patient states that around noon today she was out and she saw stray cat in the neighborhood went to pet it it came up to her sniffed her thumb was everything okay and then just bit down on her right thumb.  Patient states that the swelling is just progressively getting more swollen and she is having increasing pain and numbness to the thumb.  Patient cannot really move it without any pain.  Patient states she is never seen this cat before.      REVIEW OF SYSTEMS       Review of Systems   Constitutional:  Negative for chills and fever.   Respiratory:  Negative for cough and shortness of breath.    Cardiovascular:  Negative for chest pain and palpitations.   Gastrointestinal:  Negative for abdominal pain, constipation, diarrhea, nausea and vomiting.   Skin:  Positive for wound.   Neurological:  Negative for dizziness, seizures and headaches.       PAST MEDICAL HISTORY     Past Medical History:   Diagnosis Date    Acute appendicitis 1993    Acute constipation 2021    Allergic rhinitis     Anxiety     Benign paroxysmal positional vertigo due to bilateral vestibular disorder 9/7/2022    Chest pain     with anxiety    Cholecystitis 04/21/2022    Pathology report showed significant chronic cholecystitis, had significant omental adhesions to the gallbladder per surgical report    Cholelithiasis 2021    Chronic diarrhea 7/9/2022    Chronic kidney disease     Chronic RLQ pain 8/5/2019    Chronic RUQ pain 2/16/2023    CKD (chronic kidney disease) stage 1, GFR 90 ml/min or greater 07/10/2018    Corpus luteum cyst 9/17/2021    COVID 01/2022    Depression     Dysfunction of both eustachian

## 2024-02-23 ENCOUNTER — HOSPITAL ENCOUNTER (EMERGENCY)
Age: 39
Discharge: HOME | End: 2024-02-23
Attending: EMERGENCY MEDICINE
Payer: MEDICAID

## 2024-02-23 VITALS
WEIGHT: 212 LBS | TEMPERATURE: 98.3 F | BODY MASS INDEX: 36.19 KG/M2 | HEART RATE: 92 BPM | HEIGHT: 64 IN | SYSTOLIC BLOOD PRESSURE: 118 MMHG | OXYGEN SATURATION: 98 % | RESPIRATION RATE: 20 BRPM | DIASTOLIC BLOOD PRESSURE: 83 MMHG

## 2024-02-23 DIAGNOSIS — L03.011 CELLULITIS OF RIGHT THUMB: Primary | ICD-10-CM

## 2024-02-23 LAB
ANION GAP SERPL CALCULATED.3IONS-SCNC: 10 MMOL/L (ref 9–17)
BASOPHILS # BLD: 0 K/UL (ref 0–0.2)
BASOPHILS NFR BLD: 1 % (ref 0–2)
BUN SERPL-MCNC: 12 MG/DL (ref 6–20)
CALCIUM SERPL-MCNC: 8.8 MG/DL (ref 8.6–10.4)
CHLORIDE SERPL-SCNC: 102 MMOL/L (ref 98–107)
CO2 SERPL-SCNC: 26 MMOL/L (ref 20–31)
CREAT SERPL-MCNC: 0.9 MG/DL (ref 0.5–0.9)
EOSINOPHIL # BLD: 0 K/UL (ref 0–0.4)
EOSINOPHILS RELATIVE PERCENT: 1 % (ref 0–4)
ERYTHROCYTE [DISTWIDTH] IN BLOOD BY AUTOMATED COUNT: 12.5 % (ref 11.5–14.9)
GFR SERPL CREATININE-BSD FRML MDRD: >60 ML/MIN/1.73M2
GLUCOSE SERPL-MCNC: 84 MG/DL (ref 70–99)
HCT VFR BLD AUTO: 40.8 % (ref 36–46)
HGB BLD-MCNC: 13.6 G/DL (ref 12–16)
LYMPHOCYTES NFR BLD: 1.4 K/UL (ref 1–4.8)
LYMPHOCYTES RELATIVE PERCENT: 26 % (ref 24–44)
MCH RBC QN AUTO: 31 PG (ref 26–34)
MCHC RBC AUTO-ENTMCNC: 33.3 G/DL (ref 31–37)
MCV RBC AUTO: 93.1 FL (ref 80–100)
MONOCYTES NFR BLD: 0.5 K/UL (ref 0.1–1.3)
MONOCYTES NFR BLD: 10 % (ref 1–7)
NEUTROPHILS NFR BLD: 62 % (ref 36–66)
NEUTS SEG NFR BLD: 3.3 K/UL (ref 1.3–9.1)
PLATELET # BLD AUTO: 243 K/UL (ref 150–450)
PMV BLD AUTO: 8.6 FL (ref 6–12)
POTASSIUM SERPL-SCNC: 4.1 MMOL/L (ref 3.7–5.3)
RBC # BLD AUTO: 4.38 M/UL (ref 4–5.2)
SODIUM SERPL-SCNC: 138 MMOL/L (ref 135–144)
WBC OTHER # BLD: 5.2 K/UL (ref 3.5–11)

## 2024-02-23 PROCEDURE — 85025 COMPLETE CBC W/AUTO DIFF WBC: CPT

## 2024-02-23 PROCEDURE — 99284 EMERGENCY DEPT VISIT MOD MDM: CPT

## 2024-02-23 PROCEDURE — 6360000002 HC RX W HCPCS: Performed by: EMERGENCY MEDICINE

## 2024-02-23 PROCEDURE — 90471 IMMUNIZATION ADMIN: CPT | Performed by: EMERGENCY MEDICINE

## 2024-02-23 PROCEDURE — 36415 COLL VENOUS BLD VENIPUNCTURE: CPT

## 2024-02-23 PROCEDURE — 80048 BASIC METABOLIC PNL TOTAL CA: CPT

## 2024-02-23 PROCEDURE — 96365 THER/PROPH/DIAG IV INF INIT: CPT

## 2024-02-23 PROCEDURE — 90675 RABIES VACCINE IM: CPT | Performed by: EMERGENCY MEDICINE

## 2024-02-23 RX ORDER — CLINDAMYCIN HYDROCHLORIDE 300 MG/1
300 CAPSULE ORAL 4 TIMES DAILY
Qty: 40 CAPSULE | Refills: 0 | Status: SHIPPED | OUTPATIENT
Start: 2024-02-23 | End: 2024-03-04

## 2024-02-23 RX ORDER — CLINDAMYCIN PHOSPHATE 600 MG/50ML
600 INJECTION, SOLUTION INTRAVENOUS ONCE
Status: COMPLETED | OUTPATIENT
Start: 2024-02-23 | End: 2024-02-23

## 2024-02-23 RX ADMIN — CLINDAMYCIN PHOSPHATE 600 MG: 600 INJECTION, SOLUTION INTRAVENOUS at 11:59

## 2024-02-23 RX ADMIN — Medication 1 ML: at 12:14

## 2024-02-23 ASSESSMENT — PAIN DESCRIPTION - LOCATION: LOCATION: FINGER (COMMENT WHICH ONE)

## 2024-02-23 ASSESSMENT — PAIN DESCRIPTION - ORIENTATION: ORIENTATION: RIGHT

## 2024-02-23 ASSESSMENT — LIFESTYLE VARIABLES
HOW MANY STANDARD DRINKS CONTAINING ALCOHOL DO YOU HAVE ON A TYPICAL DAY: PATIENT DOES NOT DRINK
HOW OFTEN DO YOU HAVE A DRINK CONTAINING ALCOHOL: NEVER

## 2024-02-23 ASSESSMENT — PAIN DESCRIPTION - PAIN TYPE: TYPE: ACUTE PAIN

## 2024-02-23 ASSESSMENT — PAIN - FUNCTIONAL ASSESSMENT: PAIN_FUNCTIONAL_ASSESSMENT: 0-10

## 2024-02-23 ASSESSMENT — PAIN SCALES - GENERAL: PAINLEVEL_OUTOF10: 9

## 2024-02-23 NOTE — ED PROVIDER NOTES
[facility] ED  EMERGENCY DEPARTMENT ENCOUNTER      Pt Name: Bette Sabillon  MRN: [id number]  Birthdate 1985  Date of evaluation: 2/23/24      CHIEF COMPLAINT       Chief Complaint   Patient presents with    Animal Bite     HISTORY OF PRESENT ILLNESS   HPI 38 y.o. female presents with c/o to the left thumb.  Patient reports that she was bit by a cat on 21 February.  She was seen in the emergency department on that day.  She was started on rabies prophylaxis, she was also started on prophylactic antibiotics with doxycycline, her tetanus is up-to-date.  She was also prescribed pain medications.  She says that over the last day she has had worsening swelling on the thumb hurts a lot on the extensor surface.  She came in for wound check.    REVIEW OF SYSTEMS       Review of Systems   Constitutional:  Negative for fever.   Skin:  Positive for rash and wound.       PAST MEDICAL HISTORY     Past Medical History:   Diagnosis Date    Acute appendicitis 1993    Acute constipation 2021    Allergic rhinitis     Anxiety     Benign paroxysmal positional vertigo due to bilateral vestibular disorder 9/7/2022    Chest pain     with anxiety    Cholecystitis 04/21/2022    Pathology report showed significant chronic cholecystitis, had significant omental adhesions to the gallbladder per surgical report    Cholelithiasis 2021    Chronic diarrhea 7/9/2022    Chronic kidney disease     Chronic RLQ pain 8/5/2019    Chronic RUQ pain 2/16/2023    CKD (chronic kidney disease) stage 1, GFR 90 ml/min or greater 07/10/2018    Corpus luteum cyst 9/17/2021    COVID 01/2022    Depression     Dysfunction of both eustachian tubes 8/23/2021    Eustachian tube disorder, bilateral 10/27/2020    Generalized abdominal pain 5/19/2021    Hashimoto's thyroiditis     Headache(784.0)     Heart murmur     Hematuria 2016    Hypothyroidism     Irritable bowel syndrome with diarrhea 11/17/2016    Midline low back pain without sciatica 11/17/2016

## 2024-02-23 NOTE — ED NOTES
Mode of arrival (squad #, walk in, police, etc) : walk-in        Chief complaint(s): animal bite.        Arrival Note (brief scenario, treatment PTA, etc).: patient had a cat bite on Wednesday, was seen in the ED and received her first rabies dose and PO antibiotics. Patient states the swelling is getting worse and it is more red. Patient states it is traveling up her thumb.        C= \"Have you ever felt that you should Cut down on your drinking?\"  No  A= \"Have people Annoyed you by criticizing your drinking?\"  No  G= \"Have you ever felt bad or Guilty about your drinking?\"  No  E= \"Have you ever had a drink as an Eye-opener first thing in the morning to steady your nerves or to help a hangover?\"  Refused      Deferred []      Reason for deferring: N/A    *If yes to two or more: probable alcohol abuse.*

## 2024-02-27 ENCOUNTER — APPOINTMENT (OUTPATIENT)
Dept: PHYSICAL THERAPY | Age: 39
End: 2024-02-27
Payer: MEDICAID

## 2024-03-12 ENCOUNTER — HOSPITAL ENCOUNTER (OUTPATIENT)
Dept: PHYSICAL THERAPY | Age: 39
Setting detail: THERAPIES SERIES
Discharge: HOME | End: 2024-03-12

## 2024-03-12 NOTE — FLOWSHEET NOTE
[x] [facility]   Outpatient Rehabilitation & Therapy  [address]  P: [phone]   F: [phone]     Physical Therapy Cancel/No Show note    Date: 3/12/2024  Patient: Bette Sabillon  : 1985  MRN: [id number]    Visit Count: 10/12  Cancels/No Shows to date:     For today's appointment patient:    [x]  Cancelled both PT and aquatics    [] Rescheduled appointment    [] No-show     Reason given by patient:    [x]  Patient ill- headache    []  Conflicting appointment    [] No transportation      [] Conflict with work    [] No reason given    [] Weather related    [] COVID-19    [] Other:      Comments:        [x] Next appointment was confirmed- will call back when she is feeling better     Electronically signed by: Hari Weems, PTA

## 2024-03-20 ENCOUNTER — HOSPITAL ENCOUNTER (OUTPATIENT)
Age: 39
Discharge: HOME | End: 2024-03-20
Payer: MEDICAID

## 2024-03-20 ENCOUNTER — OFFICE VISIT (OUTPATIENT)
Dept: FAMILY MEDICINE CLINIC | Age: 39
End: 2024-03-20
Payer: MEDICAID

## 2024-03-20 VITALS
HEART RATE: 87 BPM | OXYGEN SATURATION: 95 % | DIASTOLIC BLOOD PRESSURE: 73 MMHG | HEIGHT: 64 IN | WEIGHT: 206 LBS | BODY MASS INDEX: 35.17 KG/M2 | SYSTOLIC BLOOD PRESSURE: 105 MMHG

## 2024-03-20 DIAGNOSIS — R19.7 POSTCHOLECYSTECTOMY DIARRHEA: ICD-10-CM

## 2024-03-20 DIAGNOSIS — E06.3 HASHIMOTO'S THYROIDITIS: ICD-10-CM

## 2024-03-20 DIAGNOSIS — K91.89 POSTCHOLECYSTECTOMY DIARRHEA: ICD-10-CM

## 2024-03-20 DIAGNOSIS — F41.1 GAD (GENERALIZED ANXIETY DISORDER): Primary | ICD-10-CM

## 2024-03-20 DIAGNOSIS — E78.5 HYPERLIPIDEMIA WITH TARGET LDL LESS THAN 100: ICD-10-CM

## 2024-03-20 DIAGNOSIS — R10.10 UPPER ABDOMINAL PAIN: ICD-10-CM

## 2024-03-20 LAB
ALBUMIN SERPL-MCNC: 4.4 G/DL (ref 3.5–5.2)
ALP SERPL-CCNC: 86 U/L (ref 35–104)
ALT SERPL-CCNC: 14 U/L (ref 5–33)
ANION GAP SERPL CALCULATED.3IONS-SCNC: 11 MMOL/L (ref 9–17)
AST SERPL-CCNC: 14 U/L
BILIRUB SERPL-MCNC: 0.3 MG/DL (ref 0.3–1.2)
BUN SERPL-MCNC: 13 MG/DL (ref 6–20)
CALCIUM SERPL-MCNC: 8.8 MG/DL (ref 8.6–10.4)
CHLORIDE SERPL-SCNC: 104 MMOL/L (ref 98–107)
CHOLEST SERPL-MCNC: 183 MG/DL
CHOLESTEROL/HDL RATIO: 3.2
CO2 SERPL-SCNC: 25 MMOL/L (ref 20–31)
CREAT SERPL-MCNC: 0.8 MG/DL (ref 0.5–0.9)
GFR SERPL CREATININE-BSD FRML MDRD: >60 ML/MIN/1.73M2
GLUCOSE SERPL-MCNC: 94 MG/DL (ref 70–99)
HDLC SERPL-MCNC: 58 MG/DL
LDLC SERPL CALC-MCNC: 118 MG/DL (ref 0–130)
POTASSIUM SERPL-SCNC: 4 MMOL/L (ref 3.7–5.3)
PROT SERPL-MCNC: 7.3 G/DL (ref 6.4–8.3)
SODIUM SERPL-SCNC: 140 MMOL/L (ref 135–144)
T4 FREE SERPL-MCNC: 1.2 NG/DL (ref 0.9–1.7)
TRIGL SERPL-MCNC: 37 MG/DL
TSH SERPL DL<=0.05 MIU/L-ACNC: 2.55 UIU/ML (ref 0.3–5)

## 2024-03-20 PROCEDURE — 99214 OFFICE O/P EST MOD 30 MIN: CPT | Performed by: FAMILY MEDICINE

## 2024-03-20 PROCEDURE — 36415 COLL VENOUS BLD VENIPUNCTURE: CPT

## 2024-03-20 PROCEDURE — 83013 H PYLORI (C-13) BREATH: CPT

## 2024-03-20 PROCEDURE — 84443 ASSAY THYROID STIM HORMONE: CPT

## 2024-03-20 PROCEDURE — 84439 ASSAY OF FREE THYROXINE: CPT

## 2024-03-20 PROCEDURE — 80053 COMPREHEN METABOLIC PANEL: CPT

## 2024-03-20 PROCEDURE — 80061 LIPID PANEL: CPT

## 2024-03-20 RX ORDER — CHOLESTYRAMINE 4 G/9G
1 POWDER, FOR SUSPENSION ORAL DAILY PRN
Qty: 90 PACKET | Refills: 0 | Status: SHIPPED | OUTPATIENT
Start: 2024-03-20

## 2024-03-20 RX ORDER — BUSPIRONE HYDROCHLORIDE 10 MG/1
10 TABLET ORAL 3 TIMES DAILY PRN
Qty: 90 TABLET | Refills: 0 | Status: SHIPPED | OUTPATIENT
Start: 2024-03-20 | End: 2024-04-19

## 2024-03-20 RX ORDER — POLYETHYLENE GLYCOL 400 AND PROPYLENE GLYCOL 4; 3 MG/ML; MG/ML
2 SOLUTION/ DROPS OPHTHALMIC
COMMUNITY
Start: 2024-01-03

## 2024-03-20 SDOH — ECONOMIC STABILITY: FOOD INSECURITY: WITHIN THE PAST 12 MONTHS, THE FOOD YOU BOUGHT JUST DIDN'T LAST AND YOU DIDN'T HAVE MONEY TO GET MORE.: NEVER TRUE

## 2024-03-20 SDOH — ECONOMIC STABILITY: FOOD INSECURITY: WITHIN THE PAST 12 MONTHS, YOU WORRIED THAT YOUR FOOD WOULD RUN OUT BEFORE YOU GOT MONEY TO BUY MORE.: NEVER TRUE

## 2024-03-20 SDOH — ECONOMIC STABILITY: INCOME INSECURITY: HOW HARD IS IT FOR YOU TO PAY FOR THE VERY BASICS LIKE FOOD, HOUSING, MEDICAL CARE, AND HEATING?: PATIENT DECLINED

## 2024-03-20 ASSESSMENT — ANXIETY QUESTIONNAIRES
7. FEELING AFRAID AS IF SOMETHING AWFUL MIGHT HAPPEN: SEVERAL DAYS
IF YOU CHECKED OFF ANY PROBLEMS ON THIS QUESTIONNAIRE, HOW DIFFICULT HAVE THESE PROBLEMS MADE IT FOR YOU TO DO YOUR WORK, TAKE CARE OF THINGS AT HOME, OR GET ALONG WITH OTHER PEOPLE: EXTREMELY DIFFICULT
3. WORRYING TOO MUCH ABOUT DIFFERENT THINGS: SEVERAL DAYS
1. FEELING NERVOUS, ANXIOUS, OR ON EDGE: SEVERAL DAYS
GAD7 TOTAL SCORE: 7
2. NOT BEING ABLE TO STOP OR CONTROL WORRYING: SEVERAL DAYS
4. TROUBLE RELAXING: SEVERAL DAYS
6. BECOMING EASILY ANNOYED OR IRRITABLE: SEVERAL DAYS
5. BEING SO RESTLESS THAT IT IS HARD TO SIT STILL: SEVERAL DAYS

## 2024-03-20 ASSESSMENT — PATIENT HEALTH QUESTIONNAIRE - PHQ9
SUM OF ALL RESPONSES TO PHQ QUESTIONS 1-9: 0
SUM OF ALL RESPONSES TO PHQ QUESTIONS 1-9: 0
SUM OF ALL RESPONSES TO PHQ9 QUESTIONS 1 & 2: 0
SUM OF ALL RESPONSES TO PHQ QUESTIONS 1-9: 0
2. FEELING DOWN, DEPRESSED OR HOPELESS: NOT AT ALL
SUM OF ALL RESPONSES TO PHQ QUESTIONS 1-9: 0
1. LITTLE INTEREST OR PLEASURE IN DOING THINGS: NOT AT ALL

## 2024-03-20 ASSESSMENT — ENCOUNTER SYMPTOMS
ABDOMINAL DISTENTION: 0
SHORTNESS OF BREATH: 0
NAUSEA: 0
CHEST TIGHTNESS: 0
WHEEZING: 0
DIARRHEA: 1
ABDOMINAL PAIN: 1
COUGH: 0
CONSTIPATION: 0
VOMITING: 0

## 2024-03-20 NOTE — PROGRESS NOTES
Visit Information    Have you changed or started any medications since your last visit including any over-the-counter medicines, vitamins, or herbal medicines? no   Have you stopped taking any of your medications? Is so, why? -  no  Are you having any side effects from any of your medications? - no    Have you seen any other physician or provider since your last visit?  yes -     Have you had any other diagnostic tests since your last visit?  yes -     Have you been seen in the emergency room and/or had an admission in a hospital since we last saw you?  yes   Have you had your routine dental cleaning in the past 6 months?  no     Do you have an active MyChart account? If no, what is the barrier?  Yes    Patient Care Team:  Naila Thorpe MD as PCP - General (Family Medicine)  Naila Thorpe MD as PCP - Empaneled Provider  Danis Fox MD as Consulting Physician (Urology)  Pete Figueredo MD as Consulting Physician (Nephrology)  Meaghan Miranda (Certified Nurse Practitioner)  Dawn Marshall (Audiology)  Yadira Carson MD as Consulting Physician (Endocrinology)  Wellington Brandon DO as Consulting Physician (Otolaryngology)  Robin Carlson DO as Surgeon (General Surgery)  Dallas Cosby DPM as Consulting Physician (Podiatry)  Amari Hodges MD as Consulting Physician (Neurology)  Lei Sharif PA (Physician Assistant)  Hector Oscar PA-C as Physician Assistant (Dermatology)  Fay Lewis MD as Consulting Physician (Gastroenterology)    Medical History Review  Past Medical, Family, and Social History reviewed and does contribute to the patient presenting condition    Health Maintenance   Topic Date Due    COVID-19 Vaccine (1) 10/01/2024 (Originally 1985)    Flu vaccine (1) 10/12/2024 (Originally 8/1/2023)    Depression Monitoring  11/14/2024    Cervical cancer screen  02/14/2028    DTaP/Tdap/Td vaccine (3 - Td or Tdap) 07/08/2032    Hepatitis C screen

## 2024-03-20 NOTE — PROGRESS NOTES
Bette Sabillon (:  1985) is a 38 y.o. female,Established patient, here for evaluation of the following chief complaint(s): Anxiety, Animal Bite (Cat bite on thumb on right hand was 7mm deep the bite, since than been having panic attacks and anxiety ), Discuss Medications, GI Problem (Stomach issues ongoing for a a week now when she eats anything ), and Sleep Problem (Unable to sleep at night, racing thoughts she describes )      ASSESSMENT/PLAN:    1. DEREK (generalized anxiety disorder)  Worsening since she had a cat bite on 2024 complicated  cellulitis which was treated in the emergency department with antibiotics  We will referr her to our counselor  she is agreeable to start buspirone which she did take before, she does not want to take anything every day, she only wants something as needed, she does not want to relax too much, she just wants to control this flareup of anxiety  Discussed Chamomile tea before bedtime. You can use lavender oil and vanilla oil topically, on the forehead (use small amount)    -     [facility] ([facility])  -     busPIRone (BUSPAR) 10 MG tablet; Take 1 tablet by mouth 3 times daily as needed (anxiety), Disp-90 tablet, R-0Normal  -Will check thyroid hormones TSH and free T4, due to Hashimoto thyroiditis to make sure she does not have an imbalance of thyroid hormones  She is on amitriptyline 25 Mg at nighttime for prevention of headaches, which should also help for sleep at night    2. Hashimoto's thyroiditis  Likely stable, we need to rule out thyroid imbalance due to worsening anxiety  She is not on thyroid supplement  -     TSH; Future  -     T4, Free; Future  3. Upper abdominal pain  Ongoing, failing to improve  Will rule out liver disease, electrolyte imbalance, H. pylori  Likely related to Anxiety  -     H. Pylori Breath Test; Future  -     Comprehensive Metabolic Panel; Future  4. Hyperlipidemia with target LDL less than

## 2024-03-21 ENCOUNTER — OFFICE VISIT (OUTPATIENT)
Dept: BEHAVIORAL/MENTAL HEALTH CLINIC | Age: 39
End: 2024-03-21
Payer: MEDICAID

## 2024-03-21 DIAGNOSIS — F43.10 PTSD (POST-TRAUMATIC STRESS DISORDER): Primary | ICD-10-CM

## 2024-03-21 DIAGNOSIS — F41.1 GENERALIZED ANXIETY DISORDER WITH PANIC ATTACKS: ICD-10-CM

## 2024-03-21 DIAGNOSIS — F41.0 GENERALIZED ANXIETY DISORDER WITH PANIC ATTACKS: ICD-10-CM

## 2024-03-21 LAB — H PYLORI BREATH TEST: NEGATIVE

## 2024-03-21 PROCEDURE — 90791 PSYCH DIAGNOSTIC EVALUATION: CPT | Performed by: SOCIAL WORKER

## 2024-03-21 ASSESSMENT — LIFESTYLE VARIABLES
PAST THREE MONTHS WHAT IS THE LARGEST AMOUNT OF ALCOHOLIC DRINKS YOU HAVE CONSUMED IN ONE DAY: 0
ALCOHOL_DAYS_PER_WEEK: 0
HAVE YOU EVER RECEIVED ALCOHOL OR OTHER DRUG ABUSE TREATMENT: NO
HISTORY_ALCOHOL_USE: NO

## 2024-03-21 NOTE — PROGRESS NOTES
ADULT BEHAVIORAL HEALTH ASSESSMENT  Hermila LORNA Rodriguez  Licensed Independent        Visit Date: 3/21/2024   Time of appointment: 10:00 AM     Time spent with Patient: 55 minutes.   This is patient's first appointment.    Reason for Consult:  Anxiety and Depression     PCP:  Naila Thorpe MD      Pt provided informed consent for the behavioral health program. Discussed with patient model of service to include the limits of confidentiality (i.e. abuse reporting, suicide intervention, etc.) and short-term intervention focused approach.  Pt indicated understanding.     PRESENTING PROBLEM AND HISTORY  Bette is a 38 y.o. female who presents for new evaluation and treatment of anxiety, depression.      Bette reported she feels that she has to control everything. She states she has been working on herself through reading self-help books and has learned a lot about how her trauma has affected her body. She reported she finds that there are things she doesn't remember from her childhood. Bette reported she was recently bit and attacked by a cat on 2/21/2024 and this sent her into a bad panic attack. She reported she is now living in fear again, and worries about having future panic attacks because she could not control it. She works on managing her anxiety through grounding techniques. Bette reported she experiences intrusive thoughts, that present as \"what is my car just goes off the bridge\", for example. She also states that she will replay the same song over and over again in her head. She expressed she has a hard time quieting her anxious thoughts as her mind doesn't shut off. Bette reported she also finds that if she is relaxing too much, she will instantly feel something's going to go wrong. Btete reported she also has been experiencing vivid nightmares, she recalls this began around 2014 when she started having GI issues around the time that she had the flu. Bette reported at that time she

## 2024-04-01 ENCOUNTER — CLINICAL DOCUMENTATION (OUTPATIENT)
Dept: BEHAVIORAL/MENTAL HEALTH CLINIC | Age: 39
End: 2024-04-01

## 2024-04-01 DIAGNOSIS — F41.0 GENERALIZED ANXIETY DISORDER WITH PANIC ATTACKS: ICD-10-CM

## 2024-04-01 DIAGNOSIS — F43.10 PTSD (POST-TRAUMATIC STRESS DISORDER): Primary | ICD-10-CM

## 2024-04-01 DIAGNOSIS — F41.1 GENERALIZED ANXIETY DISORDER WITH PANIC ATTACKS: ICD-10-CM

## 2024-04-01 NOTE — PROGRESS NOTES
Patient would like referral to [facility], patient requested to establish care with OraLee due to previous work together.

## 2024-04-04 ENCOUNTER — TELEPHONE (OUTPATIENT)
Dept: PSYCHIATRY | Age: 39
End: 2024-04-04

## 2024-04-04 ENCOUNTER — OFFICE VISIT (OUTPATIENT)
Dept: BEHAVIORAL/MENTAL HEALTH CLINIC | Age: 39
End: 2024-04-04
Payer: MEDICAID

## 2024-04-04 DIAGNOSIS — F41.1 GENERALIZED ANXIETY DISORDER WITH PANIC ATTACKS: ICD-10-CM

## 2024-04-04 DIAGNOSIS — F41.0 GENERALIZED ANXIETY DISORDER WITH PANIC ATTACKS: ICD-10-CM

## 2024-04-04 DIAGNOSIS — F43.10 PTSD (POST-TRAUMATIC STRESS DISORDER): Primary | ICD-10-CM

## 2024-04-04 PROCEDURE — 90834 PSYTX W PT 45 MINUTES: CPT | Performed by: SOCIAL WORKER

## 2024-04-04 NOTE — PROGRESS NOTES
behavioral health for scores 10 or greater.      DIAGNOSIS  Bette was seen today for anxiety and depression.    Diagnoses and all orders for this visit:    PTSD (post-traumatic stress disorder)    Generalized anxiety disorder with panic attacks        INTERVENTION  Discussed prevalence of  depression and anxiety for general population, Discussed potential treatments for  depression and anxiety, Provided education, [place]-setting to identify pt's primary goals for [facility] visit / overall health, Supportive techniques, Emphasized self-care as important for managing overall health, and CBT to target anxiety.      PLAN  Follow-up appointment scheduled on 4/9 @ 4 PM.       INTERACTIVE COMPLEXITY  Is interactive complexity present?  No  Reason:  N/A  Additional Supporting Information:  N/A       Electronically signed by LORNA Hairston on 4/23/2024 at 3:38 PM

## 2024-04-04 NOTE — TELEPHONE ENCOUNTER
This writer contacted patient this date at 1:33 PM to schedule intake for [facility] services.  Patient indicated that she was only interested in leaving her current treatment provider if a specific therapist was available.  Patient declined services when informed that the therapist is not available. Patient declined to meet with available therapists.

## 2024-04-09 ENCOUNTER — OFFICE VISIT (OUTPATIENT)
Dept: BEHAVIORAL/MENTAL HEALTH CLINIC | Age: 39
End: 2024-04-09
Payer: MEDICAID

## 2024-04-09 DIAGNOSIS — F41.1 GENERALIZED ANXIETY DISORDER WITH PANIC ATTACKS: ICD-10-CM

## 2024-04-09 DIAGNOSIS — F43.10 PTSD (POST-TRAUMATIC STRESS DISORDER): Primary | ICD-10-CM

## 2024-04-09 DIAGNOSIS — F41.0 GENERALIZED ANXIETY DISORDER WITH PANIC ATTACKS: ICD-10-CM

## 2024-04-09 PROCEDURE — 90834 PSYTX W PT 45 MINUTES: CPT | Performed by: SOCIAL WORKER

## 2024-04-09 NOTE — PROGRESS NOTES
ADULT BEHAVIORAL HEALTH FOLLOW UP  HermilaLORNA Bledsoe  Licensed Independent          Visit Date: 4/9/2024   Time of appointment: 4:05 PM   Time spent with Patient: 50 minutes.   This is patient's third appointment.    Reason for Consult:  Anxiety and Depression     PCP:  Naila Thorpe MD      Pt provided informed consent for the behavioral health program. Discussed with patient model of service to include the limits of confidentiality (i.e. abuse reporting, suicide intervention, etc.) and short-term intervention focused approach. Pt indicated understanding.    SUBJECTIVE  Bette is a 38 y.o. female who presents for follow up of depression and anxiety.    Bette was accompanied by her son for visit. She reported that she is still struggling with next steps of her move. She has had a lot of challenges with the apartment not being properly ready prior to her arrival. Bette elaborated on her plan to slowly move things into the apartment and then abruptly leave, due to the history of the relationship. Bette also discussed various challenges the relationship has had over time and how this has worsened over time. Bette explained their interactions have negatively impacted her mood and often feeling anxious/on edge. Bette shared that she is still working on getting certain furniture and items for the home. She explained this has been difficult due to finances. [facility] offered to provide Bette with community resources in case they may be able to assist.       Previous Recommendations:   Follow-up appointment scheduled on 4/9 @ 4 PM.     MENTAL STATUS EXAM  Mood was within normal limits with anxious affect.   Suicidal ideation was denied.   Homicidal ideation was denied.   Hygiene was good .  Dress was appropriate.   Behavior was Within Normal Limits with No observation or self-report of difficulties ambulating.   Attitude was Engageable.  Eye-contact was good.  Speech: rate - WNL, rhythm - WNL, volume

## 2024-04-18 ENCOUNTER — OFFICE VISIT (OUTPATIENT)
Dept: NEUROLOGY | Age: 39
End: 2024-04-18
Payer: MEDICAID

## 2024-04-18 VITALS
WEIGHT: 207 LBS | HEART RATE: 81 BPM | HEIGHT: 64 IN | SYSTOLIC BLOOD PRESSURE: 119 MMHG | BODY MASS INDEX: 35.34 KG/M2 | DIASTOLIC BLOOD PRESSURE: 73 MMHG

## 2024-04-18 DIAGNOSIS — G43.109 MIGRAINE WITH AURA AND WITHOUT STATUS MIGRAINOSUS, NOT INTRACTABLE: Primary | ICD-10-CM

## 2024-04-18 DIAGNOSIS — R42 DIZZINESS: ICD-10-CM

## 2024-04-18 PROCEDURE — 99214 OFFICE O/P EST MOD 30 MIN: CPT | Performed by: PSYCHIATRY & NEUROLOGY

## 2024-04-18 RX ORDER — FREMANEZUMAB-VFRM 225 MG/1.5ML
INJECTION SUBCUTANEOUS
Qty: 1 ADJUSTABLE DOSE PRE-FILLED PEN SYRINGE | Refills: 5 | Status: SHIPPED | OUTPATIENT
Start: 2024-04-18

## 2024-04-18 NOTE — PROGRESS NOTES
oriented x 3   Attention and concentration normal  Short term memory normal  Language process and speech normal . No aphasia   Cranial nerve 2 normal acuety and visual fields  Cranial nerve 3, 4 and 6 .Extraocular muscles are intact . Pupils are equal and reactive   Cranial nerve 5 . Intact corneal reflex. Normal facial sensation  Cranial nerve 7 normal exam   Cranial nerve 8. Grossly intact hearing   Cranial nerve 9 and 10. Symmetric palate elevation   Cranial nerve 11 , 5 out of 5 strength   Cranial Nerve 12 midline tongue . No atrophy  Sensation .Normal pinprick and light touch   Deep Tendon Reflexes normal  Plantar response flexor bilaterally      ASSESSMENT/PLAN      Diagnosis Orders   1. Migraine with aura and without status migrainosus, not intractable        2. Dizziness          She is to continue current medication regimen       As above

## 2024-04-22 ENCOUNTER — TELEPHONE (OUTPATIENT)
Dept: NEUROLOGY | Age: 39
End: 2024-04-22

## 2024-05-01 ENCOUNTER — CLINICAL DOCUMENTATION (OUTPATIENT)
Dept: PHYSICAL THERAPY | Age: 39
End: 2024-05-01

## 2024-05-02 ENCOUNTER — OFFICE VISIT (OUTPATIENT)
Dept: BEHAVIORAL/MENTAL HEALTH CLINIC | Age: 39
End: 2024-05-02
Payer: MEDICAID

## 2024-05-02 DIAGNOSIS — F41.0 GENERALIZED ANXIETY DISORDER WITH PANIC ATTACKS: ICD-10-CM

## 2024-05-02 DIAGNOSIS — F41.1 GENERALIZED ANXIETY DISORDER WITH PANIC ATTACKS: ICD-10-CM

## 2024-05-02 DIAGNOSIS — F43.10 PTSD (POST-TRAUMATIC STRESS DISORDER): Primary | ICD-10-CM

## 2024-05-02 PROCEDURE — 90834 PSYTX W PT 45 MINUTES: CPT | Performed by: SOCIAL WORKER

## 2024-05-03 ENCOUNTER — TELEPHONE (OUTPATIENT)
Dept: FAMILY MEDICINE CLINIC | Age: 39
End: 2024-05-03

## 2024-05-03 DIAGNOSIS — Z72.51 UNPROTECTED SEX: Primary | ICD-10-CM

## 2024-05-03 RX ORDER — LEVONORGESTREL 1.5 MG/1
1.5 TABLET ORAL ONCE
Qty: 1 TABLET | Refills: 0 | Status: SHIPPED | OUTPATIENT
Start: 2024-05-03 | End: 2024-05-03

## 2024-05-03 NOTE — TELEPHONE ENCOUNTER
Pt informed and was picking up medication, pt confirmed will stop Vitamin C while taking medication.

## 2024-05-03 NOTE — TELEPHONE ENCOUNTER
Please let the patient know to  prescription from the pharmacy.  Patient also needs to stop vitamin C when taking this medication  Orders Placed This Encounter   Medications    levonorgestrel (PLAN B ONE STEP) 1.5 MG tablet     Sig: Take 1 tablet by mouth once for 1 dose     Dispense:  1 tablet     Refill:  0         [facility] [address] [place] [address] - [address] [facility] -  [phone] - F [phone]  [facility] [address]  Phone: [phone] Fax: [phone]      No orders of the defined types were placed in this encounter.      Future Appointments   Date Time Provider Department Center   5/7/2024 11:10 AM Adrian[facility]   5/9/2024  9:00 AM [facility]   6/20/2024  2:45 PM [facility]   7/16/2024  1:00 PM [facility]   10/17/2024  9:20 AM Amari Hodges MD [facility] -       Thank you!

## 2024-05-03 NOTE — TELEPHONE ENCOUNTER
Incoming call came from patient and she stated that she had unprotected sex last night. She is nervous and doesn't want to take any chances. She asks if a plan b can be called in. As she went to the store and it was sold out.     Please Approve or Refuse.  [facility] to [facility]     Next Visit Date:  6/20/2024   Last Visit Date: 3/20/2024    Hemoglobin A1C (%)   Date Value   11/14/2023 4.8   01/16/2019 4.6             ( goal A1C is < 7)   BP Readings from Last 3 Encounters:   04/18/24 119/73   03/20/24 105/73   02/23/24 118/83          (goal 120/80)  BUN   Date Value Ref Range Status   03/20/2024 13 6 - 20 mg/dL Final     Creatinine   Date Value Ref Range Status   03/20/2024 0.8 0.5 - 0.9 mg/dL Final     Potassium   Date Value Ref Range Status   03/20/2024 4.0 3.7 - 5.3 mmol/L Final

## 2024-05-07 ENCOUNTER — OFFICE VISIT (OUTPATIENT)
Dept: UROLOGY | Age: 39
End: 2024-05-07
Payer: MEDICAID

## 2024-05-07 VITALS
HEART RATE: 78 BPM | TEMPERATURE: 97.8 F | BODY MASS INDEX: 35.34 KG/M2 | WEIGHT: 207 LBS | SYSTOLIC BLOOD PRESSURE: 110 MMHG | HEIGHT: 64 IN | OXYGEN SATURATION: 98 % | DIASTOLIC BLOOD PRESSURE: 68 MMHG

## 2024-05-07 DIAGNOSIS — R35.0 URINARY FREQUENCY: ICD-10-CM

## 2024-05-07 DIAGNOSIS — G89.29 CHRONIC PELVIC PAIN IN FEMALE: ICD-10-CM

## 2024-05-07 DIAGNOSIS — R31.29 MICROHEMATURIA: Primary | ICD-10-CM

## 2024-05-07 DIAGNOSIS — R30.0 DYSURIA: ICD-10-CM

## 2024-05-07 DIAGNOSIS — R10.2 CHRONIC PELVIC PAIN IN FEMALE: ICD-10-CM

## 2024-05-07 LAB
APPEARANCE FLUID: ABNORMAL
BILIRUBIN, POC: ABNORMAL
BLOOD URINE, POC: ABNORMAL
CLARITY, POC: CLEAR
COLOR, POC: YELLOW
GLUCOSE URINE, POC: ABNORMAL
KETONES, POC: ABNORMAL
LEUKOCYTE EST, POC: ABNORMAL
NITRITE, POC: ABNORMAL
PH, POC: ABNORMAL
PROTEIN, POC: ABNORMAL
SPECIFIC GRAVITY, POC: ABNORMAL
UROBILINOGEN, POC: ABNORMAL

## 2024-05-07 PROCEDURE — 81002 URINALYSIS NONAUTO W/O SCOPE: CPT | Performed by: NURSE PRACTITIONER

## 2024-05-07 PROCEDURE — 99214 OFFICE O/P EST MOD 30 MIN: CPT | Performed by: NURSE PRACTITIONER

## 2024-05-07 ASSESSMENT — ENCOUNTER SYMPTOMS
NAUSEA: 0
SHORTNESS OF BREATH: 0
WHEEZING: 0
EYE REDNESS: 0
ABDOMINAL PAIN: 1
EYES NEGATIVE: 1
ALLERGIC/IMMUNOLOGIC NEGATIVE: 1
RESPIRATORY NEGATIVE: 1
VOMITING: 0
COUGH: 0
BACK PAIN: 0
DIARRHEA: 1
EYE PAIN: 0

## 2024-05-07 NOTE — PROGRESS NOTES
Review of Systems   Constitutional: Negative.  Negative for activity change, chills and fever.   HENT: Negative.     Eyes: Negative.  Negative for pain, redness and visual disturbance.   Respiratory: Negative.  Negative for cough, shortness of breath and wheezing.    Cardiovascular: Negative.  Negative for chest pain and leg swelling.   Gastrointestinal:  Positive for abdominal pain and diarrhea. Negative for nausea and vomiting.   Endocrine: Negative.    Genitourinary:  Positive for frequency. Negative for difficulty urinating, dysuria, enuresis, flank pain, hematuria and urgency.   Musculoskeletal: Negative.  Negative for back pain, joint swelling and myalgias.   Skin: Negative.  Negative for rash and wound.   Allergic/Immunologic: Negative.    Neurological:  Positive for dizziness. Negative for tremors and numbness.   Hematological: Negative.  Does not bruise/bleed easily.   Psychiatric/Behavioral: Negative.       
UA      Leukocytes, UA trace     Nitrite, UA neg     Appearance, Fluid         Imaging Reviewed during this Office Visit:   10/6/2023 renal us  10/12/2023 ct abd/pelvis with contrast.    Last BUN and creatinine:  Lab Results   Component Value Date    BUN 13 03/20/2024     Lab Results   Component Value Date    CREATININE 0.8 03/20/2024       Additional Lab/Culture results:    Contains abnormal data Microscopic Urinalysis  Order: [id number]  Status: Final result       Visible to patient: Yes (seen)       Next appt: 05/09/2024 at 09:00 AM in Behavioral Health (LORNA Hairston)    3 Result Notes       1 Patient Communication            Component  Ref Range & Units 9/12/23 1153 9/7/21 1016 12/17/19 2057 8/1/19 1653 6/19/18 1600 6/6/18 1405 4/10/18 1535   WBC, UA  0 TO 5 /HPF 0 TO 2 Abnormal  2 TO 5 R None R 0 TO 2 R 0 TO 2 R 10 TO 20 R 2 TO 5 R   RBC, UA  0 TO 2 /HPF 6 TO 9 Abnormal  0 TO 2 R 5 TO 10 R, CM 20 TO 50 R 2 TO 5 R TOO NUMEROUS TO COUNT R 5 TO 10 R   Casts UA  None /LPF 0 TO 2 Abnormal  NOT REPORTED R 2 TO 5 HYALINE Reference range defined for non-centrifuged specimen. R NOT REPORTED R NOT REPORTED R NOT REPORTED R NOT REPORTED R   Epithelial Cells UA  /HPF 0 TO 2 5 TO 10 0 TO 2 R 2 TO 5 2 TO 5 10 TO 20 5 TO 10   Bacteria, UA  None None MODERATE Abnormal  NOT REPORTED MANY Abnormal  FEW Abnormal                   PAST MEDICAL, FAMILY AND SOCIAL HISTORY UPDATE:  Past Medical History:   Diagnosis Date    Acute appendicitis 1993    Acute constipation 2021    Allergic rhinitis     Anxiety     Benign paroxysmal positional vertigo due to bilateral vestibular disorder 9/7/2022    Chest pain     with anxiety    Cholecystitis 04/21/2022    Pathology report showed significant chronic cholecystitis, had significant omental adhesions to the gallbladder per surgical report    Cholelithiasis 2021    Chronic diarrhea 7/9/2022    Chronic kidney disease     Chronic RLQ pain 8/5/2019    Chronic RUQ pain 2/16/2023    CKD

## 2024-05-08 ENCOUNTER — TELEPHONE (OUTPATIENT)
Dept: UROLOGY | Age: 39
End: 2024-05-08

## 2024-05-08 DIAGNOSIS — R31.29 MICROHEMATURIA: Primary | ICD-10-CM

## 2024-05-08 DIAGNOSIS — R30.0 DYSURIA: ICD-10-CM

## 2024-05-08 NOTE — TELEPHONE ENCOUNTER
Writer called patient and spoke to patient about dropping off a urine sample do to error at the lab. Patient stated, \" She is at field day with her son but can come in later today or tomorrow.\" Writer agreed.

## 2024-05-09 ENCOUNTER — OFFICE VISIT (OUTPATIENT)
Dept: BEHAVIORAL/MENTAL HEALTH CLINIC | Age: 39
End: 2024-05-09
Payer: MEDICAID

## 2024-05-09 DIAGNOSIS — F41.1 GENERALIZED ANXIETY DISORDER WITH PANIC ATTACKS: ICD-10-CM

## 2024-05-09 DIAGNOSIS — F43.10 PTSD (POST-TRAUMATIC STRESS DISORDER): Primary | ICD-10-CM

## 2024-05-09 DIAGNOSIS — F41.0 GENERALIZED ANXIETY DISORDER WITH PANIC ATTACKS: ICD-10-CM

## 2024-05-09 PROCEDURE — 90834 PSYTX W PT 45 MINUTES: CPT | Performed by: SOCIAL WORKER

## 2024-05-09 NOTE — PROGRESS NOTES
ADULT BEHAVIORAL HEALTH FOLLOW UP  LORNA Hairston  Licensed Independent          Visit Date: 5/9/2024   Time of appointment: 9:06 AM    Time spent with Patient: 50 minutes.   This is patient's fifth appointment.    Reason for Consult:  Anxiety and Depression     PCP:  Naila Thorpe MD      Pt provided informed consent for the behavioral health program. Discussed with patient model of service to include the limits of confidentiality (i.e. abuse reporting, suicide intervention, etc.) and short-term intervention focused approach. Pt indicated understanding.    JASIEL Amos is a 38 y.o. female who presents for follow up of depression and anxiety.    Bette reported she made a list of the things that she has thought about when moving forward. She shared that this has helped her identify some of the things that are bothering her through preparing to carry out her plans to move out. Bette shared one of the things she has thought about now is the items that she cannot take with her, however has feelings about leaving or getting rid of it. She also shared that she intends to not leave things without ensuring she takes care of everything she can due to how she likes to handle things. Bette expressed the emotional burden of leaving the relationship and how this extends further than material items, as this is an ending to a chapter for her. Bette processed her feelings regarding this and [facility] began discussing plan for interventions to address guilt that Bette feels when setting boundaries/limits with others. Bette shared she has been able to work on accepting this and feeling it is the right decision/next steps, however she expressed not yet knowing how to manage feelings of guilt.     Previous Recommendations:   Follow-up appointment scheduled on 5/9 @ 9 AM.   Homework assignment: journal writing fears r/t this change, identifying pros/cons of making transition of moving to apartment to

## 2024-05-10 ENCOUNTER — HOSPITAL ENCOUNTER (OUTPATIENT)
Age: 39
Setting detail: SPECIMEN
Discharge: HOME | End: 2024-05-10

## 2024-05-10 DIAGNOSIS — R31.29 MICROHEMATURIA: ICD-10-CM

## 2024-05-10 DIAGNOSIS — R30.0 DYSURIA: ICD-10-CM

## 2024-05-11 LAB
MICROORGANISM SPEC CULT: NORMAL
SPECIMEN DESCRIPTION: NORMAL

## 2024-05-15 ENCOUNTER — HOSPITAL ENCOUNTER (OUTPATIENT)
Dept: PHYSICAL THERAPY | Facility: CLINIC | Age: 39
Setting detail: THERAPIES SERIES
Discharge: HOME | End: 2024-05-15

## 2024-05-15 NOTE — FLOWSHEET NOTE
[] [facility]  Outpatient Rehabilitation &  Therapy  [address]  [phone]  [phone] [] [facility]  Outpatient Rehabilitation &  Therapy  [address]  P: [phone]  F: [phone] [x] [facility]  Outpatient Rehabilitation &  Therapy  [address]  P: [phone]  F: [phone] [] [facility]  Outpatient Rehabilitation &  Therapy  [address]  [phone]  [phone] [] [facility]  Outpatient Rehabilitation &  Therapy  [address]   P: [phone]  F: [phone]  [] [facility]  Outpatient Rehabilitation &  Therapy  [address]  P: [phone]  F: [phone] [] [facility]  Outpatient Rehabilitation &  Therapy  [address]  P: [phone]  F: [phone] [] [facility]  Outpatient Rehabilitation &  Therapy  [address]  P: [phone]  F: [phone] [] [facility]  Outpatient Rehabilitation &  Therapy  [address]  P: [phone]  F: [phone]  [] [facility] Outpatient Rehabilitation &  Therapy  [address]  P: [phone]  F: [phone]     Therapy Cancel/No Show note    Date: 5/15/2024  Patient: Bette Sabillon  : 1985  MRN: [id number]    Cancels/No Shows to date: 1 CX    For today's appointment patient:    [x]  Cancelled    [] Rescheduled appointment    [] No-show     Reason given by patient:    []  Patient ill    [x]  Conflicting appointment    [] No transportation      [] Conflict with work    [] No reason given    [] Weather related    [] COVID-19    [] Other:      Comments:        [x] Next appointment was confirmed: Rescheduled for 24    Electronically signed by: Jackelyn Sharp, PT

## 2024-05-17 ENCOUNTER — OFFICE VISIT (OUTPATIENT)
Dept: BEHAVIORAL/MENTAL HEALTH CLINIC | Age: 39
End: 2024-05-17
Payer: MEDICAID

## 2024-05-17 DIAGNOSIS — F41.1 GENERALIZED ANXIETY DISORDER WITH PANIC ATTACKS: ICD-10-CM

## 2024-05-17 DIAGNOSIS — F41.0 GENERALIZED ANXIETY DISORDER WITH PANIC ATTACKS: ICD-10-CM

## 2024-05-17 DIAGNOSIS — F43.10 PTSD (POST-TRAUMATIC STRESS DISORDER): Primary | ICD-10-CM

## 2024-05-17 PROCEDURE — 90834 PSYTX W PT 45 MINUTES: CPT | Performed by: SOCIAL WORKER
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
## 2024-05-24 ENCOUNTER — HOSPITAL ENCOUNTER (OUTPATIENT)
Dept: PHYSICAL THERAPY | Facility: CLINIC | Age: 39
Setting detail: THERAPIES SERIES
Discharge: HOME | End: 2024-05-24
Payer: MEDICAID

## 2024-05-24 PROCEDURE — 97161 PT EVAL LOW COMPLEX 20 MIN: CPT

## 2024-05-24 PROCEDURE — 97530 THERAPEUTIC ACTIVITIES: CPT

## 2024-05-24 NOTE — FLOWSHEET NOTE
Aquatic Therapy   50789 [x] Cold/hotpack    [] Massage   89748      [] Dry Needling, 1 or 2 muscles  20560   [] Biofeedback, first 15 minutes   90912  [] Biofeedback, additional 15 minutes   90913 [] Dry Needling, 3 or more muscles  20561     Frequency:  1 x/week for 10 visits    Today’s Treatment:  Modalities:   Precautions:  Exercises:   Exercise Reps/ Time Weight/ Level Comments   Diaphragmatic breathing       Butterfly S      Happy baby s      Piriformis S      Hip flexor S      Child's pose            TA set              Education   15'   -Educated patient on pelvic anatomy and function of the pelvic floor  -Educated patient on how surgery, delivery and pregnancy can affect the pelvic floor   -Educated patient on how increased tension within the pelvic floor can lead to pelvic pain  -Educated patient on SIJ dysfunction  -Educated patient on how increased tension within the pelvic floor can lead to urinary urgency and frequency       Specific Instructions for next treatment: Review bladder diary, internal pelvic floor assessment, LE and core strength     Evaluation Complexity:  History (Personal factors, comorbidities) [x] 0 [] 1-2 [] 3+   Exam (limitations, restrictions) [x] 1-2 [] 3 [] 4+   Clinical presentation (progression) [x] Stable [] Evolving  [] Unstable   Decision Making [x] Low [] Moderate [] High    [x] Low Complexity [] Moderate Complexity [] High Complexity       Treatment Charges: Mins Units   [x] Evaluation       [x]  Low       []  Moderate       []  High 33 1   []  Modalities:     [x]  Ther Exercise 15 1   []  Manual Therapy     []  Ther Activities     []  Aquatics     [] Vasocompression     []  Other       TOTAL TREATMENT TIME: 48 min      Time in: 10:17 am      Time out: 11:05 am       Electronically signed by: Digna Hicks PT      Physician Signature:________________________________Date:__________________  By signing above or cosigning this note, I have reviewed this plan of care and certify

## 2024-06-03 ENCOUNTER — HOSPITAL ENCOUNTER (OUTPATIENT)
Dept: PHYSICAL THERAPY | Facility: CLINIC | Age: 39
Setting detail: THERAPIES SERIES
Discharge: HOME | End: 2024-06-03
Payer: MEDICAID

## 2024-06-03 PROCEDURE — 97530 THERAPEUTIC ACTIVITIES: CPT

## 2024-06-03 PROCEDURE — 97110 THERAPEUTIC EXERCISES: CPT

## 2024-06-03 NOTE — FLOWSHEET NOTE
[] [facility]  Outpatient Rehabilitation &  Therapy  [address]  [phone]  [phone] [] [facility]  Outpatient Rehabilitation &  Therapy  [address]  P: [phone]  F: [phone] [x] [facility]  Outpatient Rehabilitation &  Therapy  [address]  P: [phone]  F: [phone] [] [facility]  Outpatient Rehabilitation &  Therapy  [address]  [phone]  [phone] [] [facility]  Outpatient Rehabilitation &  Therapy  [address]   P: [phone]  F: [phone]  [] [facility]  Outpatient Rehabilitation &  Therapy  [address]  P: [phone]  F: [phone] [] [facility]  Outpatient Rehabilitation &  Therapy  [address]  P: [phone]  F: [phone] [] [facility]  Outpatient Rehabilitation &  Therapy  [address]  P: [phone]  F: [phone] [] [facility]  Outpatient Rehabilitation &  Therapy  [address]  P: [phone]  F: [phone]  [] [facility] Outpatient Rehabilitation &  Therapy  [address]  P: [phone]  F: [phone]     Physical Therapy Daily Treatment Note    Date:  6/3/2024  Patient Name:  Bette Sabillon    :  1985  MRN: [id number]  Physician: EL Flores-LUCINDA               Insurance:  [facility] Medicaid: [facility]  -  remain, soft max, not combined; Auth after  remaining vs; no pt liability   Medical Diagnosis: R10.2, G89.29 (ICD-10-CM) - Chronic pelvic pain in female                             Rehab Codes: R10.2, M54.5, R27.8, M62.81, R39.15     Onset Date: 2017                        Next 's appt: 24  Visit# / total visits: 2/10;      Cancels/No Shows: 0/0    Subjective:    Pain:  [x] Yes  [] No Location: low

## 2024-06-11 ENCOUNTER — APPOINTMENT (OUTPATIENT)
Dept: PHYSICAL THERAPY | Facility: CLINIC | Age: 39
End: 2024-06-11
Payer: MEDICAID

## 2024-06-18 ENCOUNTER — APPOINTMENT (OUTPATIENT)
Dept: PHYSICAL THERAPY | Facility: CLINIC | Age: 39
End: 2024-06-18
Payer: MEDICAID

## 2024-07-12 PROBLEM — H35.719 CENTRAL SEROUS CHORIORETINOPATHY: Status: ACTIVE | Noted: 2024-01-24

## 2024-07-16 ENCOUNTER — OFFICE VISIT (OUTPATIENT)
Dept: FAMILY MEDICINE CLINIC | Age: 39
End: 2024-07-16
Payer: MEDICAID

## 2024-07-16 VITALS
BODY MASS INDEX: 35.55 KG/M2 | DIASTOLIC BLOOD PRESSURE: 70 MMHG | OXYGEN SATURATION: 97 % | HEART RATE: 74 BPM | TEMPERATURE: 97.7 F | SYSTOLIC BLOOD PRESSURE: 104 MMHG | WEIGHT: 208.2 LBS | HEIGHT: 64 IN

## 2024-07-16 DIAGNOSIS — J30.9 ALLERGIC RHINITIS, UNSPECIFIED SEASONALITY, UNSPECIFIED TRIGGER: ICD-10-CM

## 2024-07-16 DIAGNOSIS — Z12.31 ENCOUNTER FOR SCREENING MAMMOGRAM FOR BREAST CANCER: ICD-10-CM

## 2024-07-16 DIAGNOSIS — M79.672 PAIN IN BOTH FEET: ICD-10-CM

## 2024-07-16 DIAGNOSIS — L85.8 KERATOSIS PILARIS: ICD-10-CM

## 2024-07-16 DIAGNOSIS — L30.8 PRURITIC DERMATITIS: ICD-10-CM

## 2024-07-16 DIAGNOSIS — M79.671 PAIN IN BOTH FEET: ICD-10-CM

## 2024-07-16 DIAGNOSIS — Z00.01 ENCOUNTER FOR WELL ADULT EXAM WITH ABNORMAL FINDINGS: Primary | ICD-10-CM

## 2024-07-16 DIAGNOSIS — Z88.9 MULTIPLE ALLERGIES: ICD-10-CM

## 2024-07-16 DIAGNOSIS — M17.0 PRIMARY OSTEOARTHRITIS OF BOTH KNEES: ICD-10-CM

## 2024-07-16 PROCEDURE — 99214 OFFICE O/P EST MOD 30 MIN: CPT | Performed by: FAMILY MEDICINE

## 2024-07-16 PROCEDURE — 99396 PREV VISIT EST AGE 40-64: CPT | Performed by: FAMILY MEDICINE

## 2024-07-16 RX ORDER — IPRATROPIUM BROMIDE 21 UG/1
2 SPRAY, METERED NASAL EVERY 12 HOURS
Qty: 30 ML | Refills: 0 | Status: SHIPPED | OUTPATIENT
Start: 2024-07-16

## 2024-07-16 RX ORDER — EPINEPHRINE 0.3 MG/.3ML
0.3 INJECTION SUBCUTANEOUS
Qty: 1 EACH | Refills: 1 | Status: SHIPPED | OUTPATIENT
Start: 2024-07-16 | End: 2024-07-16

## 2024-07-16 RX ORDER — IBUPROFEN 600 MG/1
600 TABLET ORAL EVERY 8 HOURS PRN
Qty: 90 TABLET | Refills: 0 | Status: SHIPPED | OUTPATIENT
Start: 2024-07-16

## 2024-07-16 RX ORDER — ACETAMINOPHEN 500 MG
500 TABLET ORAL EVERY 6 HOURS PRN
Qty: 120 TABLET | Refills: 3 | Status: SHIPPED | OUTPATIENT
Start: 2024-07-16

## 2024-07-16 RX ORDER — TRIAMCINOLONE ACETONIDE 1 MG/G
OINTMENT TOPICAL
Qty: 80 G | Refills: 0 | Status: SHIPPED | OUTPATIENT
Start: 2024-07-16

## 2024-07-16 RX ORDER — LIDOCAINE 40 MG/G
CREAM TOPICAL
Qty: 120 G | Refills: 3 | Status: SHIPPED | OUTPATIENT
Start: 2024-07-16

## 2024-07-16 RX ORDER — CLOTRIMAZOLE AND BETAMETHASONE DIPROPIONATE 10; .64 MG/G; MG/G
CREAM TOPICAL
Qty: 45 G | Refills: 0 | Status: SHIPPED | OUTPATIENT
Start: 2024-07-16

## 2024-07-16 RX ORDER — CHLORHEXIDINE GLUCONATE ORAL RINSE 1.2 MG/ML
SOLUTION DENTAL
COMMUNITY
Start: 2024-06-17

## 2024-07-16 SDOH — ECONOMIC STABILITY: INCOME INSECURITY: HOW HARD IS IT FOR YOU TO PAY FOR THE VERY BASICS LIKE FOOD, HOUSING, MEDICAL CARE, AND HEATING?: NOT VERY HARD

## 2024-07-16 SDOH — ECONOMIC STABILITY: FOOD INSECURITY: WITHIN THE PAST 12 MONTHS, YOU WORRIED THAT YOUR FOOD WOULD RUN OUT BEFORE YOU GOT MONEY TO BUY MORE.: NEVER TRUE

## 2024-07-16 SDOH — ECONOMIC STABILITY: FOOD INSECURITY: WITHIN THE PAST 12 MONTHS, THE FOOD YOU BOUGHT JUST DIDN'T LAST AND YOU DIDN'T HAVE MONEY TO GET MORE.: NEVER TRUE

## 2024-07-16 ASSESSMENT — PATIENT HEALTH QUESTIONNAIRE - PHQ9
8. MOVING OR SPEAKING SO SLOWLY THAT OTHER PEOPLE COULD HAVE NOTICED. OR THE OPPOSITE, BEING SO FIGETY OR RESTLESS THAT YOU HAVE BEEN MOVING AROUND A LOT MORE THAN USUAL: NOT AT ALL
10. IF YOU CHECKED OFF ANY PROBLEMS, HOW DIFFICULT HAVE THESE PROBLEMS MADE IT FOR YOU TO DO YOUR WORK, TAKE CARE OF THINGS AT HOME, OR GET ALONG WITH OTHER PEOPLE: NOT DIFFICULT AT ALL
4. FEELING TIRED OR HAVING LITTLE ENERGY: NOT AT ALL
5. POOR APPETITE OR OVEREATING: NOT AT ALL
SUM OF ALL RESPONSES TO PHQ QUESTIONS 1-9: 0
2. FEELING DOWN, DEPRESSED OR HOPELESS: NOT AT ALL
1. LITTLE INTEREST OR PLEASURE IN DOING THINGS: NOT AT ALL
SUM OF ALL RESPONSES TO PHQ QUESTIONS 1-9: 0
7. TROUBLE CONCENTRATING ON THINGS, SUCH AS READING THE NEWSPAPER OR WATCHING TELEVISION: NOT AT ALL
9. THOUGHTS THAT YOU WOULD BE BETTER OFF DEAD, OR OF HURTING YOURSELF: NOT AT ALL
3. TROUBLE FALLING OR STAYING ASLEEP: NOT AT ALL
6. FEELING BAD ABOUT YOURSELF - OR THAT YOU ARE A FAILURE OR HAVE LET YOURSELF OR YOUR FAMILY DOWN: NOT AT ALL
SUM OF ALL RESPONSES TO PHQ9 QUESTIONS 1 & 2: 0

## 2024-07-16 ASSESSMENT — ENCOUNTER SYMPTOMS
BACK PAIN: 0
VOMITING: 0
DIARRHEA: 0
CHEST TIGHTNESS: 0
BLOOD IN STOOL: 0
WHEEZING: 0
CONSTIPATION: 0
ABDOMINAL DISTENTION: 0
ABDOMINAL PAIN: 0
SHORTNESS OF BREATH: 0
COUGH: 0
NAUSEA: 0

## 2024-07-16 NOTE — PROGRESS NOTES
2024      Bette Sabillon (:  1985) is a 39 y.o. female, here for a preventive medicine evaluation, Annual Exam, Foot Pain (RIGHT HEEL PAIN/PAIN SCORE: 8/10/ALMOST DAILY), Discuss Medications (DISCUSS MEDICATION DOSAGE/IBUPROFEN ), Knee Pain (Bilateral), Rash (Arms), and Allergies   .      ASSESSMENT/PLAN:    1. Encounter for well adult exam with abnormal findings      Low carb, low fat diet, increase fruits and vegetables, and exercise 4-5 times a week 30-40 minutes a day, or walk 1-2 hours per day, or wear a pedometer and get at least 10,000 steps per day.    Dental exam 2-3 times /year advised.  Immunizations reviewed.  Declines COVID-19 vaccine    Health Maintenance reviewed   Smoking cessation counseling given, not to ever start smoking    Annual eye exam advised.  I advised Bette Sabillon to make appointment with GYN. The patient verbalizes understanding and agrees with the plan.   2. Pain in both feet  Worsening, will refer back to podiatry, topical creams advised, ice, stretching, Tylenol alternate with ibuprofen, advised shoe inserts from the store until seen by podiatry  -     Dallas Stephens DPM, Podiatry, [place]  -     lidocaine (LMX) 4 % cream; 5 g topical 2-3 times a day as needed for pain, maximum 20 g/day, Disp-120 g, R-3, Normal  -     Camphor-Menthol-Methyl Sal 3.1-16-10 % GEL; Apply every 8 hours as needed for pain, Disp-113.4 g, R-0Normal  -     Elastic Bandages & Supports (KNEE BRACE/HINGED BARS XL) MISC; Disp-2 each, R-0, PrintNeeds knee braces, bilateral  -     ibuprofen (ADVIL;MOTRIN) 600 MG tablet; Take 1 tablet by mouth every 8 hours as needed for Pain (with food), Disp-90 tablet, R-0Normal  -     acetaminophen (TYLENOL) 500 MG tablet; Take 1 tablet by mouth every 6 hours as needed for Pain, Disp-120 tablet, R-3Normal  -     [place] OFFICE/OUTPATIENT ESTABLISHED MOD MDM 30-39 MIN  3. Primary osteoarthritis of both knees  Likely worsening due to wear and tear 
02/14/2028    Lipids  03/20/2029    DTaP/Tdap/Td vaccine (3 - Td or Tdap) 07/08/2032    Hepatitis C screen  Completed    HIV screen  Completed    Hepatitis A vaccine  Aged Out    Hib vaccine  Aged Out    HPV vaccine  Aged Out    Polio vaccine  Aged Out    Meningococcal (ACWY) vaccine  Aged Out    Pneumococcal 0-64 years Vaccine  Aged Out    Hepatitis B vaccine  Discontinued    Varicella vaccine  Discontinued    Diabetes screen  Discontinued

## 2024-07-18 PROBLEM — Z88.9 MULTIPLE ALLERGIES: Status: ACTIVE | Noted: 2024-07-18

## 2024-07-18 PROBLEM — R10.10 UPPER ABDOMINAL PAIN: Status: RESOLVED | Noted: 2023-02-16 | Resolved: 2024-07-18

## 2024-07-18 PROBLEM — U07.1 COVID-19 VIRUS INFECTION: Status: RESOLVED | Noted: 2023-12-13 | Resolved: 2024-07-18

## 2024-07-18 PROBLEM — R63.5 UNINTENDED WEIGHT GAIN: Status: RESOLVED | Noted: 2023-07-12 | Resolved: 2024-07-18

## 2024-07-18 PROBLEM — M79.671 PAIN IN BOTH FEET: Status: ACTIVE | Noted: 2021-10-20

## 2024-07-18 PROBLEM — L30.9 FACIAL DERMATITIS: Status: RESOLVED | Noted: 2021-01-05 | Resolved: 2024-07-18

## 2024-07-18 ASSESSMENT — ENCOUNTER SYMPTOMS
SINUS PAIN: 0
SINUS PRESSURE: 0
RHINORRHEA: 1

## 2024-07-19 ENCOUNTER — TELEPHONE (OUTPATIENT)
Dept: FAMILY MEDICINE CLINIC | Age: 39
End: 2024-07-19

## 2024-07-19 NOTE — TELEPHONE ENCOUNTER
Noted. Thank you! I put it in the box    Future Appointments   Date Time Provider Department Center   8/19/2024 11:15 AM Dallas Cosby DPM [place] [facility]   10/8/2024  8:50 AM Danis Fox MD [facility]   10/17/2024  9:20 AM Amari Hodges MD [facility] Neurology -   1/16/2025 11:00 AM Naila Thorpe MD [facility]   7/17/2025  1:30 PM Naila Thorpe MD [facility]

## 2024-07-19 NOTE — TELEPHONE ENCOUNTER
Patient called in stating her knee braces should go to [facility] on [address].   Wondering if that can be faxed over today

## 2024-08-06 ENCOUNTER — CLINICAL DOCUMENTATION (OUTPATIENT)
Dept: PHYSICAL THERAPY | Facility: CLINIC | Age: 39
End: 2024-08-06

## 2024-08-06 NOTE — DISCHARGE SUMMARY
Date of final visit: 6/3/24      Subjective:  Pain:  [x] Yes  [] No   Location: low back, pelvic pain           Pain Rating: (0-10 scale) 4/10  Pain altered Tx:  [x] No  [] Yes  Action:  Comments: Patient reports to physical therapy this date stating that she cont to have increased pain within her (B) feet, knees and (L) hip. Has had some increased pelvic and low back pain this date that resides on (L) side, however pain does intermittently change from one side to another. Patient states that she lost her handouts from last session, therefore has not been completing exercises at home. States that she cont to notice urinary urgency.     Objective:  Patient verbalizes consent for all external and internal examination techniques this date. Patient was aware that they could stop examination at any point during assessment with good understanding verbalized by patient. Patient was draped properly, gloves were used and universal precautions were used.      Palpation [x]  []  []  TTP (R) lower abdominals, external pelvic diaphragm, (L) PSIS, (L) piriformis (R) levator ani, (R) OI       Perineal Descent at rest [x]  []  []  Absent     Voluntary Perineal Body Elevation  [x]  []  []   present       Perineal Body Relaxation []  [x]  []   absent    Perineal Body Movement with Sustained increase in IAP []  [x]  []   absent    Perineal Body Movement with Rapid Increase in IAP []  [x]  []   absent    Laycock PERFECT Scale [x]  []  []  P: 5/5  E:  R:  F:  E:  C:  T:   Prolapse []  [x]  []  Anterior Prolapse: present   Posterior Prolapse: absent   Apical Prolapse: absent    Sensation [x]  []  []          Assessment:  STG: (to be met in 5 treatments)  Patient will report pain as 0/10 at rest   Patient will improve lumbar AROM to 100% within all planes in order to increase ease of performing ADLs  ? Strength: Patient will demonstrate proper TA activation in supine with correct breathing in order to improve core stability   ?

## 2024-08-19 ENCOUNTER — OFFICE VISIT (OUTPATIENT)
Dept: PODIATRY | Age: 39
End: 2024-08-19
Payer: MEDICAID

## 2024-08-19 VITALS — HEIGHT: 64 IN | WEIGHT: 205 LBS | BODY MASS INDEX: 35 KG/M2

## 2024-08-19 DIAGNOSIS — G57.81 SURAL NEURITIS, RIGHT: ICD-10-CM

## 2024-08-19 DIAGNOSIS — M79.671 PAIN IN RIGHT FOOT: ICD-10-CM

## 2024-08-19 DIAGNOSIS — M76.71 PERONEAL TENDINITIS OF RIGHT LOWER EXTREMITY: Primary | ICD-10-CM

## 2024-08-19 DIAGNOSIS — R60.0 EDEMA, LOWER EXTREMITY: ICD-10-CM

## 2024-08-19 PROCEDURE — L4360 PNEUMAT WALKING BOOT PRE CST: HCPCS | Performed by: PODIATRIST

## 2024-08-19 PROCEDURE — 99213 OFFICE O/P EST LOW 20 MIN: CPT | Performed by: PODIATRIST

## 2024-08-19 ASSESSMENT — ENCOUNTER SYMPTOMS
DIARRHEA: 0
SHORTNESS OF BREATH: 0
NAUSEA: 0
BACK PAIN: 0
COLOR CHANGE: 0

## 2024-08-19 NOTE — PROGRESS NOTES
[facility] Podiatry  Return Patient Progress Note    Subjective: Bette Sabillon 39 y.o. female that presents with chief complaint of pain to the bottom and outside of the right heel.  Chief Complaint   Patient presents with    Foot Pain     Right heel     Patient states that this pain has been present for about 4 months.  Patient denies any injury.  Patient states that she has been walking differently due to the knee pain.  Pain is rated 9 out of 10 and is described as intermittent. Patient denies any treatment prior to today.    Review of Systems   Constitutional:  Negative for activity change, appetite change, chills, diaphoresis, fatigue and fever.   Respiratory:  Negative for shortness of breath.    Cardiovascular:  Negative for leg swelling.   Gastrointestinal:  Negative for diarrhea and nausea.   Endocrine: Negative for cold intolerance, heat intolerance and polyuria.   Musculoskeletal:  Negative for arthralgias, back pain, gait problem, joint swelling and myalgias.   Skin:  Negative for color change, pallor, rash and wound.   Allergic/Immunologic: Negative for environmental allergies and food allergies.   Neurological:  Negative for dizziness, weakness, light-headedness and numbness.   Hematological:  Does not bruise/bleed easily.   Psychiatric/Behavioral:  Negative for behavioral problems, confusion and self-injury. The patient is not nervous/anxious.        Objective: Clinical evaluation of the patient reveals pain with palpation to the plantar lateral rim of the right heel.  There is pain with palpation to the lateral aspect of the right heel as well.  There is mild edema noted to the right heel.  There is no erythema, calor, or open wound noted.  Muscle strength is +5/5 to the lateral muscle group of the right lower extremity.  There is mild pain with this muscle strength evaluation.    Assessment:    Diagnosis Orders   1. Peroneal tendinitis of right lower extremity  AK PNEUMAT WALKING BOOT PRE

## 2024-09-16 ENCOUNTER — OFFICE VISIT (OUTPATIENT)
Dept: PODIATRY | Age: 39
End: 2024-09-16
Payer: MEDICAID

## 2024-09-16 VITALS — WEIGHT: 205 LBS | HEIGHT: 64 IN | BODY MASS INDEX: 35 KG/M2

## 2024-09-16 DIAGNOSIS — M79.671 PAIN IN RIGHT FOOT: ICD-10-CM

## 2024-09-16 DIAGNOSIS — G57.81 SURAL NEURITIS, RIGHT: ICD-10-CM

## 2024-09-16 DIAGNOSIS — M76.71 PERONEAL TENDINITIS OF RIGHT LOWER EXTREMITY: ICD-10-CM

## 2024-09-16 DIAGNOSIS — R60.0 EDEMA, LOWER EXTREMITY: ICD-10-CM

## 2024-09-16 DIAGNOSIS — R26.2 PATIENT UNABLE TO WALK 150 FEET: Primary | ICD-10-CM

## 2024-09-16 PROCEDURE — 99213 OFFICE O/P EST LOW 20 MIN: CPT | Performed by: PODIATRIST

## 2024-09-16 ASSESSMENT — ENCOUNTER SYMPTOMS
BACK PAIN: 0
DIARRHEA: 0
NAUSEA: 0
COLOR CHANGE: 0
SHORTNESS OF BREATH: 0

## 2024-09-27 DIAGNOSIS — G43.109 MIGRAINE WITH AURA AND WITHOUT STATUS MIGRAINOSUS, NOT INTRACTABLE: ICD-10-CM

## 2024-09-27 RX ORDER — RIBOFLAVIN (VITAMIN B2) 100 MG
1 TABLET ORAL DAILY
Qty: 90 TABLET | Refills: 3 | Status: SHIPPED | OUTPATIENT
Start: 2024-09-27

## 2024-09-30 RX ORDER — BUTALBITAL, ACETAMINOPHEN AND CAFFEINE 50; 325; 40 MG/1; MG/1; MG/1
TABLET ORAL
Qty: 90 TABLET | Refills: 0 | Status: SHIPPED | OUTPATIENT
Start: 2024-09-30

## 2024-09-30 NOTE — TELEPHONE ENCOUNTER
Pharmacy requesting refill of Fioricet.      Medication active on med list yes      Date of last fill: 10/31/23  verified on 9/30/2024   verified by SF LPN      Date of last appointment 4/18/24    Next Visit Date:  10/17/2024

## 2024-10-08 ENCOUNTER — OFFICE VISIT (OUTPATIENT)
Dept: UROLOGY | Age: 39
End: 2024-10-08
Payer: MEDICAID

## 2024-10-08 VITALS
TEMPERATURE: 97.8 F | OXYGEN SATURATION: 99 % | HEIGHT: 64 IN | HEART RATE: 78 BPM | WEIGHT: 214.8 LBS | SYSTOLIC BLOOD PRESSURE: 112 MMHG | DIASTOLIC BLOOD PRESSURE: 72 MMHG | BODY MASS INDEX: 36.67 KG/M2 | RESPIRATION RATE: 16 BRPM

## 2024-10-08 DIAGNOSIS — J30.89 SEASONAL ALLERGIC RHINITIS DUE TO OTHER ALLERGIC TRIGGER: ICD-10-CM

## 2024-10-08 DIAGNOSIS — R31.29 MICROHEMATURIA: Primary | ICD-10-CM

## 2024-10-08 DIAGNOSIS — E55.9 VITAMIN D DEFICIENCY: ICD-10-CM

## 2024-10-08 DIAGNOSIS — F41.9 ANXIETY: ICD-10-CM

## 2024-10-08 DIAGNOSIS — R35.0 URINARY FREQUENCY: ICD-10-CM

## 2024-10-08 DIAGNOSIS — K58.0 IRRITABLE BOWEL SYNDROME WITH DIARRHEA: ICD-10-CM

## 2024-10-08 PROCEDURE — 99214 OFFICE O/P EST MOD 30 MIN: CPT | Performed by: UROLOGY

## 2024-10-08 RX ORDER — CHOLECALCIFEROL (VITAMIN D3) 50 MCG
2000 TABLET ORAL DAILY
Qty: 90 TABLET | Refills: 3 | Status: SHIPPED | OUTPATIENT
Start: 2024-10-08

## 2024-10-08 RX ORDER — CRANBERRY CONC/C/BACILL COAG 250-30-15
1 TABLET ORAL DAILY
Qty: 90 TABLET | Refills: 3 | Status: SHIPPED | OUTPATIENT
Start: 2024-10-08

## 2024-10-08 RX ORDER — LANOLIN ALCOHOL/MO/W.PET/CERES
400 CREAM (GRAM) TOPICAL EVERY EVENING
Qty: 90 TABLET | Refills: 3 | Status: SHIPPED | OUTPATIENT
Start: 2024-10-08

## 2024-10-08 RX ORDER — LORATADINE 10 MG/1
10 TABLET ORAL DAILY
Qty: 90 TABLET | Refills: 3 | Status: SHIPPED | OUTPATIENT
Start: 2024-10-08

## 2024-10-08 ASSESSMENT — ENCOUNTER SYMPTOMS
VOMITING: 0
EYE PAIN: 0
EYE REDNESS: 0
COUGH: 0
ABDOMINAL PAIN: 0
NAUSEA: 0
BACK PAIN: 0
CONSTIPATION: 0
WHEEZING: 0
SHORTNESS OF BREATH: 0

## 2024-10-08 NOTE — PROGRESS NOTES
[facility] PHYSICIANS [facility] UROLOGY CENTER  [address]  [facility] [address]  Dept: [phone]    [facility] Urology Office Note - Established    Patient:  Bette Sabillon  YOB: 1985  Date: 10/8/2024    The patient is a 39 y.o. female whopresents today for evaluation of the following problems:   Chief Complaint   Patient presents with    Hematuria     Micro    Urinary Frequency at Night    Urinary Frequency     Still experiencing going every 20 mins some nights        HPI  She is here for OAB.   She had a work up, which was negative.   U/A in May was negative for blood.   She voids every 2 hours during the day.   She is up 3-4 times per night.       Summary of old records: N/A    Additional History: N/A    Procedures Today: N/A    Urinalysis today:  No results found for this visit on 10/08/24.    Imaging Reviewed during this Office Visit: none  (results were independently reviewed by physician and radiology report verified)    AUA Symptom Score (10/8/2024):                               Last BUN and creatinine:  Lab Results   Component Value Date    BUN 13 03/20/2024     Lab Results   Component Value Date    CREATININE 0.8 03/20/2024       Additional Lab/Culture results: none    PAST MEDICAL, FAMILY AND SOCIAL HISTORY UPDATE:  Past Medical History:   Diagnosis Date    Acute appendicitis 1993    Acute constipation 2021    Allergic rhinitis     Anxiety     Benign paroxysmal positional vertigo due to bilateral vestibular disorder 09/07/2022    Chest pain     with anxiety    Cholecystitis 04/21/2022    Pathology report showed significant chronic cholecystitis, had significant omental adhesions to the gallbladder per surgical report    Cholelithiasis 2021    Chronic diarrhea 07/09/2022    Chronic kidney disease     Chronic RLQ pain 08/05/2019    Chronic RUQ pain 02/16/2023    CKD (chronic kidney disease) stage 1, GFR 90 ml/min or greater 07/10/2018

## 2024-10-08 NOTE — PROGRESS NOTES
Review of Systems   Constitutional:  Negative for chills, fatigue and fever.   Eyes:  Negative for pain, redness and visual disturbance.   Respiratory:  Negative for cough, shortness of breath and wheezing.    Cardiovascular:  Negative for chest pain and leg swelling.   Gastrointestinal:  Negative for abdominal pain, constipation, nausea and vomiting.   Genitourinary:  Positive for frequency (at night) and hematuria (micro). Negative for difficulty urinating, dysuria, flank pain and urgency.   Musculoskeletal:  Negative for back pain, joint swelling and myalgias.   Skin:  Negative for rash and wound.   Neurological:  Negative for dizziness, tremors and numbness.   Hematological:  Does not bruise/bleed easily.

## 2024-10-08 NOTE — TELEPHONE ENCOUNTER
Please Approve or Refuse.  Send to Pharmacy per Pt's Request:      Next Visit Date:  1/16/2025   Last Visit Date: 7/16/2024    Hemoglobin A1C (%)   Date Value   11/14/2023 4.8   01/16/2019 4.6             ( goal A1C is < 7)   BP Readings from Last 3 Encounters:   10/08/24 112/72   07/16/24 104/70   05/07/24 110/68          (goal 120/80)  BUN   Date Value Ref Range Status   03/20/2024 13 6 - 20 mg/dL Final     Creatinine   Date Value Ref Range Status   03/20/2024 0.8 0.5 - 0.9 mg/dL Final     Potassium   Date Value Ref Range Status   03/20/2024 4.0 3.7 - 5.3 mmol/L Final

## 2024-10-08 NOTE — ADDENDUM NOTE
Addended by: SEJAL CARLSON on: 10/8/2024 09:11 AM     Modules accepted: Orders     TAKE COUGH MEDICATION AS PRESCRIBED.   STAY WELL HYDRATED.   TAKE DAYQUIL OR NYQUIL FOR SYMPTOMATIC RELIEF.   RETURN TO ER FOR ANY SHORTNESS OF BREATH, CHEST PAIN, ANY OTHER NEW OR CONCERNING SYMPTOMS.   FOLLOW UP WITH YOUR PMD IN 2-3 DAYS.

## 2024-10-14 ENCOUNTER — OFFICE VISIT (OUTPATIENT)
Dept: PODIATRY | Age: 39
End: 2024-10-14
Payer: MEDICAID

## 2024-10-14 VITALS — WEIGHT: 214 LBS | HEIGHT: 64 IN | BODY MASS INDEX: 36.54 KG/M2

## 2024-10-14 DIAGNOSIS — M79.671 PAIN OF RIGHT HEEL: ICD-10-CM

## 2024-10-14 DIAGNOSIS — M76.71 PERONEAL TENDINITIS OF RIGHT LOWER EXTREMITY: Primary | ICD-10-CM

## 2024-10-14 DIAGNOSIS — M79.671 PAIN IN RIGHT FOOT: ICD-10-CM

## 2024-10-14 DIAGNOSIS — R60.0 EDEMA, LOWER EXTREMITY: ICD-10-CM

## 2024-10-14 PROCEDURE — 99213 OFFICE O/P EST LOW 20 MIN: CPT | Performed by: PODIATRIST

## 2024-10-14 ASSESSMENT — ENCOUNTER SYMPTOMS
NAUSEA: 0
COLOR CHANGE: 0
SHORTNESS OF BREATH: 0
DIARRHEA: 0
BACK PAIN: 0

## 2024-10-14 NOTE — PROGRESS NOTES
[facility] Podiatry  Return Patient Progress Note    Subjective: Bette Sabillon 39 y.o. female that presents for follow up evaluation of pain to the right foot  Chief Complaint   Patient presents with    Foot Pain     Follow up right foot, still having pain     Patient's treatment thus far has included CAM Walker, ALLAN.  Patient states that her pain has not changed since last visit.  Pain is rated 8 out of 10 and is described as intermittent. Patient has been following my prescribed course of therapy as instructed.     Review of Systems   Constitutional:  Negative for activity change, appetite change, chills, diaphoresis, fatigue and fever.   Respiratory:  Negative for shortness of breath.    Cardiovascular:  Negative for leg swelling.   Gastrointestinal:  Negative for diarrhea and nausea.   Endocrine: Negative for cold intolerance, heat intolerance and polyuria.   Musculoskeletal:  Positive for gait problem and joint swelling. Negative for arthralgias, back pain and myalgias.   Skin:  Negative for color change, pallor, rash and wound.   Allergic/Immunologic: Negative for environmental allergies and food allergies.   Neurological:  Negative for dizziness, weakness, light-headedness and numbness.   Hematological:  Does not bruise/bleed easily.   Psychiatric/Behavioral:  Negative for behavioral problems, confusion and self-injury. The patient is not nervous/anxious.        Objective: Clinical evaluation of the patient reveals continued pain with palpation to the plantar lateral rim of the right heel. There is pain with palpation to the lateral aspect of the right heel as well. There is mild edema noted to the right heel. There is no erythema, calor, or open wound noted. Muscle strength is +5/5 to the lateral muscle group of the right lower extremity. There is mild pain with this muscle strength evaluation.  Loading of the right foot reveals a forefoot valgus and a rear foot varus.  Weightbearing evaluation

## 2024-10-17 ENCOUNTER — OFFICE VISIT (OUTPATIENT)
Dept: NEUROLOGY | Age: 39
End: 2024-10-17
Payer: MEDICAID

## 2024-10-17 VITALS
OXYGEN SATURATION: 99 % | BODY MASS INDEX: 36.54 KG/M2 | SYSTOLIC BLOOD PRESSURE: 116 MMHG | DIASTOLIC BLOOD PRESSURE: 80 MMHG | HEART RATE: 65 BPM | HEIGHT: 64 IN | TEMPERATURE: 98.4 F | WEIGHT: 214 LBS

## 2024-10-17 DIAGNOSIS — J30.9 NASAL SINUS INFLAMMATION DUE TO ALLERGY: ICD-10-CM

## 2024-10-17 DIAGNOSIS — G43.109 MIGRAINE WITH AURA AND WITHOUT STATUS MIGRAINOSUS, NOT INTRACTABLE: Primary | ICD-10-CM

## 2024-10-17 PROCEDURE — 99214 OFFICE O/P EST MOD 30 MIN: CPT | Performed by: PSYCHIATRY & NEUROLOGY

## 2024-10-17 RX ORDER — FREMANEZUMAB-VFRM 225 MG/1.5ML
INJECTION SUBCUTANEOUS
Qty: 1 ADJUSTABLE DOSE PRE-FILLED PEN SYRINGE | Refills: 5 | Status: SHIPPED | OUTPATIENT
Start: 2024-10-17

## 2024-10-17 NOTE — PROGRESS NOTES
Active Problem migraine headaches and dizziness felt to have some vestibular migraine component  The condition is she reports she is having in paranasal pressure with nasal congestion of grade 3 over 10 which is daily with her thinking this mnore allergy treated . She reports her dog is shedding having dander issue . She is having headache once every two weeks to every month over bilateral frontal and vertex head grade 4 over 10 using fiorcet . She remains ajovy 225 mg SQ month and magnesium 400 mg po qd. There is imbalance such as when closing her eyes in shower or looking through blinders . She cannot look at her phone while passenger in car or when going through bridge or row of trees with light coming through making her dizzy more out of sorts with sense of sense of instabilily .There will be some vertiginous sensation with positional head changes . She has undergone vestibular rehabilitation which made dizziness worse having also issues getting transportation  .There is no tinnitus or hearing loss . She has taken over the counter dizzy medications that have helped .  Significant medications ajovy 225 mg SQ month , magnesium 400 mg po qd , fioricet PRN  . Testing MRI of Head normal , January 2019      Past Medical History:   Diagnosis Date    Acute appendicitis 1993    Acute constipation 2021    Allergic rhinitis     Anxiety     Chest pain     with anxiety    Cholecystitis 04/21/2022    Pathology report showed significant chronic cholecystitis, had significant omental adhesions to the gallbladder per surgical report    Cholelithiasis 2021    Chronic kidney disease     CKD (chronic kidney disease) stage 1, GFR 90 ml/min or greater 07/10/2018    COVID 01/2022    Depression     Dysfunction of both eustachian tubes 8/23/2021    Eustachian tube disorder, bilateral 10/27/2020    Generalized abdominal pain 5/19/2021    Hashimoto's thyroiditis     Headache(784.0)     Heart murmur     Hematuria 2016    Hypothyroidism

## 2024-10-21 ENCOUNTER — HOSPITAL ENCOUNTER (OUTPATIENT)
Dept: MRI IMAGING | Age: 39
Discharge: HOME | End: 2024-10-23
Attending: PODIATRIST
Payer: MEDICAID

## 2024-10-21 DIAGNOSIS — R60.0 EDEMA, LOWER EXTREMITY: ICD-10-CM

## 2024-10-21 DIAGNOSIS — M79.671 PAIN IN RIGHT FOOT: ICD-10-CM

## 2024-10-21 DIAGNOSIS — M79.671 PAIN OF RIGHT HEEL: ICD-10-CM

## 2024-10-21 DIAGNOSIS — M76.71 PERONEAL TENDINITIS OF RIGHT LOWER EXTREMITY: ICD-10-CM

## 2024-10-21 PROCEDURE — 73721 MRI JNT OF LWR EXTRE W/O DYE: CPT

## 2024-10-23 RX ORDER — L. ACIDOPHILUS/PECTIN, CITRUS 25MM-100MG
1 TABLET ORAL DAILY
Qty: 90 TABLET | Refills: 3 | Status: SHIPPED | OUTPATIENT
Start: 2024-10-23

## 2024-10-25 ENCOUNTER — OFFICE VISIT (OUTPATIENT)
Dept: PODIATRY | Age: 39
End: 2024-10-25
Payer: MEDICAID

## 2024-10-25 VITALS — WEIGHT: 214 LBS | BODY MASS INDEX: 36.54 KG/M2 | HEIGHT: 64 IN

## 2024-10-25 DIAGNOSIS — M76.71 PERONEAL TENDINITIS OF RIGHT LOWER EXTREMITY: Primary | ICD-10-CM

## 2024-10-25 DIAGNOSIS — R60.0 EDEMA, LOWER EXTREMITY: ICD-10-CM

## 2024-10-25 DIAGNOSIS — M79.671 PAIN OF RIGHT HEEL: ICD-10-CM

## 2024-10-25 DIAGNOSIS — M79.671 PAIN IN RIGHT FOOT: ICD-10-CM

## 2024-10-25 PROCEDURE — 99213 OFFICE O/P EST LOW 20 MIN: CPT | Performed by: PODIATRIST

## 2024-10-25 NOTE — PROGRESS NOTES
[facility]  Return Patient Progress Note    Subjective: Bette Sabillon 39 y.o. female that presents for follow up evaluation of continued pain to the right foot.   Chief Complaint   Patient presents with    Foot Pain     Follow up right foot    Results     MRI follow up     Patient's treatment thus far has included CAM walker, RICE, MRI.  Pain is rated 8 out of 10 and is described as intermittent. Patient states that the pain is less frequent now. Patient has been following my prescribed course of therapy as instructed.  Patient states that she is disappointed that she is not feeling better as she plans on going to [place] today.    Review of Systems   Constitutional:  Negative for activity change, appetite change, chills, diaphoresis, fatigue and fever.   Respiratory:  Negative for shortness of breath.    Cardiovascular:  Negative for leg swelling.   Gastrointestinal:  Negative for diarrhea and nausea.   Endocrine: Negative for cold intolerance, heat intolerance and polyuria.   Musculoskeletal:  Positive for gait problem and joint swelling. Negative for arthralgias, back pain and myalgias.   Skin:  Negative for color change, pallor, rash and wound.   Allergic/Immunologic: Negative for environmental allergies and food allergies.   Neurological:  Negative for dizziness, weakness, light-headedness and numbness.   Hematological:  Does not bruise/bleed easily.   Psychiatric/Behavioral:  Negative for behavioral problems, confusion and self-injury. The patient is not nervous/anxious.        Objective: Clinical evaluation of the patient reveals continued pain with palpation to the plantar lateral rim of the right heel. There is pain with palpation to the lateral aspect of the right heel as well. There is mild edema noted to the right heel. There is no erythema, calor, or open wound noted. Muscle strength is +5/5 to the lateral muscle group of the right lower extremity. There is mild pain with this

## 2024-11-04 ENCOUNTER — NURSE ONLY (OUTPATIENT)
Dept: PODIATRY | Age: 39
End: 2024-11-04

## 2024-11-04 DIAGNOSIS — M79.671 PAIN OF RIGHT HEEL: ICD-10-CM

## 2024-11-04 DIAGNOSIS — M89.8X7 EXOSTOSIS OF LEFT FOOT: ICD-10-CM

## 2024-11-04 DIAGNOSIS — M79.672 PAIN IN LEFT FOOT: ICD-10-CM

## 2024-11-04 DIAGNOSIS — M76.71 PERONEAL TENDINITIS OF RIGHT LOWER EXTREMITY: Primary | ICD-10-CM

## 2024-11-04 DIAGNOSIS — M79.671 PAIN IN RIGHT FOOT: ICD-10-CM

## 2024-11-04 DIAGNOSIS — G57.81 SURAL NEURITIS, RIGHT: ICD-10-CM

## 2024-11-18 NOTE — THERAPY DISCHARGE
[x] [facility]  Rehabilitation Services  [address]  [place], [place] [address]  Phone [phone]  Fax [phone]    Physical Therapy Discharge Note    Date: 2024      Patient: Bette Sabillon  : 1985  MRN: [id number]    Physician: Naila Thorpe MD                              Medical Diagnosis: M17.0 (ICD-10-CM) - Primary osteoarthritis of both knees                   M79.672 (ICD-10-CM) - Left foot pain (added 1/10/24)  Rehab Codes: M25.561 right knee pain, M25.562 Left knee pain  Onset Date: 2023    Total visits attended:10  Cancels/No shows:  Date of initial visit: 23                   [] Patient recovered from conditions. Treatment goals were met.  [] Patient received maximum benefit. No further therapy indicated at this time.  [] Patient demonstrated improvement from condition with  ** Of  ** Short term goals met.  []Patient demonstrated improvement from condition with **   Of **  Long term goals met.  [] Patient to continue exercise/home instructions independently.  [] Therapy interrupted due to:    [] Patient has 2 or more no shows/cancels, is discontinued per our policy.    [] Patient has completed prescribed number of treatment sessions.    [x] Other:Patient did not return to therapy after last visit on 24. Patient is to be discharged at this time. If patient is to require more physical therapy services, they are to obtain new script from physician.          Treatment Included:     [x] Therapeutic Exercise   66540  [] Iontophoresis: 4 mg/mL Dexamethasone Sodium Phosphate  mAmin  34198   [x] Therapeutic Activity  97630 [x] Vasopneumatic cold with compression  17070    [x] Gait Training   60587 [] Ultrasound   28729   [x] Neuromuscular Re-education  12978 [] Electrical Stimulation Unattended  06337   [x] Manual Therapy  61594 [] Electrical Stimulation Attended  02711   [x] Instruction in HEP  [] Lumbar/Cervical

## 2024-11-19 ENCOUNTER — HOSPITAL ENCOUNTER (OUTPATIENT)
Age: 39
Setting detail: SPECIMEN
Discharge: HOME | End: 2024-11-19

## 2024-11-19 ENCOUNTER — OFFICE VISIT (OUTPATIENT)
Dept: OBGYN CLINIC | Age: 39
End: 2024-11-19
Payer: MEDICAID

## 2024-11-19 VITALS
BODY MASS INDEX: 37.22 KG/M2 | HEIGHT: 64 IN | HEART RATE: 92 BPM | DIASTOLIC BLOOD PRESSURE: 72 MMHG | SYSTOLIC BLOOD PRESSURE: 118 MMHG | WEIGHT: 218 LBS

## 2024-11-19 DIAGNOSIS — Z12.31 SCREENING MAMMOGRAM FOR BREAST CANCER: ICD-10-CM

## 2024-11-19 DIAGNOSIS — E78.5 HYPERLIPIDEMIA WITH TARGET LDL LESS THAN 100: ICD-10-CM

## 2024-11-19 DIAGNOSIS — M17.0 PRIMARY OSTEOARTHRITIS OF BOTH KNEES: ICD-10-CM

## 2024-11-19 DIAGNOSIS — R30.9 PAINFUL URINATION: ICD-10-CM

## 2024-11-19 DIAGNOSIS — R31.29 MICROHEMATURIA: ICD-10-CM

## 2024-11-19 DIAGNOSIS — R39.15 URINARY URGENCY: ICD-10-CM

## 2024-11-19 DIAGNOSIS — N89.8 VAGINAL ODOR: ICD-10-CM

## 2024-11-19 DIAGNOSIS — N89.8 VAGINAL DISCHARGE: ICD-10-CM

## 2024-11-19 DIAGNOSIS — G43.109 MIGRAINE WITH AURA AND WITHOUT STATUS MIGRAINOSUS, NOT INTRACTABLE: ICD-10-CM

## 2024-11-19 DIAGNOSIS — Z01.419 WELL WOMAN EXAM: Primary | ICD-10-CM

## 2024-11-19 LAB
BACTERIA URNS QL MICRO: ABNORMAL
BILIRUB UR QL STRIP: NEGATIVE
BILIRUBIN, POC: NORMAL
BLOOD URINE, POC: NORMAL
CASTS #/AREA URNS LPF: ABNORMAL /LPF (ref 0–8)
CLARITY UR: ABNORMAL
CLARITY, POC: CLEAR
COLOR UR: YELLOW
COLOR, POC: YELLOW
EPI CELLS #/AREA URNS HPF: ABNORMAL /HPF (ref 0–5)
GLUCOSE UR STRIP-MCNC: NEGATIVE MG/DL
GLUCOSE URINE, POC: NORMAL MG/DL
HGB UR QL STRIP.AUTO: ABNORMAL
KETONES UR STRIP-MCNC: NEGATIVE MG/DL
KETONES, POC: NORMAL MG/DL
LEUKOCYTE EST, POC: NORMAL
LEUKOCYTE ESTERASE UR QL STRIP: ABNORMAL
NITRITE UR QL STRIP: NEGATIVE
NITRITE, POC: NORMAL
PH UR STRIP: 5.5 [PH] (ref 5–8)
PH, POC: 5
PROT UR STRIP-MCNC: NEGATIVE MG/DL
PROTEIN, POC: NORMAL MG/DL
RBC #/AREA URNS HPF: ABNORMAL /HPF (ref 0–4)
SP GR UR STRIP: 1.02 (ref 1–1.03)
SPECIFIC GRAVITY, POC: 1.03
UROBILINOGEN UR STRIP-ACNC: NORMAL EU/DL (ref 0–1)
UROBILINOGEN, POC: NORMAL MG/DL
WBC #/AREA URNS HPF: ABNORMAL /HPF (ref 0–5)

## 2024-11-19 PROCEDURE — 99396 PREV VISIT EST AGE 40-64: CPT | Performed by: CLINICAL NURSE SPECIALIST

## 2024-11-19 PROCEDURE — 81003 URINALYSIS AUTO W/O SCOPE: CPT | Performed by: CLINICAL NURSE SPECIALIST

## 2024-11-19 PROCEDURE — 99213 OFFICE O/P EST LOW 20 MIN: CPT | Performed by: CLINICAL NURSE SPECIALIST

## 2024-11-19 RX ORDER — BUTALBITAL, ACETAMINOPHEN AND CAFFEINE 50; 325; 40 MG/1; MG/1; MG/1
TABLET ORAL
Qty: 90 TABLET | Refills: 0 | Status: SHIPPED | OUTPATIENT
Start: 2024-11-19

## 2024-11-19 RX ORDER — CHLORAL HYDRATE 500 MG
2000 CAPSULE ORAL DAILY
Qty: 60 CAPSULE | Refills: 11 | Status: SHIPPED | OUTPATIENT
Start: 2024-11-19

## 2024-11-19 RX ORDER — METRONIDAZOLE 7.5 MG/G
GEL VAGINAL
Qty: 1 EACH | Refills: 0 | Status: SHIPPED | OUTPATIENT
Start: 2024-11-19

## 2024-11-19 RX ORDER — TERCONAZOLE 4 MG/G
CREAM VAGINAL
Qty: 45 G | Refills: 1 | Status: SHIPPED | OUTPATIENT
Start: 2024-11-19 | End: 2024-11-26

## 2024-11-19 NOTE — TELEPHONE ENCOUNTER
Please Approve or Refuse.  Send to Pharmacy per Pt's Request:      Next Visit Date:  1/16/2025   Last Visit Date: 7/16/2024    Hemoglobin A1C (%)   Date Value   11/14/2023 4.8   01/16/2019 4.6             ( goal A1C is < 7)   BP Readings from Last 3 Encounters:   11/19/24 118/72   10/17/24 116/80   10/08/24 112/72          (goal 120/80)  BUN   Date Value Ref Range Status   03/20/2024 13 6 - 20 mg/dL Final     Creatinine   Date Value Ref Range Status   03/20/2024 0.8 0.5 - 0.9 mg/dL Final     Potassium   Date Value Ref Range Status   03/20/2024 4.0 3.7 - 5.3 mmol/L Final

## 2024-11-19 NOTE — PROGRESS NOTES
tablet by mouth every 6 hours as needed for Pain 120 tablet 3    SYSTANE ULTRA 0.4-0.3 % ophthalmic solution Place 2 drops into both eyes      albuterol sulfate HFA (PROVENTIL HFA) 108 (90 Base) MCG/ACT inhaler Inhale 2 puffs into the lungs every 6 hours as needed for Wheezing or Shortness of Breath Okay to substitute 8 g 0    Omega-3 Fatty Acids (FISH OIL) 1000 MG capsule Take 2 capsules by mouth daily 60 capsule 3    diclofenac sodium (VOLTAREN) 1 % GEL Apply 2 g topically 4 times daily as needed for Pain 100 g 0    REFRESH TEARS 0.5 % SOLN ophthalmic solution INSTILL 1 DROP INTO EACH EYE THREE TIMES DAILY      cycloSPORINE (RESTASIS) 0.05 % ophthalmic emulsion INSTILL 1 DROP INTO EACH EYE TWICE DAILY      diphenhydrAMINE (BENADRYL) 25 MG tablet Take 1 tablet by mouth nightly as needed for Allergies or Itching 30 tablet 3    Probiotic Acidophilus (FLORANEX) TABS Take 1 tablet by mouth daily 30 tablet 12    hydrocortisone 2.5 % ointment Apply topically 2 times daily for scar for 10 days.  Do not use in the skin folds, neck or face 20 g 0    Misc Natural Products (CRANBERRY/PROBIOTIC) TABS Take 1 tablet by mouth daily Please dispense according to patients insurance. 90 tablet 3    ipratropium (ATROVENT) 0.03 % nasal spray 2 sprays by Each Nostril route in the morning and 2 sprays in the evening. Replacing Flonase, do not take more than 1 or 2 weeks at a time. (Patient not taking: Reported on 11/19/2024) 30 mL 0    clotrimazole-betamethasone (LOTRISONE) 1-0.05 % cream Apply topically 2 times daily x 7 days for dry patches which are itchy, taken before, denies side effects (Patient not taking: Reported on 11/19/2024) 45 g 0    triamcinolone (KENALOG) 0.1 % ointment Apply topically 2 times daily x 7 days on the lateral side of the arms for flare up rashes. Avoid applying it on the skin folds, face, groin and neck (Patient not taking: Reported on 11/19/2024) 80 g 0    EPINEPHrine (EPIPEN) 0.3 MG/0.3ML SOAJ injection

## 2024-11-19 NOTE — TELEPHONE ENCOUNTER
Pharmacy requesting refill of Fioricet -40 mg tablets.    Medication active on med list yes    Date of last Rx: 9/30/2024 #90 with 0 refills   verified by JUDIT Ohara    Date of last appointment 10/17/2024    Next Visit Date:  3/27/2025

## 2024-11-20 ENCOUNTER — TELEPHONE (OUTPATIENT)
Dept: OBGYN CLINIC | Age: 39
End: 2024-11-20

## 2024-11-20 LAB
C TRACH DNA SPEC QL PROBE+SIG AMP: NEGATIVE
CANDIDA SPECIES: NEGATIVE
GARDNERELLA VAGINALIS: POSITIVE
N GONORRHOEA DNA SPEC QL PROBE+SIG AMP: NEGATIVE
SOURCE: ABNORMAL
SPECIMEN DESCRIPTION: NORMAL
TRICHOMONAS: NEGATIVE

## 2024-11-20 NOTE — TELEPHONE ENCOUNTER
----- Message from EL Marley CNP sent at 11/20/2024  7:43 AM EST -----  Patient has BV and was sent metrogel

## 2024-11-26 ENCOUNTER — OFFICE VISIT (OUTPATIENT)
Dept: PODIATRY | Age: 39
End: 2024-11-26
Payer: MEDICAID

## 2024-11-26 VITALS — HEIGHT: 64 IN | WEIGHT: 215 LBS | BODY MASS INDEX: 36.7 KG/M2

## 2024-11-26 DIAGNOSIS — M79.671 PAIN IN RIGHT FOOT: ICD-10-CM

## 2024-11-26 DIAGNOSIS — M76.71 PERONEAL TENDINITIS OF RIGHT LOWER EXTREMITY: Primary | ICD-10-CM

## 2024-11-26 DIAGNOSIS — M79.671 PAIN OF RIGHT HEEL: ICD-10-CM

## 2024-11-26 PROCEDURE — 99213 OFFICE O/P EST LOW 20 MIN: CPT | Performed by: PODIATRIST

## 2024-11-26 ASSESSMENT — ENCOUNTER SYMPTOMS
COLOR CHANGE: 0
NAUSEA: 0
SHORTNESS OF BREATH: 0
BACK PAIN: 0
DIARRHEA: 0

## 2024-11-26 NOTE — PROGRESS NOTES
[facility]  Return Patient Progress Note    Subjective: Bette Sabillon 39 y.o. female that presents for follow up evaluation of pain right foot.  Chief Complaint   Patient presents with    Foot Pain     Right foot, orthotics are helping     Patient's treatment thus far has included CAM walker, RICE, orthotics.  Pain is rated 3 out of 10 and is described as intermittent. Patient states that the orthotics she got have helped to reduce her pain a lot.    Review of Systems   Constitutional:  Negative for activity change, appetite change, chills, diaphoresis, fatigue and fever.   Respiratory:  Negative for shortness of breath.    Cardiovascular:  Negative for leg swelling.   Gastrointestinal:  Negative for diarrhea and nausea.   Endocrine: Negative for cold intolerance, heat intolerance and polyuria.   Musculoskeletal:  Positive for gait problem and joint swelling. Negative for arthralgias, back pain and myalgias.   Skin:  Negative for color change, pallor, rash and wound.   Allergic/Immunologic: Negative for environmental allergies and food allergies.   Neurological:  Negative for dizziness, weakness, light-headedness and numbness.   Hematological:  Does not bruise/bleed easily.   Psychiatric/Behavioral:  Negative for behavioral problems, confusion and self-injury. The patient is not nervous/anxious.        Objective: Clinical evaluation of the patient reveals no pain today with palpation to the plantar lateral rim of the right heel.  There is no pain with palpation to the lateral aspect of the right heel today.  There is no edema present to the right foot today.  There is no erythema, calor, or open wound noted.  Muscle strength is +5/5 to the lateral muscle group of the right lower extremity.  There is no pain with this muscle strength evaluation.  A review of the MRI does not show any tendon tear.  There is evidence of an old plantar fascial tear, but this is not where the patient's pain has been.

## 2025-01-28 ENCOUNTER — OFFICE VISIT (OUTPATIENT)
Dept: PODIATRY | Age: 40
End: 2025-01-28
Payer: MEDICAID

## 2025-01-28 VITALS — WEIGHT: 215 LBS | HEIGHT: 64 IN | BODY MASS INDEX: 36.7 KG/M2

## 2025-01-28 DIAGNOSIS — M79.671 PAIN IN RIGHT FOOT: ICD-10-CM

## 2025-01-28 DIAGNOSIS — M21.6X1 EQUINUS DEFORMITY OF RIGHT FOOT: ICD-10-CM

## 2025-01-28 DIAGNOSIS — L85.3 XEROSIS CUTIS: ICD-10-CM

## 2025-01-28 DIAGNOSIS — M79.672 PAIN IN LEFT FOOT: ICD-10-CM

## 2025-01-28 DIAGNOSIS — M76.61 ACHILLES TENDINITIS OF RIGHT LOWER EXTREMITY: ICD-10-CM

## 2025-01-28 DIAGNOSIS — M72.2 PLANTAR FASCIITIS OF RIGHT FOOT: ICD-10-CM

## 2025-01-28 DIAGNOSIS — M77.8 ENTHESOPATHY OF RIGHT FOOT: Primary | ICD-10-CM

## 2025-01-28 PROCEDURE — 99213 OFFICE O/P EST LOW 20 MIN: CPT | Performed by: PODIATRIST

## 2025-01-28 RX ORDER — AMMONIUM LACTATE 12 G/100G
CREAM TOPICAL
Qty: 385 G | Refills: 5 | Status: SHIPPED | OUTPATIENT
Start: 2025-01-28 | End: 2025-02-27

## 2025-01-28 ASSESSMENT — ENCOUNTER SYMPTOMS
BACK PAIN: 0
COLOR CHANGE: 0
NAUSEA: 0
SHORTNESS OF BREATH: 0
DIARRHEA: 0

## 2025-01-28 NOTE — PROGRESS NOTES
[facility] Podiatry  Return Patient Progress Note    Subjective: Bette Sabillon 39 y.o. female that presents for follow up evaluation of tendonitis to the right foot and ankle.   Chief Complaint   Patient presents with    Foot Pain     Follow up right foot/ patient has good days and bad days     Patient's treatment thus far has included orthotics, CAM walker, RICE, MRI.  Pain is rated 7 out of 10 and is described as intermittent. Patient has been following my prescribed course of therapy as instructed. Patient states that her pain increases with increased activity.    Review of Systems   Constitutional:  Negative for activity change, appetite change, chills, diaphoresis, fatigue and fever.   Respiratory:  Negative for shortness of breath.    Cardiovascular:  Negative for leg swelling.   Gastrointestinal:  Negative for diarrhea and nausea.   Endocrine: Negative for cold intolerance, heat intolerance and polyuria.   Musculoskeletal:  Positive for gait problem and joint swelling. Negative for arthralgias, back pain and myalgias.   Skin:  Negative for color change, pallor, rash and wound.   Allergic/Immunologic: Negative for environmental allergies and food allergies.   Neurological:  Negative for dizziness, weakness, light-headedness and numbness.   Hematological:  Does not bruise/bleed easily.   Psychiatric/Behavioral:  Negative for behavioral problems, confusion and self-injury. The patient is not nervous/anxious.        Objective: Clinical evaluation of the patient reveals pain with palpation to the posterior aspect of the right heel at the insertion of the Achilles tendon.  There is mild pain with palpation to the watershed region of the right Achilles tendon.  No palpable defect is noted.  There is soft tissue resistance upon passive dorsiflexion of the right ankle with the knee(s) extended. However, this soft tissue resistance is not present with the knee(s) flexed.  There is no pain today with palpation

## 2025-03-05 NOTE — PATIENT INSTRUCTIONS
Short Stay Endoscopy History and Physical    PCP - EVELIN Pitt MD  Referring Physician - Jed Vance MD  [address]  [address]  [place] [address]    Procedure - ercp  ASA - per anesthesia  Mallampati - per anesthesia  History of Anesthesia problems - no  Family history Anesthesia problems -  no   Plan of anesthesia - General    HPI:  This is a 63 y.o. male here for evaluation of: stricture    Reflux - no  Dysphagia - no  Abdominal pain - no  Diarrhea - no    ROS:  Constitutional: No fevers, chills, No weight loss  CV: No chest pain  Pulm: No cough, No shortness of breath  Ophtho: No vision changes  GI: see HPI  Derm: No rash    Medical History:  has a past medical history of Abdominal hernia (02/03/2025), Acute blood loss anemia (08/12/2023), Adrenal cortical steroids causing adverse effect in therapeutic use (08/10/2023), Alcoholic cirrhosis, CVA (cerebral vascular accident), DM (diabetes mellitus), Dyslipidemia, Hepatocellular carcinoma, History of hepatocellular carcinoma, in remission after liver transplant (04/05/2023), History of stroke (09/09/2016), HTN (hypertension), Intracranial hemorrhage, Left-sided nontraumatic intracerebral hemorrhage (06/06/2023), S/P liver transplant (08/12/2023), and Skin cancer.    Surgical History:  has a past surgical history that includes Hernia repair (N/A); Liver transplant (N/A, 8/9/2023); craniotomy, with neoplasm excision using computer-assisted navigation (Left, 10/26/2023); ERCP (N/A, 2/7/2025); Endoscopic ultrasound of upper gastrointestinal tract (N/A, 2/7/2025); Esophagogastroduodenoscopy (N/A, 2/17/2025); and Colonoscopy (N/A, 2/18/2025).    Family History: family history is not on file..    Social History:  reports that he has never smoked. He has never used smokeless tobacco. He reports that he does not currently use alcohol. He reports that he does not use drugs.    Review of patient's allergies indicates:  No Known  Continue to look over recommended outpatient therapists on your panel that I sent you. Allergies    Medications:   Prescriptions Prior to Admission[1]    Physical Exam:    Vital Signs:   Vitals:    03/05/25 0814   BP: (!) 181/100   Pulse:    Resp:    Temp:        General Appearance: Well appearing in no acute distress    Labs:  Lab Results   Component Value Date    WBC 4.87 02/24/2025    HGB 11.1 (L) 02/24/2025    HCT 33.7 (L) 02/24/2025     (L) 02/24/2025    CHOL 163 12/16/2024    TRIG 150 12/16/2024    HDL 34 (L) 12/16/2024    ALT 23 02/24/2025    AST 18 02/24/2025     02/24/2025    K 3.9 02/24/2025     02/24/2025    CREATININE 1.0 02/24/2025    BUN 19 02/24/2025    CO2 24 02/24/2025    TSH 2.096 06/15/2023    PSA 0.49 12/16/2024    INR 1.0 02/17/2025    HGBA1C 6.3 (H) 12/16/2024       I have explained the risks and benefits of this endoscopic procedure to the patient including but not limited to bleeding, inflammation, infection, perforation, and death.      Jed Vanec MD         [1]   Medications Prior to Admission   Medication Sig Dispense Refill Last Dose/Taking    aspirin 81 MG Chew Chew and swallow 1 tablet (81 mg total) by mouth once daily. Restart 11/9/23 30 tablet 11 3/4/2025    carvediloL (COREG) 12.5 MG tablet Take 1 tablet (12.5 mg total) by mouth 2 (two) times daily with meals. HOLD if SBP < 125 or pulse < 60. 180 tablet 0 3/4/2025    insulin glargine U-100, Lantus, (LANTUS SOLOSTAR U-100 INSULIN) 100 unit/mL (3 mL) InPn pen Inject 33 Units into the skin once daily. 15 mL 11 3/4/2025    insulin lispro 100 unit/mL injection VIA ILET INSULIN PUMP, MAX . 50 mL 11 3/5/2025 Morning    losartan (COZAAR) 100 MG tablet Take 1 tablet (100 mg total) by mouth once daily. HOLD if SBP < 120 90 tablet 3 3/4/2025    tacrolimus (PROGRAF) 1 MG Cap Take 2 capsules (2 mg total) by mouth every 12 (twelve) hours. 120 capsule 11 3/4/2025    ursodioL (ACTIGALL) 300 mg capsule Take 1 capsule (300 mg total) by mouth 2 (two) times daily. 60 capsule 2 Past Week    blood sugar  "diagnostic Strp Test blood glucose 3 (three) times daily. 100 strip 11     blood-glucose sensor (DEXCOM G7 SENSOR) Neelam 1 each by Misc.(Non-Drug; Combo Route) route every 10 days. 3 each 11     pen needle, diabetic (BD ULTRA-FINE MERCEDES PEN NEEDLE) 32 gauge x 5/32" Ndle Use to inject insulin into the skin 4 (four) times daily. 100 each 11     tirzepatide 10 mg/0.5 mL PnIj Inject 10 mg (one pen) into the skin every 7 days. 2 mL 11 2/17/2025     "

## 2025-03-11 ENCOUNTER — OFFICE VISIT (OUTPATIENT)
Dept: PODIATRY | Age: 40
End: 2025-03-11
Payer: MEDICAID

## 2025-03-11 VITALS — WEIGHT: 215 LBS | BODY MASS INDEX: 36.7 KG/M2 | HEIGHT: 64 IN

## 2025-03-11 DIAGNOSIS — M79.671 PAIN IN RIGHT FOOT: ICD-10-CM

## 2025-03-11 DIAGNOSIS — M76.61 ACHILLES TENDINITIS OF RIGHT LOWER EXTREMITY: ICD-10-CM

## 2025-03-11 DIAGNOSIS — M77.8 ENTHESOPATHY OF RIGHT FOOT: Primary | ICD-10-CM

## 2025-03-11 DIAGNOSIS — M21.6X1 EQUINUS DEFORMITY OF RIGHT FOOT: ICD-10-CM

## 2025-03-11 PROCEDURE — 99213 OFFICE O/P EST LOW 20 MIN: CPT | Performed by: PODIATRIST

## 2025-03-11 ASSESSMENT — ENCOUNTER SYMPTOMS
SHORTNESS OF BREATH: 0
DIARRHEA: 0
NAUSEA: 0
COLOR CHANGE: 0
BACK PAIN: 0

## 2025-03-11 NOTE — PROGRESS NOTES
[facility] Podiatry  Return Patient Progress Note    Subjective: Bette Sabillon 39 y.o. female that presents for follow up evaluation of tendinitis right foot and ankle.  Chief Complaint   Patient presents with    Foot Pain     Follow up right foot, still having pain     Patient's treatment thus far has included orthotics, CAM Walker, RICE, MRI, physical therapy.  Pain is rated 7 out of 10 and is described as intermittent. Patient has not been following my prescribed course of therapy as instructed.  Patient was given an order for physical therapy on last visit, but states that she has not gone at all.  Patient states that she had work done on one of her eyes and this is the reason she did not go to physical therapy for her foot.    Review of Systems   Constitutional:  Negative for activity change, appetite change, chills, diaphoresis, fatigue and fever.   Respiratory:  Negative for shortness of breath.    Cardiovascular:  Negative for leg swelling.   Gastrointestinal:  Negative for diarrhea and nausea.   Endocrine: Negative for cold intolerance, heat intolerance and polyuria.   Musculoskeletal:  Positive for gait problem and joint swelling. Negative for arthralgias, back pain and myalgias.   Skin:  Negative for color change, pallor, rash and wound.   Allergic/Immunologic: Negative for environmental allergies and food allergies.   Neurological:  Negative for dizziness, weakness, light-headedness and numbness.   Hematological:  Does not bruise/bleed easily.   Psychiatric/Behavioral:  Negative for behavioral problems, confusion and self-injury. The patient is not nervous/anxious.        Objective: Clinical evaluation of the patient reveals pain with palpation to the posterior aspect of the right heel at the insertion of the Achilles tendon.  There is mild pain with palpation to the watershed region of the right Achilles tendon.  No palpable defect is noted.  There is soft tissue resistance upon passive

## 2025-05-09 ENCOUNTER — OFFICE VISIT (OUTPATIENT)
Dept: FAMILY MEDICINE CLINIC | Age: 40
End: 2025-05-09

## 2025-05-09 VITALS
BODY MASS INDEX: 33.29 KG/M2 | SYSTOLIC BLOOD PRESSURE: 106 MMHG | HEIGHT: 64 IN | HEART RATE: 88 BPM | DIASTOLIC BLOOD PRESSURE: 70 MMHG | WEIGHT: 195 LBS | OXYGEN SATURATION: 97 % | TEMPERATURE: 98.6 F

## 2025-05-09 DIAGNOSIS — M79.671 BILATERAL FOOT PAIN: Primary | ICD-10-CM

## 2025-05-09 DIAGNOSIS — E55.9 VITAMIN D DEFICIENCY: ICD-10-CM

## 2025-05-09 DIAGNOSIS — M62.838 MUSCLE SPASM: ICD-10-CM

## 2025-05-09 DIAGNOSIS — R06.09 DOE (DYSPNEA ON EXERTION): ICD-10-CM

## 2025-05-09 DIAGNOSIS — M17.0 PRIMARY OSTEOARTHRITIS OF BOTH KNEES: ICD-10-CM

## 2025-05-09 DIAGNOSIS — N76.1 SUBACUTE VAGINITIS: ICD-10-CM

## 2025-05-09 DIAGNOSIS — R53.82 CHRONIC FATIGUE: ICD-10-CM

## 2025-05-09 DIAGNOSIS — K58.0 IRRITABLE BOWEL SYNDROME WITH DIARRHEA: ICD-10-CM

## 2025-05-09 DIAGNOSIS — F33.1 MODERATE EPISODE OF RECURRENT MAJOR DEPRESSIVE DISORDER (HCC): ICD-10-CM

## 2025-05-09 DIAGNOSIS — Z12.31 ENCOUNTER FOR SCREENING MAMMOGRAM FOR BREAST CANCER: ICD-10-CM

## 2025-05-09 DIAGNOSIS — M79.672 BILATERAL FOOT PAIN: Primary | ICD-10-CM

## 2025-05-09 DIAGNOSIS — Z71.1 CONCERN ABOUT STD IN FEMALE WITHOUT DIAGNOSIS: ICD-10-CM

## 2025-05-09 DIAGNOSIS — G43.109 MIGRAINE WITH AURA AND WITHOUT STATUS MIGRAINOSUS, NOT INTRACTABLE: ICD-10-CM

## 2025-05-09 RX ORDER — RIBOFLAVIN (VITAMIN B2) 100 MG
1 TABLET ORAL DAILY
Qty: 90 TABLET | Refills: 3 | Status: SHIPPED | OUTPATIENT
Start: 2025-05-09

## 2025-05-09 RX ORDER — CRANBERRY CONC/C/BACILL COAG 250-30-15
1 TABLET ORAL DAILY
Qty: 90 TABLET | Refills: 3 | Status: SHIPPED | OUTPATIENT
Start: 2025-05-09

## 2025-05-09 RX ORDER — CHOLECALCIFEROL (VITAMIN D3) 50 MCG
2000 TABLET ORAL DAILY
Qty: 90 TABLET | Refills: 3 | Status: SHIPPED | OUTPATIENT
Start: 2025-05-09

## 2025-05-09 RX ORDER — LANOLIN ALCOHOL/MO/W.PET/CERES
400 CREAM (GRAM) TOPICAL EVERY EVENING
Qty: 90 TABLET | Refills: 3 | Status: SHIPPED | OUTPATIENT
Start: 2025-05-09

## 2025-05-09 SDOH — ECONOMIC STABILITY: FOOD INSECURITY: WITHIN THE PAST 12 MONTHS, YOU WORRIED THAT YOUR FOOD WOULD RUN OUT BEFORE YOU GOT MONEY TO BUY MORE.: NEVER TRUE

## 2025-05-09 SDOH — ECONOMIC STABILITY: FOOD INSECURITY: WITHIN THE PAST 12 MONTHS, THE FOOD YOU BOUGHT JUST DIDN'T LAST AND YOU DIDN'T HAVE MONEY TO GET MORE.: NEVER TRUE

## 2025-05-09 ASSESSMENT — PATIENT HEALTH QUESTIONNAIRE - PHQ9
6. FEELING BAD ABOUT YOURSELF - OR THAT YOU ARE A FAILURE OR HAVE LET YOURSELF OR YOUR FAMILY DOWN: MORE THAN HALF THE DAYS
SUM OF ALL RESPONSES TO PHQ QUESTIONS 1-9: 16
8. MOVING OR SPEAKING SO SLOWLY THAT OTHER PEOPLE COULD HAVE NOTICED. OR THE OPPOSITE, BEING SO FIGETY OR RESTLESS THAT YOU HAVE BEEN MOVING AROUND A LOT MORE THAN USUAL: NOT AT ALL
7. TROUBLE CONCENTRATING ON THINGS, SUCH AS READING THE NEWSPAPER OR WATCHING TELEVISION: NEARLY EVERY DAY
3. TROUBLE FALLING OR STAYING ASLEEP: NEARLY EVERY DAY
4. FEELING TIRED OR HAVING LITTLE ENERGY: NEARLY EVERY DAY
SUM OF ALL RESPONSES TO PHQ QUESTIONS 1-9: 16
5. POOR APPETITE OR OVEREATING: MORE THAN HALF THE DAYS
9. THOUGHTS THAT YOU WOULD BE BETTER OFF DEAD, OR OF HURTING YOURSELF: NOT AT ALL
10. IF YOU CHECKED OFF ANY PROBLEMS, HOW DIFFICULT HAVE THESE PROBLEMS MADE IT FOR YOU TO DO YOUR WORK, TAKE CARE OF THINGS AT HOME, OR GET ALONG WITH OTHER PEOPLE: VERY DIFFICULT
2. FEELING DOWN, DEPRESSED OR HOPELESS: NEARLY EVERY DAY
SUM OF ALL RESPONSES TO PHQ QUESTIONS 1-9: 16
1. LITTLE INTEREST OR PLEASURE IN DOING THINGS: NOT AT ALL
SUM OF ALL RESPONSES TO PHQ QUESTIONS 1-9: 16

## 2025-05-09 NOTE — ASSESSMENT & PLAN NOTE
Chronic with intermittent flareups  - Continued use of Ajovy, magnesium, riboflavin for headache prevention  Fioricet as abortive medication for headaches from neurology, seeing Dr. Hodges    - She continues to experience nasal pressure and dry eyes due to allergies.  - Symptoms exacerbated by recent stress and environmental factors.  - Advised to reschedule her appointment with the allergy specialist.  - Referral to allergy specialist reprinted  Orders:    Riboflavin (B-2) 100 MG TABS; Take 1 tablet by mouth daily

## 2025-05-09 NOTE — ASSESSMENT & PLAN NOTE
Chronic, likely worsening due to wear-and-tear nature of the disease  - She has arthritis in both knees, contributing to her knee pain.  - Pain is persistent and affects daily activities.  - Physical exam findings consistent with arthritis.  - Handicap placard has been issued to assist with mobility.  Tylenol as needed, ibuprofen as needed for pain, lidocaine cream as needed  Orders:    Handicap Placard MISC; by Does not apply route Duration life time  DX - CANNOT WALK GREATER THAN 200 FEET WITHOUT STOPPING TO REST

## 2025-05-09 NOTE — ASSESSMENT & PLAN NOTE
Unsure if improving or not.  Will recheck level  Continue supplementation.    Vitamin D 26.9 on 5/19/2021, very low, most recent vitamin D still borderline low    Orders:    vitamin D (CHOLECALCIFEROL) 50 MCG (2000 UT) TABS tablet; Take 1 tablet by mouth daily Dose decreased 8/29/2022    Vitamin D 25 Hydroxy; Future

## 2025-05-09 NOTE — ASSESSMENT & PLAN NOTE
Chronic, ongoing, with intermittent flareups  Will do basic labs to rule out certain common medical conditions: hematologic, renal, hepatic, electrolyte imbalances, thyroid disorders.    Orders:    CBC with Auto Differential; Future    Comprehensive Metabolic Panel; Future    TSH; Future

## 2025-05-09 NOTE — PROGRESS NOTES
Visit Information    Have you changed or started any medications since your last visit including any over-the-counter medicines, vitamins, or herbal medicines? no   Have you stopped taking any of your medications? Is so, why? -  no  Are you having any side effects from any of your medications? - no    Have you seen any other physician or provider since your last visit?  yes -    Have you had any other diagnostic tests since your last visit?  yes -    Have you been seen in the emergency room and/or had an admission in a hospital since we last saw you?  no   Have you had your routine dental cleaning in the past 6 months?  yes -      Do you have an active MyChart account? If no, what is the barrier?  Yes    Patient Care Team:  Naila Thorpe MD as PCP - General (Family Medicine)  Naila Thorpe MD as PCP - Empaneled Provider  Danis Fox MD as Consulting Physician (Urology)  Pete Figueredo MD as Consulting Physician (Nephrology)  Meaghan Miranda APRN - CNP (Certified Nurse Practitioner)  Dawn Marshall AuD (Audiology)  Yadira Carson MD as Consulting Physician (Endocrinology)  Wellington Brandon DO as Consulting Physician (Otolaryngology)  Robin Carlson DO as Surgeon (General Surgery)  Dallas Cosby DPM as Consulting Physician (Podiatry)  Amari Hodges MD as Consulting Physician (Neurology)  Lei Sharif PA (Physician Assistant)  Hector Oscar PA-C as Physician Assistant (Dermatology)  Fay Lewis MD as Consulting Physician (Gastroenterology)  Hermila Rodriguez LISW (Licensed Clinical )  Yanni Butler APRN - CNP as Nurse Practitioner (Obstetrics & Gynecology)    Medical History Review  Past Medical, Family, and Social History reviewed and does contribute to the patient presenting condition    Health Maintenance   Topic Date Due    Breast cancer screen  Never done    COVID-19 Vaccine (1 - 2024-25 season) Never done    Depression Monitoring

## 2025-05-09 NOTE — PROGRESS NOTES
Bette Sabillon (:  1985) is a 39 y.o. female, Established patient, here for evaluation of the following chief complaint(s):   Foot Pain (RIGHT FOOT), Knee Pain (RIGHT), Difficulty Walking (DISCUSS HANDICAP PLACARD ), Referral - General (NEW PODIATRY REFERRAL ), and Discuss Labs (STD)           Assessment & Plan        Assessment & Plan  Bilateral foot pain    Chronic, ongoing, right side worse   She reports significant foot pain.  - Previous scans indicate a pinched nerve in the left foot and plantar fasciitis in the right foot.  - Referral to a new podiatrist has been initiated.  - Handicap placard has been issued to assist with mobility.  Rule out gout, Tylenol as needed, ibuprofen as needed with food, lidocaine cream as needed  Orders:    Handicap Placard MISC; by Does not apply route Duration life time  DX - CANNOT WALK GREATER THAN 200 FEET WITHOUT STOPPING TO REST    Uric Acid; Future    [facility] - Robyn Lewis DPM, Podiatry, [place]    Primary osteoarthritis of both knees    Chronic, likely worsening due to wear-and-tear nature of the disease  - She has arthritis in both knees, contributing to her knee pain.  - Pain is persistent and affects daily activities.  - Physical exam findings consistent with arthritis.  - Handicap placard has been issued to assist with mobility.  Tylenol as needed, ibuprofen as needed for pain, lidocaine cream as needed  Orders:    Handicap Placard MISC; by Does not apply route Duration life time  DX - CANNOT WALK GREATER THAN 200 FEET WITHOUT STOPPING TO REST    Irritable bowel syndrome with diarrhea  Chronic with intermittent flareups, likely due to her anxiety  Continue probiotics which have been helping  -Orders:    Cranberry-Vitamin C-Probiotic (AZO CRANBERRY) 250-30 MG TABS; Take 1 tablet by mouth daily    Chronic fatigue    Chronic, ongoing, with intermittent flareups  Will do basic labs to rule out certain common medical conditions: hematologic, renal,

## 2025-05-09 NOTE — ASSESSMENT & PLAN NOTE
Chronic, worsening, she is not willing to try any medications     Regular counseling sessions ongoing to manage stress and mental health.  - Encouraged to continue with daily affirmations and other therapeutic activities.      5/9/2025     2:07 PM 7/16/2024     1:11 PM 3/20/2024     9:39 AM 11/14/2023     1:07 PM 10/12/2023    11:26 AM 7/12/2023     1:32 PM 4/18/2023     1:18 PM   PHQ Scores   PHQ2 Score 3 0 0 6 1 2 4   PHQ9 Score 16 0 0 14 1 12 16     Interpretation of Total Score Depression Severity: 1-4 = Minimal depression, 5-9 = Mild depression, 10-14 = Moderate depression, 15-19 = Moderately severe depression, 20-27 = Severe depression

## 2025-05-16 ENCOUNTER — HOSPITAL ENCOUNTER (OUTPATIENT)
Age: 40
Setting detail: SPECIMEN
Discharge: HOME | End: 2025-05-16

## 2025-05-16 ENCOUNTER — OFFICE VISIT (OUTPATIENT)
Dept: FAMILY MEDICINE CLINIC | Age: 40
End: 2025-05-16
Payer: MEDICAID

## 2025-05-16 ENCOUNTER — RESULTS FOLLOW-UP (OUTPATIENT)
Dept: FAMILY MEDICINE CLINIC | Age: 40
End: 2025-05-16

## 2025-05-16 DIAGNOSIS — N76.1 SUBACUTE VAGINITIS: Primary | ICD-10-CM

## 2025-05-16 DIAGNOSIS — B96.89 BV (BACTERIAL VAGINOSIS): Primary | ICD-10-CM

## 2025-05-16 DIAGNOSIS — N76.1 SUBACUTE VAGINITIS: ICD-10-CM

## 2025-05-16 DIAGNOSIS — Z71.1 CONCERN ABOUT STD IN FEMALE WITHOUT DIAGNOSIS: ICD-10-CM

## 2025-05-16 DIAGNOSIS — N76.0 BV (BACTERIAL VAGINOSIS): Primary | ICD-10-CM

## 2025-05-16 DIAGNOSIS — R35.0 FREQUENT URINATION: ICD-10-CM

## 2025-05-16 LAB
BACTERIA URINE, POC: NORMAL
BILIRUBIN URINE: NORMAL
BLOOD, URINE: POSITIVE
CASTS URINE, POC: NORMAL
CLARITY, UA: CLEAR
COLOR, UA: YELLOW
CRYSTALS URINE, POC: NORMAL
EPI CELLS URINE, POC: NORMAL
GLUCOSE URINE: NEGATIVE
KETONES, URINE: POSITIVE
LEUKOCYTE EST, POC: NEGATIVE
NITRITE, URINE: NEGATIVE
PH UA: 7 (ref 4.5–8)
PROTEIN UA: NEGATIVE
RBC URINE, POC: NORMAL
SPECIFIC GRAVITY UA: 1.02 (ref 1–1.03)
UROBILINOGEN, URINE: NORMAL
WBC URINE, POC: NORMAL
YEAST URINE, POC: NORMAL

## 2025-05-16 PROCEDURE — 81000 URINALYSIS NONAUTO W/SCOPE: CPT | Performed by: FAMILY MEDICINE

## 2025-05-16 PROCEDURE — 99211 OFF/OP EST MAY X REQ PHY/QHP: CPT | Performed by: FAMILY MEDICINE

## 2025-05-16 NOTE — RESULT ENCOUNTER NOTE
Addressed during office visit today, please let her know urine test shows just blood in the urine which she has had for a long time, otherwise within normal limits no signs of UTI

## 2025-05-16 NOTE — PROGRESS NOTES
Urine test positive for blood in the urine, otherwise normal, no UTI  Positive for ketones likely fasting     Diagnosis Orders   1. Subacute vaginitis  Vaginitis DNA Probe      2. Frequent urination  POCT Urine with Microscopic         Results for orders placed or performed in visit on 05/16/25   POCT Urine with Microscopic   Result Value Ref Range    Color, UA Yellow     Clarity, UA Clear Clear    Glucose, Ur Negative     Bilirubin Urine      Ketones, Urine Positive     Specific Gravity, UA 1.020 1.005 - 1.030    Blood, Urine Positive     pH, UA 7.0 4.5 - 8.0    Protein, UA Negative Negative    Nitrite, Urine Negative     Leukocytes, UA Negative     Urobilinogen, Urine 0.2 mg/dL     RBC Urine, POC      WBC Urine, POC      Bacteria Urine, POC      yeast urine, poc      Casts Urine, POC      Epi Cells Urine, POC      crystals urine, poc         Future Appointments   Date Time Provider Department Center   6/11/2025  3:45 PM Robyn Lewis DPM [facility]   6/12/2025 11:20 AM Amari Hodges MD [facility] NEURO Neurology -   7/17/2025  1:30 PM Naila Thorpe MD [facility]   11/25/2025  9:00 AM Yanni Butler, APRN - CNP [facility]

## 2025-05-17 PROBLEM — H04.123 DRY EYES: Status: ACTIVE | Noted: 2024-11-13

## 2025-05-17 LAB
CANDIDA SPECIES: NEGATIVE
GARDNERELLA VAGINALIS: POSITIVE
SOURCE: ABNORMAL
TRICHOMONAS: NEGATIVE

## 2025-05-17 NOTE — ASSESSMENT & PLAN NOTE
Chronic with intermittent flareups, likely due to her anxiety  Continue probiotics which have been helping  -Orders:    Cranberry-Vitamin C-Probiotic (AZO CRANBERRY) 250-30 MG TABS; Take 1 tablet by mouth daily

## 2025-05-17 NOTE — ASSESSMENT & PLAN NOTE
Chronic, ongoing, right side worse   She reports significant foot pain.  - Previous scans indicate a pinched nerve in the left foot and plantar fasciitis in the right foot.  - Referral to a new podiatrist has been initiated.  - Handicap placard has been issued to assist with mobility.  Rule out gout, Tylenol as needed, ibuprofen as needed with food, lidocaine cream as needed  Orders:    Handicap Placard MISC; by Does not apply route Duration life time  DX - CANNOT WALK GREATER THAN 200 FEET WITHOUT STOPPING TO REST    Uric Acid; Future    Robyn Adkins, ERROL, Podiatry, [place]

## 2025-05-17 NOTE — RESULT ENCOUNTER NOTE
Please notify patient:    Please let Bette Sabillon  know needs to  clindamycin  Future Appointments  6/11/2025  3:45 PM    Robyn Lewis DPM           [facility]        [facility]  6/12/2025  11:20 AM   Amari Hodges MD    [facility] NEURO          Neurology -  7/17/2025  1:30 PM    Naila Thorpe MD    [facility]  11/25/2025 9:00 AM    Yanni Butler, EL - CNP  [facility]        [facility]

## 2025-05-19 LAB
CHLAMYDIA DNA UR QL NAA+PROBE: NEGATIVE
N GONORRHOEA DNA UR QL NAA+PROBE: NEGATIVE
SOURCE: NORMAL
SPECIMEN DESCRIPTION: NORMAL
TRICHOMONAS VAGINALIS, MOLECULAR: NEGATIVE

## 2025-05-19 NOTE — RESULT ENCOUNTER NOTE
Please let the patient know to  clindamycin for bacterial vaginosis.  No UTI, negative for GC and chlamydia  Future Appointments  6/11/2025  3:45 PM    Robyn Lewis DPM           [facility]        [facility]  6/12/2025  11:20 AM   Amari Hodges MD    [facility] NEURO          Neurology -  7/17/2025  1:30 PM    Naila Thorpe MD    [facility]               [facility]  11/25/2025 9:00 AM    Yanni Butler, EL - CNP  [facility]        [facility]

## 2025-05-22 ENCOUNTER — TELEPHONE (OUTPATIENT)
Dept: NEUROLOGY | Age: 40
End: 2025-05-22

## 2025-06-11 ENCOUNTER — OFFICE VISIT (OUTPATIENT)
Dept: PODIATRY | Age: 40
End: 2025-06-11
Payer: MEDICAID

## 2025-06-11 VITALS — BODY MASS INDEX: 33.29 KG/M2 | HEIGHT: 64 IN | WEIGHT: 195 LBS

## 2025-06-11 DIAGNOSIS — M79.671 PAIN IN RIGHT FOOT: Primary | ICD-10-CM

## 2025-06-11 DIAGNOSIS — M72.2 PLANTAR FASCIITIS OF RIGHT FOOT: ICD-10-CM

## 2025-06-11 PROCEDURE — 20550 NJX 1 TENDON SHEATH/LIGAMENT: CPT | Performed by: PODIATRIST

## 2025-06-11 PROCEDURE — 99203 OFFICE O/P NEW LOW 30 MIN: CPT | Performed by: PODIATRIST

## 2025-06-11 RX ORDER — AMMONIUM LACTATE 12 G/100G
CREAM TOPICAL
COMMUNITY
Start: 2025-05-23

## 2025-06-11 NOTE — PROGRESS NOTES
[facility] PODIATRY  [address] [place], [place] [id number]  Dept: [phone]    NEW PATIENT PROGRESS NOTE  Date of patient's visit: 6/11/2025  Patient's Name:  Bette Sabillon YOB: 1985            Patient Care Team:  Naila Thorpe MD as PCP - General (Family Medicine)  [facility]  Danis Fox MD as Consulting Physician (Urology)  Pete Figueredo MD as Consulting Physician (Nephrology)  Meaghan Miranda APRN - CNP (Certified Nurse Practitioner)  Dawn Marshall AuD (Audiology)  Yadira Carson MD as Consulting Physician (Endocrinology)  Wellington Brandon DO as Consulting Physician (Otolaryngology)  Dallas Cosby DPM as Consulting Physician (Podiatry)  Amari Hodges MD as Consulting Physician (Neurology)  Lei Sharif PA (Physician Assistant)  Hector Oscar PA-C as Physician Assistant (Dermatology)  Fay Lewis MD as Consulting Physician (Gastroenterology)  Hermila Rodriguez LISW (Licensed Clinical )  Yanni Butler APRN - CNP as Nurse Practitioner (Obstetrics & Gynecology)        Chief Complaint   Patient presents with    New Patient     Establish care    Foot Pain     Bl feet right more severe x 1.5 years         HPI:   Bette Sabillon is a 39 y.o. female who presents to the office today complaining of bilateral foot pain with the right foot being more severe.  Symptoms began 1.5 year(s) ago. Patient relates pain is Present to the heel.  Pain is rated 7 out of 10 and is described as constant.  Treatments prior to today's visit include: previous podiatry treatment by loreta clements, injection only in the left foot.  Currently denies F/C/N/V. Pt's primary care physician is Naila Thorpe MD last seen may 16 2025     Allergies   Allergen Reactions    Diflucan [Fluconazole] Rash     Throat itches    Flagyl

## 2025-06-12 ENCOUNTER — OFFICE VISIT (OUTPATIENT)
Dept: NEUROLOGY | Age: 40
End: 2025-06-12
Payer: MEDICAID

## 2025-06-12 VITALS
HEART RATE: 64 BPM | BODY MASS INDEX: 31.82 KG/M2 | DIASTOLIC BLOOD PRESSURE: 68 MMHG | HEIGHT: 64 IN | SYSTOLIC BLOOD PRESSURE: 110 MMHG | WEIGHT: 186.4 LBS

## 2025-06-12 DIAGNOSIS — G43.109 MIGRAINE WITH AURA AND WITHOUT STATUS MIGRAINOSUS, NOT INTRACTABLE: Primary | ICD-10-CM

## 2025-06-12 DIAGNOSIS — J30.9 NASAL SINUS INFLAMMATION DUE TO ALLERGY: ICD-10-CM

## 2025-06-12 DIAGNOSIS — R42 DIZZINESS: ICD-10-CM

## 2025-06-12 PROCEDURE — 99214 OFFICE O/P EST MOD 30 MIN: CPT | Performed by: PSYCHIATRY & NEUROLOGY

## 2025-06-12 RX ORDER — FREMANEZUMAB-VFRM 225 MG/1.5ML
INJECTION SUBCUTANEOUS
Qty: 1 ADJUSTABLE DOSE PRE-FILLED PEN SYRINGE | Refills: 5 | Status: SHIPPED | OUTPATIENT
Start: 2025-06-12

## 2025-06-12 NOTE — PROGRESS NOTES
3    lidocaine (LMX) 4 % cream 5 g topical 2-3 times a day as needed for pain, maximum 20 g/day 120 g 3    fexofenadine (ALLEGRA) 180 MG tablet Take 1 tablet by mouth once daily 90 tablet 3    hydrocortisone 2.5 % ointment Apply topically 2 times daily for scar for 10 days.  Do not use in the skin folds, neck or face 20 g 0    Ascorbic Acid (VITAMIN C PO) Take 1 tablet by mouth daily      vitamin B-2 (RIBOFLAVIN) 100 MG TABS tablet Take 1 tablet by mouth once daily 30 tablet 5    rizatriptan (MAXALT) 10 MG tablet Take 1 tablet by mouth once as needed for Migraine May repeat in 2 hours if needed 12 tablet 5    Misc Natural Products (CRANBERRY/PROBIOTIC) TABS Take 1 tablet by mouth daily Please dispense according to patients insurance. 90 tablet 3    EPINEPHrine (EPIPEN) 0.3 MG/0.3ML SOAJ injection        No current facility-administered medications for this visit.       Allergies   Allergen Reactions    Diflucan [Fluconazole] Rash     Throat itches    Flagyl [Metronidazole] Hives    Penicillins      When a child         Review of Systems     Vitals:    03/31/23 1043   BP: 118/78   Pulse: 93     weight: 205 lb (93 kg)      Review of Systems   Constitutional: Negative.    HENT: Negative.     Eyes: Negative.    Respiratory: Negative.     Cardiovascular: Negative.    Gastrointestinal: Negative.    Endocrine: Negative.    Genitourinary: Negative.    Musculoskeletal: Negative.    Skin: Negative.    Allergic/Immunologic: Negative.    Neurological:  Positive for headaches.   Hematological: Negative.    Psychiatric/Behavioral: Negative.        Neurological Examination  Constitutional .General exam well groomed   Head/Ears /Nose/Throat: external ear .Normal exam  Neck and thyroid .Normal size. No bruits  Respiratory .Breathsounds clear bilaterally  Cardiovascular: Auscultation of heart with regular rate and rhythm  Musculoskeletal. Muscle bulk and tone normal                                                           Muscle

## 2025-06-18 RX ORDER — BETAMETHASONE SODIUM PHOSPHATE AND BETAMETHASONE ACETATE 3; 3 MG/ML; MG/ML
6 INJECTION, SUSPENSION INTRA-ARTICULAR; INTRALESIONAL; INTRAMUSCULAR; SOFT TISSUE ONCE
Status: COMPLETED | OUTPATIENT
Start: 2025-06-18 | End: 2025-06-19

## 2025-06-19 RX ADMIN — BETAMETHASONE SODIUM PHOSPHATE AND BETAMETHASONE ACETATE 6 MG: 3; 3 INJECTION, SUSPENSION INTRA-ARTICULAR; INTRALESIONAL; INTRAMUSCULAR; SOFT TISSUE at 10:32

## 2025-06-19 NOTE — PATIENT INSTRUCTIONS
Schedule a Vaccine  When you qualify to receive the vaccine, call the [facility] COVID-19 Vaccination Hotline to schedule your appointment or to get additional information about the [facility] locations which are offering the COVID-19 vaccine.    To be 94% effective, it's important that you receive two doses of one of the COVID-19 vaccines.  -If you are receiving the Pfizer vaccine, your second shot will be scheduled as close to 21 days after the first shot as possible.  -If you are receiving the Moderna vaccine, your second shot will be scheduled as close to 28 days after the first shot as possible.    [facility] COVID-19 Vaccination Hotline: 306.316.2007    Links to [facility] website and [facility] website:    [email or web address]    [email or web address]

## 2025-07-09 ENCOUNTER — OFFICE VISIT (OUTPATIENT)
Dept: PODIATRY | Age: 40
End: 2025-07-09
Payer: MEDICAID

## 2025-07-09 VITALS — BODY MASS INDEX: 31.76 KG/M2 | HEIGHT: 64 IN | WEIGHT: 186 LBS

## 2025-07-09 DIAGNOSIS — M72.2 PLANTAR FASCIITIS OF RIGHT FOOT: Primary | ICD-10-CM

## 2025-07-09 PROCEDURE — 99213 OFFICE O/P EST LOW 20 MIN: CPT | Performed by: PODIATRIST

## 2025-07-09 NOTE — PROGRESS NOTES
Allergic rhinitis     Anxiety     Autoimmune disorder 2019    Hashimoto    Benign paroxysmal positional vertigo due to bilateral vestibular disorder 09/07/2022    Chest pain     with anxiety    Cholecystitis 04/21/2022    Pathology report showed significant chronic cholecystitis, had significant omental adhesions to the gallbladder per surgical report    Cholelithiasis 2021    Chronic diarrhea 07/09/2022    Chronic kidney disease     Chronic RLQ pain 08/05/2019    Chronic RUQ pain 02/16/2023    CKD (chronic kidney disease) stage 1, GFR 90 ml/min or greater 07/10/2018    Corpus luteum cyst 09/17/2021    COVID 01/2022    COVID-19 virus infection 12/13/2023    Depression     Dysfunction of both eustachian tubes 08/23/2021    Eustachian tube disorder, bilateral 10/27/2020    Facial dermatitis 01/05/2021    Generalized abdominal pain 05/19/2021    Hashimoto's thyroiditis     Headache(784.0)     Heart murmur     Hematuria 2016    Hypothyroidism     Irritable bowel syndrome with diarrhea 11/17/2016    Midline low back pain without sciatica 11/17/2016    Moderate episode of recurrent major depressive disorder (HCC) 05/13/2018    Obesity (BMI 30-39.9) 08/26/2017    Orthostatic hypotension     Panic attack as reaction to stress     certain anxiety medications will cause a panic attack    Panic disorder with agoraphobia 08/26/2017    some medications for anxiety will cause a panic attack.    Pruritic dermatitis 01/05/2021    Severe episode of recurrent major depressive disorder, without psychotic features (HCC) 05/13/2018    Unintended weight gain 07/12/2023    Vertigo 10/2018    hx of    Worsening headaches 02/21/2021       Prior to Admission medications    Medication Sig Start Date End Date Taking? Authorizing Provider   Fremanezumab-vfrm (AJOVY) 225 MG/1.5ML SOAJ INJECT 225MG SUB-Q EVERY 30 DAYS MUST MAKE APPOINTMENT 6/12/25  Yes Amari Hodges MD   ammonium lactate (AMLACTIN) 12 % cream APPLY CREAM TOPICALLY TWICE

## (undated) DEVICE — BITEBLOCK 54FR W/ DENT RIM BLOX

## (undated) DEVICE — CHLORAPREP 26ML ORANGE

## (undated) DEVICE — TUBING, SUCTION, 3/16" X 10', STRAIGHT: Brand: MEDLINE

## (undated) DEVICE — TROCAR ENDOSCP L100MM DIA5MM BLDELSS STBL SL THRD OPT VW

## (undated) DEVICE — SUTURE MCRYL + SZ 4-0 L27IN ABSRB UD L19MM PS-2 3/8 CIR MCP426H

## (undated) DEVICE — NEEDLE INSUF L120MM DIA2MM DISP FOR PNEUMOPERI ENDOPATH

## (undated) DEVICE — SPONGE GZ W6XL6.75IN FLUF FOR PRE OP PREPPING CLN BULKEE II

## (undated) DEVICE — 40580 - THE PINK PAD - ADVANCED TRENDELENBURG POSITIONING KIT: Brand: 40580 - THE PINK PAD - ADVANCED TRENDELENBURG POSITIONING KIT

## (undated) DEVICE — LEGGINGS, PAIR, CLEAR, STERILE: Brand: MEDLINE

## (undated) DEVICE — ELECTRODE PT RET AD L9FT HI MOIST COND ADH HYDRGEL CORDED

## (undated) DEVICE — GOWN,POLY REINFORCED,LG: Brand: MEDLINE

## (undated) DEVICE — ARM DRAPE

## (undated) DEVICE — SINGLE PORT MANIFOLD: Brand: NEPTUNE 2

## (undated) DEVICE — CONNECTOR TBNG AUX H2O JET DISP FOR OLY 160/180 SER

## (undated) DEVICE — GAUZE,SPONGE,3"X3",4PLY,NS,LF: Brand: MEDLINE

## (undated) DEVICE — ADHESIVE SKIN CLSR 0.7ML TOP DERMBND ADV

## (undated) DEVICE — ST CHARLES GYN LAPAROSCOPY PK: Brand: MEDLINE INDUSTRIES, INC.

## (undated) DEVICE — SINGLE-USE BIOPSY FORCEPS: Brand: RADIAL JAW 4

## (undated) DEVICE — BLANKET WRM W29.9XL79.1IN UP BODY FORC AIR MISTRAL-AIR

## (undated) DEVICE — BASIN EMSIS 700ML GRAPHITE PLAS KID SHP GRAD

## (undated) DEVICE — BLADELESS OBTURATOR: Brand: WECK VISTA

## (undated) DEVICE — 3M™ WARMING BLANKET, UPPER BODY, 10 PER CASE, 42268: Brand: BAIR HUGGER™

## (undated) DEVICE — CANNULA SEAL

## (undated) DEVICE — GLOVE SURG BEAD CUF 7 STD PF WHT STRL TRIUMPH LT LTX

## (undated) DEVICE — TIP COVER ACCESSORY

## (undated) DEVICE — 3M™ STERI-STRIP™ WOUND CLOSURE SYSTEMS 5 EACH/PACK 25 PACKS/CARTON 4 CARTONS/CASE W8516: Brand: 3M™ STERI-STRIP™

## (undated) DEVICE — MHPB BASIC LAP PACK: Brand: MEDLINE INDUSTRIES, INC.

## (undated) DEVICE — FORCEPS BX L240CM JAW DIA2.8MM L CAP W/ NDL MIC MESH TOOTH

## (undated) DEVICE — SUTURE PDS + SZ 0 L27IN ABSRB VLT L26MM CT 2 1 2 CIR PDP334H

## (undated) DEVICE — JELLY,LUBE,STERILE,FLIP TOP,TUBE,2-OZ: Brand: MEDLINE

## (undated) DEVICE — 4-PORT MANIFOLD: Brand: NEPTUNE 2

## (undated) DEVICE — Z INACTIVE USE 2660664 SOLUTION IRRIG 3000ML 0.9% SOD CHL USP UROMATIC PLAS CONT

## (undated) DEVICE — STRAP,POSITIONING,KNEE/BODY,FOAM,4X60": Brand: MEDLINE

## (undated) DEVICE — GOWN,AURORA,NONRNF,XL,30/CS: Brand: MEDLINE

## (undated) DEVICE — CANNULA IV 18GA L15IN BLNT FILL LUERLOCK HUB MJCT

## (undated) DEVICE — TROCAR ENDOSCP L100MM DIA12MM BLNT TIP OBT RADLUC STBL SL

## (undated) DEVICE — GLOVE ORANGE PI 7   MSG9070

## (undated) DEVICE — SYRINGE MED 10ML LUERLOCK TIP W/O SFTY DISP

## (undated) DEVICE — SYRINGE MED 50ML LUERLOCK TIP

## (undated) DEVICE — SOLUTION ANTIFOG VIS SYS CLEARIFY LAPSCP

## (undated) DEVICE — PROTECTOR ULN NRV PUR FOAM HK LOOP STRP ANATOMICALLY

## (undated) DEVICE — PENCIL ES L3M BTTN SWCH HOLSTER W/ BLDE ELECTRD EDGE

## (undated) DEVICE — SEALER ENDOSCP L37CM NANO COAT BLNT TIP LAP DIV

## (undated) DEVICE — SUTURE VCRL + SZ 4-0 L27IN ABSRB WHT FS-2 3/8 CIR REV CUT VCP422H

## (undated) DEVICE — GLOVE ORTHO 8   MSG9480

## (undated) DEVICE — Device: Brand: DEFENDO VALVE AND CONNECTOR KIT

## (undated) DEVICE — SOLUTION IV IRRIG POUR BRL 0.9% SODIUM CHL 2F7124

## (undated) DEVICE — CLIP INT L POLYMER LOK LIG HEM O LOK

## (undated) DEVICE — ADAPTER,CATHETER/SYRINGE/LUER,STERILE: Brand: MEDLINE

## (undated) DEVICE — POLYP TRAP: Brand: TRAPEASE®

## (undated) DEVICE — TOTAL TRAY, DB, 100% SILI FOLEY, 16FR 10: Brand: MEDLINE

## (undated) DEVICE — ANCHOR TISSUE RETRIEVAL SYSTEM, BAG SIZE 125 ML, PORT SIZE 8 MM: Brand: ANCHOR TISSUE RETRIEVAL SYSTEM

## (undated) DEVICE — Device